# Patient Record
Sex: FEMALE | Race: WHITE | NOT HISPANIC OR LATINO | Employment: OTHER | URBAN - METROPOLITAN AREA
[De-identification: names, ages, dates, MRNs, and addresses within clinical notes are randomized per-mention and may not be internally consistent; named-entity substitution may affect disease eponyms.]

---

## 2017-01-11 PROBLEM — R42 DIZZINESS: Status: ACTIVE | Noted: 2017-01-11

## 2017-01-11 PROBLEM — R53.83 FATIGUE: Status: ACTIVE | Noted: 2017-01-11

## 2017-01-11 PROBLEM — R60.9 EDEMA: Status: ACTIVE | Noted: 2017-01-11

## 2017-01-18 RX ORDER — FUROSEMIDE 20 MG/1
TABLET ORAL
Qty: 24 TABLET | Refills: 3 | Status: SHIPPED | OUTPATIENT
Start: 2017-01-18 | End: 2018-01-31 | Stop reason: SDUPTHER

## 2017-02-08 RX ORDER — LEVOTHYROXINE SODIUM 0.05 MG/1
TABLET ORAL
Qty: 90 TABLET | Refills: 1 | Status: SHIPPED | OUTPATIENT
Start: 2017-02-08 | End: 2017-08-18 | Stop reason: SDUPTHER

## 2017-02-13 RX ORDER — POTASSIUM CHLORIDE 750 MG/1
TABLET, FILM COATED, EXTENDED RELEASE ORAL
Qty: 90 TABLET | Refills: 0 | Status: SHIPPED | OUTPATIENT
Start: 2017-02-13 | End: 2017-11-14

## 2017-03-06 RX ORDER — AMLODIPINE BESYLATE 5 MG/1
TABLET ORAL
Qty: 90 TABLET | Refills: 1 | Status: SHIPPED | OUTPATIENT
Start: 2017-03-06 | End: 2017-03-29 | Stop reason: DRUGHIGH

## 2017-03-16 RX ORDER — ROSUVASTATIN CALCIUM 10 MG/1
TABLET, COATED ORAL
Qty: 90 TABLET | Refills: 0 | Status: SHIPPED | OUTPATIENT
Start: 2017-03-16 | End: 2017-03-20 | Stop reason: SDUPTHER

## 2017-03-20 RX ORDER — METOPROLOL SUCCINATE 50 MG/1
TABLET, EXTENDED RELEASE ORAL
Qty: 90 TABLET | Refills: 1 | Status: SHIPPED | OUTPATIENT
Start: 2017-03-20 | End: 2017-06-26

## 2017-03-20 RX ORDER — ROSUVASTATIN CALCIUM 10 MG/1
TABLET, COATED ORAL
Qty: 90 TABLET | Refills: 0 | Status: SHIPPED | OUTPATIENT
Start: 2017-03-20 | End: 2017-11-14

## 2017-03-20 RX ORDER — METOPROLOL SUCCINATE 50 MG/1
TABLET, EXTENDED RELEASE ORAL
Qty: 90 TABLET | Refills: 1 | Status: SHIPPED | OUTPATIENT
Start: 2017-03-20 | End: 2018-04-04 | Stop reason: SDUPTHER

## 2017-03-29 ENCOUNTER — OFFICE VISIT (OUTPATIENT)
Dept: INTERNAL MEDICINE | Facility: CLINIC | Age: 82
End: 2017-03-29

## 2017-03-29 VITALS
HEART RATE: 60 BPM | WEIGHT: 135.5 LBS | SYSTOLIC BLOOD PRESSURE: 170 MMHG | BODY MASS INDEX: 28.32 KG/M2 | OXYGEN SATURATION: 98 % | DIASTOLIC BLOOD PRESSURE: 80 MMHG

## 2017-03-29 DIAGNOSIS — E78.5 DYSLIPIDEMIA: ICD-10-CM

## 2017-03-29 DIAGNOSIS — E78.5 HYPERLIPIDEMIA LDL GOAL <100: ICD-10-CM

## 2017-03-29 DIAGNOSIS — E03.8 OTHER SPECIFIED HYPOTHYROIDISM: ICD-10-CM

## 2017-03-29 DIAGNOSIS — I10 ESSENTIAL HYPERTENSION: ICD-10-CM

## 2017-03-29 DIAGNOSIS — M89.9 BONE DISORDER: Primary | ICD-10-CM

## 2017-03-29 DIAGNOSIS — K21.9 GASTROESOPHAGEAL REFLUX DISEASE, ESOPHAGITIS PRESENCE NOT SPECIFIED: ICD-10-CM

## 2017-03-29 LAB
ALBUMIN SERPL-MCNC: 4.2 G/DL (ref 3.2–4.8)
ALBUMIN/GLOB SERPL: 1.4 G/DL (ref 1.5–2.5)
ALP SERPL-CCNC: 136 U/L (ref 25–100)
ALT SERPL W P-5'-P-CCNC: 11 U/L (ref 7–40)
ANION GAP SERPL CALCULATED.3IONS-SCNC: 11 MMOL/L (ref 3–11)
ARTICHOKE IGE QN: 89 MG/DL (ref 0–130)
AST SERPL-CCNC: 19 U/L (ref 0–33)
BILIRUB SERPL-MCNC: 0.4 MG/DL (ref 0.3–1.2)
BUN BLD-MCNC: 13 MG/DL (ref 9–23)
BUN/CREAT SERPL: 14.4 (ref 7–25)
CALCIUM SPEC-SCNC: 9.7 MG/DL (ref 8.7–10.4)
CHLORIDE SERPL-SCNC: 102 MMOL/L (ref 99–109)
CHOLEST SERPL-MCNC: 165 MG/DL (ref 0–200)
CO2 SERPL-SCNC: 27 MMOL/L (ref 20–31)
CREAT BLD-MCNC: 0.9 MG/DL (ref 0.6–1.3)
GFR SERPL CREATININE-BSD FRML MDRD: 60 ML/MIN/1.73
GLOBULIN UR ELPH-MCNC: 3.1 GM/DL
GLUCOSE BLD-MCNC: 78 MG/DL (ref 70–100)
HDLC SERPL-MCNC: 64 MG/DL (ref 40–60)
POTASSIUM BLD-SCNC: 4.6 MMOL/L (ref 3.5–5.5)
PROT SERPL-MCNC: 7.3 G/DL (ref 5.7–8.2)
SODIUM BLD-SCNC: 140 MMOL/L (ref 132–146)
TRIGL SERPL-MCNC: 101 MG/DL (ref 0–150)
TSH SERPL DL<=0.05 MIU/L-ACNC: 5.1 MIU/ML (ref 0.35–5.35)

## 2017-03-29 PROCEDURE — 99214 OFFICE O/P EST MOD 30 MIN: CPT | Performed by: INTERNAL MEDICINE

## 2017-03-29 PROCEDURE — 84443 ASSAY THYROID STIM HORMONE: CPT | Performed by: INTERNAL MEDICINE

## 2017-03-29 PROCEDURE — 80061 LIPID PANEL: CPT | Performed by: INTERNAL MEDICINE

## 2017-03-29 PROCEDURE — 80053 COMPREHEN METABOLIC PANEL: CPT | Performed by: INTERNAL MEDICINE

## 2017-03-29 RX ORDER — AMLODIPINE BESYLATE 10 MG/1
10 TABLET ORAL DAILY
Qty: 30 TABLET | Refills: 1 | Status: SHIPPED | OUTPATIENT
Start: 2017-03-29 | End: 2017-05-24 | Stop reason: SDUPTHER

## 2017-03-29 NOTE — PROGRESS NOTES
Subjective   Dorota Membreno is a 85 y.o. female.   Chief Complaint   Patient presents with   • Heartburn   • Hyperlipidemia   • Hypertension   • Dyslipidemia       Heartburn   She reports no abdominal pain, no chest pain, no coughing, no nausea, no sore throat, no stridor, no water brash or no wheezing. This is a chronic problem. Pertinent negatives include no fatigue.   Hyperlipidemia   This is a chronic problem. The current episode started more than 1 year ago. The problem is controlled. Exacerbating diseases include hypothyroidism. Pertinent negatives include no chest pain, myalgias or shortness of breath.   Hypertension   This is a chronic problem. The current episode started more than 1 year ago. Pertinent negatives include no chest pain, headaches, neck pain, palpitations or shortness of breath. There are no compliance problems.    Hypothyroidism   This is a chronic problem. The current episode started more than 1 year ago. Pertinent negatives include no abdominal pain, arthralgias, chest pain, chills, congestion, coughing, diaphoresis, fatigue, fever, headaches, myalgias, nausea, neck pain, numbness, rash, sore throat or vomiting.        The following portions of the patient's history were reviewed and updated as appropriate: allergies, current medications, past family history, past medical history, past social history, past surgical history and problem list.    Review of Systems   Constitutional: Negative for activity change, appetite change, chills, diaphoresis, fatigue, fever and unexpected weight change.   HENT: Negative for congestion, ear discharge, ear pain, mouth sores, nosebleeds, sinus pressure, sneezing and sore throat.    Eyes: Negative for pain, discharge and itching.   Respiratory: Negative for cough, chest tightness, shortness of breath and wheezing.    Cardiovascular: Negative for chest pain, palpitations and leg swelling.   Gastrointestinal: Negative for abdominal pain, constipation,  diarrhea, nausea and vomiting.   Endocrine: Negative for cold intolerance, heat intolerance, polydipsia and polyphagia.   Genitourinary: Negative for dysuria, flank pain, frequency, hematuria and urgency.   Musculoskeletal: Negative for arthralgias, back pain, gait problem, myalgias, neck pain and neck stiffness.   Skin: Negative for color change, pallor and rash.   Neurological: Negative for seizures, speech difficulty, numbness and headaches.   Psychiatric/Behavioral: Negative for agitation, confusion, decreased concentration and sleep disturbance. The patient is not nervous/anxious.      /80  Pulse 60  Wt 135 lb 8 oz (61.5 kg)  SpO2 98%  BMI 28.32 kg/m2    Objective   Physical Exam   Constitutional: She is oriented to person, place, and time. She appears well-developed.   HENT:   Head: Normocephalic.   Right Ear: External ear normal.   Left Ear: External ear normal.   Nose: Nose normal.   Mouth/Throat: Oropharynx is clear and moist.   Eyes: Conjunctivae are normal. Pupils are equal, round, and reactive to light.   Neck: No JVD present. No thyromegaly present.   Cardiovascular: Normal rate, regular rhythm and normal heart sounds.  Exam reveals no friction rub.    No murmur heard.  Pulmonary/Chest: Effort normal and breath sounds normal. No respiratory distress. She has no wheezes. She has no rales.   Abdominal: Soft. Bowel sounds are normal. She exhibits no distension. There is no tenderness. There is no guarding.   Musculoskeletal: She exhibits no edema or tenderness.   Lymphadenopathy:     She has no cervical adenopathy.   Neurological: She is oriented to person, place, and time. She displays normal reflexes. No cranial nerve deficit.   Skin: No rash noted.   Psychiatric: Her behavior is normal.   Nursing note and vitals reviewed.      Assessment/Plan   Dorota was seen today for heartburn, hyperlipidemia, hypertension and dyslipidemia.    Diagnoses and all orders for this visit:    Bone disorder  -      DEXA Bone Density Axial; Future    Essential hypertension  -     amLODIPine (NORVASC) 10 MG tablet; Take 1 tablet by mouth Daily.  -     Lipid Panel  -     Comprehensive Metabolic Panel  -     TSH  BP is up today. She has already taken BP med.  Increase Norvasc to 10 mg and 4 week f/u.  Other specified hypothyroidism  -     Lipid Panel  -     Comprehensive Metabolic Panel  -     TSH  stable  Hyperlipidemia LDL goal <100  -     Lipid Panel  -     Comprehensive Metabolic Panel  -     TSH  stable  Gastroesophageal reflux disease, esophagitis presence not specified    Stable  50% of visit spent counseling

## 2017-03-30 ENCOUNTER — TELEPHONE (OUTPATIENT)
Dept: INTERNAL MEDICINE | Facility: CLINIC | Age: 82
End: 2017-03-30

## 2017-03-30 NOTE — TELEPHONE ENCOUNTER
Called and spoke with pts daughter. Informed her that we do not have Calcium listed on her medication but she could take it otc and to check her medication bottles at home to verify that. Pts daughter verbalized understanding and had no further questions.

## 2017-03-30 NOTE — TELEPHONE ENCOUNTER
"----- Message from Brendan Waldrop sent at 3/30/2017  9:09 AM EDT -----  [phone]    PT'S DAUGHTER IS CALLING, ASKING IF HER MOTHER SHANNA IS ON CALCIUM. THE INFO IS TO GET HER SCHEDULED FOR A BONE DENSITY. I HAD GIVEN HER WEIGHT AND HEIGHT OVER THE PHONE. \"NEELA\" HER DAUGHTER IS ON HER POWER OF  SCANNED INTO EPIC.   "

## 2017-05-01 RX ORDER — OMEPRAZOLE 20 MG/1
CAPSULE, DELAYED RELEASE ORAL
Qty: 30 CAPSULE | Refills: 5 | Status: SHIPPED | OUTPATIENT
Start: 2017-05-01 | End: 2017-11-06 | Stop reason: SDUPTHER

## 2017-05-02 ENCOUNTER — OFFICE VISIT (OUTPATIENT)
Dept: NEUROLOGY | Facility: CLINIC | Age: 82
End: 2017-05-02

## 2017-05-02 VITALS — SYSTOLIC BLOOD PRESSURE: 130 MMHG | WEIGHT: 135 LBS | BODY MASS INDEX: 28.22 KG/M2 | DIASTOLIC BLOOD PRESSURE: 70 MMHG

## 2017-05-02 DIAGNOSIS — G31.84 MILD COGNITIVE IMPAIRMENT: Primary | ICD-10-CM

## 2017-05-02 PROCEDURE — 99214 OFFICE O/P EST MOD 30 MIN: CPT | Performed by: PHYSICIAN ASSISTANT

## 2017-05-05 RX ORDER — MONTELUKAST SODIUM 10 MG/1
TABLET ORAL
Qty: 90 TABLET | Refills: 1 | Status: SHIPPED | OUTPATIENT
Start: 2017-05-05 | End: 2017-11-08 | Stop reason: SDUPTHER

## 2017-05-19 ENCOUNTER — OFFICE VISIT (OUTPATIENT)
Dept: INTERNAL MEDICINE | Facility: CLINIC | Age: 82
End: 2017-05-19

## 2017-05-19 VITALS
HEART RATE: 70 BPM | DIASTOLIC BLOOD PRESSURE: 68 MMHG | OXYGEN SATURATION: 99 % | BODY MASS INDEX: 28.57 KG/M2 | WEIGHT: 136.7 LBS | SYSTOLIC BLOOD PRESSURE: 132 MMHG

## 2017-05-19 DIAGNOSIS — K21.9 GASTROESOPHAGEAL REFLUX DISEASE, ESOPHAGITIS PRESENCE NOT SPECIFIED: ICD-10-CM

## 2017-05-19 DIAGNOSIS — I10 ESSENTIAL HYPERTENSION: ICD-10-CM

## 2017-05-19 DIAGNOSIS — E03.8 OTHER SPECIFIED HYPOTHYROIDISM: Primary | ICD-10-CM

## 2017-05-19 PROCEDURE — 99213 OFFICE O/P EST LOW 20 MIN: CPT | Performed by: INTERNAL MEDICINE

## 2017-05-22 ENCOUNTER — APPOINTMENT (OUTPATIENT)
Dept: MAMMOGRAPHY | Facility: HOSPITAL | Age: 82
End: 2017-05-22
Attending: INTERNAL MEDICINE

## 2017-05-24 DIAGNOSIS — I10 ESSENTIAL HYPERTENSION: ICD-10-CM

## 2017-05-24 RX ORDER — AMLODIPINE BESYLATE 10 MG/1
TABLET ORAL
Qty: 30 TABLET | Refills: 1 | Status: SHIPPED | OUTPATIENT
Start: 2017-05-24 | End: 2017-11-14

## 2017-05-30 RX ORDER — FLUTICASONE PROPIONATE 50 MCG
SPRAY, SUSPENSION (ML) NASAL
Qty: 16 ML | Refills: 3 | Status: SHIPPED | OUTPATIENT
Start: 2017-05-30 | End: 2018-06-08

## 2017-05-31 RX ORDER — LOSARTAN POTASSIUM 100 MG/1
TABLET ORAL
Qty: 90 TABLET | Refills: 1 | Status: SHIPPED | OUTPATIENT
Start: 2017-05-31 | End: 2017-11-14

## 2017-06-22 ENCOUNTER — APPOINTMENT (OUTPATIENT)
Dept: BONE DENSITY | Facility: HOSPITAL | Age: 82
End: 2017-06-22
Attending: INTERNAL MEDICINE

## 2017-06-26 ENCOUNTER — OFFICE VISIT (OUTPATIENT)
Dept: INTERNAL MEDICINE | Facility: CLINIC | Age: 82
End: 2017-06-26

## 2017-06-26 VITALS
BODY MASS INDEX: 28.05 KG/M2 | SYSTOLIC BLOOD PRESSURE: 140 MMHG | WEIGHT: 134.2 LBS | DIASTOLIC BLOOD PRESSURE: 60 MMHG | OXYGEN SATURATION: 98 % | HEART RATE: 71 BPM

## 2017-06-26 DIAGNOSIS — M79.89 LEFT LEG SWELLING: Primary | ICD-10-CM

## 2017-06-26 PROCEDURE — 99213 OFFICE O/P EST LOW 20 MIN: CPT | Performed by: INTERNAL MEDICINE

## 2017-06-26 RX ORDER — AMLODIPINE BESYLATE 2.5 MG/1
2.5 TABLET ORAL DAILY
Qty: 90 TABLET | Refills: 0 | Status: SHIPPED | OUTPATIENT
Start: 2017-06-26 | End: 2017-10-02 | Stop reason: SDUPTHER

## 2017-06-26 NOTE — PROGRESS NOTES
Subjective   Dorota Membreno is a 85 y.o. female.     Chief Complaint   Patient presents with   • Leg Swelling       History of Present Illness   2 week hx leg swelling. Now only in left leg and increased in size. 2  The following portions of the patient's history were reviewed and updated as appropriate: allergies, current medications, past family history, past medical history, past social history, past surgical history and problem list.    Review of Systems   Constitutional: Negative for activity change, appetite change, chills, diaphoresis, fatigue, fever and unexpected weight change.   HENT: Negative for congestion, ear discharge, ear pain, mouth sores, nosebleeds, sinus pressure, sneezing and sore throat.    Eyes: Negative for pain, discharge and itching.   Respiratory: Negative for cough, chest tightness, shortness of breath and wheezing.    Cardiovascular: Positive for leg swelling. Negative for chest pain and palpitations.   Gastrointestinal: Negative for abdominal pain, constipation, diarrhea, nausea and vomiting.   Endocrine: Negative for cold intolerance, heat intolerance, polydipsia and polyphagia.   Genitourinary: Negative for dysuria, flank pain, frequency, hematuria and urgency.   Musculoskeletal: Negative for arthralgias, back pain, gait problem, myalgias, neck pain and neck stiffness.   Skin: Negative for color change, pallor and rash.   Neurological: Negative for seizures, speech difficulty, numbness and headaches.   Psychiatric/Behavioral: Negative for agitation, confusion, decreased concentration and sleep disturbance. The patient is not nervous/anxious.    /60  Pulse 71  Wt 134 lb 3.2 oz (60.9 kg)  SpO2 98%  BMI 28.05 kg/m2      Objective   Physical Exam   Constitutional: She appears well-developed.   HENT:   Head: Normocephalic.   Right Ear: External ear normal.   Left Ear: External ear normal.   Nose: Nose normal.   Mouth/Throat: Oropharynx is clear and moist.   Eyes: Conjunctivae  are normal. Pupils are equal, round, and reactive to light.   Neck: No JVD present. No thyromegaly present.   Cardiovascular: Normal rate, regular rhythm and normal heart sounds.  Exam reveals no friction rub.    No murmur heard.  Pulmonary/Chest: Effort normal and breath sounds normal. No respiratory distress. She has no wheezes. She has no rales.   Abdominal: Soft. Bowel sounds are normal. She exhibits no distension. There is no tenderness. There is no guarding.   Musculoskeletal: She exhibits edema (left leg edema). She exhibits no tenderness.   Lymphadenopathy:     She has no cervical adenopathy.   Neurological: She displays normal reflexes. No cranial nerve deficit.   Skin: No rash noted.   Psychiatric: Her behavior is normal.   Nursing note and vitals reviewed.      Assessment/Plan   Dorota was seen today for leg swelling.    Diagnoses and all orders for this visit:    Left leg swelling  -     Duplex Venous Lower Extremity - Left CAR; Future    Further recommendations after results.    50% of visit spent counseling

## 2017-08-18 RX ORDER — LEVOTHYROXINE SODIUM 0.05 MG/1
TABLET ORAL
Qty: 90 TABLET | Refills: 1 | Status: SHIPPED | OUTPATIENT
Start: 2017-08-18 | End: 2018-02-15 | Stop reason: SDUPTHER

## 2017-08-24 ENCOUNTER — HOSPITAL ENCOUNTER (OUTPATIENT)
Dept: BONE DENSITY | Facility: HOSPITAL | Age: 82
Discharge: HOME | End: 2017-08-24
Attending: INTERNAL MEDICINE | Admitting: INTERNAL MEDICINE

## 2017-08-24 DIAGNOSIS — M89.9 BONE DISORDER: ICD-10-CM

## 2017-08-24 PROCEDURE — 77080 DXA BONE DENSITY AXIAL: CPT

## 2017-09-11 RX ORDER — DONEPEZIL HYDROCHLORIDE 5 MG/1
TABLET, FILM COATED ORAL
Qty: 90 TABLET | Refills: 2 | Status: SHIPPED | OUTPATIENT
Start: 2017-09-11 | End: 2020-01-06

## 2017-09-12 RX ORDER — ROSUVASTATIN CALCIUM 10 MG/1
TABLET, COATED ORAL
Qty: 90 TABLET | Refills: 0 | Status: SHIPPED | OUTPATIENT
Start: 2017-09-12 | End: 2018-02-13 | Stop reason: SDUPTHER

## 2017-10-02 RX ORDER — AMLODIPINE BESYLATE 2.5 MG/1
TABLET ORAL
Qty: 90 TABLET | Refills: 0 | Status: SHIPPED | OUTPATIENT
Start: 2017-10-02 | End: 2017-12-29 | Stop reason: SDUPTHER

## 2017-10-04 ENCOUNTER — OFFICE VISIT (OUTPATIENT)
Dept: INTERNAL MEDICINE | Facility: CLINIC | Age: 82
End: 2017-10-04

## 2017-10-04 VITALS
HEART RATE: 57 BPM | WEIGHT: 135 LBS | DIASTOLIC BLOOD PRESSURE: 60 MMHG | OXYGEN SATURATION: 99 % | SYSTOLIC BLOOD PRESSURE: 140 MMHG | BODY MASS INDEX: 28.22 KG/M2

## 2017-10-04 DIAGNOSIS — I10 ESSENTIAL HYPERTENSION: Primary | ICD-10-CM

## 2017-10-04 DIAGNOSIS — G31.84 MILD COGNITIVE IMPAIRMENT: ICD-10-CM

## 2017-10-04 DIAGNOSIS — E03.8 OTHER SPECIFIED HYPOTHYROIDISM: ICD-10-CM

## 2017-10-04 DIAGNOSIS — E78.5 DYSLIPIDEMIA: ICD-10-CM

## 2017-10-04 DIAGNOSIS — K21.9 GASTROESOPHAGEAL REFLUX DISEASE, ESOPHAGITIS PRESENCE NOT SPECIFIED: ICD-10-CM

## 2017-10-04 LAB
ALBUMIN SERPL-MCNC: 4.1 G/DL (ref 3.2–4.8)
ALBUMIN/GLOB SERPL: 1.4 G/DL (ref 1.5–2.5)
ALP SERPL-CCNC: 115 U/L (ref 25–100)
ALT SERPL W P-5'-P-CCNC: 11 U/L (ref 7–40)
ANION GAP SERPL CALCULATED.3IONS-SCNC: 7 MMOL/L (ref 3–11)
AST SERPL-CCNC: 18 U/L (ref 0–33)
BILIRUB SERPL-MCNC: 0.3 MG/DL (ref 0.3–1.2)
BUN BLD-MCNC: 16 MG/DL (ref 9–23)
BUN/CREAT SERPL: 20 (ref 7–25)
CALCIUM SPEC-SCNC: 9.1 MG/DL (ref 8.7–10.4)
CHLORIDE SERPL-SCNC: 101 MMOL/L (ref 99–109)
CO2 SERPL-SCNC: 28 MMOL/L (ref 20–31)
CREAT BLD-MCNC: 0.8 MG/DL (ref 0.6–1.3)
GFR SERPL CREATININE-BSD FRML MDRD: 68 ML/MIN/1.73
GLOBULIN UR ELPH-MCNC: 3 GM/DL
GLUCOSE BLD-MCNC: 90 MG/DL (ref 70–100)
POTASSIUM BLD-SCNC: 4 MMOL/L (ref 3.5–5.5)
PROT SERPL-MCNC: 7.1 G/DL (ref 5.7–8.2)
SODIUM BLD-SCNC: 136 MMOL/L (ref 132–146)
TSH SERPL DL<=0.05 MIU/L-ACNC: 4.75 MIU/ML (ref 0.35–5.35)

## 2017-10-04 PROCEDURE — G0008 ADMIN INFLUENZA VIRUS VAC: HCPCS | Performed by: INTERNAL MEDICINE

## 2017-10-04 PROCEDURE — 90662 IIV NO PRSV INCREASED AG IM: CPT | Performed by: INTERNAL MEDICINE

## 2017-10-04 PROCEDURE — 99214 OFFICE O/P EST MOD 30 MIN: CPT | Performed by: INTERNAL MEDICINE

## 2017-10-04 PROCEDURE — 84443 ASSAY THYROID STIM HORMONE: CPT | Performed by: INTERNAL MEDICINE

## 2017-10-04 PROCEDURE — 80053 COMPREHEN METABOLIC PANEL: CPT | Performed by: INTERNAL MEDICINE

## 2017-10-04 NOTE — PROGRESS NOTES
Subjective   Dorota Membreno is a 85 y.o. female.   Chief Complaint   Patient presents with   • Follow-up     hypertension, hypothyroidism       Hypertension   This is a chronic problem. The current episode started more than 1 year ago. Pertinent negatives include no chest pain, headaches, neck pain, palpitations, peripheral edema, shortness of breath or sweats.   Hypothyroidism   This is a chronic problem. The current episode started more than 1 year ago. Pertinent negatives include no abdominal pain, arthralgias, chest pain, chills, congestion, coughing, diaphoresis, fatigue, fever, headaches, myalgias, nausea, neck pain, numbness, rash, sore throat or vomiting. The treatment provided moderate relief.   Heartburn   She reports no abdominal pain, no chest pain, no coughing, no nausea, no sore throat, no stridor, no tooth decay, no water brash or no wheezing. This is a chronic problem. The current episode started more than 1 year ago. Pertinent negatives include no fatigue.      Mild cognitive memory.  The following portions of the patient's history were reviewed and updated as appropriate: allergies, current medications, past family history, past medical history, past social history, past surgical history and problem list.    Review of Systems   Constitutional: Negative for activity change, appetite change, chills, diaphoresis, fatigue, fever and unexpected weight change.   HENT: Negative for congestion, ear discharge, ear pain, mouth sores, nosebleeds, sinus pressure, sneezing and sore throat.    Eyes: Negative for pain, discharge and itching.   Respiratory: Negative for cough, chest tightness, shortness of breath and wheezing.    Cardiovascular: Negative for chest pain, palpitations and leg swelling.   Gastrointestinal: Negative for abdominal pain, constipation, diarrhea, nausea and vomiting.   Endocrine: Negative for cold intolerance, heat intolerance, polydipsia and polyphagia.   Genitourinary: Negative for  dysuria, flank pain, frequency, hematuria and urgency.   Musculoskeletal: Negative for arthralgias, back pain, gait problem, myalgias, neck pain and neck stiffness.   Skin: Negative for color change, pallor and rash.   Neurological: Negative for seizures, speech difficulty, numbness and headaches.   Psychiatric/Behavioral: Negative for agitation, confusion, decreased concentration and sleep disturbance. The patient is not nervous/anxious.      /60  Pulse 57  Wt 135 lb (61.2 kg)  SpO2 99%  BMI 28.22 kg/m2    Objective   Physical Exam   Constitutional: She appears well-developed.   HENT:   Head: Normocephalic.   Right Ear: External ear normal.   Left Ear: External ear normal.   Nose: Nose normal.   Mouth/Throat: Oropharynx is clear and moist.   Eyes: Conjunctivae are normal. Pupils are equal, round, and reactive to light.   Neck: No JVD present. No thyromegaly present.   Cardiovascular: Normal rate, regular rhythm and normal heart sounds.  Exam reveals no friction rub.    No murmur heard.  Pulmonary/Chest: Effort normal and breath sounds normal. No respiratory distress. She has no wheezes. She has no rales.   Abdominal: Soft. Bowel sounds are normal. She exhibits no distension. There is no tenderness. There is no guarding.   Musculoskeletal: She exhibits edema (left leg edema). She exhibits no tenderness.   Lymphadenopathy:     She has no cervical adenopathy.   Neurological: She displays normal reflexes. No cranial nerve deficit.   Skin: No rash noted.   Psychiatric: Her behavior is normal.   Nursing note and vitals reviewed.      Assessment/Plan   Dorota was seen today for follow-up.    Diagnoses and all orders for this visit:    Essential hypertension  -     Comprehensive Metabolic Panel    Other specified hypothyroidism  -     TSH    Gastroesophageal reflux disease, esophagitis presence not specified  stable  Dyslipidemia  stable  Mild cognitive impairment  stable  Other orders  -     Flu Vaccine High Dose  PF 65YR+

## 2017-11-06 RX ORDER — OMEPRAZOLE 20 MG/1
CAPSULE, DELAYED RELEASE ORAL
Qty: 30 CAPSULE | Refills: 5 | Status: SHIPPED | OUTPATIENT
Start: 2017-11-06 | End: 2018-03-18 | Stop reason: SDUPTHER

## 2017-11-08 RX ORDER — MONTELUKAST SODIUM 10 MG/1
TABLET ORAL
Qty: 90 TABLET | Refills: 1 | Status: SHIPPED | OUTPATIENT
Start: 2017-11-08

## 2017-11-09 ENCOUNTER — TELEPHONE (OUTPATIENT)
Dept: NEUROLOGY | Facility: CLINIC | Age: 82
End: 2017-11-09
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
## 2017-11-14 ENCOUNTER — OFFICE VISIT (OUTPATIENT)
Dept: NEUROLOGY | Facility: CLINIC | Age: 82
End: 2017-11-14

## 2017-11-14 VITALS — DIASTOLIC BLOOD PRESSURE: 74 MMHG | SYSTOLIC BLOOD PRESSURE: 130 MMHG | BODY MASS INDEX: 28.22 KG/M2 | WEIGHT: 135 LBS

## 2017-11-14 DIAGNOSIS — F02.80 LATE ONSET ALZHEIMER'S DISEASE WITHOUT BEHAVIORAL DISTURBANCE (HCC): Primary | ICD-10-CM

## 2017-11-14 DIAGNOSIS — G30.1 LATE ONSET ALZHEIMER'S DISEASE WITHOUT BEHAVIORAL DISTURBANCE (HCC): Primary | ICD-10-CM

## 2017-11-14 PROCEDURE — 99214 OFFICE O/P EST MOD 30 MIN: CPT | Performed by: PHYSICIAN ASSISTANT

## 2017-11-14 NOTE — PROGRESS NOTES
Subjective     Chief Complaint: memory loss     History of Present Illness   Dorota Membreno is a 86 y.o. female who returns to clinic today for evaluation of cognitive impairment. She has noted problems since early 2013. She initially noted trouble with naming and forgetfulness. This has worsened over time. There are additional impairments in orientation and executive function. Her family notes that she has had difficulty managing her finances.      At her initial evaluation in 12/13, her MMSE score was 27, but her MoCA=25 (0/5 recall). Therefore, a diagnosis of MCI was made and donepezil started. She was unable to tolerate Donepezil at 10mg due to upset stomach and diarrhea. She is currently doing well on this medication at 5mg daily. She was also unable to tolerate Namenda XR due to dizziness. Workup has included a normal head CT along with CBC/CMP/TFT's/B12/Folate.    Since her last visit in 5/17, she and her family have noted a cognitive decline.      I have reviewed and confirmed the past family, social and medical history as accurate on 11/14/17.     Review of Systems   Constitutional: Negative.    HENT: Negative.    Eyes: Negative.    Respiratory: Negative.    Cardiovascular: Negative.    Gastrointestinal: Negative.    Endocrine: Negative.    Genitourinary: Negative.    Musculoskeletal: Negative.    Skin: Negative.    Allergic/Immunologic: Negative.    Neurological:        As noted in HPI   Hematological: Negative.    Psychiatric/Behavioral: Negative.        Objective     /74  Wt 135 lb (61.2 kg)  BMI 28.22 kg/m2    General appearance today is normal.       Physical Exam   Constitutional: She is oriented to person, place, and time.   Neurological: She is oriented to person, place, and time. She has normal strength. She has a normal Finger-Nose-Finger Test.   Psychiatric: Her speech is normal.        Neurologic Exam     Mental Status   Oriented to person, place, and time.   Registration: recalls 3 of  3 objects. Recall of objects at 5 minutes: 0/3 recall. Follows 3 step commands.   Attention: 2/5 sequencing.   Speech: speech is normal   Level of consciousness: alert  Able to name object. Able to read. Able to repeat. Able to write. Normal comprehension.     Cranial Nerves   Cranial nerves II through XII intact.     Motor Exam   Muscle bulk: normal  Overall muscle tone: normal    Strength   Strength 5/5 throughout.     Sensory Exam   Light touch normal.     Gait, Coordination, and Reflexes     Coordination   Finger to nose coordination: normal    Tremor   Resting tremor: absent        Results  MMSE=23 (26 in 5/17)       Assessment/Plan   Dorota was seen today for memory loss.    Diagnoses and all orders for this visit:    Late onset Alzheimer's disease without behavioral disturbance  -     Ambulatory Referral to Occupational Therapy          Discussion/Summary   Dorota Membreno comes to clinic today for evaluation of cognitive impairment. Given her history and examination today, including cognitive bedside testing, I am concerned about underlying Alzheimer's Disease. This was discussed in detail with the patient and her family. I again reviewed her current status and treatment options. After discussing potential treatment options, it was elected to continue on  donepezil 5mg daily. I have also made a referral to [facility] for a driving evaluation. She will then follow up in 6 months, or sooner if needed.       I spent 20 minutes out of 30  minutes face to face with the patient and family and discussing diagnosis, prognosis, evaluation, current status, treatment options and management.    As part of this visit I obtained additional history from the family which is incorporated in the HPI.      Deborah Corley PA-C

## 2017-11-21 RX ORDER — POTASSIUM CHLORIDE 750 MG/1
TABLET, FILM COATED, EXTENDED RELEASE ORAL
Qty: 90 TABLET | Refills: 0 | OUTPATIENT
Start: 2017-11-21

## 2017-12-02 RX ORDER — POTASSIUM CHLORIDE 750 MG/1
TABLET, FILM COATED, EXTENDED RELEASE ORAL
Qty: 90 TABLET | Refills: 0 | Status: SHIPPED | OUTPATIENT
Start: 2017-12-02 | End: 2023-03-09 | Stop reason: HOSPADM

## 2017-12-28 ENCOUNTER — TELEPHONE (OUTPATIENT)
Dept: NEUROLOGY | Facility: CLINIC | Age: 82
End: 2017-12-28

## 2017-12-29 RX ORDER — AMLODIPINE BESYLATE 2.5 MG/1
TABLET ORAL
Qty: 90 TABLET | Refills: 0 | Status: SHIPPED | OUTPATIENT
Start: 2017-12-29 | End: 2018-03-31 | Stop reason: SDUPTHER

## 2018-01-31 RX ORDER — FUROSEMIDE 20 MG/1
TABLET ORAL
Qty: 24 TABLET | Refills: 0 | Status: SHIPPED | OUTPATIENT
Start: 2018-01-31 | End: 2018-03-29 | Stop reason: SDUPTHER

## 2018-02-08 ENCOUNTER — OFFICE VISIT (OUTPATIENT)
Dept: INTERNAL MEDICINE | Facility: CLINIC | Age: 83
End: 2018-02-08

## 2018-02-08 VITALS
HEART RATE: 73 BPM | DIASTOLIC BLOOD PRESSURE: 70 MMHG | SYSTOLIC BLOOD PRESSURE: 128 MMHG | BODY MASS INDEX: 27.59 KG/M2 | OXYGEN SATURATION: 98 % | WEIGHT: 132 LBS

## 2018-02-08 DIAGNOSIS — B02.9 HERPES ZOSTER WITHOUT COMPLICATION: Primary | ICD-10-CM

## 2018-02-08 PROCEDURE — 99213 OFFICE O/P EST LOW 20 MIN: CPT | Performed by: PHYSICIAN ASSISTANT

## 2018-02-08 RX ORDER — VALACYCLOVIR HYDROCHLORIDE 1 G/1
1000 TABLET, FILM COATED ORAL 3 TIMES DAILY
Qty: 21 TABLET | Refills: 0 | Status: SHIPPED | OUTPATIENT
Start: 2018-02-08 | End: 2018-02-12

## 2018-02-08 RX ORDER — VALACYCLOVIR HYDROCHLORIDE 1 G/1
1000 TABLET, FILM COATED ORAL 3 TIMES DAILY
Qty: 21 TABLET | Refills: 0 | Status: SHIPPED | OUTPATIENT
Start: 2018-02-08 | End: 2018-02-08 | Stop reason: SDUPTHER

## 2018-02-08 NOTE — PROGRESS NOTES
Chief Complaint   Patient presents with   • Rash under breasts and back x1 week       Subjective   Dorota Membreno is a 86 y.o. female.       History of Present Illness     Pt has had a mild rash under breasts for several months, worsened about a week ago under right breast and has moved around to her back, stops at midline. Rash has blistered and become painful and itchy. Has not tried anything on it or taken anything for pain.      Current Outpatient Prescriptions:   •  albuterol (PROVENTIL) (2.5 MG/3ML) 0.083% nebulizer solution, Albuterol Sulfate (2.5 MG/3ML) 0.083% Inhalation Nebulization Solution; Patient Sig: Albuterol Sulfate (2.5 MG/3ML) 0.083% Inhalation Nebulization Solution prn; 0; 01-May-2013; Active, Disp: , Rfl:   •  amLODIPine (NORVASC) 2.5 MG tablet, TAKE 1 TABLET BY MOUTH EVERY DAY, Disp: 90 tablet, Rfl: 0  •  donepezil (ARICEPT) 5 MG tablet, TAKE 1 TABLET BY MOUTH AT BEDTIME, Disp: 90 tablet, Rfl: 2  •  fluticasone (FLONASE) 50 MCG/ACT nasal spray, USE 1 SPRAY IN EACH NOSTRIL TWICE DAILY, Disp: 16 mL, Rfl: 3  •  furosemide (LASIX) 20 MG tablet, TAKE 1 TABLET BY MOUTH DAILY ON MONDAY AND THURSDAY, Disp: 24 tablet, Rfl: 0  •  KLOR-CON 10 MEQ CR tablet, TAKE 1 TABLET BY MOUTH: MON,WED,FRI, Disp: 90 tablet, Rfl: 0  •  levothyroxine (SYNTHROID, LEVOTHROID) 50 MCG tablet, TAKE 1 TABLET EVERY DAY, Disp: 90 tablet, Rfl: 1  •  metoprolol succinate XL (TOPROL-XL) 50 MG 24 hr tablet, TAKE 1 TABLET BY MOUTH EVERY DAY, Disp: 90 tablet, Rfl: 1  •  montelukast (SINGULAIR) 10 MG tablet, TAKE 1 TABLET BY MOUTH EVERY NIGHT., Disp: 90 tablet, Rfl: 1  •  omeprazole (priLOSEC) 20 MG capsule, TAKE 1 CAPSULE BY MOUTH DAILY., Disp: 30 capsule, Rfl: 5  •  rosuvastatin (CRESTOR) 10 MG tablet, TAKE 1 TABLET BY MOUTH MONDAY,WEDNESDAY,FRIDAY, Disp: 90 tablet, Rfl: 0  •  valACYclovir (VALTREX) 1000 MG tablet, Take 1 tablet by mouth 3 (Three) Times a Day., Disp: 21 tablet, Rfl: 0     PMFSH  The following portions of the  patient's history were reviewed and updated as appropriate: allergies, current medications, past family history, past medical history, past social history, past surgical history and problem list.    Review of Systems   Constitutional: Negative for activity change, appetite change, fatigue, fever and unexpected weight change.   HENT: Negative for facial swelling, mouth sores and trouble swallowing.    Eyes: Negative for pain, discharge, itching and visual disturbance.   Respiratory: Negative for chest tightness, shortness of breath and wheezing.    Cardiovascular: Negative for chest pain and palpitations.   Gastrointestinal: Negative for abdominal pain, diarrhea, nausea and vomiting.   Endocrine: Negative.    Genitourinary: Negative.    Musculoskeletal: Negative for joint swelling.   Skin: Positive for rash.   Allergic/Immunologic: Negative.    Neurological: Negative for seizures and syncope.   Hematological: Does not bruise/bleed easily.   Psychiatric/Behavioral: Negative.        Objective   /70  Pulse 73  Wt 59.9 kg (132 lb)  SpO2 98%  BMI 27.59 kg/m2    Physical Exam   Constitutional: She is oriented to person, place, and time. She appears well-developed and well-nourished. No distress.   HENT:   Head: Normocephalic and atraumatic.   Right Ear: External ear normal.   Left Ear: External ear normal.   Eyes: Conjunctivae and EOM are normal. Pupils are equal, round, and reactive to light. Right eye exhibits no discharge. Left eye exhibits no discharge. No scleral icterus.   Neck: Normal range of motion. Neck supple.   Pulmonary/Chest: Effort normal.   Musculoskeletal: Normal range of motion.   Neurological: She is alert and oriented to person, place, and time. She exhibits normal muscle tone.   Skin: Skin is warm and dry. Rash noted. Rash is vesicular. She is not diaphoretic.        Psychiatric: She has a normal mood and affect. Her behavior is normal. Judgment and thought content normal.   Nursing note and  vitals reviewed.      Results for orders placed or performed in visit on 10/04/17   Comprehensive Metabolic Panel   Result Value Ref Range    Glucose 90 70 - 100 mg/dL    BUN 16 9 - 23 mg/dL    Creatinine 0.80 0.60 - 1.30 mg/dL    Sodium 136 132 - 146 mmol/L    Potassium 4.0 3.5 - 5.5 mmol/L    Chloride 101 99 - 109 mmol/L    CO2 28.0 20.0 - 31.0 mmol/L    Calcium 9.1 8.7 - 10.4 mg/dL    Total Protein 7.1 5.7 - 8.2 g/dL    Albumin 4.10 3.20 - 4.80 g/dL    ALT (SGPT) 11 7 - 40 U/L    AST (SGOT) 18 0 - 33 U/L    Alkaline Phosphatase 115 (H) 25 - 100 U/L    Total Bilirubin 0.3 0.3 - 1.2 mg/dL    eGFR Non African Amer 68 >60 mL/min/1.73    Globulin 3.0 gm/dL    A/G Ratio 1.4 (L) 1.5 - 2.5 g/dL    BUN/Creatinine Ratio 20.0 7.0 - 25.0    Anion Gap 7.0 3.0 - 11.0 mmol/L   TSH   Result Value Ref Range    TSH 4.750 0.350 - 5.350 mIU/mL        ASSESSMENT/PLAN    Problem List Items Addressed This Visit     None      Visit Diagnoses     Herpes zoster without complication    -  Primary    Start valtrex as directed. Use otc tylenol or ibuprofen prn for discomfort, use otc hydrocortisone cream for itching. RTC if worsening or not resolving.    Relevant Medications    valACYclovir (VALTREX) 1000 MG tablet               Return if symptoms worsen or fail to improve.

## 2018-02-12 ENCOUNTER — OFFICE VISIT (OUTPATIENT)
Dept: INTERNAL MEDICINE | Facility: CLINIC | Age: 83
End: 2018-02-12

## 2018-02-12 VITALS
BODY MASS INDEX: 27.17 KG/M2 | OXYGEN SATURATION: 98 % | DIASTOLIC BLOOD PRESSURE: 60 MMHG | HEART RATE: 71 BPM | WEIGHT: 130 LBS | SYSTOLIC BLOOD PRESSURE: 138 MMHG | TEMPERATURE: 97.1 F

## 2018-02-12 DIAGNOSIS — B02.8 HERPES ZOSTER WITH OTHER COMPLICATION: Primary | ICD-10-CM

## 2018-02-12 PROBLEM — B02.9 SHINGLES: Status: ACTIVE | Noted: 2018-02-12

## 2018-02-12 PROCEDURE — 99213 OFFICE O/P EST LOW 20 MIN: CPT | Performed by: PHYSICIAN ASSISTANT

## 2018-02-12 RX ORDER — ACYCLOVIR 800 MG/1
800 TABLET ORAL
Qty: 35 TABLET | Refills: 0 | Status: SHIPPED | OUTPATIENT
Start: 2018-02-12 | End: 2018-02-19

## 2018-02-12 RX ORDER — DOXYCYCLINE 100 MG/1
100 CAPSULE ORAL EVERY 12 HOURS SCHEDULED
Qty: 20 CAPSULE | Refills: 0 | Status: SHIPPED | OUTPATIENT
Start: 2018-02-12 | End: 2018-06-08

## 2018-02-12 NOTE — PROGRESS NOTES
Chief Complaint   Patient presents with   • Follow-up     shingles       Subjective   Dorota Membreno is a 86 y.o. female.       History of Present Illness     Since last visit, pt took one of valtrex tablets and could not tolerate it- vomited after several hours of nausea. She has continued to have worsening rash, in same dermatomal distribution. Did not use hydrocortisone cream or ibuprofen for pain relief. Rash under her breast is uncomfortable and turning somewhat red. Pain is uncomfortable, not intolerable. Pt does not want to take more medication to help with pain.     Pt is here with her dtr-in-law who notes that pt called her son (DIL's ) several times over the weekend saying she was short of breath but when he came to the house, was feeling fine. Thinks she was feeling anxious due to frustration with shingles.     Current Outpatient Prescriptions:   •  albuterol (PROVENTIL) (2.5 MG/3ML) 0.083% nebulizer solution, Albuterol Sulfate (2.5 MG/3ML) 0.083% Inhalation Nebulization Solution; Patient Sig: Albuterol Sulfate (2.5 MG/3ML) 0.083% Inhalation Nebulization Solution prn; 0; 01-May-2013; Active, Disp: , Rfl:   •  amLODIPine (NORVASC) 2.5 MG tablet, TAKE 1 TABLET BY MOUTH EVERY DAY, Disp: 90 tablet, Rfl: 0  •  donepezil (ARICEPT) 5 MG tablet, TAKE 1 TABLET BY MOUTH AT BEDTIME, Disp: 90 tablet, Rfl: 2  •  fluticasone (FLONASE) 50 MCG/ACT nasal spray, USE 1 SPRAY IN EACH NOSTRIL TWICE DAILY, Disp: 16 mL, Rfl: 3  •  furosemide (LASIX) 20 MG tablet, TAKE 1 TABLET BY MOUTH DAILY ON MONDAY AND THURSDAY, Disp: 24 tablet, Rfl: 0  •  KLOR-CON 10 MEQ CR tablet, TAKE 1 TABLET BY MOUTH: MON,WED,FRI, Disp: 90 tablet, Rfl: 0  •  levothyroxine (SYNTHROID, LEVOTHROID) 50 MCG tablet, TAKE 1 TABLET EVERY DAY, Disp: 90 tablet, Rfl: 1  •  metoprolol succinate XL (TOPROL-XL) 50 MG 24 hr tablet, TAKE 1 TABLET BY MOUTH EVERY DAY, Disp: 90 tablet, Rfl: 1  •  montelukast (SINGULAIR) 10 MG tablet, TAKE 1 TABLET BY MOUTH EVERY  NIGHT., Disp: 90 tablet, Rfl: 1  •  omeprazole (priLOSEC) 20 MG capsule, TAKE 1 CAPSULE BY MOUTH DAILY., Disp: 30 capsule, Rfl: 5  •  rosuvastatin (CRESTOR) 10 MG tablet, TAKE 1 TABLET BY MOUTH MONDAY,WEDNESDAY,FRIDAY, Disp: 90 tablet, Rfl: 0  •  acyclovir (ZOVIRAX) 800 MG tablet, Take 1 tablet by mouth 5 (Five) Times a Day. Take no more than 5 doses a day., Disp: 35 tablet, Rfl: 0  •  doxycycline (MONODOX) 100 MG capsule, Take 1 capsule by mouth Every 12 (Twelve) Hours., Disp: 20 capsule, Rfl: 0     PMFSH  The following portions of the patient's history were reviewed and updated as appropriate: allergies, current medications, past family history, past medical history, past social history, past surgical history and problem list.    Review of Systems   Constitutional: Negative for activity change, appetite change, fatigue, fever and unexpected weight change.   HENT: Negative for facial swelling, mouth sores and trouble swallowing.    Eyes: Negative for pain, discharge, itching and visual disturbance.   Respiratory: Negative for chest tightness, shortness of breath and wheezing.    Cardiovascular: Negative for chest pain and palpitations.   Gastrointestinal: Negative for abdominal pain, diarrhea, nausea and vomiting.   Endocrine: Negative.    Genitourinary: Negative.    Musculoskeletal: Negative for joint swelling.   Skin: Positive for rash.   Allergic/Immunologic: Negative.    Neurological: Negative for seizures and syncope.   Hematological: Does not bruise/bleed easily.   Psychiatric/Behavioral: Negative.        Objective   /60  Pulse 71  Temp 97.1 °F (36.2 °C)  Wt 59 kg (130 lb)  SpO2 98%  BMI 27.17 kg/m2    Physical Exam   Constitutional: She is oriented to person, place, and time. She appears well-developed and well-nourished. No distress.   HENT:   Head: Normocephalic and atraumatic.   Right Ear: External ear normal.   Left Ear: External ear normal.   Eyes: Conjunctivae and EOM are normal. Pupils are  equal, round, and reactive to light. Right eye exhibits no discharge. Left eye exhibits no discharge. No scleral icterus.   Neck: Normal range of motion. Neck supple.   Pulmonary/Chest: Effort normal.   Musculoskeletal: Normal range of motion.   Neurological: She is alert and oriented to person, place, and time. She exhibits normal muscle tone.   Skin: Skin is warm and dry. Rash (some slightly ulcerated lesions with surrounding redness under right breast) noted. Rash is pustular and vesicular. She is not diaphoretic.        Psychiatric: She has a normal mood and affect. Her behavior is normal. Judgment and thought content normal.   Nursing note and vitals reviewed.      Results for orders placed or performed in visit on 10/04/17   Comprehensive Metabolic Panel   Result Value Ref Range    Glucose 90 70 - 100 mg/dL    BUN 16 9 - 23 mg/dL    Creatinine 0.80 0.60 - 1.30 mg/dL    Sodium 136 132 - 146 mmol/L    Potassium 4.0 3.5 - 5.5 mmol/L    Chloride 101 99 - 109 mmol/L    CO2 28.0 20.0 - 31.0 mmol/L    Calcium 9.1 8.7 - 10.4 mg/dL    Total Protein 7.1 5.7 - 8.2 g/dL    Albumin 4.10 3.20 - 4.80 g/dL    ALT (SGPT) 11 7 - 40 U/L    AST (SGOT) 18 0 - 33 U/L    Alkaline Phosphatase 115 (H) 25 - 100 U/L    Total Bilirubin 0.3 0.3 - 1.2 mg/dL    eGFR Non African Amer 68 >60 mL/min/1.73    Globulin 3.0 gm/dL    A/G Ratio 1.4 (L) 1.5 - 2.5 g/dL    BUN/Creatinine Ratio 20.0 7.0 - 25.0    Anion Gap 7.0 3.0 - 11.0 mmol/L   TSH   Result Value Ref Range    TSH 4.750 0.350 - 5.350 mIU/mL        ASSESSMENT/PLAN    Problem List Items Addressed This Visit        Other    Shingles - Primary     Worsening d/t pt not taking antiviral. Switch to acyclovir to see if better tolerated. Add doxycycline to treat newly forming surrounding cellulitis. Keep clean and dry. Ordered HH to see if they can help with monitoring lesions and potential dressing of the rash. Follow up with Dr Zapata on Friday. Call if any medication is not tolerated,  return with any worsening symptoms.         Relevant Medications    acyclovir (ZOVIRAX) 800 MG tablet    doxycycline (MONODOX) 100 MG capsule    Other Relevant Orders    Ambulatory Referral to Home Health               Return in about 4 days (around 2/16/2018) for Recheck with Dr Zapata.

## 2018-02-12 NOTE — TELEPHONE ENCOUNTER
NEELA WOULD LIKE TO SPEAK WITH SE ABOUT THE SHINGLES MEDICATION THAT SHE WAS PUT ON.  SHANNA HAS BEEN VOMITING AND VERY WEAK.  NEELA, HER DAUGHTER IN LAW WOULD LIKE TO SPEAK WITH SE BEFORE BRINGING Shanna IN BECAUSE SHE NEEDS TO TELL HER SOME THINGS NOT IN FRONT OF SHANNA BECAUSE OF THE ALZHEIMERS.    pLEASE CALL NEELA BACK [phone] ASAP.  THANKS.

## 2018-02-13 RX ORDER — ROSUVASTATIN CALCIUM 10 MG/1
10 TABLET, COATED ORAL NIGHTLY
Qty: 90 TABLET | Refills: 1 | Status: SHIPPED | OUTPATIENT
Start: 2018-02-13 | End: 2018-06-08

## 2018-02-13 NOTE — ASSESSMENT & PLAN NOTE
Worsening d/t pt not taking antiviral. Switch to acyclovir to see if better tolerated. Add doxycycline to treat newly forming surrounding cellulitis. Keep clean and dry. Ordered HH to see if they can help with monitoring lesions and potential dressing of the rash. Follow up with Dr Zapata on Friday. Call if any medication is not tolerated, return with any worsening symptoms.

## 2018-02-15 RX ORDER — LEVOTHYROXINE SODIUM 0.05 MG/1
TABLET ORAL
Qty: 90 TABLET | Refills: 1 | Status: SHIPPED | OUTPATIENT
Start: 2018-02-15 | End: 2018-02-19 | Stop reason: SDUPTHER

## 2018-02-15 NOTE — TELEPHONE ENCOUNTER
Sidra with [facility] went out to provide home health services, but pt's insurance requires her to be home bound and pt is not so she will not be able to continue care with her.

## 2018-02-19 ENCOUNTER — OFFICE VISIT (OUTPATIENT)
Dept: INTERNAL MEDICINE | Facility: CLINIC | Age: 83
End: 2018-02-19

## 2018-02-19 VITALS
WEIGHT: 129.9 LBS | HEART RATE: 66 BPM | SYSTOLIC BLOOD PRESSURE: 140 MMHG | OXYGEN SATURATION: 98 % | DIASTOLIC BLOOD PRESSURE: 90 MMHG | BODY MASS INDEX: 27.15 KG/M2

## 2018-02-19 DIAGNOSIS — Z86.19 HISTORY OF SHINGLES: Primary | ICD-10-CM

## 2018-02-19 PROCEDURE — 99213 OFFICE O/P EST LOW 20 MIN: CPT | Performed by: INTERNAL MEDICINE

## 2018-02-19 RX ORDER — LEVOTHYROXINE SODIUM 0.05 MG/1
50 TABLET ORAL DAILY
Qty: 90 TABLET | Refills: 1 | Status: SHIPPED | OUTPATIENT
Start: 2018-02-19 | End: 2019-12-11 | Stop reason: SDUPTHER

## 2018-02-19 RX ORDER — NYSTATIN AND TRIAMCINOLONE ACETONIDE 100000; 1 [USP'U]/G; MG/G
OINTMENT TOPICAL 2 TIMES DAILY
Qty: 30 G | Refills: 0 | Status: SHIPPED | OUTPATIENT
Start: 2018-02-19 | End: 2018-03-10 | Stop reason: SDUPTHER

## 2018-02-19 NOTE — PROGRESS NOTES
Subjective   Dorota Membreno is a 86 y.o. female.   Chief Complaint   Patient presents with   • Herpes Zoster       History of Present Illness   Seen by NP and was given Valtrex but she did not tolerate it so did not take after 1 dose.  Acyclovir she did not . Got a secondary infection and started on doxycycline.   The following portions of the patient's history were reviewed and updated as appropriate: allergies, current medications, past family history, past medical history, past social history, past surgical history and problem list.    Review of Systems   Constitutional: Negative for activity change, appetite change, chills, diaphoresis, fatigue, fever and unexpected weight change.   HENT: Negative for congestion, ear discharge, ear pain, mouth sores, nosebleeds, sinus pressure, sneezing and sore throat.    Eyes: Negative for pain, discharge and itching.   Respiratory: Negative for cough, chest tightness, shortness of breath and wheezing.    Cardiovascular: Negative for chest pain, palpitations and leg swelling.   Gastrointestinal: Negative for abdominal pain, constipation, diarrhea, nausea and vomiting.   Endocrine: Negative for cold intolerance, heat intolerance, polydipsia and polyphagia.   Genitourinary: Negative for dysuria, flank pain, frequency, hematuria and urgency.   Musculoskeletal: Negative for arthralgias, back pain, gait problem, myalgias, neck pain and neck stiffness.   Skin: Negative for color change, pallor and rash.   Neurological: Negative for seizures, speech difficulty, numbness and headaches.   Psychiatric/Behavioral: Negative for agitation, confusion, decreased concentration and sleep disturbance. The patient is not nervous/anxious.        Objective   Physical Exam   Constitutional: She appears well-developed.   HENT:   Head: Normocephalic.   Right Ear: External ear normal.   Left Ear: External ear normal.   Nose: Nose normal.   Mouth/Throat: Oropharynx is clear and moist.   Eyes:  Conjunctivae are normal. Pupils are equal, round, and reactive to light.   Neck: No JVD present. No thyromegaly present.   Cardiovascular: Normal rate, regular rhythm and normal heart sounds.  Exam reveals no friction rub.    No murmur heard.  Pulmonary/Chest: No respiratory distress. She has no wheezes. She has no rales.   Abdominal: She exhibits no distension. There is no tenderness. There is no guarding.   Musculoskeletal: She exhibits no edema or tenderness.   Lymphadenopathy:     She has no cervical adenopathy.   Neurological: She displays normal reflexes. No cranial nerve deficit.   Skin: No rash noted.        Psychiatric: Her behavior is normal.   Nursing note and vitals reviewed.      Assessment/Plan   Dorota was seen today for herpes zoster.    Diagnoses and all orders for this visit:    History of shingles  Healing, appears start of yeast under right breast. Addd mycolog ointment  Other orders  -     levothyroxine (SYNTHROID, LEVOTHROID) 50 MCG tablet; Take 1 tablet by mouth Daily.  -     nystatin-triamcinolone (MYCOLOG) 965699-4.1 UNIT/GM-% ointment; Apply  topically 2 (Two) Times a Day.

## 2018-03-08 ENCOUNTER — HOSPITAL ENCOUNTER (EMERGENCY)
Facility: HOSPITAL | Age: 83
Discharge: HOME | End: 2018-03-09
Attending: EMERGENCY MEDICINE | Admitting: EMERGENCY MEDICINE

## 2018-03-08 ENCOUNTER — APPOINTMENT (OUTPATIENT)
Dept: GENERAL RADIOLOGY | Facility: HOSPITAL | Age: 83
End: 2018-03-08

## 2018-03-08 DIAGNOSIS — I10 ESSENTIAL HYPERTENSION: ICD-10-CM

## 2018-03-08 DIAGNOSIS — F41.0 PANIC ATTACKS: ICD-10-CM

## 2018-03-08 DIAGNOSIS — F02.80 LATE ONSET ALZHEIMER'S DISEASE WITHOUT BEHAVIORAL DISTURBANCE (HCC): ICD-10-CM

## 2018-03-08 DIAGNOSIS — Z86.19 HISTORY OF SHINGLES: ICD-10-CM

## 2018-03-08 DIAGNOSIS — G30.1 LATE ONSET ALZHEIMER'S DISEASE WITHOUT BEHAVIORAL DISTURBANCE (HCC): ICD-10-CM

## 2018-03-08 DIAGNOSIS — F41.1 ANXIETY IN ACUTE STRESS REACTION: Primary | ICD-10-CM

## 2018-03-08 DIAGNOSIS — F43.0 ANXIETY IN ACUTE STRESS REACTION: Primary | ICD-10-CM

## 2018-03-08 LAB
ALBUMIN SERPL-MCNC: 4.1 G/DL (ref 3.2–4.8)
ALBUMIN/GLOB SERPL: 1.2 G/DL (ref 1.5–2.5)
ALP SERPL-CCNC: 104 U/L (ref 25–100)
ALT SERPL W P-5'-P-CCNC: 13 U/L (ref 7–40)
ANION GAP SERPL CALCULATED.3IONS-SCNC: 15 MMOL/L (ref 3–11)
AST SERPL-CCNC: 28 U/L (ref 0–33)
BASOPHILS # BLD AUTO: 0.05 10*3/MM3 (ref 0–0.2)
BASOPHILS NFR BLD AUTO: 0.6 % (ref 0–1)
BILIRUB SERPL-MCNC: 0.3 MG/DL (ref 0.3–1.2)
BNP SERPL-MCNC: 103 PG/ML (ref 0–100)
BUN BLD-MCNC: 10 MG/DL (ref 9–23)
BUN/CREAT SERPL: 11.1 (ref 7–25)
CALCIUM SPEC-SCNC: 8.9 MG/DL (ref 8.7–10.4)
CHLORIDE SERPL-SCNC: 94 MMOL/L (ref 99–109)
CO2 SERPL-SCNC: 27 MMOL/L (ref 20–31)
CREAT BLD-MCNC: 0.9 MG/DL (ref 0.6–1.3)
DEPRECATED RDW RBC AUTO: 45.1 FL (ref 37–54)
EOSINOPHIL # BLD AUTO: 0.18 10*3/MM3 (ref 0–0.3)
EOSINOPHIL NFR BLD AUTO: 2 % (ref 0–3)
ERYTHROCYTE [DISTWIDTH] IN BLOOD BY AUTOMATED COUNT: 14.4 % (ref 11.3–14.5)
GFR SERPL CREATININE-BSD FRML MDRD: 59 ML/MIN/1.73
GLOBULIN UR ELPH-MCNC: 3.3 GM/DL
GLUCOSE BLD-MCNC: 110 MG/DL (ref 70–100)
HCT VFR BLD AUTO: 39.3 % (ref 34.5–44)
HGB BLD-MCNC: 12.9 G/DL (ref 11.5–15.5)
HOLD SPECIMEN: NORMAL
HOLD SPECIMEN: NORMAL
IMM GRANULOCYTES # BLD: 0.03 10*3/MM3 (ref 0–0.03)
IMM GRANULOCYTES NFR BLD: 0.3 % (ref 0–0.6)
LIPASE SERPL-CCNC: 33 U/L (ref 6–51)
LYMPHOCYTES # BLD AUTO: 2.61 10*3/MM3 (ref 0.6–4.8)
LYMPHOCYTES NFR BLD AUTO: 28.9 % (ref 24–44)
MCH RBC QN AUTO: 28.4 PG (ref 27–31)
MCHC RBC AUTO-ENTMCNC: 32.8 G/DL (ref 32–36)
MCV RBC AUTO: 86.4 FL (ref 80–99)
MONOCYTES # BLD AUTO: 0.87 10*3/MM3 (ref 0–1)
MONOCYTES NFR BLD AUTO: 9.6 % (ref 0–12)
NEUTROPHILS # BLD AUTO: 5.28 10*3/MM3 (ref 1.5–8.3)
NEUTROPHILS NFR BLD AUTO: 58.6 % (ref 41–71)
PLATELET # BLD AUTO: 326 10*3/MM3 (ref 150–450)
PMV BLD AUTO: 10.1 FL (ref 6–12)
POTASSIUM BLD-SCNC: 3.9 MMOL/L (ref 3.5–5.5)
PROT SERPL-MCNC: 7.4 G/DL (ref 5.7–8.2)
RBC # BLD AUTO: 4.55 10*6/MM3 (ref 3.89–5.14)
SODIUM BLD-SCNC: 136 MMOL/L (ref 132–146)
TROPONIN I SERPL-MCNC: 0 NG/ML (ref 0–0.07)
WBC NRBC COR # BLD: 9.02 10*3/MM3 (ref 3.5–10.8)
WHOLE BLOOD HOLD SPECIMEN: NORMAL
WHOLE BLOOD HOLD SPECIMEN: NORMAL

## 2018-03-08 PROCEDURE — 80053 COMPREHEN METABOLIC PANEL: CPT | Performed by: EMERGENCY MEDICINE

## 2018-03-08 PROCEDURE — 93005 ELECTROCARDIOGRAM TRACING: CPT | Performed by: EMERGENCY MEDICINE

## 2018-03-08 PROCEDURE — 81003 URINALYSIS AUTO W/O SCOPE: CPT | Performed by: EMERGENCY MEDICINE

## 2018-03-08 PROCEDURE — 71045 X-RAY EXAM CHEST 1 VIEW: CPT

## 2018-03-08 PROCEDURE — 83690 ASSAY OF LIPASE: CPT | Performed by: EMERGENCY MEDICINE

## 2018-03-08 PROCEDURE — 83880 ASSAY OF NATRIURETIC PEPTIDE: CPT | Performed by: EMERGENCY MEDICINE

## 2018-03-08 PROCEDURE — 85025 COMPLETE CBC W/AUTO DIFF WBC: CPT | Performed by: EMERGENCY MEDICINE

## 2018-03-08 PROCEDURE — 84484 ASSAY OF TROPONIN QUANT: CPT

## 2018-03-08 PROCEDURE — 99284 EMERGENCY DEPT VISIT MOD MDM: CPT

## 2018-03-08 RX ORDER — DIAZEPAM 2 MG/1
2 TABLET ORAL ONCE
Status: COMPLETED | OUTPATIENT
Start: 2018-03-08 | End: 2018-03-08

## 2018-03-08 RX ORDER — ASPIRIN 81 MG/1
324 TABLET, CHEWABLE ORAL ONCE
Status: DISCONTINUED | OUTPATIENT
Start: 2018-03-08 | End: 2018-03-08

## 2018-03-08 RX ORDER — SODIUM CHLORIDE 0.9 % (FLUSH) 0.9 %
10 SYRINGE (ML) INJECTION AS NEEDED
Status: DISCONTINUED | OUTPATIENT
Start: 2018-03-08 | End: 2018-03-09 | Stop reason: HOSPADM

## 2018-03-08 RX ADMIN — DIAZEPAM 2 MG: 2 TABLET ORAL at 23:41

## 2018-03-09 ENCOUNTER — TELEPHONE (OUTPATIENT)
Dept: NEUROLOGY | Facility: CLINIC | Age: 83
End: 2018-03-09

## 2018-03-09 VITALS
TEMPERATURE: 97.7 F | HEART RATE: 60 BPM | HEIGHT: 59 IN | WEIGHT: 132 LBS | BODY MASS INDEX: 26.61 KG/M2 | RESPIRATION RATE: 18 BRPM | SYSTOLIC BLOOD PRESSURE: 146 MMHG | DIASTOLIC BLOOD PRESSURE: 72 MMHG | OXYGEN SATURATION: 96 %

## 2018-03-09 LAB
BILIRUB UR QL STRIP: NEGATIVE
CLARITY UR: CLEAR
COLOR UR: YELLOW
GLUCOSE UR STRIP-MCNC: NEGATIVE MG/DL
HGB UR QL STRIP.AUTO: NEGATIVE
KETONES UR QL STRIP: NEGATIVE
LEUKOCYTE ESTERASE UR QL STRIP.AUTO: NEGATIVE
NITRITE UR QL STRIP: NEGATIVE
PH UR STRIP.AUTO: 7 [PH] (ref 5–8)
PROT UR QL STRIP: NEGATIVE
SP GR UR STRIP: 1.01 (ref 1–1.03)
UROBILINOGEN UR QL STRIP: NORMAL

## 2018-03-09 RX ORDER — DIAZEPAM 2 MG/1
2 TABLET ORAL EVERY 8 HOURS PRN
Qty: 10 TABLET | Refills: 0 | Status: SHIPPED | OUTPATIENT
Start: 2018-03-09 | End: 2018-06-08

## 2018-03-09 RX ORDER — MIRTAZAPINE 15 MG/1
15 TABLET, FILM COATED ORAL NIGHTLY
Qty: 30 TABLET | Refills: 6 | Status: SHIPPED | OUTPATIENT
Start: 2018-03-09 | End: 2018-03-28

## 2018-03-09 NOTE — ED PROVIDER NOTES
Subjective   HPI Comments: Dorota Membreno is a 86 y.o.female who presents to the ED with c/o shortness of breath and chest pain that suddenly onset at 2000 after becoming emotionally upset. 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 She felt as if she could not control her body then became very nervous and agitated. She then experienced a panic attack then developed shortness of breath and chest pain. She called her friend's who then came over to her house to check on her. She states her symptoms resolved and that she returned to her baseline within the hour but her friend's were concerned so they brought her to [facility] ED for evaluation. She denies any current chest pain, shortness of breath, confusion or panic attack.   She reports that she has experienced an increased in frequency and urgency over the past couple of weeks but attributed it to her constant fluid intake. She states that she has had a rash on her back for several weeks now that is improved and reports a history of shingles.  Per her friend, she has complained of dizziness the past several days but denies any current dizziness, light-headiness or headaches. She states that she has experienced 3 panic attacks in the past 10 days and states she has had the occasional panic attack over the years but none severe enough to go to the hospital before today. Her last panic attack was in 2000. She states that she lives independently in her own home and feels safe at home. She has a history of HTN and a possible TIA but denies MI, stroke, lung disease or heart disease. She states her brothers both had strokes.       Patient is a 86 y.o. female presenting with shortness of breath.   History provided by:  Patient and friend  Shortness of Breath   Severity:  Moderate  Onset quality:  Sudden  Duration:  1 hour  Timing:  Constant  Progression:  Resolved  Chronicity:  New  Context: emotional upset     Relieved by:  Rest  Worsened by:  Nothing  Ineffective treatments:  None tried  Associated symptoms: chest pain and rash    Associated symptoms: no abdominal pain, no fever, no headaches and no vomiting    Risk factors: no recent alcohol use and no tobacco use        Review of Systems   Constitutional: Negative for activity change, appetite change, chills and fever.   Respiratory: Positive for shortness of breath.    Cardiovascular: Positive for chest pain.   Gastrointestinal: Negative for abdominal pain, blood in stool, diarrhea and vomiting.   Genitourinary: Positive for frequency and urgency. Negative for dysuria and hematuria.   Skin: Positive for rash.   Neurological: Negative for headaches.   Psychiatric/Behavioral: Positive for sleep disturbance. The patient is nervous/anxious.    All other systems reviewed and are negative.      Past Medical History:   Diagnosis Date   • Acid reflux 03/14/2016    stable   • Allergic rhinitis    • Alzheimer's disease 12/2017   • Arteritis     A. Giant cell arteritis, diagnosed around 2000 when she lost sight in her eye. B. Improved with prednisone therapy   • Asthma    • Cancer     colon. Status post resection at age 37   • Cognitive impairment, mild, so stated     A. Patient has been forgetting names continue med. Has f/u with neuro. She is having some times forgetting directions. She has moved into a new house and this has not helped with directions   • Dyslipidemia     lipids and cmp   • Hypertension     lipids, cmp, urine micro   • Hypothyroidism     A. On replacement therapy   • Mammogram abnormal     A. 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   • Panic attacks    • Pneumonia    • Postmenopausal osteoporosis    • Shingles 03/2018       Allergies   Allergen Reactions   • Codeine    • Hydrochlorothiazide W-Triamterene    • Levofloxacin    • Penicillins        Past Surgical History:   Procedure Laterality Date   • CATARACT EXTRACTION     •  COLECTOMY PARTIAL / TOTAL      Patient diagnosed with colon cancer at age 37   • HYSTERECTOMY         Family History   Problem Relation Age of Onset   • Other Mother      medical problems   • Allergies Son    • Alcohol abuse Other        Social History     Social History   • Marital status:      Social History Main Topics   • Smoking status: Never Smoker   • Smokeless tobacco: Never Used   • Alcohol use No   • Drug use: No   • Sexual activity: Defer         Objective   Physical Exam   Constitutional: She is oriented to person, place, and time. She appears well-developed and well-nourished. No distress.   HENT:   Head: Normocephalic and atraumatic.   Right Ear: External ear normal.   Left Ear: External ear normal.   Nose: Nose normal.   Eyes: Conjunctivae are normal. No scleral icterus.   Neck: Normal range of motion. Neck supple.   Cardiovascular: Normal rate, regular rhythm, normal heart sounds and intact distal pulses.    No murmur heard.  Pulmonary/Chest: Effort normal and breath sounds normal. No respiratory distress. She has no wheezes. She has no rales.   Abdominal: Soft. Bowel sounds are normal. She exhibits no distension. There is no tenderness.   Musculoskeletal: Normal range of motion. She exhibits no edema or tenderness.   Neurological: She is alert and oriented to person, place, and time.   Skin: Skin is warm and dry. Rash noted. Rash is macular. She is not diaphoretic.   Herpes zoster outbreak along T5 distrubution.    Psychiatric: She has a normal mood and affect. Her behavior is normal.   Nursing note and vitals reviewed.      Procedures         ED Course  Recent Results (from the past 24 hour(s))   Comprehensive Metabolic Panel    Collection Time: 03/08/18 10:14 PM   Result Value Ref Range    Glucose 110 (H) 70 - 100 mg/dL    BUN 10 9 - 23 mg/dL    Creatinine 0.90 0.60 - 1.30 mg/dL    Sodium 136 132 - 146 mmol/L    Potassium 3.9 3.5 - 5.5 mmol/L    Chloride 94 (L) 99 - 109 mmol/L    CO2 27.0  20.0 - 31.0 mmol/L    Calcium 8.9 8.7 - 10.4 mg/dL    Total Protein 7.4 5.7 - 8.2 g/dL    Albumin 4.10 3.20 - 4.80 g/dL    ALT (SGPT) 13 7 - 40 U/L    AST (SGOT) 28 0 - 33 U/L    Alkaline Phosphatase 104 (H) 25 - 100 U/L    Total Bilirubin 0.3 0.3 - 1.2 mg/dL    eGFR Non African Amer 59 (L) >60 mL/min/1.73    Globulin 3.3 gm/dL    A/G Ratio 1.2 (L) 1.5 - 2.5 g/dL    BUN/Creatinine Ratio 11.1 7.0 - 25.0    Anion Gap 15.0 (H) 3.0 - 11.0 mmol/L   Lipase    Collection Time: 03/08/18 10:14 PM   Result Value Ref Range    Lipase 33 6 - 51 U/L   BNP    Collection Time: 03/08/18 10:14 PM   Result Value Ref Range    .0 (H) 0.0 - 100.0 pg/mL   Light Blue Top    Collection Time: 03/08/18 10:14 PM   Result Value Ref Range    Extra Tube hold for add-on    Green Top (Gel)    Collection Time: 03/08/18 10:14 PM   Result Value Ref Range    Extra Tube Hold for add-ons.    Lavender Top    Collection Time: 03/08/18 10:14 PM   Result Value Ref Range    Extra Tube hold for add-on    Gold Top - SST    Collection Time: 03/08/18 10:14 PM   Result Value Ref Range    Extra Tube Hold for add-ons.    CBC Auto Differential    Collection Time: 03/08/18 10:14 PM   Result Value Ref Range    WBC 9.02 3.50 - 10.80 10*3/mm3    RBC 4.55 3.89 - 5.14 10*6/mm3    Hemoglobin 12.9 11.5 - 15.5 g/dL    Hematocrit 39.3 34.5 - 44.0 %    MCV 86.4 80.0 - 99.0 fL    MCH 28.4 27.0 - 31.0 pg    MCHC 32.8 32.0 - 36.0 g/dL    RDW 14.4 11.3 - 14.5 %    RDW-SD 45.1 37.0 - 54.0 fl    MPV 10.1 6.0 - 12.0 fL    Platelets 326 150 - 450 10*3/mm3    Neutrophil % 58.6 41.0 - 71.0 %    Lymphocyte % 28.9 24.0 - 44.0 %    Monocyte % 9.6 0.0 - 12.0 %    Eosinophil % 2.0 0.0 - 3.0 %    Basophil % 0.6 0.0 - 1.0 %    Immature Grans % 0.3 0.0 - 0.6 %    Neutrophils, Absolute 5.28 1.50 - 8.30 10*3/mm3    Lymphocytes, Absolute 2.61 0.60 - 4.80 10*3/mm3    Monocytes, Absolute 0.87 0.00 - 1.00 10*3/mm3    Eosinophils, Absolute 0.18 0.00 - 0.30 10*3/mm3    Basophils, Absolute 0.05  "0.00 - 0.20 10*3/mm3    Immature Grans, Absolute 0.03 0.00 - 0.03 10*3/mm3   POC Troponin, Rapid    Collection Time: 03/08/18 10:21 PM   Result Value Ref Range    Troponin I 0.00 0.00 - 0.07 ng/mL   Urinalysis With / Culture If Indicated - Urine, Clean Catch    Collection Time: 03/08/18 11:50 PM   Result Value Ref Range    Color, UA Yellow Yellow, Straw    Appearance, UA Clear Clear    pH, UA 7.0 5.0 - 8.0    Specific Gravity, UA 1.006 1.001 - 1.030    Glucose, UA Negative Negative    Ketones, UA Negative Negative    Bilirubin, UA Negative Negative    Blood, UA Negative Negative    Protein, UA Negative Negative    Leuk Esterase, UA Negative Negative    Nitrite, UA Negative Negative    Urobilinogen, UA 0.2 E.U./dL 0.2 - 1.0 E.U./dL     Note: In addition to lab results from this visit, the labs listed above may include labs taken at another facility or during a different encounter within the last 24 hours. Please correlate lab times with ED admission and discharge times for further clarification of the services performed during this visit.    XR Chest 1 View   Final Result     Chronic cardiopulmonary changes without evidence of an active lung    parenchymal lesion.       THIS DOCUMENT HAS BEEN ELECTRONICALLY SIGNED BY YENY CRAWFORD MD        Vitals:    03/08/18 2158 03/08/18 2249 03/08/18 2300   BP: 151/65 173/61 137/62   BP Location: Left arm Left arm Left arm   Patient Position: Sitting Sitting Sitting   Pulse: 68 63 61   Resp: 18     Temp: 97.7 °F (36.5 °C)     TempSrc: Oral     SpO2: 95% 98% 96%   Weight: 59.9 kg (132 lb)     Height: 149.9 cm (59\")       Medications   sodium chloride 0.9 % flush 10 mL (not administered)   diazePAM (VALIUM) tablet 2 mg (2 mg Oral Given 3/8/18 8111)     ECG/EMG Results (last 24 hours)     Procedure Component Value Units Date/Time    ECG 12 Lead [[id number]] Collected:  03/08/18 2204     Updated:  03/08/18 2205          ED Course   Value Comment By Time   Troponin I: 0.00 (Reviewed) " Marvin Prescott MD 03/08 2342   BNP: (!) 103.0 (Reviewed) Marvin Prescott MD 03/08 2342    Patient has findings consistent with anxiety associated with lites discharge with follow-up with doctor as soon as possible.  She voices understanding and is happy with the plan. Marvin Prescott MD 03/09 0034                     MDM  Number of Diagnoses or Management Options  Anxiety in acute stress reaction:   Essential hypertension:   History of shingles:   Late onset Alzheimer's disease without behavioral disturbance:   Panic attacks:   Diagnosis management comments: ECG/EMG Results (last 24 hours)     Procedure Component Value Units Date/Time    ECG 12 Lead ([id number]) Collected:  03/08/18 2204     Updated:  03/08/18 2205             Amount and/or Complexity of Data Reviewed  Clinical lab tests: reviewed  Tests in the radiology section of CPT®: reviewed  Independent visualization of images, tracings, or specimens: yes        Final diagnoses:   Anxiety in acute stress reaction   Panic attacks   Essential hypertension   History of shingles   Late onset Alzheimer's disease without behavioral disturbance       Documentation assistance provided by radha Westfall.  Information recorded by the scribe was done at my direction and has been verified and validated by me.     Magdiel Westfall  03/09/18 0006       Marvin Prescott MD  03/09/18 0040

## 2018-03-09 NOTE — TELEPHONE ENCOUNTER
----- Message from Alvaro Montano MD sent at 3/9/2018  2:41 PM EST -----  Contact: Jovana Membreno   Those are reasonable concerns. She should NOT drive if she is taking this medication. I would also offer mirtazapine 15 mg po qhs, which might help prevent these attacks. Thanks.    ----- Message -----     From: Patricia Danielle Snellen, CMA     Sent: 3/9/2018   2:14 PM       To: Alvaro Montano MD        ----- Message -----     From: Yvonne Wynn     Sent: 3/9/2018   1:25 PM       To: LAN Bravo, #    Dusty/Vero     Jovana called in regards to Dorota. She wanted me to send a message to both Dusty and Dorota letting them know Dorota had to go to the emergency room with a panic attack. It was the 3rd one within 10 days. The emergency room prescribed her Valium 2mg   (Take one tablet by mouth every 8 hours), but Jvoana is uneasy about Dorota taking this because she lives alone and drives. She wondered Dustys thoughts on this and whether something she could take every night to help with her anxiety would better help her?    Please call Jovana back  [phone]

## 2018-03-09 NOTE — TELEPHONE ENCOUNTER
Returned Jovana's call informed her of  instructions she verbalized understanding and wants to know if it is okay for pt to drive when taking mirtazapine?  However They would prefer her not to drive and she is scheduled to do driving eval. Is there any possibility you can write a letter stating she cannot drive? Thanks

## 2018-03-10 RX ORDER — NYSTATIN AND TRIAMCINOLONE ACETONIDE 100000; 1 [USP'U]/G; MG/G
OINTMENT TOPICAL
Qty: 30 G | Refills: 0 | Status: SHIPPED | OUTPATIENT
Start: 2018-03-10 | End: 2023-03-09 | Stop reason: HOSPADM

## 2018-03-12 NOTE — TELEPHONE ENCOUNTER
Returned Jovana's call informed her of  instruction and mailed letter to Jovana, she verbalized understanding and will call back if needed.

## 2018-03-18 RX ORDER — OMEPRAZOLE 20 MG/1
CAPSULE, DELAYED RELEASE ORAL
Qty: 30 CAPSULE | Refills: 0 | Status: SHIPPED | OUTPATIENT
Start: 2018-03-18 | End: 2018-06-08

## 2018-03-21 ENCOUNTER — TELEPHONE (OUTPATIENT)
Dept: NEUROLOGY | Facility: CLINIC | Age: 83
End: 2018-03-21

## 2018-03-21 DIAGNOSIS — F03.90 DEMENTIA WITHOUT BEHAVIORAL DISTURBANCE, UNSPECIFIED DEMENTIA TYPE: Primary | ICD-10-CM

## 2018-03-21 NOTE — TELEPHONE ENCOUNTER
"I spoke with daughter in law, Jovana, yesterday about worsening symptoms. Ms. Membreno has been on mirtazapine 15mg for 9 days and is noticing the following: Doesn't know if side effect of meds or needs a new med but they are concerned with safety. Symtpoms start around 3-4pm, very confused, delusional and paranoid (thinks family is poisoning her), won't let family help her, threats of \"Killing the doctors who told me to not drive\", wants to \"drive until she reaches the end\". She has hidden her car key and family cannot find them, they do not want to remove the car because doctors have not said to not drive and they are in process of driving evaluation process through [facility]. There are lots of fluctuations with this, fine one minute and symptoms above appear again. When she gets the mirtazapine pill at 6pm it's \"like she's drunk\" for an hour-loopy, giggling, childlike, then falls asleep at 10pm and sleeps until 10am. I've encouraged supervision from caregiving agency. No weapons. I sent message to Dr. Montano asking if he feels this can be handled at home or should we consider in patient psychiatry. I told daughter we would call her once discussed with Dr. Montano. I advised her to call PCP office about this as well to see what they recommend.   "

## 2018-03-27 ENCOUNTER — TELEPHONE (OUTPATIENT)
Dept: INTERNAL MEDICINE | Facility: CLINIC | Age: 83
End: 2018-03-27

## 2018-03-27 ENCOUNTER — TELEPHONE (OUTPATIENT)
Dept: NEUROLOGY | Facility: CLINIC | Age: 83
End: 2018-03-27

## 2018-03-27 NOTE — TELEPHONE ENCOUNTER
----- Message from Maria L Braxton MA sent at 3/27/2018 11:06 AM EDT -----  Jovana ( daughter in law)  [phone]  Wanted to talk to you.

## 2018-03-27 NOTE — TELEPHONE ENCOUNTER
LUIS M from [facility] that they evaluated Ms. Membreno and did not feel she qualifies for inpatient services. I called and left message for Deborah Cruz, intake director, to see what other options she could give me as we have concerns about Ms. Membreno's safety and presentation of symptoms. I called Jovana, daughter in law, and Left her a voicemail asking her to call and discuss other options such as a referral to [facility].

## 2018-03-28 ENCOUNTER — TELEPHONE (OUTPATIENT)
Dept: NEUROLOGY | Facility: CLINIC | Age: 83
End: 2018-03-28

## 2018-03-28 RX ORDER — MIRTAZAPINE 15 MG/1
30 TABLET, FILM COATED ORAL NIGHTLY
Qty: 60 TABLET | Refills: 6 | Status: SHIPPED | OUTPATIENT
Start: 2018-03-28 | End: 2018-06-08

## 2018-03-28 RX ORDER — MIRTAZAPINE 15 MG/1
30 TABLET, FILM COATED ORAL NIGHTLY
Qty: 60 TABLET | Refills: 6 | Status: SHIPPED | OUTPATIENT
Start: 2018-03-28 | End: 2018-03-28 | Stop reason: SDUPTHER

## 2018-03-29 RX ORDER — FUROSEMIDE 20 MG/1
TABLET ORAL
Qty: 24 TABLET | Refills: 2 | Status: SHIPPED | OUTPATIENT
Start: 2018-03-29 | End: 2018-06-08

## 2018-03-30 ENCOUNTER — TELEPHONE (OUTPATIENT)
Dept: NEUROLOGY | Facility: CLINIC | Age: 83
End: 2018-03-30
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
## 2018-03-31 RX ORDER — AMLODIPINE BESYLATE 2.5 MG/1
TABLET ORAL
Qty: 90 TABLET | Refills: 0 | Status: SHIPPED | OUTPATIENT
Start: 2018-03-31 | End: 2018-06-29 | Stop reason: SDUPTHER

## 2018-04-02 NOTE — TELEPHONE ENCOUNTER
She can take to [facility], they have psy available for eval. Needs to have car keys in safe spot.

## 2018-04-03 ENCOUNTER — TELEPHONE (OUTPATIENT)
Dept: NEUROLOGY | Facility: CLINIC | Age: 83
End: 2018-04-03

## 2018-04-03 RX ORDER — QUETIAPINE FUMARATE 25 MG/1
25 TABLET, FILM COATED ORAL NIGHTLY
Qty: 30 TABLET | Refills: 6 | Status: SHIPPED | OUTPATIENT
Start: 2018-04-03 | End: 2018-12-06 | Stop reason: SDUPTHER

## 2018-04-03 NOTE — TELEPHONE ENCOUNTER
"----- Message from Yvonne Wynn sent at 4/3/2018 10:30 AM EDT -----  Contact: Jovana (POA - daughter in law)  Dusty/[facility] Whaley called in regards to the mirtazapine that Dusty prescribed. She said it's not helping and she's requesting that Dusty prescribe instead a mild sedative for Dorota. Jovana explained that she's \"all over the place\" and that the sedative would be for \"her own safety\". She's having an In home evaluation on Thursday, but she would like the medication before then.   Jovana wanted this message to also be sent to Vero, as well.    She requested a call back from [facility] assistant letting her know what's decided  [phone]    (Use [facility] pharmacy on [address] on file)   "

## 2018-04-03 NOTE — TELEPHONE ENCOUNTER
Spoke with Jovana to inform her of the RX that was called in for Deepak Haseeb. She verbalized understanding and will call with any questions or concerns.

## 2018-04-04 RX ORDER — METOPROLOL SUCCINATE 50 MG/1
50 TABLET, EXTENDED RELEASE ORAL DAILY
Qty: 90 TABLET | Refills: 1 | Status: SHIPPED | OUTPATIENT
Start: 2018-04-04 | End: 2021-01-04 | Stop reason: SDUPTHER

## 2018-04-05 ENCOUNTER — TELEPHONE (OUTPATIENT)
Dept: INTERNAL MEDICINE | Facility: CLINIC | Age: 83
End: 2018-04-05

## 2018-04-05 NOTE — TELEPHONE ENCOUNTER
"NEELA WANTED TO CALL TO GIVE YOU AN UPDATE ON PATIENT.   SHE HAD A BRAIN SCAN AT [facility]. SHE DOES NOT HAVE A TUMOR.  PT IS NOW [facility].  PT WENT IN VERY WILLINGLY AND HAD A GOOD INTERVIEW.   DURING ADMISSIONS \"SUN DOWNER\" STARTED   SO HER CONDITION WAS ABLE TO BE OBSERVED  NEELA WILL BE BY THERE LATER THIS AFTERNOON.   NEELA DOES NOT NEED A CALL BACK. SHE WILL CALL YOU FOR FURTHER UPDATES.  "

## 2018-04-07 ENCOUNTER — HOSPITAL ENCOUNTER (EMERGENCY)
Facility: HOSPITAL | Age: 83
Discharge: ANOTHER HOSPITAL | End: 2018-04-07
Attending: EMERGENCY MEDICINE | Admitting: EMERGENCY MEDICINE

## 2018-04-07 ENCOUNTER — APPOINTMENT (OUTPATIENT)
Dept: CT IMAGING | Facility: HOSPITAL | Age: 83
End: 2018-04-07

## 2018-04-07 ENCOUNTER — APPOINTMENT (OUTPATIENT)
Dept: GENERAL RADIOLOGY | Facility: HOSPITAL | Age: 83
End: 2018-04-07

## 2018-04-07 ENCOUNTER — APPOINTMENT (OUTPATIENT)
Dept: MRI IMAGING | Facility: HOSPITAL | Age: 83
End: 2018-04-07

## 2018-04-07 VITALS
OXYGEN SATURATION: 90 % | HEART RATE: 65 BPM | BODY MASS INDEX: 26.66 KG/M2 | SYSTOLIC BLOOD PRESSURE: 110 MMHG | RESPIRATION RATE: 18 BRPM | DIASTOLIC BLOOD PRESSURE: 53 MMHG | TEMPERATURE: 99.9 F | WEIGHT: 127 LBS | HEIGHT: 58 IN

## 2018-04-07 DIAGNOSIS — R55 SYNCOPE, UNSPECIFIED SYNCOPE TYPE: ICD-10-CM

## 2018-04-07 DIAGNOSIS — F03.90 DEMENTIA WITHOUT BEHAVIORAL DISTURBANCE, UNSPECIFIED DEMENTIA TYPE: ICD-10-CM

## 2018-04-07 DIAGNOSIS — R56.9 SEIZURE (HCC): Primary | ICD-10-CM

## 2018-04-07 LAB
ALBUMIN SERPL-MCNC: 3.7 G/DL (ref 3.2–4.8)
ALBUMIN/GLOB SERPL: 1.4 G/DL (ref 1.5–2.5)
ALP SERPL-CCNC: 67 U/L (ref 25–100)
ALT SERPL W P-5'-P-CCNC: 10 U/L (ref 7–40)
ANION GAP SERPL CALCULATED.3IONS-SCNC: 9 MMOL/L (ref 3–11)
APTT PPP: 27.2 SECONDS (ref 24–31)
AST SERPL-CCNC: 23 U/L (ref 0–33)
BACTERIA UR QL AUTO: ABNORMAL /HPF
BASOPHILS # BLD AUTO: 0.02 10*3/MM3 (ref 0–0.2)
BASOPHILS NFR BLD AUTO: 0.3 % (ref 0–1)
BILIRUB SERPL-MCNC: 0.5 MG/DL (ref 0.3–1.2)
BILIRUB UR QL STRIP: NEGATIVE
BILIRUB UR QL STRIP: NEGATIVE
BNP SERPL-MCNC: 71 PG/ML (ref 0–100)
BUN BLD-MCNC: 20 MG/DL (ref 9–23)
BUN/CREAT SERPL: 22.2 (ref 7–25)
CALCIUM SPEC-SCNC: 8.1 MG/DL (ref 8.7–10.4)
CHLORIDE SERPL-SCNC: 96 MMOL/L (ref 99–109)
CLARITY UR: ABNORMAL
CLARITY UR: CLEAR
CO2 SERPL-SCNC: 27 MMOL/L (ref 20–31)
COLOR UR: YELLOW
COLOR UR: YELLOW
CREAT BLD-MCNC: 0.9 MG/DL (ref 0.6–1.3)
DEPRECATED RDW RBC AUTO: 47.7 FL (ref 37–54)
EOSINOPHIL # BLD AUTO: 0 10*3/MM3 (ref 0–0.3)
EOSINOPHIL NFR BLD AUTO: 0 % (ref 0–3)
ERYTHROCYTE [DISTWIDTH] IN BLOOD BY AUTOMATED COUNT: 15 % (ref 11.3–14.5)
GFR SERPL CREATININE-BSD FRML MDRD: 59 ML/MIN/1.73
GLOBULIN UR ELPH-MCNC: 2.7 GM/DL
GLUCOSE BLD-MCNC: 98 MG/DL (ref 70–100)
GLUCOSE UR STRIP-MCNC: NEGATIVE MG/DL
GLUCOSE UR STRIP-MCNC: NEGATIVE MG/DL
HCT VFR BLD AUTO: 39.6 % (ref 34.5–44)
HGB BLD-MCNC: 12.9 G/DL (ref 11.5–15.5)
HGB UR QL STRIP.AUTO: ABNORMAL
HGB UR QL STRIP.AUTO: NEGATIVE
HOLD SPECIMEN: NORMAL
HOLD SPECIMEN: NORMAL
HYALINE CASTS UR QL AUTO: ABNORMAL /LPF
IMM GRANULOCYTES # BLD: 0.02 10*3/MM3 (ref 0–0.03)
IMM GRANULOCYTES NFR BLD: 0.3 % (ref 0–0.6)
INR PPP: 1.06 (ref 0.91–1.09)
KETONES UR QL STRIP: ABNORMAL
KETONES UR QL STRIP: ABNORMAL
LEUKOCYTE ESTERASE UR QL STRIP.AUTO: ABNORMAL
LEUKOCYTE ESTERASE UR QL STRIP.AUTO: NEGATIVE
LIPASE SERPL-CCNC: 49 U/L (ref 6–51)
LYMPHOCYTES # BLD AUTO: 0.78 10*3/MM3 (ref 0.6–4.8)
LYMPHOCYTES NFR BLD AUTO: 13 % (ref 24–44)
MAGNESIUM SERPL-MCNC: 1.4 MG/DL (ref 1.3–2.7)
MCH RBC QN AUTO: 28.3 PG (ref 27–31)
MCHC RBC AUTO-ENTMCNC: 32.6 G/DL (ref 32–36)
MCV RBC AUTO: 86.8 FL (ref 80–99)
MONOCYTES # BLD AUTO: 0.77 10*3/MM3 (ref 0–1)
MONOCYTES NFR BLD AUTO: 12.8 % (ref 0–12)
MUCOUS THREADS URNS QL MICRO: ABNORMAL /HPF
NEUTROPHILS # BLD AUTO: 4.43 10*3/MM3 (ref 1.5–8.3)
NEUTROPHILS NFR BLD AUTO: 73.6 % (ref 41–71)
NITRITE UR QL STRIP: NEGATIVE
NITRITE UR QL STRIP: NEGATIVE
PH UR STRIP.AUTO: 5.5 [PH] (ref 5–8)
PH UR STRIP.AUTO: 6 [PH] (ref 5–8)
PLATELET # BLD AUTO: 156 10*3/MM3 (ref 150–450)
PMV BLD AUTO: 11.2 FL (ref 6–12)
POTASSIUM BLD-SCNC: 3.3 MMOL/L (ref 3.5–5.5)
PROT SERPL-MCNC: 6.4 G/DL (ref 5.7–8.2)
PROT UR QL STRIP: ABNORMAL
PROT UR QL STRIP: ABNORMAL
PROTHROMBIN TIME: 11.1 SECONDS (ref 9.6–11.5)
RBC # BLD AUTO: 4.56 10*6/MM3 (ref 3.89–5.14)
RBC # UR: ABNORMAL /HPF
REF LAB TEST METHOD: ABNORMAL
SODIUM BLD-SCNC: 132 MMOL/L (ref 132–146)
SP GR UR STRIP: 1.02 (ref 1–1.03)
SP GR UR STRIP: 1.02 (ref 1–1.03)
SQUAMOUS #/AREA URNS HPF: ABNORMAL /HPF
T4 FREE SERPL-MCNC: 1.34 NG/DL (ref 0.89–1.76)
TROPONIN I SERPL-MCNC: 0.03 NG/ML (ref 0–0.07)
TROPONIN I SERPL-MCNC: 0.06 NG/ML
TROPONIN I SERPL-MCNC: 0.06 NG/ML
TSH SERPL DL<=0.05 MIU/L-ACNC: 0.56 MIU/ML (ref 0.35–5.35)
UROBILINOGEN UR QL STRIP: ABNORMAL
UROBILINOGEN UR QL STRIP: ABNORMAL
WBC NRBC COR # BLD: 6.02 10*3/MM3 (ref 3.5–10.8)
WBC UR QL AUTO: ABNORMAL /HPF
WHOLE BLOOD HOLD SPECIMEN: NORMAL
WHOLE BLOOD HOLD SPECIMEN: NORMAL

## 2018-04-07 PROCEDURE — 70553 MRI BRAIN STEM W/O & W/DYE: CPT

## 2018-04-07 PROCEDURE — 87077 CULTURE AEROBIC IDENTIFY: CPT | Performed by: EMERGENCY MEDICINE

## 2018-04-07 PROCEDURE — 96361 HYDRATE IV INFUSION ADD-ON: CPT

## 2018-04-07 PROCEDURE — 81001 URINALYSIS AUTO W/SCOPE: CPT | Performed by: EMERGENCY MEDICINE

## 2018-04-07 PROCEDURE — 84439 ASSAY OF FREE THYROXINE: CPT | Performed by: EMERGENCY MEDICINE

## 2018-04-07 PROCEDURE — 0 GADOBENATE DIMEGLUMINE 529 MG/ML SOLUTION: Performed by: EMERGENCY MEDICINE

## 2018-04-07 PROCEDURE — 93005 ELECTROCARDIOGRAM TRACING: CPT | Performed by: EMERGENCY MEDICINE

## 2018-04-07 PROCEDURE — 83735 ASSAY OF MAGNESIUM: CPT | Performed by: EMERGENCY MEDICINE

## 2018-04-07 PROCEDURE — 83690 ASSAY OF LIPASE: CPT | Performed by: EMERGENCY MEDICINE

## 2018-04-07 PROCEDURE — A9577 INJ MULTIHANCE: HCPCS | Performed by: EMERGENCY MEDICINE

## 2018-04-07 PROCEDURE — 85025 COMPLETE CBC W/AUTO DIFF WBC: CPT | Performed by: EMERGENCY MEDICINE

## 2018-04-07 PROCEDURE — 71045 X-RAY EXAM CHEST 1 VIEW: CPT

## 2018-04-07 PROCEDURE — 85730 THROMBOPLASTIN TIME PARTIAL: CPT | Performed by: EMERGENCY MEDICINE

## 2018-04-07 PROCEDURE — 87186 SC STD MICRODIL/AGAR DIL: CPT | Performed by: EMERGENCY MEDICINE

## 2018-04-07 PROCEDURE — 25010000003 LEVETIRACETAM IN NACL 0.75% 1000 MG/100ML SOLUTION: Performed by: EMERGENCY MEDICINE

## 2018-04-07 PROCEDURE — 85610 PROTHROMBIN TIME: CPT | Performed by: EMERGENCY MEDICINE

## 2018-04-07 PROCEDURE — 84484 ASSAY OF TROPONIN QUANT: CPT | Performed by: EMERGENCY MEDICINE

## 2018-04-07 PROCEDURE — 84484 ASSAY OF TROPONIN QUANT: CPT

## 2018-04-07 PROCEDURE — 83880 ASSAY OF NATRIURETIC PEPTIDE: CPT | Performed by: EMERGENCY MEDICINE

## 2018-04-07 PROCEDURE — 96374 THER/PROPH/DIAG INJ IV PUSH: CPT

## 2018-04-07 PROCEDURE — 80053 COMPREHEN METABOLIC PANEL: CPT | Performed by: EMERGENCY MEDICINE

## 2018-04-07 PROCEDURE — 87086 URINE CULTURE/COLONY COUNT: CPT | Performed by: EMERGENCY MEDICINE

## 2018-04-07 PROCEDURE — 81003 URINALYSIS AUTO W/O SCOPE: CPT | Performed by: EMERGENCY MEDICINE

## 2018-04-07 PROCEDURE — 84443 ASSAY THYROID STIM HORMONE: CPT | Performed by: EMERGENCY MEDICINE

## 2018-04-07 PROCEDURE — 70450 CT HEAD/BRAIN W/O DYE: CPT

## 2018-04-07 PROCEDURE — 99284 EMERGENCY DEPT VISIT MOD MDM: CPT

## 2018-04-07 RX ORDER — LEVETIRACETAM 10 MG/ML
1000 INJECTION INTRAVASCULAR ONCE
Status: COMPLETED | OUTPATIENT
Start: 2018-04-07 | End: 2018-04-07

## 2018-04-07 RX ORDER — SODIUM CHLORIDE 0.9 % (FLUSH) 0.9 %
10 SYRINGE (ML) INJECTION AS NEEDED
Status: DISCONTINUED | OUTPATIENT
Start: 2018-04-07 | End: 2018-04-08 | Stop reason: HOSPADM

## 2018-04-07 RX ORDER — SODIUM CHLORIDE 9 MG/ML
125 INJECTION, SOLUTION INTRAVENOUS CONTINUOUS
Status: DISCONTINUED | OUTPATIENT
Start: 2018-04-07 | End: 2018-04-08 | Stop reason: HOSPADM

## 2018-04-07 RX ORDER — LEVETIRACETAM 500 MG/1
500 TABLET ORAL 2 TIMES DAILY
Qty: 30 TABLET | Refills: 0 | Status: SHIPPED | OUTPATIENT
Start: 2018-04-07 | End: 2019-10-21

## 2018-04-07 RX ADMIN — SODIUM CHLORIDE 1000 ML: 9 INJECTION, SOLUTION INTRAVENOUS at 17:09

## 2018-04-07 RX ADMIN — LEVETIRACETAM 1000 MG: 10 INJECTION INTRAVENOUS at 14:10

## 2018-04-07 RX ADMIN — SODIUM CHLORIDE 500 ML: 9 INJECTION, SOLUTION INTRAVENOUS at 12:24

## 2018-04-07 RX ADMIN — GADOBENATE DIMEGLUMINE 10 ML: 529 INJECTION, SOLUTION INTRAVENOUS at 15:12

## 2018-04-07 NOTE — ED PROVIDER NOTES
"Subjective   Ms. Dorota Membreno is a 86 y.o. female who presents to the ED post possible seizure episode approximately 1 hours ago. Pt is resident at the [facility] and nursing home staff states pt went to the bathroom and after a few minutes they heard a loud noise. Upon entering bathroom, nursing home staff found patient unconscious, slumped over on the toilet with head rested against wall, and with loss of bowel continence. The nursing staff placed the patient on the ground and states she had urinary incontinence at this time and she was \"contracted and rigid\" with eyes open for about 2-3 minutes. After about 3 minutes, pt started to become more conscious, however she was confused upon waking and it took her more than 5 minutes to return to baseline. Pt is alert and oriented x3  in ED now, however she is amnesic to seizure event. She denies head pain, chest pain, back pain, neck pain, N/V/D, HA, dysuria, cough, congestion, SoA, and any other acute sx at this time. Nursing home staff denies a recent change in medication. She has hx of panic attacks, PNA. Hypothyroidism, HTN, colon cancer, Alzheimer's, asthma, and dyslipidemia. She denies hx of seizure.           History provided by:  Patient and nursing home  Seizures   Seizure activity on arrival: no    Seizure type:  Unable to specify  Initial focality:  Unable to specify  Episode characteristics: incontinence, stiffening and unresponsiveness    Postictal symptoms: confusion and memory loss    Return to baseline: yes    Severity:  Moderate  Timing:  Once  Number of seizures this episode:  1  Progression:  Resolved  Context: not change in medication    Recent head injury:  During the event  History of seizures: no        Review of Systems   HENT: Negative for congestion.         Denies head pain   Respiratory: Negative for cough and shortness of breath.    Cardiovascular: Negative for chest pain.   Gastrointestinal: Negative for diarrhea, nausea and vomiting.        " Bowel incontinence during seizure episode   Genitourinary: Negative for dysuria.        Urinary incontinence during seizure episode.   Musculoskeletal: Negative for back pain and neck pain.   Neurological: Positive for seizures. Negative for headaches.   Psychiatric/Behavioral: Positive for confusion (Post seizure episode).   All other systems reviewed and are negative.      Past Medical History:   Diagnosis Date   • Acid reflux 03/14/2016    stable   • Allergic rhinitis    • Alzheimer's disease 12/2017   • Arteritis     A. Giant cell arteritis, diagnosed around 2000 when she lost sight in her eye. B. Improved with prednisone therapy   • Asthma    • Cancer     colon. Status post resection at age 37   • Cognitive impairment, mild, so stated     A. Patient has been forgetting names continue med. Has f/u with neuro. She is having some times forgetting directions. She has moved into a new house and this has not helped with directions   • Dyslipidemia     lipids and cmp   • Hypertension     lipids, cmp, urine micro   • Hypothyroidism     A. On replacement therapy   • Mammogram abnormal     A. 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   • Panic attacks    • Pneumonia    • Postmenopausal osteoporosis    • Shingles 03/2018       Allergies   Allergen Reactions   • Codeine    • Hydrochlorothiazide W-Triamterene    • Levofloxacin    • Penicillins        Past Surgical History:   Procedure Laterality Date   • CATARACT EXTRACTION     • COLECTOMY PARTIAL / TOTAL      Patient diagnosed with colon cancer at age 37   • HYSTERECTOMY         Family History   Problem Relation Age of Onset   • Other Mother      medical problems   • Allergies Son    • Alcohol abuse Other        Social History     Social History   • Marital status:      Social History Main Topics   • Smoking status: Never Smoker   • Smokeless tobacco: Never Used   • Alcohol use No   • Drug use: No   • Sexual activity: Defer     Other Topics  Concern   • Not on file         Objective   Physical Exam   Constitutional: She is oriented to person, place, and time. She appears well-developed and well-nourished.   HENT:   Head: Normocephalic and atraumatic.   Mouth/Throat: Oropharynx is clear and moist and mucous membranes are normal. No lacerations.   No signs of neck trauma or tenderness. No signs of cranial trauma or tenderness. No tongue bites.    Eyes: Conjunctivae and EOM are normal. Pupils are equal, round, and reactive to light.   Neck: Normal range of motion. Neck supple.   No shoulder tenderness.    Cardiovascular: Normal rate, regular rhythm and normal heart sounds.    Pulmonary/Chest: Effort normal and breath sounds normal.   Abdominal: Soft. Bowel sounds are normal. There is no tenderness.   Musculoskeletal: Normal range of motion. She exhibits no tenderness.   No signs of trauma or tenderness. Pelvis is stable and non-tender.    Neurological: She is alert and oriented to person, place, and time. She has normal strength. No cranial nerve deficit or sensory deficit.   Skin: Skin is warm and dry.   Psychiatric: She has a normal mood and affect. Her behavior is normal. Judgment and thought content normal.   Nursing note and vitals reviewed.      Procedures         ED Course  ED Course   Comment By Time   Patient remains asymptomatic.  Orthostatics are unremarkable and there is no objective signs of orthostasis.  Her second troponin however is 0.058.  EKG was nonacute.  Urinalysis on clean catch is somewhat equivocal and will repeat with a catheter specimen. Gabriel Live MD 04/07 4768   Dr. Live discussed with Dr. Felipe Sherman who recommends outpatient Echo and re-evaluation of pt's symptoms.  Discussed the syncope, EKG and troponin results in the emergency department.  We will initiate outpatient therapy. Gabriel Live MD 04/07 4124   Clive syncope rule is low risk     [facility]   Dr. Live discussed with   Dee who agrees pt is safe for discharge with an rx of Kepra 500 B.I.D. Piero Tejada Tringal 04/07 1810   Repeat urinalysis on a catheter specimen is negative.  Clean catch specimen appears contaminated. Gabriel Live MD 04/07 1811   I discussed findings at length with the patient and the caregiver from the [facility] who accompanies her.  I discussed seizure precautions, no driving for 3 months by ANGELIKA CANALES, Lionel ruth.  And follow-upI discussed outpatient echocardiogram as recommended by cardiology along with cardiovascular follow-up, though I do not believe this is a cardiovascular event in view of the eyewitness that accompanied her.  I discussed indications for immediate returnVery pleasant patient verbalizes understanding agreement with plan of care, need for follow-up, and indications for return as does her caregiver from the [facility] Gabriel Live MD 04/07 1816   The caregiver from the [facility] reports a Dr. Farmer has accepted her back to the [facility] Gabriel Live MD 04/07 1818   Discussed with patient and caregiver that I believe she had a seizure.  This is fairly convincing from the caregiver that was there but there is no history of seizure and no tonoclonic just tonic activity though she has a normal progression postevent for seizure and not cardiogenic etiology.  I discussed outpatient cardiology and neurology follow-up however.  All agree.  The patient was stable for discharge and has been asymptomatic throughout the entire ED course including 3 sets of cardiac enzymes Gabriel Live MD 04/07 1830     Recent Results (from the past 24 hour(s))   Comprehensive Metabolic Panel    Collection Time: 04/07/18 11:39 AM   Result Value Ref Range    Glucose 98 70 - 100 mg/dL    BUN 20 9 - 23 mg/dL    Creatinine 0.90 0.60 - 1.30 mg/dL    Sodium 132 132 - 146 mmol/L    Potassium 3.3 (L) 3.5 - 5.5 mmol/L    Chloride 96 (L) 99 - 109 mmol/L    CO2 27.0 20.0 - 31.0 mmol/L    Calcium 8.1 (L) 8.7 - 10.4 mg/dL    Total  Protein 6.4 5.7 - 8.2 g/dL    Albumin 3.70 3.20 - 4.80 g/dL    ALT (SGPT) 10 7 - 40 U/L    AST (SGOT) 23 0 - 33 U/L    Alkaline Phosphatase 67 25 - 100 U/L    Total Bilirubin 0.5 0.3 - 1.2 mg/dL    eGFR Non African Amer 59 (L) >60 mL/min/1.73    Globulin 2.7 gm/dL    A/G Ratio 1.4 (L) 1.5 - 2.5 g/dL    BUN/Creatinine Ratio 22.2 7.0 - 25.0    Anion Gap 9.0 3.0 - 11.0 mmol/L   Protime-INR    Collection Time: 04/07/18 11:39 AM   Result Value Ref Range    Protime 11.1 9.6 - 11.5 Seconds    INR 1.06 0.91 - 1.09   aPTT    Collection Time: 04/07/18 11:39 AM   Result Value Ref Range    PTT 27.2 24.0 - 31.0 seconds   Lipase    Collection Time: 04/07/18 11:39 AM   Result Value Ref Range    Lipase 49 6 - 51 U/L   TSH    Collection Time: 04/07/18 11:39 AM   Result Value Ref Range    TSH 0.563 0.350 - 5.350 mIU/mL   T4, Free    Collection Time: 04/07/18 11:39 AM   Result Value Ref Range    Free T4 1.34 0.89 - 1.76 ng/dL   Magnesium    Collection Time: 04/07/18 11:39 AM   Result Value Ref Range    Magnesium 1.4 1.3 - 2.7 mg/dL   BNP    Collection Time: 04/07/18 11:39 AM   Result Value Ref Range    BNP 71.0 0.0 - 100.0 pg/mL   CBC Auto Differential    Collection Time: 04/07/18 11:39 AM   Result Value Ref Range    WBC 6.02 3.50 - 10.80 10*3/mm3    RBC 4.56 3.89 - 5.14 10*6/mm3    Hemoglobin 12.9 11.5 - 15.5 g/dL    Hematocrit 39.6 34.5 - 44.0 %    MCV 86.8 80.0 - 99.0 fL    MCH 28.3 27.0 - 31.0 pg    MCHC 32.6 32.0 - 36.0 g/dL    RDW 15.0 (H) 11.3 - 14.5 %    RDW-SD 47.7 37.0 - 54.0 fl    MPV 11.2 6.0 - 12.0 fL    Platelets 156 150 - 450 10*3/mm3    Neutrophil % 73.6 (H) 41.0 - 71.0 %    Lymphocyte % 13.0 (L) 24.0 - 44.0 %    Monocyte % 12.8 (H) 0.0 - 12.0 %    Eosinophil % 0.0 0.0 - 3.0 %    Basophil % 0.3 0.0 - 1.0 %    Immature Grans % 0.3 0.0 - 0.6 %    Neutrophils, Absolute 4.43 1.50 - 8.30 10*3/mm3    Lymphocytes, Absolute 0.78 0.60 - 4.80 10*3/mm3    Monocytes, Absolute 0.77 0.00 - 1.00 10*3/mm3    Eosinophils, Absolute 0.00  0.00 - 0.30 10*3/mm3    Basophils, Absolute 0.02 0.00 - 0.20 10*3/mm3    Immature Grans, Absolute 0.02 0.00 - 0.03 10*3/mm3   Light Blue Top    Collection Time: 04/07/18 11:39 AM   Result Value Ref Range    Extra Tube hold for add-on    Green Top (Gel)    Collection Time: 04/07/18 11:39 AM   Result Value Ref Range    Extra Tube Hold for add-ons.    Lavender Top    Collection Time: 04/07/18 11:39 AM   Result Value Ref Range    Extra Tube hold for add-on    Gold Top - SST    Collection Time: 04/07/18 11:39 AM   Result Value Ref Range    Extra Tube Hold for add-ons.    POC Troponin, Rapid    Collection Time: 04/07/18 11:44 AM   Result Value Ref Range    Troponin I 0.03 0.00 - 0.07 ng/mL   Urinalysis With / Culture If Indicated - Urine, Clean Catch    Collection Time: 04/07/18 12:33 PM   Result Value Ref Range    Color, UA Yellow Yellow, Straw    Appearance, UA Cloudy (A) Clear    pH, UA 5.5 5.0 - 8.0    Specific Gravity, UA 1.018 1.001 - 1.030    Glucose, UA Negative Negative    Ketones, UA 15 mg/dL (1+) (A) Negative    Bilirubin, UA Negative Negative    Blood, UA Small (1+) (A) Negative    Protein, UA Trace (A) Negative    Leuk Esterase, UA Moderate (2+) (A) Negative    Nitrite, UA Negative Negative    Urobilinogen, UA 0.2 E.U./dL 0.2 - 1.0 E.U./dL   Urinalysis, Microscopic Only - Urine, Clean Catch    Collection Time: 04/07/18 12:33 PM   Result Value Ref Range    RBC, UA 3-6 (A) None Seen, 0-2 /HPF    WBC, UA 6-12 (A) None Seen, 0-2 /HPF    Bacteria, UA None Seen None Seen, Trace /HPF    Squamous Epithelial Cells, UA 3-6 (A) None Seen, 0-2 /HPF    Hyaline Casts, UA 0-6 0 - 6 /LPF    Mucus, UA Small/1+ (A) None Seen, Trace /HPF    Methodology Manual Light Microscopy    Troponin    Collection Time: 04/07/18  2:32 PM   Result Value Ref Range    Troponin I 0.058 (H) <=0.039 ng/mL   Urinalysis With / Culture If Indicated - Urine, Catheter    Collection Time: 04/07/18  5:08 PM   Result Value Ref Range    Color, UA Yellow  Yellow, Straw    Appearance, UA Clear Clear    pH, UA 6.0 5.0 - 8.0    Specific Gravity, UA 1.018 1.001 - 1.030    Glucose, UA Negative Negative    Ketones, UA 15 mg/dL (1+) (A) Negative    Bilirubin, UA Negative Negative    Blood, UA Negative Negative    Protein, UA Trace (A) Negative    Leuk Esterase, UA Negative Negative    Nitrite, UA Negative Negative    Urobilinogen, UA 0.2 E.U./dL 0.2 - 1.0 E.U./dL   Troponin    Collection Time: 04/07/18  5:09 PM   Result Value Ref Range    Troponin I 0.060 (H) <=0.039 ng/mL     Note: In addition to lab results from this visit, the labs listed above may include labs taken at another facility or during a different encounter within the last 24 hours. Please correlate lab times with ED admission and discharge times for further clarification of the services performed during this visit.    MRI Brain With & Without Contrast   Final Result   1. No acute intracranial abnormality, specifically no evidence for acute   ischemia or abnormal enhancing focus.   2. Extensive chronic small vessel ischemic changes and findings of   atrophy of the supratentorial white matter.        DICTATED:     04/07/2018   EDITED/ls :     04/07/2018        This report was finalized on 4/7/2018 4:55 PM by Dr. Benedicto Krause.          XR Chest 1 View   Final Result   Persistent asymmetric elevation of the right hemidiaphragm   as well as subtle opacifications left lung base, predominantly linear in   orientation and unchanged from 03/08/2018 comparison, likely atelectasis   without new parenchymal disease or focal consolidation noted.       DICTATED:     04/07/2018   EDITED/ls :     04/07/2018        This report was finalized on 4/7/2018 3:06 PM by Dr. Benedicto Krause.          CT Head Without Contrast   Final Result   No acute intracranial abnormality with background chronic   small-vessel ischemic changes and findings of cerebral atrophy.       DICTATED:     04/07/2018   EDITED/ls :     04/07/2018        This  report was finalized on 4/7/2018 2:41 PM by Dr. Benedicto Krause.            Vitals:    04/07/18 1330 04/07/18 1400 04/07/18 1430 04/07/18 1547   BP: 110/50 114/49 117/48 110/53   BP Location:       Patient Position:       Pulse: 67 66 65    Resp:       Temp:       TempSrc:       SpO2: (!) 89% (!) 89% 90%    Weight:       Height:         Medications   sodium chloride 0.9 % flush 10 mL (not administered)   sodium chloride 0.9 % infusion (0 mL/hr Intravenous Stopped 4/7/18 1429)   sodium chloride 0.9 % bolus 500 mL (0 mL Intravenous Stopped 4/7/18 1232)   levETIRAcetam in NaCl 0.75% (KEPPRA) IVPB 1,000 mg (0 mg Intravenous Stopped 4/7/18 1437)   gadobenate dimeglumine (MULTIHANCE) injection 10 mL (10 mL Intravenous Given 4/7/18 1512)   sodium chloride 0.9 % bolus 1,000 mL (1,000 mL Intravenous New Bag 4/7/18 1709)     ECG/EMG Results (last 24 hours)     ** No results found for the last 24 hours. **                        MDM    Final diagnoses:   Seizure   Syncope, unspecified syncope type   Dementia without behavioral disturbance, unspecified dementia type       Documentation assistance provided by radha Leonardo.  Information recorded by the scribe was done at my direction and has been verified and validated by me.     Piero Leonardo  04/07/18 1140       Piero Leonardo  04/07/18 1818       Gabriel Live MD  04/07/18 1832

## 2018-04-07 NOTE — DISCHARGE INSTRUCTIONS
Follow up with Dr. Zapata in office within 1 week. Call to make an appointment.  Have her coordinate outpatient neurologic and cardiovascular evaluation along with office follow-up    Go to outpatient follow up with cardiologist, as discussed, for an outpatient Echo.  A referral to cardiology has been generated today.  Please call this week to arrange an outpatient appointment.  Part of your evaluation for all that may be done by Dr. Zapata.  Please discuss additional cardiovascular evaluation with Dr. Zapata however    Follow up with Dr. Montano at next available for neurologic re-evaluation. Continue with neurologic care.     Start Kepra twice a day, as prescribed.     Push fluids for the next couple of days.     No driving for three months per [place] State Law.  Seizure precautions as discussed.  No driving, no bathing alone, no bathing in a tub or swimming in a pool or any elevated work.  No other activities that would cause injuries to your another person if he had a seizure while performing them.    Immediately return to the emergency department for any new or worsening symptoms, including recurrent syncope or seizure or any other acute neurologic event, or other acute, urgent, emergent, or progressive symptoms as discussed.

## 2018-04-09 ENCOUNTER — EPISODE CHANGES (OUTPATIENT)
Dept: CASE MANAGEMENT | Facility: OTHER | Age: 83
End: 2018-04-09

## 2018-04-10 LAB
BACTERIA SPEC AEROBE CULT: ABNORMAL
BACTERIA SPEC AEROBE CULT: ABNORMAL

## 2018-04-13 ENCOUNTER — TELEPHONE (OUTPATIENT)
Dept: INTERNAL MEDICINE | Facility: CLINIC | Age: 83
End: 2018-04-13

## 2018-04-13 ENCOUNTER — TELEPHONE (OUTPATIENT)
Dept: NEUROLOGY | Facility: CLINIC | Age: 83
End: 2018-04-13

## 2018-04-13 NOTE — TELEPHONE ENCOUNTER
Jovana calling to let you know that Dorota is being moved to [facility] unit on 04/18/18.    Jovana also expressed her thanks for your help in getting her mom in law taken care of.

## 2018-04-13 NOTE — TELEPHONE ENCOUNTER
----- Message from Maria L Braxton MA sent at 4/13/2018 10:21 AM EDT -----  JUST FYHERI.  DO NOT NEED TO RETURN CALL    Jovana christiansen to say Dorota is moving into [facility] wed 4/18 on the [facility] unit.

## 2018-04-23 ENCOUNTER — PATIENT OUTREACH (OUTPATIENT)
Dept: CASE MANAGEMENT | Facility: OTHER | Age: 83
End: 2018-04-23

## 2018-06-04 ENCOUNTER — TELEPHONE (OUTPATIENT)
Dept: NEUROLOGY | Facility: CLINIC | Age: 83
End: 2018-06-04
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
## 2018-06-08 ENCOUNTER — OFFICE VISIT (OUTPATIENT)
Dept: NEUROLOGY | Facility: CLINIC | Age: 83
End: 2018-06-08

## 2018-06-08 VITALS
BODY MASS INDEX: 26.24 KG/M2 | HEIGHT: 58 IN | DIASTOLIC BLOOD PRESSURE: 78 MMHG | SYSTOLIC BLOOD PRESSURE: 154 MMHG | WEIGHT: 125 LBS

## 2018-06-08 DIAGNOSIS — R55 SYNCOPE AND COLLAPSE: ICD-10-CM

## 2018-06-08 DIAGNOSIS — F02.818 LATE ONSET ALZHEIMER'S DISEASE WITH BEHAVIORAL DISTURBANCE (HCC): Primary | ICD-10-CM

## 2018-06-08 DIAGNOSIS — G30.1 LATE ONSET ALZHEIMER'S DISEASE WITH BEHAVIORAL DISTURBANCE (HCC): Primary | ICD-10-CM

## 2018-06-08 PROCEDURE — 99214 OFFICE O/P EST MOD 30 MIN: CPT | Performed by: PSYCHIATRY & NEUROLOGY

## 2018-06-08 RX ORDER — MEMANTINE HYDROCHLORIDE 5 MG/1
5 TABLET ORAL 2 TIMES DAILY
COMMUNITY
End: 2020-05-22

## 2018-06-08 RX ORDER — ATORVASTATIN CALCIUM 20 MG/1
20 TABLET, FILM COATED ORAL DAILY
COMMUNITY
End: 2021-02-03

## 2018-06-08 NOTE — PROGRESS NOTES
"Dorota Membreno    Subjective     CC: memory loss    History of Present Illness   Dorota Membreno returns to clinic today with a history of Alzheimer's Disease . She has had symptoms since approximately 2013 marked initially by forgetfulness and word-finding difficulties . This has worsened  over time. She is currently residing at [facility].     Prior evaluation has included screening blood work  and a head CT  which was unremarkable .     Since her last visit on 11/14/17, she has been admitted for 2 weeks at the [facility] due to behavioral symptoms. However, she has adjusted well to [facility] with now only occasional verbal aggressivity. She was seen in the ED on 4/7/18 and diagnosed with possible seizure. She was found in the bathroom with urinary incontinence, \"contracted and rigid\", returning to baseline in about 5 minutes. A head CT was normal. She was started on Keppra. She has not had recurrent episodes. Currently she feels well.    I have reviewed and confirmed the past family, social and medical history as accurate on 6/8/18.     Review of Systems   Constitutional: Negative.    Respiratory: Negative.    Cardiovascular: Negative.    Gastrointestinal: Negative.    Genitourinary: Negative.        Objective     /78   Ht 147.3 cm (58\")   Wt 56.7 kg (125 lb)   BMI 26.13 kg/m²      Neurologic Exam     Mental Status   Oriented to person.   Disoriented to place.   Disoriented to time.   Registration: recalls 3 of 3 objects. Recall of objects at 5 minutes: 0/3. Follows 3 step commands.   Attention: normal.   Speech: speech is normal   Level of consciousness: alert  Able to name object. Able to read. Able to repeat. Able to write. Normal comprehension.     Cranial Nerves   Cranial nerves II through XII intact.     Motor Exam   Muscle bulk: normal  Overall muscle tone: normal    Strength   Strength 5/5 throughout.     Sensory Exam   Light touch normal.     Gait, Coordination, and Reflexes     Coordination   Finger " to nose coordination: normal      MMSE=22      Assessment/Plan   Dorota was seen today for mild cognitive impairment.    Diagnoses and all orders for this visit:    Late onset Alzheimer's disease with behavioral disturbance    Syncope and collapse  -     EEG Awake or Asleep Routine          Dorota Membreno returns to clinic today with a history of Alzheimer's Disease . I again reviewed her current status and treatment options. After discussing potential treatment options, it was elected to continue on her current medications unchanged. However, I am unconvinced of seizure/epilepsy, and after discussion it was elected to check an EEG, and if no epileptiform activty is noted stop Keppra. She will then follow up in 6 months , or sooner if needed.       I spent 25 minutes face to face with the patient and caregiver. I   spent 15 minutes of this time counseling and discussing diagnosis, prognosis, current status, driving, treatment options and management.    As part of this visit I reviewed radiology results.    Alvaro Montano MD

## 2018-06-21 ENCOUNTER — APPOINTMENT (OUTPATIENT)
Dept: NEUROLOGY | Facility: HOSPITAL | Age: 83
End: 2018-06-21
Attending: PSYCHIATRY & NEUROLOGY

## 2018-06-27 ENCOUNTER — TELEPHONE (OUTPATIENT)
Dept: NEUROLOGY | Facility: CLINIC | Age: 83
End: 2018-06-27

## 2018-06-27 NOTE — TELEPHONE ENCOUNTER
Daughter in law, Jvoana, called me saying they scheduled the EEG but scheduling told them that a family member needed to accompany but she nor her  are not able to go with her as they cause the patient a lot of distress, plus their own medical issues. I called EEG dept and I was told that a paid caregiver could accompany instead of family. I relayed this information to Jovana who will have a staff member from [facility] accompany her. She also noted that Ms. Membreno is starting to have delusions again, this time that the [facility] is performing surgery on her . This is not yet distressing and I informed her to keep up posted should this become distressing.

## 2018-06-29 RX ORDER — AMLODIPINE BESYLATE 2.5 MG/1
TABLET ORAL
Qty: 90 TABLET | Refills: 0 | Status: SHIPPED | OUTPATIENT
Start: 2018-06-29 | End: 2021-02-09

## 2018-09-12 ENCOUNTER — TELEPHONE (OUTPATIENT)
Dept: NEUROLOGY | Facility: CLINIC | Age: 83
End: 2018-09-12

## 2018-09-12 NOTE — TELEPHONE ENCOUNTER
Elizabeth with [facility] called to schedule the EEG for Ms. Membreno as it has to be staff with nursing home that will be taking her to and from the appt. Can you please see that central Scheduling calls the nurse line at [facility] to schedule appt: [phone] (Jovana Johnson or Michelle). Thank you!

## 2018-09-26 ENCOUNTER — HOSPITAL ENCOUNTER (OUTPATIENT)
Dept: NEUROLOGY | Facility: HOSPITAL | Age: 83
Discharge: HOME | End: 2018-09-26
Attending: PSYCHIATRY & NEUROLOGY | Admitting: PSYCHIATRY & NEUROLOGY

## 2018-09-26 PROCEDURE — 95816 EEG AWAKE AND DROWSY: CPT

## 2018-09-27 ENCOUNTER — TELEPHONE (OUTPATIENT)
Dept: NEUROLOGY | Facility: CLINIC | Age: 83
End: 2018-09-27

## 2018-09-27 NOTE — TELEPHONE ENCOUNTER
Jovana Jose with [facility] memory care community called to clarify to discontinue Keppra-she verbalized understanding and said she could take a verbal, didn't need written order.

## 2018-09-27 NOTE — TELEPHONE ENCOUNTER
LM for Director of Nursing (no name on the machine) to give a verbal to discontinue Keppra and that we will send in written order when Dr. Montano gets back in office. Asked to call us back if had questions.

## 2018-09-27 NOTE — TELEPHONE ENCOUNTER
----- Message from Alvaro Montano MD sent at 9/26/2018  3:56 PM EDT -----  Regarding: RE: EEG results and fu recommendations  It is accurate. Do you think that you or someone could write out the order and stamp my name on it? Otherwise, I'll try to swing by tomorrow. Thanks.    ----- Message -----  From: Vero Maciel MSW  Sent: 9/26/2018   3:35 PM  To: Alvaro Montano MD  Subject: EEG results and fu recommendations               Daughter saw in [facility] the recommendation to stop keppra based on EEG results. She wants to know if this is accurate and if so, if you can write order for this so I can send to nursing home. Thanks    [facility] fax [phone]

## 2018-12-06 RX ORDER — QUETIAPINE FUMARATE 25 MG/1
TABLET, FILM COATED ORAL
Qty: 45 TABLET | Refills: 4 | Status: SHIPPED | OUTPATIENT
Start: 2018-12-06 | End: 2020-06-15

## 2019-02-25 ENCOUNTER — TELEPHONE (OUTPATIENT)
Dept: NEUROLOGY | Facility: CLINIC | Age: 84
End: 2019-02-25

## 2019-02-25 ENCOUNTER — OFFICE VISIT (OUTPATIENT)
Dept: NEUROLOGY | Facility: CLINIC | Age: 84
End: 2019-02-25

## 2019-02-25 VITALS — HEIGHT: 58 IN | BODY MASS INDEX: 26.24 KG/M2 | WEIGHT: 125 LBS | HEART RATE: 64 BPM

## 2019-02-25 DIAGNOSIS — G30.1 LATE ONSET ALZHEIMER'S DISEASE WITH BEHAVIORAL DISTURBANCE (HCC): Primary | ICD-10-CM

## 2019-02-25 DIAGNOSIS — F02.818 LATE ONSET ALZHEIMER'S DISEASE WITH BEHAVIORAL DISTURBANCE (HCC): Primary | ICD-10-CM

## 2019-02-25 PROCEDURE — 99214 OFFICE O/P EST MOD 30 MIN: CPT | Performed by: PHYSICIAN ASSISTANT

## 2019-02-25 NOTE — PROGRESS NOTES
"Subjective     Chief Complaint: memory loss      History of Present Illness   Droota Membreno is a 87 y.o. female who returns to clinic today with a history of Alzheimer's Disease . She has had symptoms since approximately 2013 marked initially by forgetfulness and word-finding difficulties . This has worsened  over time. She is currently residing at [facility].      Prior evaluation has included screening blood work  and a head CT  which was unremarkable. She is currently taking donepezil and Namenda.      She was seen in the ED on 4/7/18 and diagnosed with possible seizure. She was found in the bathroom with urinary incontinence, \"contracted and rigid\", returning to baseline in about 5 minutes. A head CT was normal. She was started on Keppra, though this was discontinued later on after an EEG was unremarkable. She has not had any recurrent episodes.     Today: Since her last visit in 6/18, she feels essentially unchanged. Her family has noted some cognitive decline. She has adjusted well to [facility] with now only occasional verbal aggressivity      I have reviewed and confirmed the past family, social and medical history as accurate on 2/25/19.     Review of Systems   Constitutional: Negative.    HENT: Negative.    Eyes: Negative.    Respiratory: Negative.    Cardiovascular: Negative.    Gastrointestinal: Negative.    Endocrine: Negative.    Genitourinary: Negative.    Musculoskeletal: Negative.    Skin: Negative.    Allergic/Immunologic: Negative.    Neurological:        Memory loss     Hematological: Negative.    Psychiatric/Behavioral: Negative.        Objective     Pulse 64   Ht 147.3 cm (58\")   Wt 56.7 kg (125 lb)   BMI 26.13 kg/m²     General appearance today is normal.     Physical Exam   Neurological: She has normal strength. She has a normal Finger-Nose-Finger Test. Gait normal.   Psychiatric: Her speech is normal.        Neurologic Exam     Mental Status   Oriented to person.   Oriented to place. "   Disoriented to time. Disoriented to year, date and season. Oriented to month and day.   Registration: recalls 3 of 3 objects. Recall at 5 minutes: recalls 1 of 3 objects. Follows 3 step commands.   Attention: decreased.   Speech: speech is normal   Level of consciousness: alert  Able to name object. Able to read. Able to repeat. Able to write. Normal comprehension.     Cranial Nerves   Cranial nerves II through XII intact.     Motor Exam   Muscle bulk: normal  Overall muscle tone: normal    Strength   Strength 5/5 throughout.     Sensory Exam   Light touch normal.     Gait, Coordination, and Reflexes     Gait  Gait: normal    Coordination   Finger to nose coordination: normal    Tremor   Resting tremor: absent        Results  MMSE=18 (22 in 6/18)      Assessment/Plan   Dorota was seen today for memory loss.    Diagnoses and all orders for this visit:    Late onset Alzheimer's disease with behavioral disturbance          Discussion/Summary   Dorota Membreno returns to clinic today for evaluation of Alzheimer's Disease . I again reviewed her current status and treatment options. After discussing potential treatment options, it was elected to continue on  donepezil and memantine unchanged as she is doing well overall. She will then follow up in 6 months, or sooner if needed.   I spent 25 minutes face to face with the patient with 15 minutes spent on discussing evaluation, current status, treatment options and management as discussed above.       As part of this visit I obtained additional history from the family which is incorporated in the HPI.      Deborah Corley PA-C

## 2019-02-25 NOTE — TELEPHONE ENCOUNTER
"Phone call with oJvana Membreno to get update on how she is doing before her visit today. She is still living in Memory Care building at [facility] and doing very well. She has \"extreme\" short term memory loss, can't recall events from one hour to next. She has some anger towards staff but nothing that staff can't redirect. She knows to keep in touch as needed.   "

## 2019-09-06 NOTE — TELEPHONE ENCOUNTER
"Spoke with Jovana, Dr. Montano recommends inpatient psychiatry evaluation and treatment of behavioral symptoms as he feels she is not safe to be at home while trying to find another medication that may be beneficial. Since our call yesterday, Jovana said Ms. Membreno made a statement expressing a wish for death. Last night Ms. Membreno said \" I've called to tell you I'm done with this, I'm old, and I want to go home, you know what I mean\". Son stayed the night with her and family will increase supervision but they are worried that these behaviors are distressing and family cannot provide 24/7 care and anger is towards son and daughter in law. I explained I would call [facility], per Dr. Montano's recommendations and send over a referral (fax [phone]).  "
97.7

## 2019-10-15 ENCOUNTER — TELEPHONE (OUTPATIENT)
Dept: NEUROLOGY | Facility: CLINIC | Age: 84
End: 2019-10-15

## 2019-10-15 NOTE — TELEPHONE ENCOUNTER
----- Message from Alvaro Montano MD sent at 10/15/2019  3:15 PM EDT -----  Regarding: RE: appt time  Contact: [phone]  I would not anticipate any problem with this, but she is welcome to call that afternoon to make sure that I'm not running behind. Thanks.    ----- Message -----  From: Vero Maciel MSW  Sent: 10/15/2019   1:25 PM  To: Alvaro Montano MD  Subject: appt time                                        Daughter wants to know if you can get her out of her appt by 245pm (2pm appt) or should she reschedule.

## 2019-10-15 NOTE — TELEPHONE ENCOUNTER
Told daughter we will do what we can to get them out by 245 (agency she uses for transportation has to leave by 245), told her to let front office know as a reminder the day of the visit. Also has 2 new med changes:  Doubled seroquel from 12.5 mg to 25mg bid and added citalopram. I will request updated med list from [facility] (called Tricia Sotelo).

## 2019-10-16 ENCOUNTER — TELEPHONE (OUTPATIENT)
Dept: NEUROLOGY | Facility: CLINIC | Age: 84
End: 2019-10-16

## 2019-10-16 NOTE — TELEPHONE ENCOUNTER
Called her senior living community to get updated med list faxed over, they advised they would fax to my attention today.

## 2019-10-21 ENCOUNTER — OFFICE VISIT (OUTPATIENT)
Dept: NEUROLOGY | Facility: CLINIC | Age: 84
End: 2019-10-21

## 2019-10-21 VITALS — OXYGEN SATURATION: 91 % | BODY MASS INDEX: 26.24 KG/M2 | HEIGHT: 58 IN | WEIGHT: 125 LBS | HEART RATE: 70 BPM

## 2019-10-21 DIAGNOSIS — F02.818 LATE ONSET ALZHEIMER'S DISEASE WITH BEHAVIORAL DISTURBANCE (HCC): Primary | ICD-10-CM

## 2019-10-21 DIAGNOSIS — G30.1 LATE ONSET ALZHEIMER'S DISEASE WITH BEHAVIORAL DISTURBANCE (HCC): Primary | ICD-10-CM

## 2019-10-21 PROCEDURE — 99213 OFFICE O/P EST LOW 20 MIN: CPT | Performed by: PSYCHIATRY & NEUROLOGY

## 2019-10-21 RX ORDER — CITALOPRAM 20 MG/1
TABLET ORAL
COMMUNITY
Start: 2019-09-21 | End: 2020-06-26

## 2019-10-21 RX ORDER — AMLODIPINE BESYLATE 5 MG/1
TABLET ORAL
COMMUNITY
Start: 2019-10-04 | End: 2021-02-09

## 2019-10-21 NOTE — PROGRESS NOTES
"Subjective     Chief Complaint: memory loss      History of Present Illness   Dorota Membreno is a 87 y.o. female who returns to clinic today with a history of Alzheimer's Disease . She has had symptoms since approximately 2013 marked initially by forgetfulness and word-finding difficulties . This has worsened  over time. She is currently residing at [facility].      Prior evaluation has included screening blood work  and a head CT  which was unremarkable. She is currently taking donepezil and Namenda.      She was seen in the ED on 4/7/18 and diagnosed with possible seizure. She was found in the bathroom with urinary incontinence, \"contracted and rigid\", returning to baseline in about 5 minutes. A head CT was normal. She was started on Keppra, though this was discontinued later on after an EEG was unremarkable. She has not had any recurrent episodes.     Since her last visit on 2/25/19 she feels well and unchanged. No new concerns were reported today.    I have reviewed and confirmed the past family, social and medical history as accurate on 10/21/19.     Review of Systems   Constitutional: Negative.        Objective     Pulse 70   Ht 147.3 cm (58\")   Wt 56.7 kg (125 lb)   SpO2 91%   BMI 26.13 kg/m²     General appearance today is normal.     Physical Exam   Neurological: She has normal strength.   Psychiatric: Her speech is normal.        Neurologic Exam     Mental Status   Oriented to person.   Disoriented to place.   Disoriented to time.   Registration: recalls 3 of 3 objects. Recall of objects at 5 minutes: 0/3. Follows 3 step commands.   Attention: normal.   Speech: speech is normal   Level of consciousness: alert  Able to name object. Able to read. Able to repeat. Able to write. Normal comprehension.     Cranial Nerves   Cranial nerves II through XII intact.     Motor Exam   Muscle bulk: normal  Overall muscle tone: normal    Strength   Strength 5/5 throughout.         Results  MMSE=18 " (unchanged)      Assessment/Plan   Dorota was seen today for follow-up.    Diagnoses and all orders for this visit:    Late onset Alzheimer's disease with behavioral disturbance (CMS/HCC)          Discussion/Summary   Dorota Membreno returns to clinic today for evaluation of Alzheimer's Disease . I again reviewed her current status and treatment options. After discussing potential treatment options, it was elected to continue on  donepezil and memantine as well as Seroquel and citalopram unchanged as she is doing well overall. She will then follow up in 6 months, or sooner if needed.     I spent 15 minutes face to face with the patient and caregiver with 8 minutes spent on discussing current status, treatment options and management as discussed above.       As part of this visit I obtained additional history from the caregiver which is incorporated in the HPI.      Alvaro Montano MD

## 2019-12-11 RX ORDER — LEVOTHYROXINE SODIUM 0.05 MG/1
TABLET ORAL
Qty: 30 TABLET | Refills: 4 | Status: SHIPPED | OUTPATIENT
Start: 2019-12-11 | End: 2020-05-22

## 2019-12-19 RX ORDER — ATORVASTATIN CALCIUM 20 MG/1
TABLET, FILM COATED ORAL
Qty: 30 TABLET | Refills: 11 | OUTPATIENT
Start: 2019-12-19

## 2020-01-06 RX ORDER — DONEPEZIL HYDROCHLORIDE 5 MG/1
TABLET, FILM COATED ORAL
Qty: 30 TABLET | Refills: 4 | Status: SHIPPED | OUTPATIENT
Start: 2020-01-06 | End: 2021-04-08

## 2020-05-22 RX ORDER — LEVOTHYROXINE SODIUM 0.05 MG/1
TABLET ORAL
Qty: 30 TABLET | Refills: 11 | Status: SHIPPED | OUTPATIENT
Start: 2020-05-22 | End: 2021-04-01

## 2020-05-22 RX ORDER — MEMANTINE HYDROCHLORIDE 5 MG/1
TABLET ORAL
Qty: 60 TABLET | Refills: 10 | Status: SHIPPED | OUTPATIENT
Start: 2020-05-22 | End: 2021-04-01

## 2020-06-15 RX ORDER — QUETIAPINE FUMARATE 25 MG/1
TABLET, FILM COATED ORAL
Qty: 60 TABLET | Refills: 3 | Status: SHIPPED | OUTPATIENT
Start: 2020-06-15 | End: 2020-06-17

## 2020-06-17 RX ORDER — QUETIAPINE FUMARATE 25 MG/1
TABLET, FILM COATED ORAL
Qty: 60 TABLET | Refills: 3 | Status: SHIPPED | OUTPATIENT
Start: 2020-06-17 | End: 2020-10-14

## 2020-06-26 RX ORDER — CITALOPRAM 20 MG/1
TABLET ORAL
Qty: 30 TABLET | Refills: 3 | Status: SHIPPED | OUTPATIENT
Start: 2020-06-26 | End: 2020-10-23

## 2020-08-25 ENCOUNTER — TELEPHONE (OUTPATIENT)
Dept: NEUROLOGY | Facility: CLINIC | Age: 85
End: 2020-08-25

## 2020-08-25 NOTE — TELEPHONE ENCOUNTER
Daughter in Law and POA, Jovana, called saying she hasn't been allowed to change appts, etc as she isn't on [facility]. I mailed her another VINCENT for she and her  as joint POA's to complete.

## 2020-10-14 RX ORDER — QUETIAPINE FUMARATE 25 MG/1
TABLET, FILM COATED ORAL
Qty: 60 TABLET | Refills: 11 | Status: SHIPPED | OUTPATIENT
Start: 2020-10-14 | End: 2021-08-30

## 2020-10-23 RX ORDER — CITALOPRAM 20 MG/1
TABLET ORAL
Qty: 30 TABLET | Refills: 11 | Status: SHIPPED | OUTPATIENT
Start: 2020-10-23 | End: 2021-04-01

## 2020-10-23 RX ORDER — CITALOPRAM 20 MG/1
TABLET ORAL
Qty: 30 TABLET | Refills: 11 | Status: SHIPPED | OUTPATIENT
Start: 2020-10-23 | End: 2020-10-23

## 2021-01-04 RX ORDER — METOPROLOL SUCCINATE 50 MG/1
50 TABLET, EXTENDED RELEASE ORAL DAILY
Qty: 90 TABLET | Refills: 3 | Status: SHIPPED | OUTPATIENT
Start: 2021-01-04 | End: 2021-04-08

## 2021-01-21 RX ORDER — AMLODIPINE BESYLATE 5 MG/1
TABLET ORAL
Qty: 30 TABLET | Refills: 10 | OUTPATIENT
Start: 2021-01-21

## 2021-01-21 RX ORDER — ATORVASTATIN CALCIUM 20 MG/1
TABLET, FILM COATED ORAL
Qty: 30 TABLET | Refills: 10 | OUTPATIENT
Start: 2021-01-21

## 2021-01-22 RX ORDER — ONDANSETRON 4 MG/1
TABLET, FILM COATED ORAL
Qty: 12 TABLET | Refills: 2 | Status: SHIPPED | OUTPATIENT
Start: 2021-01-22

## 2021-01-29 RX ORDER — ATORVASTATIN CALCIUM 20 MG/1
TABLET, FILM COATED ORAL
Qty: 30 TABLET | Refills: 11 | OUTPATIENT
Start: 2021-01-29

## 2021-02-03 RX ORDER — ATORVASTATIN CALCIUM 20 MG/1
TABLET, FILM COATED ORAL
Qty: 30 TABLET | Refills: 11 | Status: SHIPPED | OUTPATIENT
Start: 2021-02-03 | End: 2022-01-18

## 2021-02-09 RX ORDER — AMLODIPINE BESYLATE 5 MG/1
5 TABLET ORAL DAILY
Qty: 30 TABLET | Refills: 11 | Status: SHIPPED | OUTPATIENT
Start: 2021-02-09 | End: 2021-06-29

## 2021-02-25 RX ORDER — BISMUTH SUBSALICYLATE 262 MG/15ML
LIQUID ORAL
Qty: 236 ML | Refills: 11 | Status: SHIPPED | OUTPATIENT
Start: 2021-02-25 | End: 2023-03-09 | Stop reason: HOSPADM

## 2021-04-01 RX ORDER — LEVOTHYROXINE SODIUM 0.05 MG/1
TABLET ORAL
Qty: 30 TABLET | Refills: 11 | Status: SHIPPED | OUTPATIENT
Start: 2021-04-01 | End: 2022-03-22

## 2021-04-01 RX ORDER — MEMANTINE HYDROCHLORIDE 5 MG/1
TABLET ORAL
Qty: 60 TABLET | Refills: 11 | Status: SHIPPED | OUTPATIENT
Start: 2021-04-01 | End: 2022-03-22

## 2021-04-01 RX ORDER — CITALOPRAM 20 MG/1
TABLET ORAL
Qty: 30 TABLET | Refills: 11 | Status: SHIPPED | OUTPATIENT
Start: 2021-04-01 | End: 2022-03-22

## 2021-04-08 RX ORDER — METOPROLOL SUCCINATE 50 MG/1
50 TABLET, EXTENDED RELEASE ORAL DAILY
Qty: 30 TABLET | Refills: 11 | Status: SHIPPED | OUTPATIENT
Start: 2021-04-08 | End: 2021-08-30

## 2021-04-08 RX ORDER — DONEPEZIL HYDROCHLORIDE 5 MG/1
TABLET, FILM COATED ORAL
Qty: 30 TABLET | Refills: 11 | Status: SHIPPED | OUTPATIENT
Start: 2021-04-08 | End: 2021-08-30

## 2021-06-29 RX ORDER — AMLODIPINE BESYLATE 5 MG/1
TABLET ORAL
Qty: 30 TABLET | Refills: 11 | Status: SHIPPED | OUTPATIENT
Start: 2021-06-29 | End: 2022-06-14

## 2021-08-30 RX ORDER — DONEPEZIL HYDROCHLORIDE 5 MG/1
TABLET, FILM COATED ORAL
Qty: 30 TABLET | Refills: 11 | Status: SHIPPED | OUTPATIENT
Start: 2021-08-30 | End: 2022-08-09

## 2021-08-30 RX ORDER — QUETIAPINE FUMARATE 25 MG/1
TABLET, FILM COATED ORAL
Qty: 30 TABLET | Refills: 11 | Status: SHIPPED | OUTPATIENT
Start: 2021-08-30 | End: 2022-08-09

## 2021-08-30 RX ORDER — METOPROLOL SUCCINATE 50 MG/1
50 TABLET, EXTENDED RELEASE ORAL DAILY
Qty: 30 TABLET | Refills: 11 | Status: SHIPPED | OUTPATIENT
Start: 2021-08-30 | End: 2023-03-09 | Stop reason: HOSPADM

## 2022-01-18 RX ORDER — ATORVASTATIN CALCIUM 20 MG/1
TABLET, FILM COATED ORAL
Qty: 30 TABLET | Refills: 11 | Status: SHIPPED | OUTPATIENT
Start: 2022-01-18 | End: 2023-01-11

## 2022-03-22 RX ORDER — LEVOTHYROXINE SODIUM 0.05 MG/1
TABLET ORAL
Qty: 30 TABLET | Refills: 11 | Status: SHIPPED | OUTPATIENT
Start: 2022-03-22 | End: 2023-03-14

## 2022-03-22 RX ORDER — MEMANTINE HYDROCHLORIDE 5 MG/1
TABLET ORAL
Qty: 60 TABLET | Refills: 11 | Status: SHIPPED | OUTPATIENT
Start: 2022-03-22 | End: 2023-03-09 | Stop reason: HOSPADM

## 2022-03-22 RX ORDER — CITALOPRAM 20 MG/1
TABLET ORAL
Qty: 30 TABLET | Refills: 11 | Status: SHIPPED | OUTPATIENT
Start: 2022-03-22 | End: 2023-03-14

## 2022-06-14 RX ORDER — AMLODIPINE BESYLATE 5 MG/1
TABLET ORAL
Qty: 30 TABLET | Refills: 10 | Status: SHIPPED | OUTPATIENT
Start: 2022-06-14 | End: 2023-03-09 | Stop reason: HOSPADM

## 2022-08-09 RX ORDER — DONEPEZIL HYDROCHLORIDE 5 MG/1
TABLET, FILM COATED ORAL
Qty: 30 TABLET | Refills: 0 | Status: SHIPPED | OUTPATIENT
Start: 2022-08-09 | End: 2023-03-09 | Stop reason: HOSPADM

## 2022-08-09 RX ORDER — QUETIAPINE FUMARATE 25 MG/1
TABLET, FILM COATED ORAL
Qty: 30 TABLET | Refills: 0 | Status: SHIPPED | OUTPATIENT
Start: 2022-08-09 | End: 2023-03-09 | Stop reason: HOSPADM

## 2022-09-06 RX ORDER — QUETIAPINE FUMARATE 25 MG/1
TABLET, FILM COATED ORAL
Qty: 30 TABLET | Refills: 10 | OUTPATIENT
Start: 2022-09-06

## 2022-09-06 RX ORDER — DONEPEZIL HYDROCHLORIDE 5 MG/1
TABLET, FILM COATED ORAL
Qty: 30 TABLET | Refills: 10 | OUTPATIENT
Start: 2022-09-06

## 2022-09-12 RX ORDER — DONEPEZIL HYDROCHLORIDE 5 MG/1
TABLET, FILM COATED ORAL
Qty: 30 TABLET | Refills: 10 | OUTPATIENT
Start: 2022-09-12

## 2022-09-12 RX ORDER — QUETIAPINE FUMARATE 25 MG/1
TABLET, FILM COATED ORAL
Qty: 30 TABLET | Refills: 10 | OUTPATIENT
Start: 2022-09-12

## 2023-01-11 RX ORDER — ATORVASTATIN CALCIUM 20 MG/1
TABLET, FILM COATED ORAL
Qty: 30 TABLET | Refills: 11 | Status: SHIPPED | OUTPATIENT
Start: 2023-01-11 | End: 2023-03-09 | Stop reason: HOSPADM

## 2023-02-26 ENCOUNTER — HOSPITAL ENCOUNTER (INPATIENT)
Facility: HOSPITAL | Age: 88
LOS: 11 days | Discharge: NURSING, REHAB OR LONG-TERM CARE | DRG: 871 | End: 2023-03-09
Attending: STUDENT IN AN ORGANIZED HEALTH CARE EDUCATION/TRAINING PROGRAM | Admitting: STUDENT IN AN ORGANIZED HEALTH CARE EDUCATION/TRAINING PROGRAM
Payer: MEDICARE

## 2023-02-26 ENCOUNTER — APPOINTMENT (OUTPATIENT)
Dept: CARDIOLOGY | Facility: HOSPITAL | Age: 88
DRG: 871 | End: 2023-02-26
Payer: MEDICARE

## 2023-02-26 ENCOUNTER — APPOINTMENT (OUTPATIENT)
Dept: CT IMAGING | Facility: HOSPITAL | Age: 88
DRG: 871 | End: 2023-02-26
Payer: MEDICARE

## 2023-02-26 ENCOUNTER — APPOINTMENT (OUTPATIENT)
Dept: GENERAL RADIOLOGY | Facility: HOSPITAL | Age: 88
DRG: 871 | End: 2023-02-26
Payer: MEDICARE

## 2023-02-26 DIAGNOSIS — G30.1 LATE ONSET ALZHEIMER'S DISEASE WITH BEHAVIORAL DISTURBANCE: Primary | ICD-10-CM

## 2023-02-26 DIAGNOSIS — F02.818 LATE ONSET ALZHEIMER'S DISEASE WITH BEHAVIORAL DISTURBANCE: Primary | ICD-10-CM

## 2023-02-26 DIAGNOSIS — I95.9 HYPOTENSION, UNSPECIFIED HYPOTENSION TYPE: ICD-10-CM

## 2023-02-26 DIAGNOSIS — I48.91 ATRIAL FIBRILLATION WITH RVR: ICD-10-CM

## 2023-02-26 DIAGNOSIS — J96.01 ACUTE RESPIRATORY FAILURE WITH HYPOXIA: ICD-10-CM

## 2023-02-26 DIAGNOSIS — A41.9 SEPSIS, DUE TO UNSPECIFIED ORGANISM, UNSPECIFIED WHETHER ACUTE ORGAN DYSFUNCTION PRESENT: ICD-10-CM

## 2023-02-26 PROBLEM — J18.9 PNEUMONIA: Status: ACTIVE | Noted: 2023-02-26

## 2023-02-26 PROBLEM — J90 PLEURAL EFFUSION: Status: ACTIVE | Noted: 2023-02-26

## 2023-02-26 PROBLEM — E83.42 HYPOMAGNESEMIA: Status: ACTIVE | Noted: 2023-02-26

## 2023-02-26 PROBLEM — J96.90 RESPIRATORY FAILURE (HCC): Status: ACTIVE | Noted: 2023-02-26

## 2023-02-26 PROBLEM — R80.9 PROTEINURIA: Status: ACTIVE | Noted: 2023-02-26

## 2023-02-26 PROBLEM — R79.89 ELEVATED BRAIN NATRIURETIC PEPTIDE (BNP) LEVEL: Status: ACTIVE | Noted: 2023-02-26

## 2023-02-26 PROBLEM — D72.829 LEUKOCYTOSIS: Status: ACTIVE | Noted: 2023-02-26

## 2023-02-26 PROBLEM — R74.8 ELEVATED ALKALINE PHOSPHATASE LEVEL: Status: ACTIVE | Noted: 2023-02-26

## 2023-02-26 LAB
ALBUMIN SERPL-MCNC: 3.4 G/DL (ref 3.5–5.2)
ALBUMIN/GLOB SERPL: 0.9 G/DL
ALP SERPL-CCNC: 120 U/L (ref 39–117)
ALT SERPL W P-5'-P-CCNC: 13 U/L (ref 1–33)
ANION GAP SERPL CALCULATED.3IONS-SCNC: 10 MMOL/L (ref 5–15)
ARTERIAL PATENCY WRIST A: ABNORMAL
AST SERPL-CCNC: 19 U/L (ref 1–32)
ATMOSPHERIC PRESS: ABNORMAL MM[HG]
B PARAPERT DNA SPEC QL NAA+PROBE: NOT DETECTED
B PERT DNA SPEC QL NAA+PROBE: NOT DETECTED
BASE EXCESS BLDA CALC-SCNC: 4.9 MMOL/L (ref 0–2)
BASOPHILS # BLD AUTO: 0.05 10*3/MM3 (ref 0–0.2)
BASOPHILS NFR BLD AUTO: 0.3 % (ref 0–1.5)
BDY SITE: ABNORMAL
BH CV ECHO MEAS - AO MAX PG: 7.8 MMHG
BH CV ECHO MEAS - AO MEAN PG: 4 MMHG
BH CV ECHO MEAS - AO ROOT DIAM: 2.14 CM
BH CV ECHO MEAS - AO V2 MAX: 140 CM/SEC
BH CV ECHO MEAS - AO V2 VTI: 30.3 CM
BH CV ECHO MEAS - AVA(I,D): 2.05 CM2
BH CV ECHO MEAS - EDV(CUBED): 93.8 ML
BH CV ECHO MEAS - EDV(MOD-SP2): 41.3 ML
BH CV ECHO MEAS - EDV(MOD-SP4): 45.3 ML
BH CV ECHO MEAS - EF(MOD-BP): 64.2 %
BH CV ECHO MEAS - EF(MOD-SP2): 69.5 %
BH CV ECHO MEAS - EF(MOD-SP4): 58.9 %
BH CV ECHO MEAS - ESV(CUBED): 17.5 ML
BH CV ECHO MEAS - ESV(MOD-SP2): 12.6 ML
BH CV ECHO MEAS - ESV(MOD-SP4): 18.6 ML
BH CV ECHO MEAS - FS: 42.8 %
BH CV ECHO MEAS - IVS/LVPW: 1.06 CM
BH CV ECHO MEAS - IVSD: 0.84 CM
BH CV ECHO MEAS - LA DIMENSION: 3.4 CM
BH CV ECHO MEAS - LAT PEAK E' VEL: 6.7 CM/SEC
BH CV ECHO MEAS - LV MASS(C)D: 118.3 GRAMS
BH CV ECHO MEAS - LV MAX PG: 3.8 MMHG
BH CV ECHO MEAS - LV MEAN PG: 2 MMHG
BH CV ECHO MEAS - LV V1 MAX: 97.9 CM/SEC
BH CV ECHO MEAS - LV V1 VTI: 19.8 CM
BH CV ECHO MEAS - LVIDD: 4.5 CM
BH CV ECHO MEAS - LVIDS: 2.6 CM
BH CV ECHO MEAS - LVOT AREA: 3.1 CM2
BH CV ECHO MEAS - LVOT DIAM: 2 CM
BH CV ECHO MEAS - LVPWD: 0.79 CM
BH CV ECHO MEAS - MED PEAK E' VEL: 6.6 CM/SEC
BH CV ECHO MEAS - MV A MAX VEL: 109.6 CM/SEC
BH CV ECHO MEAS - MV DEC SLOPE: 537.1 CM/SEC2
BH CV ECHO MEAS - MV DEC TIME: 0.19 MSEC
BH CV ECHO MEAS - MV E MAX VEL: 110 CM/SEC
BH CV ECHO MEAS - MV E/A: 1
BH CV ECHO MEAS - MV MAX PG: 7.9 MMHG
BH CV ECHO MEAS - MV MEAN PG: 3.5 MMHG
BH CV ECHO MEAS - MV P1/2T: 75.4 MSEC
BH CV ECHO MEAS - MV V2 VTI: 41.3 CM
BH CV ECHO MEAS - MVA(P1/2T): 2.9 CM2
BH CV ECHO MEAS - MVA(VTI): 1.5 CM2
BH CV ECHO MEAS - PA ACC TIME: 0.13 SEC
BH CV ECHO MEAS - PA PR(ACCEL): 22 MMHG
BH CV ECHO MEAS - RAP SYSTOLE: 3 MMHG
BH CV ECHO MEAS - RVSP: 47 MMHG
BH CV ECHO MEAS - SV(LVOT): 62 ML
BH CV ECHO MEAS - SV(MOD-SP2): 28.7 ML
BH CV ECHO MEAS - SV(MOD-SP4): 26.7 ML
BH CV ECHO MEAS - TAPSE (>1.6): 1.92 CM
BH CV ECHO MEAS - TR MAX PG: 44 MMHG
BH CV ECHO MEAS - TR MAX VEL: 331 CM/SEC
BH CV ECHO MEASUREMENTS AVERAGE E/E' RATIO: 16.54
BH CV XLRA - RV BASE: 3.9 CM
BH CV XLRA - RV LENGTH: 6 CM
BH CV XLRA - RV MID: 3.4 CM
BH CV XLRA - TDI S': 11.4 CM/SEC
BILIRUB SERPL-MCNC: 0.7 MG/DL (ref 0–1.2)
BILIRUB UR QL STRIP: NEGATIVE
BODY TEMPERATURE: 37 C
BUN SERPL-MCNC: 28 MG/DL (ref 8–23)
BUN/CREAT SERPL: 32.6 (ref 7–25)
C PNEUM DNA NPH QL NAA+NON-PROBE: NOT DETECTED
CALCIUM SPEC-SCNC: 8.7 MG/DL (ref 8.2–9.6)
CHLORIDE SERPL-SCNC: 99 MMOL/L (ref 98–107)
CLARITY UR: CLEAR
CO2 BLDA-SCNC: 31.2 MMOL/L (ref 22–33)
CO2 SERPL-SCNC: 30 MMOL/L (ref 22–29)
COHGB MFR BLD: 1.3 % (ref 0–2)
COLOR UR: YELLOW
CREAT SERPL-MCNC: 0.86 MG/DL (ref 0.57–1)
D DIMER PPP FEU-MCNC: 1.45 MCGFEU/ML (ref 0–0.91)
D-LACTATE SERPL-SCNC: 1.5 MMOL/L (ref 0.5–2)
DEPRECATED RDW RBC AUTO: 46.7 FL (ref 37–54)
EGFRCR SERPLBLD CKD-EPI 2021: 63.9 ML/MIN/1.73
EOSINOPHIL # BLD AUTO: 0.11 10*3/MM3 (ref 0–0.4)
EOSINOPHIL NFR BLD AUTO: 0.7 % (ref 0.3–6.2)
EPAP: 0
ERYTHROCYTE [DISTWIDTH] IN BLOOD BY AUTOMATED COUNT: 13.6 % (ref 12.3–15.4)
FLUAV SUBTYP SPEC NAA+PROBE: NOT DETECTED
FLUBV RNA ISLT QL NAA+PROBE: NOT DETECTED
GEN 5 2HR TROPONIN T REFLEX: 20 NG/L
GLOBULIN UR ELPH-MCNC: 3.7 GM/DL
GLUCOSE SERPL-MCNC: 139 MG/DL (ref 65–99)
GLUCOSE UR STRIP-MCNC: NEGATIVE MG/DL
HADV DNA SPEC NAA+PROBE: NOT DETECTED
HCO3 BLDA-SCNC: 29.8 MMOL/L (ref 20–26)
HCOV 229E RNA SPEC QL NAA+PROBE: NOT DETECTED
HCOV HKU1 RNA SPEC QL NAA+PROBE: NOT DETECTED
HCOV NL63 RNA SPEC QL NAA+PROBE: NOT DETECTED
HCOV OC43 RNA SPEC QL NAA+PROBE: NOT DETECTED
HCT VFR BLD AUTO: 36.1 % (ref 34–46.6)
HCT VFR BLD CALC: 34.8 % (ref 38–51)
HGB BLD-MCNC: 11.5 G/DL (ref 12–15.9)
HGB BLDA-MCNC: 11.4 G/DL (ref 14–18)
HGB UR QL STRIP.AUTO: NEGATIVE
HMPV RNA NPH QL NAA+NON-PROBE: NOT DETECTED
HOLD SPECIMEN: NORMAL
HPIV1 RNA ISLT QL NAA+PROBE: NOT DETECTED
HPIV2 RNA SPEC QL NAA+PROBE: NOT DETECTED
HPIV3 RNA NPH QL NAA+PROBE: NOT DETECTED
HPIV4 P GENE NPH QL NAA+PROBE: NOT DETECTED
IMM GRANULOCYTES # BLD AUTO: 0.08 10*3/MM3 (ref 0–0.05)
IMM GRANULOCYTES NFR BLD AUTO: 0.5 % (ref 0–0.5)
INHALED O2 CONCENTRATION: 80 %
IPAP: 0
KETONES UR QL STRIP: NEGATIVE
L PNEUMO1 AG UR QL IA: NEGATIVE
LEFT ATRIUM VOLUME INDEX: 48.6 ML/M2
LEUKOCYTE ESTERASE UR QL STRIP.AUTO: NEGATIVE
LYMPHOCYTES # BLD AUTO: 0.82 10*3/MM3 (ref 0.7–3.1)
LYMPHOCYTES NFR BLD AUTO: 5 % (ref 19.6–45.3)
M PNEUMO IGG SER IA-ACNC: NOT DETECTED
MAGNESIUM SERPL-MCNC: 1.6 MG/DL (ref 1.7–2.3)
MAXIMAL PREDICTED HEART RATE: 129 BPM
MCH RBC QN AUTO: 29.8 PG (ref 26.6–33)
MCHC RBC AUTO-ENTMCNC: 31.9 G/DL (ref 31.5–35.7)
MCV RBC AUTO: 93.5 FL (ref 79–97)
METHGB BLD QL: 0.3 % (ref 0–1.5)
MODALITY: ABNORMAL
MONOCYTES # BLD AUTO: 1.66 10*3/MM3 (ref 0.1–0.9)
MONOCYTES NFR BLD AUTO: 10.2 % (ref 5–12)
MRSA DNA SPEC QL NAA+PROBE: NEGATIVE
NEUTROPHILS NFR BLD AUTO: 13.54 10*3/MM3 (ref 1.7–7)
NEUTROPHILS NFR BLD AUTO: 83.3 % (ref 42.7–76)
NITRITE UR QL STRIP: NEGATIVE
NOTE: ABNORMAL
NRBC BLD AUTO-RTO: 0 /100 WBC (ref 0–0.2)
NT-PROBNP SERPL-MCNC: 1095 PG/ML (ref 0–1800)
OXYHGB MFR BLDV: 98.2 % (ref 94–99)
PAW @ PEAK INSP FLOW SETTING VENT: 0 CMH2O
PCO2 BLDA: 44.5 MM HG (ref 35–45)
PCO2 TEMP ADJ BLD: 44.5 MM HG (ref 35–45)
PH BLDA: 7.43 PH UNITS (ref 7.35–7.45)
PH UR STRIP.AUTO: 5.5 [PH] (ref 5–8)
PH, TEMP CORRECTED: 7.43 PH UNITS
PHOSPHATE SERPL-MCNC: 2.6 MG/DL (ref 2.5–4.5)
PLATELET # BLD AUTO: 264 10*3/MM3 (ref 140–450)
PMV BLD AUTO: 9.8 FL (ref 6–12)
PO2 BLDA: 144 MM HG (ref 83–108)
PO2 TEMP ADJ BLD: 144 MM HG (ref 83–108)
POTASSIUM SERPL-SCNC: 3.8 MMOL/L (ref 3.5–5.2)
PROCALCITONIN SERPL-MCNC: 0.13 NG/ML (ref 0–0.25)
PROT SERPL-MCNC: 7.1 G/DL (ref 6–8.5)
PROT UR QL STRIP: ABNORMAL
RBC # BLD AUTO: 3.86 10*6/MM3 (ref 3.77–5.28)
RHINOVIRUS RNA SPEC NAA+PROBE: NOT DETECTED
RSV RNA NPH QL NAA+NON-PROBE: NOT DETECTED
S PNEUM AG SPEC QL LA: NEGATIVE
SARS-COV-2 RNA NPH QL NAA+NON-PROBE: NOT DETECTED
SODIUM SERPL-SCNC: 139 MMOL/L (ref 136–145)
SP GR UR STRIP: 1.02 (ref 1–1.03)
STRESS TARGET HR: 110 BPM
T4 FREE SERPL-MCNC: 1.03 NG/DL (ref 0.93–1.7)
TOTAL RATE: 0 BREATHS/MINUTE
TROPONIN T DELTA: 1 NG/L
TROPONIN T SERPL HS-MCNC: 19 NG/L
TROPONIN T SERPL HS-MCNC: 19 NG/L
TSH SERPL DL<=0.05 MIU/L-ACNC: 6 UIU/ML (ref 0.27–4.2)
UROBILINOGEN UR QL STRIP: ABNORMAL
WBC NRBC COR # BLD: 16.26 10*3/MM3 (ref 3.4–10.8)
WHOLE BLOOD HOLD COAG: NORMAL
WHOLE BLOOD HOLD SPECIMEN: NORMAL

## 2023-02-26 PROCEDURE — 25010000002 VANCOMYCIN 10 G RECONSTITUTED SOLUTION: Performed by: STUDENT IN AN ORGANIZED HEALTH CARE EDUCATION/TRAINING PROGRAM

## 2023-02-26 PROCEDURE — 94664 DEMO&/EVAL PT USE INHALER: CPT

## 2023-02-26 PROCEDURE — 84484 ASSAY OF TROPONIN QUANT: CPT | Performed by: STUDENT IN AN ORGANIZED HEALTH CARE EDUCATION/TRAINING PROGRAM

## 2023-02-26 PROCEDURE — 84145 PROCALCITONIN (PCT): CPT | Performed by: STUDENT IN AN ORGANIZED HEALTH CARE EDUCATION/TRAINING PROGRAM

## 2023-02-26 PROCEDURE — P9612 CATHETERIZE FOR URINE SPEC: HCPCS

## 2023-02-26 PROCEDURE — 84100 ASSAY OF PHOSPHORUS: CPT | Performed by: STUDENT IN AN ORGANIZED HEALTH CARE EDUCATION/TRAINING PROGRAM

## 2023-02-26 PROCEDURE — 94799 UNLISTED PULMONARY SVC/PX: CPT

## 2023-02-26 PROCEDURE — 25010000002 HEPARIN (PORCINE) PER 1000 UNITS: Performed by: INTERNAL MEDICINE

## 2023-02-26 PROCEDURE — 99222 1ST HOSP IP/OBS MODERATE 55: CPT | Performed by: INTERNAL MEDICINE

## 2023-02-26 PROCEDURE — 85025 COMPLETE CBC W/AUTO DIFF WBC: CPT

## 2023-02-26 PROCEDURE — 80053 COMPREHEN METABOLIC PANEL: CPT

## 2023-02-26 PROCEDURE — 92610 EVALUATE SWALLOWING FUNCTION: CPT

## 2023-02-26 PROCEDURE — 93005 ELECTROCARDIOGRAM TRACING: CPT

## 2023-02-26 PROCEDURE — 87641 MR-STAPH DNA AMP PROBE: CPT | Performed by: NURSE PRACTITIONER

## 2023-02-26 PROCEDURE — 82805 BLOOD GASES W/O2 SATURATION: CPT

## 2023-02-26 PROCEDURE — 25010000002 MAGNESIUM SULFATE IN D5W 1G/100ML (PREMIX) 1-5 GM/100ML-% SOLUTION: Performed by: STUDENT IN AN ORGANIZED HEALTH CARE EDUCATION/TRAINING PROGRAM

## 2023-02-26 PROCEDURE — 87040 BLOOD CULTURE FOR BACTERIA: CPT | Performed by: STUDENT IN AN ORGANIZED HEALTH CARE EDUCATION/TRAINING PROGRAM

## 2023-02-26 PROCEDURE — 82375 ASSAY CARBOXYHB QUANT: CPT

## 2023-02-26 PROCEDURE — 25010000002 CEFEPIME PER 500 MG: Performed by: STUDENT IN AN ORGANIZED HEALTH CARE EDUCATION/TRAINING PROGRAM

## 2023-02-26 PROCEDURE — 85379 FIBRIN DEGRADATION QUANT: CPT | Performed by: STUDENT IN AN ORGANIZED HEALTH CARE EDUCATION/TRAINING PROGRAM

## 2023-02-26 PROCEDURE — 94640 AIRWAY INHALATION TREATMENT: CPT

## 2023-02-26 PROCEDURE — 83880 ASSAY OF NATRIURETIC PEPTIDE: CPT

## 2023-02-26 PROCEDURE — 93306 TTE W/DOPPLER COMPLETE: CPT | Performed by: INTERNAL MEDICINE

## 2023-02-26 PROCEDURE — 81003 URINALYSIS AUTO W/O SCOPE: CPT | Performed by: STUDENT IN AN ORGANIZED HEALTH CARE EDUCATION/TRAINING PROGRAM

## 2023-02-26 PROCEDURE — 93005 ELECTROCARDIOGRAM TRACING: CPT | Performed by: STUDENT IN AN ORGANIZED HEALTH CARE EDUCATION/TRAINING PROGRAM

## 2023-02-26 PROCEDURE — 71045 X-RAY EXAM CHEST 1 VIEW: CPT

## 2023-02-26 PROCEDURE — 83735 ASSAY OF MAGNESIUM: CPT | Performed by: STUDENT IN AN ORGANIZED HEALTH CARE EDUCATION/TRAINING PROGRAM

## 2023-02-26 PROCEDURE — 87449 NOS EACH ORGANISM AG IA: CPT | Performed by: INTERNAL MEDICINE

## 2023-02-26 PROCEDURE — 36600 WITHDRAWAL OF ARTERIAL BLOOD: CPT

## 2023-02-26 PROCEDURE — 99285 EMERGENCY DEPT VISIT HI MDM: CPT

## 2023-02-26 PROCEDURE — 84484 ASSAY OF TROPONIN QUANT: CPT

## 2023-02-26 PROCEDURE — 84439 ASSAY OF FREE THYROXINE: CPT | Performed by: STUDENT IN AN ORGANIZED HEALTH CARE EDUCATION/TRAINING PROGRAM

## 2023-02-26 PROCEDURE — 93306 TTE W/DOPPLER COMPLETE: CPT

## 2023-02-26 PROCEDURE — 25510000001 IOPAMIDOL PER 1 ML: Performed by: STUDENT IN AN ORGANIZED HEALTH CARE EDUCATION/TRAINING PROGRAM

## 2023-02-26 PROCEDURE — 36415 COLL VENOUS BLD VENIPUNCTURE: CPT

## 2023-02-26 PROCEDURE — 84443 ASSAY THYROID STIM HORMONE: CPT | Performed by: STUDENT IN AN ORGANIZED HEALTH CARE EDUCATION/TRAINING PROGRAM

## 2023-02-26 PROCEDURE — 25010000002 DEXAMETHASONE SODIUM PHOSPHATE 100 MG/10ML SOLUTION: Performed by: STUDENT IN AN ORGANIZED HEALTH CARE EDUCATION/TRAINING PROGRAM

## 2023-02-26 PROCEDURE — 0202U NFCT DS 22 TRGT SARS-COV-2: CPT | Performed by: STUDENT IN AN ORGANIZED HEALTH CARE EDUCATION/TRAINING PROGRAM

## 2023-02-26 PROCEDURE — 71275 CT ANGIOGRAPHY CHEST: CPT

## 2023-02-26 PROCEDURE — 83050 HGB METHEMOGLOBIN QUAN: CPT

## 2023-02-26 PROCEDURE — 25010000002 CEFEPIME PER 500 MG: Performed by: INTERNAL MEDICINE

## 2023-02-26 PROCEDURE — 83605 ASSAY OF LACTIC ACID: CPT | Performed by: STUDENT IN AN ORGANIZED HEALTH CARE EDUCATION/TRAINING PROGRAM

## 2023-02-26 RX ORDER — MAGNESIUM SULFATE 1 G/100ML
1 INJECTION INTRAVENOUS ONCE
Status: COMPLETED | OUTPATIENT
Start: 2023-02-26 | End: 2023-02-26

## 2023-02-26 RX ORDER — DONEPEZIL HYDROCHLORIDE 10 MG/1
10 TABLET, FILM COATED ORAL NIGHTLY
Status: DISCONTINUED | OUTPATIENT
Start: 2023-02-26 | End: 2023-03-09 | Stop reason: HOSPADM

## 2023-02-26 RX ORDER — QUETIAPINE FUMARATE 25 MG/1
25 TABLET, FILM COATED ORAL DAILY
Status: DISCONTINUED | OUTPATIENT
Start: 2023-02-26 | End: 2023-02-26 | Stop reason: ALTCHOICE

## 2023-02-26 RX ORDER — SODIUM CHLORIDE 9 MG/ML
40 INJECTION, SOLUTION INTRAVENOUS AS NEEDED
Status: DISCONTINUED | OUTPATIENT
Start: 2023-02-26 | End: 2023-03-09 | Stop reason: HOSPADM

## 2023-02-26 RX ORDER — IPRATROPIUM BROMIDE AND ALBUTEROL SULFATE 2.5; .5 MG/3ML; MG/3ML
3 SOLUTION RESPIRATORY (INHALATION) ONCE
Status: COMPLETED | OUTPATIENT
Start: 2023-02-26 | End: 2023-02-26

## 2023-02-26 RX ORDER — MONTELUKAST SODIUM 10 MG/1
10 TABLET ORAL NIGHTLY
Status: DISCONTINUED | OUTPATIENT
Start: 2023-02-26 | End: 2023-03-09 | Stop reason: HOSPADM

## 2023-02-26 RX ORDER — PANTOPRAZOLE SODIUM 40 MG/10ML
40 INJECTION, POWDER, LYOPHILIZED, FOR SOLUTION INTRAVENOUS
Status: DISCONTINUED | OUTPATIENT
Start: 2023-02-26 | End: 2023-02-27 | Stop reason: ALTCHOICE

## 2023-02-26 RX ORDER — SODIUM CHLORIDE, SODIUM LACTATE, POTASSIUM CHLORIDE, CALCIUM CHLORIDE 600; 310; 30; 20 MG/100ML; MG/100ML; MG/100ML; MG/100ML
125 INJECTION, SOLUTION INTRAVENOUS CONTINUOUS
Status: DISCONTINUED | OUTPATIENT
Start: 2023-02-26 | End: 2023-02-26

## 2023-02-26 RX ORDER — SODIUM CHLORIDE 0.9 % (FLUSH) 0.9 %
10 SYRINGE (ML) INJECTION EVERY 12 HOURS SCHEDULED
Status: DISCONTINUED | OUTPATIENT
Start: 2023-02-26 | End: 2023-03-09 | Stop reason: HOSPADM

## 2023-02-26 RX ORDER — CITALOPRAM 20 MG/1
20 TABLET ORAL DAILY
Status: DISCONTINUED | OUTPATIENT
Start: 2023-02-26 | End: 2023-03-09 | Stop reason: HOSPADM

## 2023-02-26 RX ORDER — SODIUM CHLORIDE 0.9 % (FLUSH) 0.9 %
10 SYRINGE (ML) INJECTION AS NEEDED
Status: DISCONTINUED | OUTPATIENT
Start: 2023-02-26 | End: 2023-03-09 | Stop reason: HOSPADM

## 2023-02-26 RX ORDER — QUETIAPINE FUMARATE 25 MG/1
25 TABLET, FILM COATED ORAL DAILY
Status: DISCONTINUED | OUTPATIENT
Start: 2023-02-27 | End: 2023-03-07

## 2023-02-26 RX ORDER — HEPARIN SODIUM 5000 [USP'U]/ML
5000 INJECTION, SOLUTION INTRAVENOUS; SUBCUTANEOUS EVERY 8 HOURS SCHEDULED
Status: DISCONTINUED | OUTPATIENT
Start: 2023-02-26 | End: 2023-03-07

## 2023-02-26 RX ORDER — LEVOTHYROXINE SODIUM 0.05 MG/1
50 TABLET ORAL
Status: DISCONTINUED | OUTPATIENT
Start: 2023-02-26 | End: 2023-03-09 | Stop reason: HOSPADM

## 2023-02-26 RX ORDER — MEMANTINE HYDROCHLORIDE 10 MG/1
5 TABLET ORAL DAILY
Status: DISCONTINUED | OUTPATIENT
Start: 2023-02-26 | End: 2023-03-09 | Stop reason: HOSPADM

## 2023-02-26 RX ORDER — IPRATROPIUM BROMIDE AND ALBUTEROL SULFATE 2.5; .5 MG/3ML; MG/3ML
3 SOLUTION RESPIRATORY (INHALATION)
Status: DISCONTINUED | OUTPATIENT
Start: 2023-02-26 | End: 2023-03-07

## 2023-02-26 RX ORDER — ESMOLOL HYDROCHLORIDE 10 MG/ML
50-300 INJECTION, SOLUTION INTRAVENOUS
Status: DISCONTINUED | OUTPATIENT
Start: 2023-02-26 | End: 2023-02-28 | Stop reason: ALTCHOICE

## 2023-02-26 RX ORDER — QUETIAPINE FUMARATE 25 MG/1
25 TABLET, FILM COATED ORAL DAILY
Status: COMPLETED | OUTPATIENT
Start: 2023-02-26 | End: 2023-02-26

## 2023-02-26 RX ORDER — ATORVASTATIN CALCIUM 20 MG/1
20 TABLET, FILM COATED ORAL NIGHTLY
Status: DISCONTINUED | OUTPATIENT
Start: 2023-02-26 | End: 2023-03-09 | Stop reason: HOSPADM

## 2023-02-26 RX ORDER — IPRATROPIUM BROMIDE AND ALBUTEROL SULFATE 2.5; .5 MG/3ML; MG/3ML
3 SOLUTION RESPIRATORY (INHALATION) ONCE
Status: DISCONTINUED | OUTPATIENT
Start: 2023-02-26 | End: 2023-03-04

## 2023-02-26 RX ORDER — ONDANSETRON 2 MG/ML
4 INJECTION INTRAMUSCULAR; INTRAVENOUS EVERY 6 HOURS PRN
Status: DISCONTINUED | OUTPATIENT
Start: 2023-02-26 | End: 2023-03-09 | Stop reason: HOSPADM

## 2023-02-26 RX ADMIN — IOPAMIDOL 65 ML: 755 INJECTION, SOLUTION INTRAVENOUS at 07:57

## 2023-02-26 RX ADMIN — QUETIAPINE FUMARATE 25 MG: 25 TABLET ORAL at 11:17

## 2023-02-26 RX ADMIN — HEPARIN SODIUM 5000 UNITS: 5000 INJECTION INTRAVENOUS; SUBCUTANEOUS at 20:30

## 2023-02-26 RX ADMIN — Medication 10 ML: at 20:17

## 2023-02-26 RX ADMIN — IPRATROPIUM BROMIDE AND ALBUTEROL SULFATE 3 ML: 2.5; .5 SOLUTION RESPIRATORY (INHALATION) at 19:49

## 2023-02-26 RX ADMIN — MAGNESIUM SULFATE HEPTAHYDRATE 1 G: 1 INJECTION, SOLUTION INTRAVENOUS at 05:42

## 2023-02-26 RX ADMIN — ATORVASTATIN CALCIUM 20 MG: 20 TABLET, FILM COATED ORAL at 20:17

## 2023-02-26 RX ADMIN — ESMOLOL HYDROCHLORIDE 50 MCG/KG/MIN: 10 INJECTION INTRAVENOUS at 07:06

## 2023-02-26 RX ADMIN — HEPARIN SODIUM 5000 UNITS: 5000 INJECTION INTRAVENOUS; SUBCUTANEOUS at 17:21

## 2023-02-26 RX ADMIN — SODIUM CHLORIDE 1000 ML: 9 INJECTION, SOLUTION INTRAVENOUS at 06:40

## 2023-02-26 RX ADMIN — CEFEPIME 2 G: 2 INJECTION, POWDER, FOR SOLUTION INTRAVENOUS at 10:00

## 2023-02-26 RX ADMIN — IPRATROPIUM BROMIDE AND ALBUTEROL SULFATE 3 ML: 2.5; .5 SOLUTION RESPIRATORY (INHALATION) at 08:49

## 2023-02-26 RX ADMIN — IPRATROPIUM BROMIDE AND ALBUTEROL SULFATE 3 ML: 2.5; .5 SOLUTION RESPIRATORY (INHALATION) at 11:23

## 2023-02-26 RX ADMIN — MONTELUKAST 10 MG: 10 TABLET, FILM COATED ORAL at 20:17

## 2023-02-26 RX ADMIN — VANCOMYCIN HYDROCHLORIDE 1750 MG: 10 INJECTION, POWDER, LYOPHILIZED, FOR SOLUTION INTRAVENOUS at 10:55

## 2023-02-26 RX ADMIN — SODIUM CHLORIDE 500 ML: 9 INJECTION, SOLUTION INTRAVENOUS at 07:45

## 2023-02-26 RX ADMIN — CEFEPIME 2 G: 2 INJECTION, POWDER, FOR SOLUTION INTRAVENOUS at 20:17

## 2023-02-26 RX ADMIN — SODIUM CHLORIDE 500 ML: 9 INJECTION, SOLUTION INTRAVENOUS at 05:42

## 2023-02-26 RX ADMIN — DEXAMETHASONE SODIUM PHOSPHATE 10 MG: 10 INJECTION, SOLUTION INTRAMUSCULAR; INTRAVENOUS at 07:04

## 2023-02-26 RX ADMIN — DONEPEZIL HYDROCHLORIDE 10 MG: 10 TABLET, FILM COATED ORAL at 20:17

## 2023-02-27 LAB
ANION GAP SERPL CALCULATED.3IONS-SCNC: 17 MMOL/L (ref 5–15)
BUN SERPL-MCNC: 32 MG/DL (ref 8–23)
BUN/CREAT SERPL: 52.5 (ref 7–25)
CA-I SERPL ISE-MCNC: 1.21 MMOL/L (ref 1.12–1.32)
CALCIUM SPEC-SCNC: 8.2 MG/DL (ref 8.2–9.6)
CHLORIDE SERPL-SCNC: 106 MMOL/L (ref 98–107)
CO2 SERPL-SCNC: 17 MMOL/L (ref 22–29)
CREAT SERPL-MCNC: 0.61 MG/DL (ref 0.57–1)
DEPRECATED RDW RBC AUTO: 52.3 FL (ref 37–54)
EGFRCR SERPLBLD CKD-EPI 2021: 84.5 ML/MIN/1.73
ERYTHROCYTE [DISTWIDTH] IN BLOOD BY AUTOMATED COUNT: 14.1 % (ref 12.3–15.4)
GLUCOSE SERPL-MCNC: 129 MG/DL (ref 65–99)
HCT VFR BLD AUTO: 37 % (ref 34–46.6)
HGB BLD-MCNC: 11.3 G/DL (ref 12–15.9)
MAGNESIUM SERPL-MCNC: 1.8 MG/DL (ref 1.7–2.3)
MCH RBC QN AUTO: 31.1 PG (ref 26.6–33)
MCHC RBC AUTO-ENTMCNC: 30.5 G/DL (ref 31.5–35.7)
MCV RBC AUTO: 101.9 FL (ref 79–97)
PHOSPHATE SERPL-MCNC: 1.7 MG/DL (ref 2.5–4.5)
PHOSPHATE SERPL-MCNC: 2.3 MG/DL (ref 2.5–4.5)
PLATELET # BLD AUTO: 245 10*3/MM3 (ref 140–450)
PMV BLD AUTO: 10.3 FL (ref 6–12)
POTASSIUM SERPL-SCNC: 3.9 MMOL/L (ref 3.5–5.2)
RBC # BLD AUTO: 3.63 10*6/MM3 (ref 3.77–5.28)
SODIUM SERPL-SCNC: 140 MMOL/L (ref 136–145)
WBC NRBC COR # BLD: 16.92 10*3/MM3 (ref 3.4–10.8)

## 2023-02-27 PROCEDURE — 94799 UNLISTED PULMONARY SVC/PX: CPT

## 2023-02-27 PROCEDURE — 25010000002 ZIPRASIDONE MESYLATE PER 10 MG: Performed by: INTERNAL MEDICINE

## 2023-02-27 PROCEDURE — 82330 ASSAY OF CALCIUM: CPT | Performed by: NURSE PRACTITIONER

## 2023-02-27 PROCEDURE — 25010000002 CEFEPIME PER 500 MG: Performed by: INTERNAL MEDICINE

## 2023-02-27 PROCEDURE — 84100 ASSAY OF PHOSPHORUS: CPT

## 2023-02-27 PROCEDURE — 25010000002 HEPARIN (PORCINE) PER 1000 UNITS: Performed by: INTERNAL MEDICINE

## 2023-02-27 PROCEDURE — 25010000002 DIPHENHYDRAMINE PER 50 MG: Performed by: INTERNAL MEDICINE

## 2023-02-27 PROCEDURE — 25010000002 HALOPERIDOL LACTATE PER 5 MG: Performed by: INTERNAL MEDICINE

## 2023-02-27 PROCEDURE — 80048 BASIC METABOLIC PNL TOTAL CA: CPT | Performed by: NURSE PRACTITIONER

## 2023-02-27 PROCEDURE — 83735 ASSAY OF MAGNESIUM: CPT | Performed by: NURSE PRACTITIONER

## 2023-02-27 PROCEDURE — 99233 SBSQ HOSP IP/OBS HIGH 50: CPT | Performed by: INTERNAL MEDICINE

## 2023-02-27 PROCEDURE — 85027 COMPLETE CBC AUTOMATED: CPT | Performed by: NURSE PRACTITIONER

## 2023-02-27 PROCEDURE — 84100 ASSAY OF PHOSPHORUS: CPT | Performed by: NURSE PRACTITIONER

## 2023-02-27 PROCEDURE — 25010000002 METHYLPREDNISOLONE PER 40 MG: Performed by: INTERNAL MEDICINE

## 2023-02-27 PROCEDURE — 94664 DEMO&/EVAL PT USE INHALER: CPT

## 2023-02-27 PROCEDURE — 25010000002 CEFTRIAXONE PER 250 MG: Performed by: INTERNAL MEDICINE

## 2023-02-27 RX ORDER — BISOPROLOL FUMARATE AND HYDROCHLOROTHIAZIDE 2.5; 6.25 MG/1; MG/1
1 TABLET ORAL DAILY
COMMUNITY
End: 2023-03-09 | Stop reason: HOSPADM

## 2023-02-27 RX ORDER — HALOPERIDOL 5 MG/ML
1 INJECTION INTRAMUSCULAR ONCE
Status: COMPLETED | OUTPATIENT
Start: 2023-02-27 | End: 2023-02-27

## 2023-02-27 RX ORDER — PANTOPRAZOLE SODIUM 40 MG/1
40 TABLET, DELAYED RELEASE ORAL
Status: DISCONTINUED | OUTPATIENT
Start: 2023-02-28 | End: 2023-03-09 | Stop reason: HOSPADM

## 2023-02-27 RX ORDER — TAMSULOSIN HYDROCHLORIDE 0.4 MG/1
0.4 CAPSULE ORAL DAILY
Status: DISCONTINUED | OUTPATIENT
Start: 2023-02-27 | End: 2023-03-09 | Stop reason: HOSPADM

## 2023-02-27 RX ORDER — QUETIAPINE FUMARATE 25 MG/1
25 TABLET, FILM COATED ORAL NIGHTLY
Status: DISCONTINUED | OUTPATIENT
Start: 2023-02-27 | End: 2023-02-28

## 2023-02-27 RX ORDER — ACETAMINOPHEN 325 MG/1
650 TABLET ORAL EVERY 6 HOURS PRN
Status: DISCONTINUED | OUTPATIENT
Start: 2023-02-27 | End: 2023-03-09 | Stop reason: HOSPADM

## 2023-02-27 RX ORDER — HALOPERIDOL 5 MG/ML
2.5 INJECTION INTRAMUSCULAR ONCE
Status: COMPLETED | OUTPATIENT
Start: 2023-02-27 | End: 2023-02-27

## 2023-02-27 RX ORDER — DOXYCYCLINE 100 MG/1
100 CAPSULE ORAL EVERY 12 HOURS SCHEDULED
Status: DISPENSED | OUTPATIENT
Start: 2023-02-27 | End: 2023-03-04

## 2023-02-27 RX ORDER — ZIPRASIDONE MESYLATE 20 MG/ML
5 INJECTION, POWDER, LYOPHILIZED, FOR SOLUTION INTRAMUSCULAR EVERY 4 HOURS PRN
Status: DISCONTINUED | OUTPATIENT
Start: 2023-02-27 | End: 2023-03-07

## 2023-02-27 RX ORDER — DIPHENHYDRAMINE HYDROCHLORIDE 50 MG/ML
25 INJECTION INTRAMUSCULAR; INTRAVENOUS EVERY 4 HOURS PRN
Status: DISCONTINUED | OUTPATIENT
Start: 2023-02-27 | End: 2023-03-09 | Stop reason: HOSPADM

## 2023-02-27 RX ORDER — HALOPERIDOL 5 MG/ML
1 INJECTION INTRAMUSCULAR EVERY 6 HOURS PRN
Status: DISCONTINUED | OUTPATIENT
Start: 2023-02-27 | End: 2023-02-28 | Stop reason: ALTCHOICE

## 2023-02-27 RX ORDER — METHYLPREDNISOLONE SODIUM SUCCINATE 40 MG/ML
40 INJECTION, POWDER, LYOPHILIZED, FOR SOLUTION INTRAMUSCULAR; INTRAVENOUS EVERY 12 HOURS
Status: DISCONTINUED | OUTPATIENT
Start: 2023-02-27 | End: 2023-03-05

## 2023-02-27 RX ADMIN — POTASSIUM & SODIUM PHOSPHATES POWDER PACK 280-160-250 MG 2 PACKET: 280-160-250 PACK at 17:17

## 2023-02-27 RX ADMIN — TAMSULOSIN HYDROCHLORIDE 0.4 MG: 0.4 CAPSULE ORAL at 12:11

## 2023-02-27 RX ADMIN — IPRATROPIUM BROMIDE AND ALBUTEROL SULFATE 3 ML: 2.5; .5 SOLUTION RESPIRATORY (INHALATION) at 10:53

## 2023-02-27 RX ADMIN — HEPARIN SODIUM 5000 UNITS: 5000 INJECTION INTRAVENOUS; SUBCUTANEOUS at 22:16

## 2023-02-27 RX ADMIN — ACETAMINOPHEN 325MG 650 MG: 325 TABLET ORAL at 04:48

## 2023-02-27 RX ADMIN — CEFEPIME 2 G: 2 INJECTION, POWDER, FOR SOLUTION INTRAVENOUS at 05:01

## 2023-02-27 RX ADMIN — HALOPERIDOL LACTATE 2.5 MG: 5 INJECTION, SOLUTION INTRAMUSCULAR at 14:13

## 2023-02-27 RX ADMIN — CITALOPRAM HYDROBROMIDE 20 MG: 20 TABLET ORAL at 08:18

## 2023-02-27 RX ADMIN — PANTOPRAZOLE SODIUM 40 MG: 40 INJECTION, POWDER, FOR SOLUTION INTRAVENOUS at 05:01

## 2023-02-27 RX ADMIN — DOXYCYCLINE 100 MG: 100 CAPSULE ORAL at 22:22

## 2023-02-27 RX ADMIN — Medication 10 ML: at 22:17

## 2023-02-27 RX ADMIN — LEVOTHYROXINE SODIUM 50 MCG: 50 TABLET ORAL at 05:01

## 2023-02-27 RX ADMIN — QUETIAPINE FUMARATE 25 MG: 25 TABLET ORAL at 22:16

## 2023-02-27 RX ADMIN — HEPARIN SODIUM 5000 UNITS: 5000 INJECTION INTRAVENOUS; SUBCUTANEOUS at 13:50

## 2023-02-27 RX ADMIN — SODIUM CHLORIDE 1 G: 900 INJECTION INTRAVENOUS at 13:59

## 2023-02-27 RX ADMIN — QUETIAPINE FUMARATE 25 MG: 25 TABLET ORAL at 08:18

## 2023-02-27 RX ADMIN — ZIPRASIDONE MESYLATE 5 MG: 20 INJECTION, POWDER, LYOPHILIZED, FOR SOLUTION INTRAMUSCULAR at 17:17

## 2023-02-27 RX ADMIN — HALOPERIDOL LACTATE 1 MG: 5 INJECTION, SOLUTION INTRAMUSCULAR at 09:11

## 2023-02-27 RX ADMIN — IPRATROPIUM BROMIDE AND ALBUTEROL SULFATE 3 ML: 2.5; .5 SOLUTION RESPIRATORY (INHALATION) at 20:23

## 2023-02-27 RX ADMIN — HALOPERIDOL LACTATE 1 MG: 5 INJECTION, SOLUTION INTRAMUSCULAR at 12:07

## 2023-02-27 RX ADMIN — HEPARIN SODIUM 5000 UNITS: 5000 INJECTION INTRAVENOUS; SUBCUTANEOUS at 05:01

## 2023-02-27 RX ADMIN — DOXYCYCLINE 100 MG: 100 CAPSULE ORAL at 14:05

## 2023-02-27 RX ADMIN — METOPROLOL TARTRATE 25 MG: 25 TABLET, FILM COATED ORAL at 12:11

## 2023-02-27 RX ADMIN — MONTELUKAST 10 MG: 10 TABLET, FILM COATED ORAL at 22:16

## 2023-02-27 RX ADMIN — DIPHENHYDRAMINE HYDROCHLORIDE 25 MG: 50 INJECTION INTRAMUSCULAR; INTRAVENOUS at 17:21

## 2023-02-27 RX ADMIN — METHYLPREDNISOLONE SODIUM SUCCINATE 40 MG: 40 INJECTION, POWDER, FOR SOLUTION INTRAMUSCULAR; INTRAVENOUS at 14:00

## 2023-02-27 RX ADMIN — DONEPEZIL HYDROCHLORIDE 10 MG: 10 TABLET, FILM COATED ORAL at 22:16

## 2023-02-27 RX ADMIN — IPRATROPIUM BROMIDE AND ALBUTEROL SULFATE 3 ML: 2.5; .5 SOLUTION RESPIRATORY (INHALATION) at 06:58

## 2023-02-27 RX ADMIN — MEMANTINE 5 MG: 10 TABLET ORAL at 08:18

## 2023-02-27 RX ADMIN — IPRATROPIUM BROMIDE AND ALBUTEROL SULFATE 3 ML: 2.5; .5 SOLUTION RESPIRATORY (INHALATION) at 16:59

## 2023-02-27 RX ADMIN — ATORVASTATIN CALCIUM 20 MG: 20 TABLET, FILM COATED ORAL at 22:16

## 2023-02-27 RX ADMIN — DOXYCYCLINE 100 MG: 100 INJECTION, POWDER, LYOPHILIZED, FOR SOLUTION INTRAVENOUS at 12:22

## 2023-02-27 RX ADMIN — HALOPERIDOL LACTATE 1 MG: 5 INJECTION, SOLUTION INTRAMUSCULAR at 10:15

## 2023-02-28 LAB
ANION GAP SERPL CALCULATED.3IONS-SCNC: 16 MMOL/L (ref 5–15)
BUN SERPL-MCNC: 31 MG/DL (ref 8–23)
BUN/CREAT SERPL: 49.2 (ref 7–25)
CALCIUM SPEC-SCNC: 8.7 MG/DL (ref 8.2–9.6)
CHLORIDE SERPL-SCNC: 106 MMOL/L (ref 98–107)
CO2 SERPL-SCNC: 21 MMOL/L (ref 22–29)
CREAT SERPL-MCNC: 0.63 MG/DL (ref 0.57–1)
DEPRECATED RDW RBC AUTO: 52.1 FL (ref 37–54)
EGFRCR SERPLBLD CKD-EPI 2021: 83.9 ML/MIN/1.73
ERYTHROCYTE [DISTWIDTH] IN BLOOD BY AUTOMATED COUNT: 14.4 % (ref 12.3–15.4)
GLUCOSE SERPL-MCNC: 129 MG/DL (ref 65–99)
HCT VFR BLD AUTO: 37.1 % (ref 34–46.6)
HGB BLD-MCNC: 11.6 G/DL (ref 12–15.9)
MAGNESIUM SERPL-MCNC: 1.8 MG/DL (ref 1.7–2.3)
MCH RBC QN AUTO: 32 PG (ref 26.6–33)
MCHC RBC AUTO-ENTMCNC: 31.3 G/DL (ref 31.5–35.7)
MCV RBC AUTO: 102.5 FL (ref 79–97)
PHOSPHATE SERPL-MCNC: 2.8 MG/DL (ref 2.5–4.5)
PLATELET # BLD AUTO: 290 10*3/MM3 (ref 140–450)
PMV BLD AUTO: 9.8 FL (ref 6–12)
POTASSIUM SERPL-SCNC: 4.2 MMOL/L (ref 3.5–5.2)
QT INTERVAL: 284 MS
QT INTERVAL: 366 MS
QTC INTERVAL: 422 MS
QTC INTERVAL: 433 MS
RBC # BLD AUTO: 3.62 10*6/MM3 (ref 3.77–5.28)
SODIUM SERPL-SCNC: 143 MMOL/L (ref 136–145)
WBC NRBC COR # BLD: 15.97 10*3/MM3 (ref 3.4–10.8)

## 2023-02-28 PROCEDURE — 94799 UNLISTED PULMONARY SVC/PX: CPT

## 2023-02-28 PROCEDURE — 80048 BASIC METABOLIC PNL TOTAL CA: CPT

## 2023-02-28 PROCEDURE — 25010000002 HALOPERIDOL LACTATE PER 5 MG: Performed by: INTERNAL MEDICINE

## 2023-02-28 PROCEDURE — 84100 ASSAY OF PHOSPHORUS: CPT

## 2023-02-28 PROCEDURE — 25010000002 CEFTRIAXONE PER 250 MG: Performed by: INTERNAL MEDICINE

## 2023-02-28 PROCEDURE — 25010000002 METHYLPREDNISOLONE PER 40 MG: Performed by: INTERNAL MEDICINE

## 2023-02-28 PROCEDURE — 94761 N-INVAS EAR/PLS OXIMETRY MLT: CPT

## 2023-02-28 PROCEDURE — 25010000002 DIPHENHYDRAMINE PER 50 MG: Performed by: INTERNAL MEDICINE

## 2023-02-28 PROCEDURE — 25010000002 HEPARIN (PORCINE) PER 1000 UNITS: Performed by: INTERNAL MEDICINE

## 2023-02-28 PROCEDURE — 0 MAGNESIUM SULFATE 4 GM/100ML SOLUTION

## 2023-02-28 PROCEDURE — 25010000002 ZIPRASIDONE MESYLATE PER 10 MG: Performed by: INTERNAL MEDICINE

## 2023-02-28 PROCEDURE — 85027 COMPLETE CBC AUTOMATED: CPT | Performed by: INTERNAL MEDICINE

## 2023-02-28 PROCEDURE — 83735 ASSAY OF MAGNESIUM: CPT | Performed by: INTERNAL MEDICINE

## 2023-02-28 PROCEDURE — 99233 SBSQ HOSP IP/OBS HIGH 50: CPT | Performed by: INTERNAL MEDICINE

## 2023-02-28 RX ORDER — METOPROLOL TARTRATE 50 MG/1
50 TABLET, FILM COATED ORAL EVERY 12 HOURS SCHEDULED
Status: DISCONTINUED | OUTPATIENT
Start: 2023-02-28 | End: 2023-03-06

## 2023-02-28 RX ORDER — LABETALOL HYDROCHLORIDE 5 MG/ML
5 INJECTION, SOLUTION INTRAVENOUS EVERY 4 HOURS PRN
Status: DISCONTINUED | OUTPATIENT
Start: 2023-02-28 | End: 2023-03-09 | Stop reason: HOSPADM

## 2023-02-28 RX ORDER — MAGNESIUM SULFATE HEPTAHYDRATE 40 MG/ML
4 INJECTION, SOLUTION INTRAVENOUS ONCE
Status: DISCONTINUED | OUTPATIENT
Start: 2023-02-28 | End: 2023-03-04

## 2023-02-28 RX ORDER — QUETIAPINE FUMARATE 25 MG/1
50 TABLET, FILM COATED ORAL NIGHTLY
Status: DISCONTINUED | OUTPATIENT
Start: 2023-02-28 | End: 2023-03-01

## 2023-02-28 RX ORDER — SODIUM CHLORIDE 9 MG/ML
50 INJECTION, SOLUTION INTRAVENOUS CONTINUOUS
Status: DISCONTINUED | OUTPATIENT
Start: 2023-02-28 | End: 2023-03-01

## 2023-02-28 RX ADMIN — CITALOPRAM HYDROBROMIDE 20 MG: 20 TABLET ORAL at 09:29

## 2023-02-28 RX ADMIN — DIPHENHYDRAMINE HYDROCHLORIDE 25 MG: 50 INJECTION INTRAMUSCULAR; INTRAVENOUS at 23:34

## 2023-02-28 RX ADMIN — ZIPRASIDONE MESYLATE 5 MG: 20 INJECTION, POWDER, LYOPHILIZED, FOR SOLUTION INTRAMUSCULAR at 03:25

## 2023-02-28 RX ADMIN — ZIPRASIDONE MESYLATE 5 MG: 20 INJECTION, POWDER, LYOPHILIZED, FOR SOLUTION INTRAMUSCULAR at 16:33

## 2023-02-28 RX ADMIN — LABETALOL HYDROCHLORIDE 5 MG: 5 INJECTION, SOLUTION INTRAVENOUS at 14:52

## 2023-02-28 RX ADMIN — IPRATROPIUM BROMIDE AND ALBUTEROL SULFATE 3 ML: 2.5; .5 SOLUTION RESPIRATORY (INHALATION) at 15:44

## 2023-02-28 RX ADMIN — IPRATROPIUM BROMIDE AND ALBUTEROL SULFATE 3 ML: 2.5; .5 SOLUTION RESPIRATORY (INHALATION) at 11:21

## 2023-02-28 RX ADMIN — DIPHENHYDRAMINE HYDROCHLORIDE 25 MG: 50 INJECTION INTRAMUSCULAR; INTRAVENOUS at 03:26

## 2023-02-28 RX ADMIN — DONEPEZIL HYDROCHLORIDE 10 MG: 10 TABLET, FILM COATED ORAL at 20:43

## 2023-02-28 RX ADMIN — ATORVASTATIN CALCIUM 20 MG: 20 TABLET, FILM COATED ORAL at 20:43

## 2023-02-28 RX ADMIN — HEPARIN SODIUM 5000 UNITS: 5000 INJECTION INTRAVENOUS; SUBCUTANEOUS at 05:22

## 2023-02-28 RX ADMIN — METHYLPREDNISOLONE SODIUM SUCCINATE 40 MG: 40 INJECTION, POWDER, FOR SOLUTION INTRAMUSCULAR; INTRAVENOUS at 15:07

## 2023-02-28 RX ADMIN — Medication 10 ML: at 21:03

## 2023-02-28 RX ADMIN — Medication 10 ML: at 09:35

## 2023-02-28 RX ADMIN — LEVOTHYROXINE SODIUM 50 MCG: 50 TABLET ORAL at 05:22

## 2023-02-28 RX ADMIN — TAMSULOSIN HYDROCHLORIDE 0.4 MG: 0.4 CAPSULE ORAL at 09:32

## 2023-02-28 RX ADMIN — QUETIAPINE FUMARATE 50 MG: 25 TABLET ORAL at 20:42

## 2023-02-28 RX ADMIN — PANTOPRAZOLE SODIUM 40 MG: 40 TABLET, DELAYED RELEASE ORAL at 05:22

## 2023-02-28 RX ADMIN — QUETIAPINE FUMARATE 25 MG: 25 TABLET ORAL at 09:29

## 2023-02-28 RX ADMIN — METHYLPREDNISOLONE SODIUM SUCCINATE 40 MG: 40 INJECTION, POWDER, FOR SOLUTION INTRAMUSCULAR; INTRAVENOUS at 03:26

## 2023-02-28 RX ADMIN — HALOPERIDOL LACTATE 1 MG: 5 INJECTION, SOLUTION INTRAMUSCULAR at 06:35

## 2023-02-28 RX ADMIN — MEMANTINE 5 MG: 10 TABLET ORAL at 09:29

## 2023-02-28 RX ADMIN — METOPROLOL TARTRATE 25 MG: 25 TABLET, FILM COATED ORAL at 12:03

## 2023-02-28 RX ADMIN — IPRATROPIUM BROMIDE AND ALBUTEROL SULFATE 3 ML: 2.5; .5 SOLUTION RESPIRATORY (INHALATION) at 21:16

## 2023-02-28 RX ADMIN — SODIUM CHLORIDE 1 G: 900 INJECTION INTRAVENOUS at 12:02

## 2023-02-28 RX ADMIN — SODIUM CHLORIDE 50 ML/HR: 9 INJECTION, SOLUTION INTRAVENOUS at 14:57

## 2023-02-28 RX ADMIN — METOPROLOL TARTRATE 50 MG: 50 TABLET, FILM COATED ORAL at 20:43

## 2023-02-28 RX ADMIN — HEPARIN SODIUM 5000 UNITS: 5000 INJECTION INTRAVENOUS; SUBCUTANEOUS at 15:07

## 2023-02-28 RX ADMIN — ZIPRASIDONE MESYLATE 5 MG: 20 INJECTION, POWDER, LYOPHILIZED, FOR SOLUTION INTRAMUSCULAR at 23:38

## 2023-02-28 RX ADMIN — SODIUM CHLORIDE 50 ML/HR: 9 INJECTION, SOLUTION INTRAVENOUS at 09:50

## 2023-02-28 RX ADMIN — MONTELUKAST 10 MG: 10 TABLET, FILM COATED ORAL at 20:43

## 2023-02-28 RX ADMIN — IPRATROPIUM BROMIDE AND ALBUTEROL SULFATE 3 ML: 2.5; .5 SOLUTION RESPIRATORY (INHALATION) at 08:12

## 2023-02-28 RX ADMIN — DOXYCYCLINE 100 MG: 100 CAPSULE ORAL at 09:29

## 2023-02-28 RX ADMIN — DOXYCYCLINE 100 MG: 100 CAPSULE ORAL at 20:43

## 2023-02-28 RX ADMIN — HEPARIN SODIUM 5000 UNITS: 5000 INJECTION INTRAVENOUS; SUBCUTANEOUS at 20:43

## 2023-02-28 RX ADMIN — ZIPRASIDONE MESYLATE 5 MG: 20 INJECTION, POWDER, LYOPHILIZED, FOR SOLUTION INTRAMUSCULAR at 12:50

## 2023-03-01 LAB
ANION GAP SERPL CALCULATED.3IONS-SCNC: 15 MMOL/L (ref 5–15)
BUN SERPL-MCNC: 23 MG/DL (ref 8–23)
BUN/CREAT SERPL: 34.8 (ref 7–25)
CALCIUM SPEC-SCNC: 8.6 MG/DL (ref 8.2–9.6)
CHLORIDE SERPL-SCNC: 102 MMOL/L (ref 98–107)
CO2 SERPL-SCNC: 26 MMOL/L (ref 22–29)
CREAT SERPL-MCNC: 0.66 MG/DL (ref 0.57–1)
DEPRECATED RDW RBC AUTO: 45 FL (ref 37–54)
EGFRCR SERPLBLD CKD-EPI 2021: 82.9 ML/MIN/1.73
ERYTHROCYTE [DISTWIDTH] IN BLOOD BY AUTOMATED COUNT: 13.8 % (ref 12.3–15.4)
GLUCOSE SERPL-MCNC: 103 MG/DL (ref 65–99)
HCT VFR BLD AUTO: 36.3 % (ref 34–46.6)
HGB BLD-MCNC: 12.5 G/DL (ref 12–15.9)
MAGNESIUM SERPL-MCNC: 2.6 MG/DL (ref 1.7–2.3)
MCH RBC QN AUTO: 31.4 PG (ref 26.6–33)
MCHC RBC AUTO-ENTMCNC: 34.4 G/DL (ref 31.5–35.7)
MCV RBC AUTO: 91.2 FL (ref 79–97)
PLATELET # BLD AUTO: 342 10*3/MM3 (ref 140–450)
PMV BLD AUTO: 10.3 FL (ref 6–12)
POTASSIUM SERPL-SCNC: 3.9 MMOL/L (ref 3.5–5.2)
QT INTERVAL: 372 MS
QT INTERVAL: 378 MS
QTC INTERVAL: 446 MS
QTC INTERVAL: 452 MS
RBC # BLD AUTO: 3.98 10*6/MM3 (ref 3.77–5.28)
SODIUM SERPL-SCNC: 143 MMOL/L (ref 136–145)
WBC NRBC COR # BLD: 19.46 10*3/MM3 (ref 3.4–10.8)

## 2023-03-01 PROCEDURE — 97535 SELF CARE MNGMENT TRAINING: CPT

## 2023-03-01 PROCEDURE — 97530 THERAPEUTIC ACTIVITIES: CPT

## 2023-03-01 PROCEDURE — 25010000002 ZIPRASIDONE MESYLATE PER 10 MG: Performed by: INTERNAL MEDICINE

## 2023-03-01 PROCEDURE — 93010 ELECTROCARDIOGRAM REPORT: CPT | Performed by: STUDENT IN AN ORGANIZED HEALTH CARE EDUCATION/TRAINING PROGRAM

## 2023-03-01 PROCEDURE — 93005 ELECTROCARDIOGRAM TRACING: CPT | Performed by: INTERNAL MEDICINE

## 2023-03-01 PROCEDURE — 85027 COMPLETE CBC AUTOMATED: CPT | Performed by: INTERNAL MEDICINE

## 2023-03-01 PROCEDURE — 25010000002 DIPHENHYDRAMINE PER 50 MG: Performed by: INTERNAL MEDICINE

## 2023-03-01 PROCEDURE — 94664 DEMO&/EVAL PT USE INHALER: CPT

## 2023-03-01 PROCEDURE — 25010000002 HEPARIN (PORCINE) PER 1000 UNITS: Performed by: INTERNAL MEDICINE

## 2023-03-01 PROCEDURE — 94799 UNLISTED PULMONARY SVC/PX: CPT

## 2023-03-01 PROCEDURE — 83735 ASSAY OF MAGNESIUM: CPT | Performed by: INTERNAL MEDICINE

## 2023-03-01 PROCEDURE — 93005 ELECTROCARDIOGRAM TRACING: CPT | Performed by: PHYSICIAN ASSISTANT

## 2023-03-01 PROCEDURE — 97166 OT EVAL MOD COMPLEX 45 MIN: CPT

## 2023-03-01 PROCEDURE — 97162 PT EVAL MOD COMPLEX 30 MIN: CPT

## 2023-03-01 PROCEDURE — 25010000002 CEFTRIAXONE PER 250 MG: Performed by: INTERNAL MEDICINE

## 2023-03-01 PROCEDURE — 25010000002 METHYLPREDNISOLONE PER 40 MG: Performed by: INTERNAL MEDICINE

## 2023-03-01 PROCEDURE — 80048 BASIC METABOLIC PNL TOTAL CA: CPT

## 2023-03-01 PROCEDURE — 99233 SBSQ HOSP IP/OBS HIGH 50: CPT | Performed by: INTERNAL MEDICINE

## 2023-03-01 RX ORDER — HYDROXYZINE HYDROCHLORIDE 25 MG/1
25 TABLET, FILM COATED ORAL 3 TIMES DAILY PRN
COMMUNITY

## 2023-03-01 RX ORDER — AMOXICILLIN 250 MG
2 CAPSULE ORAL 2 TIMES DAILY
Status: DISCONTINUED | OUTPATIENT
Start: 2023-03-01 | End: 2023-03-09 | Stop reason: HOSPADM

## 2023-03-01 RX ORDER — GUAIFENESIN AND DEXTROMETHORPHAN HYDROBROMIDE 100; 10 MG/5ML; MG/5ML
5 SOLUTION ORAL 3 TIMES DAILY
COMMUNITY
Start: 2023-02-25 | End: 2023-03-09 | Stop reason: HOSPADM

## 2023-03-01 RX ORDER — QUETIAPINE FUMARATE 25 MG/1
75 TABLET, FILM COATED ORAL NIGHTLY
Status: DISCONTINUED | OUTPATIENT
Start: 2023-03-01 | End: 2023-03-09 | Stop reason: HOSPADM

## 2023-03-01 RX ADMIN — IPRATROPIUM BROMIDE AND ALBUTEROL SULFATE 3 ML: 2.5; .5 SOLUTION RESPIRATORY (INHALATION) at 19:21

## 2023-03-01 RX ADMIN — METHYLPREDNISOLONE SODIUM SUCCINATE 40 MG: 40 INJECTION, POWDER, FOR SOLUTION INTRAMUSCULAR; INTRAVENOUS at 14:44

## 2023-03-01 RX ADMIN — HEPARIN SODIUM 5000 UNITS: 5000 INJECTION INTRAVENOUS; SUBCUTANEOUS at 05:00

## 2023-03-01 RX ADMIN — METOPROLOL TARTRATE 50 MG: 50 TABLET, FILM COATED ORAL at 21:03

## 2023-03-01 RX ADMIN — QUETIAPINE FUMARATE 75 MG: 25 TABLET ORAL at 21:03

## 2023-03-01 RX ADMIN — DIPHENHYDRAMINE HYDROCHLORIDE 25 MG: 50 INJECTION INTRAMUSCULAR; INTRAVENOUS at 12:59

## 2023-03-01 RX ADMIN — LEVOTHYROXINE SODIUM 50 MCG: 50 TABLET ORAL at 05:00

## 2023-03-01 RX ADMIN — Medication 10 ML: at 09:39

## 2023-03-01 RX ADMIN — HEPARIN SODIUM 5000 UNITS: 5000 INJECTION INTRAVENOUS; SUBCUTANEOUS at 14:44

## 2023-03-01 RX ADMIN — IPRATROPIUM BROMIDE AND ALBUTEROL SULFATE 3 ML: 2.5; .5 SOLUTION RESPIRATORY (INHALATION) at 09:37

## 2023-03-01 RX ADMIN — Medication 10 ML: at 22:00

## 2023-03-01 RX ADMIN — HEPARIN SODIUM 5000 UNITS: 5000 INJECTION INTRAVENOUS; SUBCUTANEOUS at 21:59

## 2023-03-01 RX ADMIN — SODIUM CHLORIDE 1 G: 900 INJECTION INTRAVENOUS at 13:17

## 2023-03-01 RX ADMIN — ZIPRASIDONE MESYLATE 5 MG: 20 INJECTION, POWDER, LYOPHILIZED, FOR SOLUTION INTRAMUSCULAR at 12:59

## 2023-03-01 RX ADMIN — METHYLPREDNISOLONE SODIUM SUCCINATE 40 MG: 40 INJECTION, POWDER, FOR SOLUTION INTRAMUSCULAR; INTRAVENOUS at 04:56

## 2023-03-01 RX ADMIN — IPRATROPIUM BROMIDE AND ALBUTEROL SULFATE 3 ML: 2.5; .5 SOLUTION RESPIRATORY (INHALATION) at 16:10

## 2023-03-01 RX ADMIN — LABETALOL HYDROCHLORIDE 5 MG: 5 INJECTION, SOLUTION INTRAVENOUS at 13:57

## 2023-03-01 RX ADMIN — PANTOPRAZOLE SODIUM 40 MG: 40 TABLET, DELAYED RELEASE ORAL at 05:00

## 2023-03-01 RX ADMIN — IPRATROPIUM BROMIDE AND ALBUTEROL SULFATE 3 ML: 2.5; .5 SOLUTION RESPIRATORY (INHALATION) at 12:39

## 2023-03-01 NOTE — PLAN OF CARE
Goal Outcome Evaluation:     Progress: improving  Outcome Evaluation: Pt seen by Dr. Chang, reported more calm today, worked a little bit with PT, but O2 supplementation requirement up overnight, up to as high as 70% HF, then down to 60% early this afternoon. Dr. Chang unable to reach family by phone; per documentation, Dr. Guerin did speak with GUSTAVO Wadery, aware of guarded prognosis, continue current POC with limited supportive interventions and no CPR. Per chart review, pt has not had BM documented since admission on 2/26/23; Dr. Chang to initiate bowel regimen. Palliative following for continued support to pt and family in GOC/POC discussion.    1300 Palliative IDT meeting: JOCELYN Peters, RN, CHPN; REKHA Chang, ; MARIA ELENA Luz, APRN; PUNEET Baca, [facility], [facility]; ADALGISA Eaton, RN, CHPN; MARISSA Ferguson, RN, CHPN; PUNEET Fox, [facility]; JEANETTE Verma, RN    After hours, weekends and holidays, contact Palliative Provider by calling [phone].

## 2023-03-01 NOTE — PROGRESS NOTES
[facility] Medicine Services  PROGRESS NOTE    Patient Name: Dorota Membreno  : 10/31/1931  MRN: [id number]    Date of Admission: 2023  Primary Care Physician: Kylie Zapata DO    Subjective   Subjective     CC:  resp failure, pneumonia, afib, dementia    HPI:  Calm today, denies pain or dyspnea or nausea. Dementia is obvious and does limit accuracy of interview. Says she is comfortable    ROS:  uto reliably due to dementia, but denies pain, dyspnea or nausea    Objective   Objective     Vital Signs:   Temp:  [97.7 °F (36.5 °C)-98 °F (36.7 °C)] 97.8 °F (36.6 °C)  Heart Rate:  [62-95] 85  Resp:  [18-20] 20  BP: (141-174)/() 164/89  Flow (L/min):  [45] 45    Physical Exam:  Constitutional: alert , calm currently, no resp distress, interactive, hi flow canula in place, normal work of breathing. Answers simple questions appropriately but confused to details  Psych:agitated  HEENT:NCAT, oropharynx clear  Neck: neck supple, full range of motion  Neuro: Face symmetric, speech clear, equal , moves all extremities  Cardiac: RRR; No pretibial pitting edema  Resp: rhonchi bilaterally, normal work of breathing  GI: abd soft, nontender, obese  Skin: No extremity rash  Musculoskeletal/extremities: no cyanosis of extremities; no significant ankle edema      Results Reviewed:  LAB RESULTS:      Lab 23  0700 23  0659 23  1145 23  0525   WBC 19.46* 15.97* 16.92* 16.26*   HEMOGLOBIN 12.5 11.6* 11.3* 11.5*   HEMATOCRIT 36.3 37.1 37.0 36.1   PLATELETS 342 290 245 264   NEUTROS ABS  --   --   --  13.54*   IMMATURE GRANS (ABS)  --   --   --  0.08*   LYMPHS ABS  --   --   --  0.82   MONOS ABS  --   --   --  1.66*   EOS ABS  --   --   --  0.11   MCV 91.2 102.5* 101.9* 93.5   PROCALCITONIN  --   --   --  0.13   LACTATE  --   --   --  1.5   D DIMER QUANT  --   --   --  1.45*         Lab 23  0700 23  0659 23  2211 23  1145 23  0525    SODIUM 143 143  --  140 139   POTASSIUM 3.9 4.2  --  3.9 3.8   CHLORIDE 102 106  --  106 99   CO2 26.0 21.0*  --  17.0* 30.0*   ANION GAP 15.0 16.0*  --  17.0* 10.0   BUN 23 31*  --  32* 28*   CREATININE 0.66 0.63  --  0.61 0.86   EGFR 82.9 83.9  --  84.5 63.9   GLUCOSE 103* 129*  --  129* 139*   CALCIUM 8.6 8.7  --  8.2 8.7   IONIZED CALCIUM  --   --   --  1.21  --    MAGNESIUM 2.6* 1.8  --  1.8 1.6*   PHOSPHORUS  --  2.8 2.3* 1.7* 2.6   TSH  --   --   --   --  6.000*         Lab 02/26/23  0525   TOTAL PROTEIN 7.1   ALBUMIN 3.4*   GLOBULIN 3.7   ALT (SGPT) 13   AST (SGOT) 19   BILIRUBIN 0.7   ALK PHOS 120*         Lab 02/26/23  0834 02/26/23  0525   PROBNP  --  1,095.0   HSTROP T 20* 19*  19*                 Lab 02/26/23  0544   PH, ARTERIAL 7.434   PCO2, ARTERIAL 44.5   PO2 .0*   FIO2 80   HCO3 ART 29.8*   BASE EXCESS ART 4.9*   CARBOXYHEMOGLOBIN 1.3     Brief Urine Lab Results  (Last result in the past 365 days)      Color   Clarity   Blood   Leuk Est   Nitrite   Protein   CREAT   Urine HCG        02/26/23 0600 Yellow   Clear   Negative   Negative   Negative   Trace                 Microbiology Results Abnormal     Procedure Component Value - Date/Time    Blood Culture - Blood, Arm, Right [[id number]]  (Normal) Collected: 02/26/23 0525    Lab Status: Preliminary result Specimen: Blood from Arm, Right Updated: 03/01/23 0730     Blood Culture No growth at 3 days    Blood Culture - Blood, Arm, Right [[id number]]  (Normal) Collected: 02/26/23 0525    Lab Status: Preliminary result Specimen: Blood from Arm, Right Updated: 03/01/23 0730     Blood Culture No growth at 3 days    S. Pneumo Ag Urine or CSF - Urine, Urine, Clean Catch [[id number]]  (Normal) Collected: 02/26/23 1514    Lab Status: Final result Specimen: Urine, Clean Catch Updated: 02/26/23 2125     Strep Pneumo Ag Negative    Legionella Antigen, Urine - Urine, Urine, Clean Catch [[id number]]  (Normal) Collected: 02/26/23 1514    Lab Status: Final  result Specimen: Urine, Clean Catch Updated: 02/26/23 2124     LEGIONELLA ANTIGEN, URINE Negative    MRSA Screen, PCR (Inpatient) - Swab, Nares [[id number]]  (Normal) Collected: 02/26/23 1113    Lab Status: Final result Specimen: Swab from Nares Updated: 02/26/23 1228     MRSA PCR Negative    Narrative:      The negative predictive value of this diagnostic test is high and should only be used to consider de-escalating anti-MRSA therapy. A positive result may indicate colonization with MRSA and must be correlated clinically.  MRSA Negative    COVID PRE-OP / PRE-PROCEDURE SCREENING ORDER (NO ISOLATION) - Swab, Nasopharynx [[id number]]  (Normal) Collected: 02/26/23 0525    Lab Status: Final result Specimen: Swab from Nasopharynx Updated: 02/26/23 0637    Narrative:      The following orders were created for panel order COVID PRE-OP / PRE-PROCEDURE SCREENING ORDER (NO ISOLATION) - Swab, Nasopharynx.  Procedure                               Abnormality         Status                     ---------                               -----------         ------                     Respiratory Panel PCR w/...[[id number]]  Normal              Final result                 Please view results for these tests on the individual orders.    Respiratory Panel PCR w/COVID-19(SARS-CoV-2) KRYSTIAN/ANISHA/LEIDA/PAD/COR/MAD/JAILENE In-House, NP Swab in UTM/VTM, 3-4 HR TAT - Swab, Nasopharynx [[id number]]  (Normal) Collected: 02/26/23 0525    Lab Status: Final result Specimen: Swab from Nasopharynx Updated: 02/26/23 0637     ADENOVIRUS, PCR Not Detected     Coronavirus 229E Not Detected     Coronavirus HKU1 Not Detected     Coronavirus NL63 Not Detected     Coronavirus OC43 Not Detected     COVID19 Not Detected     Human Metapneumovirus Not Detected     Human Rhinovirus/Enterovirus Not Detected     Influenza A PCR Not Detected     Influenza B PCR Not Detected     Parainfluenza Virus 1 Not Detected     Parainfluenza Virus 2 Not Detected     Parainfluenza Virus 3  Not Detected     Parainfluenza Virus 4 Not Detected     RSV, PCR Not Detected     Bordetella pertussis pcr Not Detected     Bordetella parapertussis PCR Not Detected     Chlamydophila pneumoniae PCR Not Detected     Mycoplasma pneumo by PCR Not Detected    Narrative:      In the setting of a positive respiratory panel with a viral infection PLUS a negative procalcitonin without other underlying concern for bacterial infection, consider observing off antibiotics or discontinuation of antibiotics and continue supportive care. If the respiratory panel is positive for atypical bacterial infection (Bordetella pertussis, Chlamydophila pneumoniae, or Mycoplasma pneumoniae), consider antibiotic de-escalation to target atypical bacterial infection.          Adult Transthoracic Echo Complete w/ Color, Spectral and Contrast if Necessary Per Protocol    Result Date: 2/26/2023  •  Left ventricular ejection fraction appears to be 61 - 65%. •  Left ventricular diastolic function was normal. •  Left atrial volume is moderately increased. •  Moderate tricuspid valve regurgitation is present. •  Estimated right ventricular systolic pressure from tricuspid regurgitation is moderately elevated (45-55 mmHg). Calculated right ventricular systolic pressure from tricuspid regurgitation is 47 mmHg.     XR Chest 1 View    Result Date: 2/26/2023  EXAMINATION: XR CHEST 1 VW  DATE: 2/26/2023 6:00 AM INDICATIONS: SOA triage protocol. COMPARISON: Image from chest radiograph dated 4/7/2018. Report not available at time of dictation. FINDINGS: Evaluation somewhat limited by patient body habitus. Stable elevation of the right hemidiaphragm. Nonspecific mild diffuse interstitial prominence. Mild bibasilar opacities suggest atelectasis, but infiltrate not excluded. No sizable effusion or pneumothorax. Atherosclerotic calcifications of the aortic arch. Cardiac and mediastinal silhouette otherwise unremarkable. No acute osseous abnormality identified on  single frontal view.     Impression: 1.  Nonspecific interstitial prominence could reflect chronic changes, but mild edema or atypical infectious/inflammatory process can also appear this way in the correct clinical setting. 2.  Mild bibasilar opacities suggest atelectasis, but infiltrate or edema not excluded.  Electronically signed by:  Nikita Keller D.O.  2/26/2023 4:47 AM Mountain Time    CT Angiogram Chest    Addendum Date: 2/26/2023    ADDENDUM #1 Additional impression point: Narrowing of the lower trachea and proximal mainstem bronchi may represent tracheomalacia. Electronically Signed: Son Bella  2/26/2023 9:04 AM EST  Workstation ID: [id number] REPORT  CT ANGIOGRAM CHEST Date of Exam: 2/26/2023 7:03 AM EST Indication: Sepsis, hypoxia, tachycardia, dimer. Comparison: CTA chest 6/18/2013 Technique: CTA of the chest was performed after the uneventful intravenous administration of 65 mL Isovue-370. Reconstructed coronal and sagittal images were also obtained. In addition, a 3-D volume rendered image was created for interpretation. Automated exposure control and iterative reconstruction methods were used. Findings: Motion degraded exam. Pulmonary arteries: No evidence of pulmonary embolus. Normal main pulmonary artery diameter. Heart and pericardium:No flattening of the interventricular septum. Coronary artery calcification is seen. No substantial pericardial effusion. Vessels:Normal caliber aorta. Atherosclerotic calcification of the aorta. No evidence of acute aortic injury on this nondedicated exam. Venous structures including the superior vena cava and inferior vena cava appear grossly patent. Mediastinum:Unremarkable appearance of the esophagus. Few enlarged mediastinal lymph nodes including a right paratracheal node measuring 13 mm in short axis (image 29). No anterior mediastinal masses. Lower neck:No suspicious or enlarged supraclavicular or axillary lymphadenopathy. Diminutive appearance  of the thyroid. Pulmonary parenchyma:No evidence of pulmonary infarct. Muscular spine nodules. Few foci of consolidative opacities seen in the right upper lobe. Scattered calcified granulomas. Bilateral dependent atelectasis. Pleura: Trace bilateral pleural effusions. No pneumothorax. Airways: Narrowing of the lower trachea and proximal mainstem bronchi may represent tracheomalacia. Diffuse bronchial wall thickening. Chest wall and bones:No acute osseous abnormality. Degenerative changes of thoracic spine. Unremarkable appearance of soft tissues. Upper abdomen:No lesion seen within the visualized liver. Upper abdomen is unremarkable. Small hiatal hernia. Impression: Mildly motion degraded exam. No evidence of pulmonary embolus. Few foci of consolidative opacity in the right upper lobe may represent infection. Few mildly enlarged mediastinal lymph nodes may be reactive. Trace bilateral pleural effusions. Electronically Signed: Son Bella  2/26/2023 8:17 AM EST  Workstation ID: [id number]    Result Date: 2/26/2023  CT ANGIOGRAM CHEST Date of Exam: 2/26/2023 7:03 AM EST Indication: Sepsis, hypoxia, tachycardia, dimer. Comparison: CTA chest 6/18/2013 Technique: CTA of the chest was performed after the uneventful intravenous administration of 65 mL Isovue-370. Reconstructed coronal and sagittal images were also obtained. In addition, a 3-D volume rendered image was created for interpretation. Automated exposure control and iterative reconstruction methods were used. Findings: Motion degraded exam. Pulmonary arteries: No evidence of pulmonary embolus. Normal main pulmonary artery diameter. Heart and pericardium:No flattening of the interventricular septum. Coronary artery calcification is seen. No substantial pericardial effusion. Vessels:Normal caliber aorta. Atherosclerotic calcification of the aorta. No evidence of acute aortic injury on this nondedicated exam. Venous structures including the superior vena cava and  inferior vena cava appear grossly patent. Mediastinum:Unremarkable appearance of the esophagus. Few enlarged mediastinal lymph nodes including a right paratracheal node measuring 13 mm in short axis (image 29). No anterior mediastinal masses. Lower neck:No suspicious or enlarged supraclavicular or axillary lymphadenopathy. Diminutive appearance of the thyroid. Pulmonary parenchyma:No evidence of pulmonary infarct. Muscular spine nodules. Few foci of consolidative opacities seen in the right upper lobe. Scattered calcified granulomas. Bilateral dependent atelectasis. Pleura: Trace bilateral pleural effusions. No pneumothorax. Airways: Narrowing of the lower trachea and proximal mainstem bronchi may represent tracheomalacia. Diffuse bronchial wall thickening. Chest wall and bones:No acute osseous abnormality. Degenerative changes of thoracic spine. Unremarkable appearance of soft tissues. Upper abdomen:No lesion seen within the visualized liver. Upper abdomen is unremarkable. Small hiatal hernia.     Impression: Impression: Mildly motion degraded exam. No evidence of pulmonary embolus. Few foci of consolidative opacity in the right upper lobe may represent infection. Few mildly enlarged mediastinal lymph nodes may be reactive. Trace bilateral pleural effusions. Electronically Signed: Son Bella  2/26/2023 8:17 AM EST  Workstation ID: [id number]      Results for orders placed during the hospital encounter of 02/26/23    Adult Transthoracic Echo Complete w/ Color, Spectral and Contrast if Necessary Per Protocol    Interpretation Summary  •  Left ventricular ejection fraction appears to be 61 - 65%.  •  Left ventricular diastolic function was normal.  •  Left atrial volume is moderately increased.  •  Moderate tricuspid valve regurgitation is present.  •  Estimated right ventricular systolic pressure from tricuspid regurgitation is moderately elevated (45-55 mmHg). Calculated right ventricular systolic pressure from  tricuspid regurgitation is 47 mmHg.      Current medications:  Scheduled Meds:atorvastatin, 20 mg, Oral, Nightly  cefTRIAXone, 1 g, Intravenous, Q24H  citalopram, 20 mg, Oral, Daily  donepezil, 10 mg, Oral, Nightly  doxycycline, 100 mg, Oral, Q12H  heparin (porcine), 5,000 Units, Subcutaneous, Q8H  ipratropium-albuterol, 3 mL, Nebulization, Once  ipratropium-albuterol, 3 mL, Nebulization, 4x Daily - RT  levothyroxine, 50 mcg, Oral, Q AM  magnesium sulfate, 4 g, Intravenous, Once  memantine, 5 mg, Oral, Daily  methylPREDNISolone sodium succinate, 40 mg, Intravenous, Q12H  metoprolol tartrate, 50 mg, Oral, Q12H  montelukast, 10 mg, Oral, Nightly  pantoprazole, 40 mg, Oral, Q AM  QUEtiapine, 25 mg, Oral, Daily  QUEtiapine, 75 mg, Oral, Nightly  sodium chloride, 10 mL, Intravenous, Q12H  tamsulosin, 0.4 mg, Oral, Daily      Continuous Infusions:   PRN Meds:.•  acetaminophen  •  Calcium Replacement - Initiate Nurse / BPA Driven Protocol  •  diphenhydrAMINE  •  labetalol  •  Magnesium Standard Dose Replacement - Initiate Nurse / BPA Driven Protocol  •  ondansetron  •  Phosphorus Replacement - Initiate Nurse / BPA Driven Protocol  •  Potassium Replacement - Initiate Nurse / BPA Driven Protocol  •  sodium chloride  •  sodium chloride  •  sodium chloride  •  ziprasidone    Assessment & Plan   Assessment & Plan     Active Hospital Problems    Diagnosis  POA   • **Sepsis ([facility]) [A41.9]  Yes   • Atrial fibrillation ([facility]) [I48.91]  Yes   • Leukocytosis [D72.829]  Yes   • Hypomagnesemia [E83.42]  Yes   • Elevated brain natriuretic peptide (BNP) level [R79.89]  Yes   • Proteinuria [R80.9]  Yes   • Bilateral Pleural effusions [J90]  Yes   • Pneumonia [J18.9]  Yes   • Respiratory failure ([facility]) [J96.90]  Yes   • Elevated alkaline phosphatase level [R74.8]  Yes   • Sepsis, due to unspecified organism, unspecified whether acute organ dysfunction present ([facility]) [A41.9]  Yes   • Late onset Alzheimer's disease with behavioral disturbance  ([facility]) [G30.1, 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        Brief Hospital Course to date:  Dorota Membreno is a 89 y.o. female w/ hx alzheimers dementia, giant cell arteritis (2000), asthma, htn, hl, hypothyroidism, anxiety, remote colon cancer (s/p resection at age 37) who presented to [facility] ED via ems on 2/26/23 after being found down at nursing facility and was found to be hypoxic (sats 80s) w/ tachycardia. In ED temp 99.9, hr 138, sbp 89/79, sats 83%. Was placed on nonrebreather mask and initiated on esmolol dripl and iv fluids. Bp improved w/ fluids and was weaned to nasal canula. Troponins were mildly elevated and flat. Respiratory pcr was negative. U/a not compelling for uti. D-dimer mildly elevated at 1.45, probnp 1095, wbc 16,260. Cta chest revealed mildly motion degraded exam with no evidence of pulmonary embolish and few foci of consolidative opacity right upper lobe, few mildly enlarged mediastinal lymph notes (possibly reactive) and trace bilateral pleural effusions, some narrowing of lower trachea and proximal mainstem bronchi possibly representing tracheomalacia. Initiated on cefepime and vanc empirically by intensivist service and admitted to telemetry floor as patient had clinically improved. 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 Continue empiric rocephin & doxy empiric rx (day #4 abx); continue duonebs; continue solumedrol 40mg iv bid and wean as able. Wean oxygen as tolerates (currently on ~60% fio2 hi flow). CXR in a.m.    New onset afib w/ rvr (converted back to nsr)  -echo: ef 61-65%, diastolic dysfunction, mod tr w/ ervsp moderately elevated 45-55mmhg  -tsh ok  -weaned off esmolol  -increased metoprolol to 50mg bid for rate control      Urinary retention  -u/a benign  -flomax; bladder scans and in/out cath prn    Alzheimers dementia, severe  Agitation  -continue home memantine and aricept  -increase night seroquel to 75mg nightly. Continue seroquel 25mg po qam  -continue prn geodon/benadryl  -continue celexa  -soft wrist restraints currently, attempt to d/c as able    Debility  -pt/ot evals pending    Hypothyroidism  -tsh 6.0  -continue synthroid    Goals/limits  -long discussion via phone w/ daughter in law Whaley (home ; cell ) to update on 2/27/23. She confirmed dnr/dni/no feeding tube status/no icu transfer/no invasives. Then per later discussion 2/27/23, after clinical deterioration, was open to palliative consultation given guarded prognosis  -palliative following, assisting w/ goals/symptoms, etc.   -I updated daughter in law Whaley on 3/1/23, she was appreciative of the call. Aware of the guarded prognosis    Am labs: cbc,bmp,mag (6am CXR)    Expected Discharge Location and Transportation: facility  Expected Discharge ? 3/4 depending on clinical course if improves  Expected Discharge Date and Time     Expected Discharge Date Expected Discharge Time    Mar 3, 2023          DVT prophylaxis:  Medical and mechanical DVT prophylaxis orders are present. sq heparin    AM-PAC 6 Clicks Score (PT): 14 (02/28/23 2000)    CODE STATUS:   Code Status and Medical Interventions:   Ordered at: 02/27/23 1522     Medical Intervention Limits:    NO intubation (DNI)    NO artificial nutrition     NO dialysis    NO vasopressors    Other     Code Status (Patient has no pulse and is not breathing):    No CPR (Do Not Attempt to Resuscitate)     Medical Interventions (Patient has pulse or is breathing):    Limited Support     Additional Medical Interventions Limits:    no invasive procedures, no icu transfer     Release to patient:    Routine Release       Russell Guerin MD  03/01/23

## 2023-03-01 NOTE — PLAN OF CARE
Goal Outcome Evaluation:  Plan of Care Reviewed With: patient        Progress: improving  Outcome Evaluation: Pt demonstrates deficits in sequencing, strength, activity tolerance with increased O2 demands, and balance compared to baseline completion of ADLs, able to participate well in therapeutic activities and ADL retraining through self-feeding EOB/grooming and steps to BSC then chair, recom IPOT DC SNF rehab

## 2023-03-01 NOTE — PLAN OF CARE
Goal Outcome Evaluation:  Plan of Care Reviewed With: patient           Outcome Evaluation: PT initial Eval completed.  Pt presents with generalized weakness, balance deficits, cognitive impairments, decreased functional endurance, increased O2 requirements, and decreased indep/safety with functional mobility compared to baseline.  STS with minAx2 and BUE support.  Ambulated 25ft with modAx2 and FWW with O2 transitioned from HFNC to 15L on NRB.  Exhibited 3 LOB during ambulation.  Pt warrants skilled IP PT to improve indep with mobility and return to PLOF.  Rec SNF upon d/c.

## 2023-03-01 NOTE — PROGRESS NOTES
Palliative Care Progress Note    Date of Admission: 2/26/2023    Subjective:  sitter states that the patient has been much calmer today as well as able to walk some with physical therapy.  Overnight events do show that the patient's oxygen supplementation did have to be increased however  Current Code Status     Date Active Code Status Order ID Comments User Context       2/27/2023 1522 No CPR (Do Not Attempt to Resuscitate) [id number]  Russell Guerin MD Inpatient      Question Answer    Code Status (Patient has no pulse and is not breathing) No CPR (Do Not Attempt to Resuscitate)    Medical Interventions (Patient has pulse or is breathing) Limited Support    Medical Intervention Limits: NO intubation (DNI)     NO artificial nutrition     NO dialysis     NO vasopressors     Other    Additional Medical Interventions Limits: no invasive procedures, no icu transfer    Release to patient Routine Release              No current facility-administered medications on file prior to encounter.     Current Outpatient Medications on File Prior to Encounter   Medication Sig Dispense Refill   • amLODIPine (NORVASC) 5 MG tablet GIVE 1 TABLET BY MOUTH DAILY AT BEDTIME 30 tablet 10   • atorvastatin (LIPITOR) 20 MG tablet GIVE 1 TABLET BY MOUTH DAILY 30 tablet 11   • bisoprolol-hydrochlorothiazide (ZIAC) 2.5-6.25 MG per tablet Take 1 tablet by mouth Daily.     • Cholecalciferol (vitamin D3) 125 MCG (5000 UT) tablet tablet GIVE 1 TABLET BY MOUTH DAILY 30 tablet 11   • citalopram (CeleXA) 20 MG tablet TAKE 1 TABLET BY MOUTH EVERY MORNING FOR DEPRESSION 30 tablet 11   • donepezil (ARICEPT) 5 MG tablet GIVE 1 TABLET BY MOUTH DAILY AT BEDTIME 30 tablet 0   • QUEtiapine (SEROquel) 25 MG tablet GIVE 1 TABLET BY MOUTH EVERY MORNING 30 tablet 0   • Bismatrol 262 MG/15ML suspension GIVE 15 ML BY MOUTH EVERY 8 HOURS AS NEEDED FOR DIARRHEA 236 mL 11   • KLOR-CON 10 MEQ CR tablet TAKE 1 TABLET BY MOUTH: MON,WED,FRI 90 tablet 0   •  "levothyroxine (SYNTHROID, LEVOTHROID) 50 MCG tablet GIVE 1 TABLET BY MOUTH DAILY 30 tablet 11   • memantine (NAMENDA) 5 MG tablet GIVE 1 TABLET BY MOUTH TWICE A DAY 60 tablet 11   • metoprolol succinate XL (TOPROL-XL) 50 MG 24 hr tablet TAKE 1 TABLET BY MOUTH DAILY. 30 tablet 11   • montelukast (SINGULAIR) 10 MG tablet TAKE 1 TABLET BY MOUTH EVERY NIGHT. 90 tablet 1   • nystatin-triamcinolone (MYCOLOG) 340085-5.1 UNIT/GM-% ointment APPLY TO SKIN TWICE DAILY 30 g 0   • ondansetron (ZOFRAN) 4 MG tablet GIVE 1 TABLET BY MOUTH EVERY 12 HOURS AS NEEDED FOR NAUSEA/VOMITING 12 tablet 2        •  acetaminophen  •  Calcium Replacement - Initiate Nurse / BPA Driven Protocol  •  diphenhydrAMINE  •  labetalol  •  Magnesium Standard Dose Replacement - Initiate Nurse / BPA Driven Protocol  •  ondansetron  •  Phosphorus Replacement - Initiate Nurse / BPA Driven Protocol  •  Potassium Replacement - Initiate Nurse / BPA Driven Protocol  •  sodium chloride  •  sodium chloride  •  sodium chloride  •  ziprasidone    Objective: /87 (BP Location: Right arm, Patient Position: Lying)   Pulse 96   Temp 98.2 °F (36.8 °C) (Oral)   Resp 20   Ht 147.3 cm (58\")   Wt 73.3 kg (161 lb 9.6 oz)   SpO2 90%   BMI 33.77 kg/m²      Intake/Output Summary (Last 24 hours) at 3/1/2023 1249  Last data filed at 3/1/2023 0635  Gross per 24 hour   Intake 1353.11 ml   Output 1375 ml   Net -21.89 ml     Physical Exam:      General Appearance:   Pleasantly confused   Head:    Normocephalic, without obvious abnormality, atraumatic   Eyes:            Lids and lashes normal, conjunctivae and sclerae normal, no   icterus, no pallor, corneas clear, PERRLA   Ears:    Ears appear intact with no abnormalities noted   Throat:   No oral lesions, no thrush, oral mucosa moist   Neck:   No adenopathy, supple, trachea midline, no thyromegaly, no     carotid bruit, no JVD   Back:     No kyphosis present, no scoliosis present, no skin lesions,       erythema or scars, " no tenderness to percussion or                   palpation,   range of motion normal   Lungs:     Clear to auscultation,respirations regular, even and                   unlabored    Heart:    Regular rhythm and normal rate, normal S1 and S2, no            murmur, no gallop, no rub, no click   Breast Exam:    Deferred   Abdomen:     Normal bowel sounds, no masses, no organomegaly, soft        non-tender, non-distended, no guarding, no rebound                 tenderness   Genitalia:    Deferred   Extremities:   Moves all extremities well, no edema, no cyanosis, no              redness   Pulses:   Pulses palpable and equal bilaterally   Skin:   No bleeding, bruising or rash   Lymph nodes:   No palpable adenopathy   Neurologic:   Cranial nerves 2 - 12 grossly intact, sensation intact, DTR        present and equal bilaterally     Results from last 7 days   Lab Units 03/01/23  0700   WBC 10*3/mm3 19.46*   HEMOGLOBIN g/dL 12.5   HEMATOCRIT % 36.3   PLATELETS 10*3/mm3 342     Results from last 7 days   Lab Units 03/01/23  0700 02/27/23  1145 02/26/23  0525   SODIUM mmol/L 143   < > 139   POTASSIUM mmol/L 3.9   < > 3.8   CHLORIDE mmol/L 102   < > 99   CO2 mmol/L 26.0   < > 30.0*   BUN mg/dL 23   < > 28*   CREATININE mg/dL 0.66   < > 0.86   CALCIUM mg/dL 8.6   < > 8.7   BILIRUBIN mg/dL  --   --  0.7   ALK PHOS U/L  --   --  120*   ALT (SGPT) U/L  --   --  13   AST (SGOT) U/L  --   --  19   GLUCOSE mg/dL 103*   < > 139*    < > = values in this interval not displayed.       Impression: Sepsis  Dementia  Dyspnea  Restlessness  Change in status  Goals of care  Plan: Patient does show some improvement in respect to her mentation, however her respiratory status does seem to be slightly worse.  Did try calling the patient's daughter to update her but no answer.  We will continue following and have further discussions as we proceed forward based on how the patient is doing.            Adonis Chang DO  03/01/23  12:49 EST

## 2023-03-01 NOTE — THERAPY EVALUATION
Patient Name: Dorota Membreno  : 10/31/1931    MRN: [id number]                              Today's Date: 3/1/2023       Admit Date: 2023    Visit Dx:     ICD-10-CM ICD-9-CM   1. Late onset Alzheimer's disease with behavioral disturbance (HCC)  G30.1 331.0    F02.818 294.11   2. Sepsis, due to unspecified organism, unspecified whether acute organ dysfunction present ([facility])  A41.9 038.9     995.91   3. Acute respiratory failure with hypoxia ([facility])  J96.01 518.81   4. Atrial fibrillation with RVR ([facility])  I48.91 427.31   5. Hypotension, unspecified hypotension type  I95.9 458.9     Patient Active Problem List   Diagnosis   • Gastroesophageal reflux disease   • Atopic rhinitis   • Asthma   • Late onset Alzheimer's disease with behavioral disturbance (HCC)   • Eczema   • Dyslipidemia   • Hypothyroidism   • Essential hypertension   • Post-menopausal osteoporosis   • Arteritis (HCC)   • Dizziness   • Edema   • Fatigue   • Shingles   • Syncope and collapse   • Atrial fibrillation (HCC)   • Leukocytosis   • Hypomagnesemia   • Elevated brain natriuretic peptide (BNP) level   • Proteinuria   • Bilateral Pleural effusions   • Pneumonia   • Respiratory failure (HCC)   • Elevated alkaline phosphatase level   • Sepsis (HCC)   • Sepsis, due to unspecified organism, unspecified whether acute organ dysfunction present (HCC)     Past Medical History:   Diagnosis Date   • Acid reflux 2016    stable   • Allergic rhinitis    • Alzheimer's disease ([facility]) 2017   • Arteritis ([facility])     A. 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 B. Improved with prednisone therapy   • Asthma    • Cancer (HCC)     colon. Status post resection at age 37   • Cognitive impairment, mild, so stated     A. Patient has been forgetting names continue med. Has f/u with neuro. She is having some times forgetting directions. She has moved into a new house and this has not helped with directions   • Dyslipidemia     lipids  and cmp   • Hypertension     lipids, cmp, urine micro   • Hypothyroidism     A. On replacement therapy   • Mammogram abnormal     A. 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   • Panic attacks    • Pneumonia    • Postmenopausal osteoporosis    • Shingles 03/2018     Past Surgical History:   Procedure Laterality Date   • CATARACT EXTRACTION     • COLECTOMY PARTIAL / TOTAL      Patient diagnosed with colon cancer at age 37   • HYSTERECTOMY        General Information     Row Name 03/01/23 0929          OT Time and Intention    Document Type evaluation  -KF     Mode of Treatment occupational therapy  -KF     Row Name 03/01/23 0929          General Information    Patient Profile Reviewed yes  -KF     Prior Level of Function independent:;transfer;bed mobility;all household mobility;min assist:;ADL's  based on chart review, Pt limited historian  -KF     Existing Precautions/Restrictions fall;oxygen therapy device and L/min  HFNC  -KF     Barriers to Rehab cognitive status;medically complex  -KF     Row Name 03/01/23 0929          Occupational Profile    Environmental Supports and Barriers (Occupational Profile) no DME used at baseline per chart  -KF     Row Name 03/01/23 0929          Living Environment    People in Home facility resident  -KF     Row Name 03/01/23 0929          Cognition    Orientation Status (Cognition) oriented to;person;disoriented to;place;situation;time;verbal cues/prompts needed for orientation  -KF     Row Name 03/01/23 0929          Safety Issues, Functional Mobility    Safety Issues Affecting Function (Mobility) insight into deficits/self-awareness;awareness of need for assistance;safety precaution awareness;sequencing abilities;impulsivity  -KF     Impairments Affecting Function (Mobility) balance;cognition;endurance/activity tolerance;strength  -KF     Cognitive Impairments, Mobility Safety/Performance awareness, need for assistance;attention;impulsivity;safety  precaution awareness  -KF           User Key  (r) = Recorded By, (t) = Taken By, (c) = Cosigned By    Initials Name Provider Type    KF Giovanna Condon, OT Occupational Therapist                 Mobility/ADL's     Row Name 03/01/23 0931          Bed Mobility    Bed Mobility scooting/bridging;supine-sit  -KF     Scooting/Bridging [place] (Bed Mobility) minimum assist (75% patient effort);2 person assist;nonverbal cues (demo/gesture);verbal cues  -KF     Supine-Sit [place] (Bed Mobility) 2 person assist;moderate assist (50% patient effort);verbal cues;nonverbal cues (demo/gesture)  -KF     Bed Mobility, Safety Issues cognitive deficits limit understanding;decreased use of arms for pushing/pulling;decreased use of legs for bridging/pushing  -KF     Assistive Device (Bed Mobility) bed rails;draw sheet;head of bed elevated  -KF     Comment, (Bed Mobility) cues for sequencing and tactile cues for initiation throughout  -KF     Row Name 03/01/23 0931          Transfers    Transfers sit-stand transfer;stand-sit transfer;toilet transfer;bed-chair transfer  -KF     Comment, (Transfers) cues for sequencing, trial of use of BSC post self-feeding treatment seated EOB with cues for task completion only and SBA for balance throughout EOB  -KF     Row Name 03/01/23 0931          Bed-Chair Transfer    Bed-Chair [place] (Transfers) minimum assist (75% patient effort);2 person assist  -     Assistive Device (Bed-Chair Transfers) --  BUE support  -KF     Row Name 03/01/23 0931          Sit-Stand Transfer    Sit-Stand [place] (Transfers) minimum assist (75% patient effort);2 person assist  -KF     Row Name 03/01/23 0931          Stand-Sit Transfer    Stand-Sit [place] (Transfers) minimum assist (75% patient effort);2 person assist  -KF     Row Name 03/01/23 0931          Toilet Transfer    Type (Toilet Transfer) stand pivot/stand step  -KF     [place] Level (Toilet Transfer) minimum assist (75% patient  effort);2 person assist  -     Assistive Device (Toilet Transfer) commode, bedside without drop arms  -     Row Name 03/01/23 0931          Functional Mobility    Functional Mobility- Safety Issues weight-shifting ability decreased;loses balance backward;step length decreased  -     Functional Mobility- Comment steps to BSC then to chair, deferred specifics to PT  -     Row Name 03/01/23 0931          Activities of Daily Living    BADL Assessment/Intervention upper body dressing;lower body dressing;grooming;feeding  -     Row Name 03/01/23 0931          Upper Body Dressing Assessment/Training    [place] Level (Upper Body Dressing) front opening garment;maximum assist (25% patient effort)  -KF     Position (Upper Body Dressing) supported standing  -     Comment, (Upper Body Dressing) cues throughout for line management and sequencing  -[facility] Name 03/01/23 0931          Lower Body Dressing Assessment/Training    [place] Level (Lower Body Dressing) don;socks;dependent (less than 25% patient effort)  -     Position (Lower Body Dressing) sitting up in bed  -     Row Name 03/01/23 0931          Grooming Assessment/Training    [place] Level (Grooming) oral care regimen;wash face, hands;set up;hair care, combing/brushing  -     Position (Grooming) edge of bed sitting;unsupported sitting  -     Row Name 03/01/23 0931          Self-Feeding Assessment/Training    [place] Level (Feeding) prepare tray/open items;knife use;maximum assist (25% patient effort);scoop food and bring to mouth;supervision;verbal cues  -     Position (Self-Feeding) edge of bed sitting  -     Comment, (Feeding) assist for tray setup and cues for sequencing, good management of utensils, required assist for cutting foods  -           User Key  (r) = Recorded By, (t) = Taken By, (c) = Cosigned By    Initials Name Provider Type    KF Giovanna Condon, OT Occupational Therapist                Obj/Interventions     Row Name 03/01/23 0936          Sensory Assessment (Somatosensory)    Sensory Assessment (Somatosensory) UE sensation intact;unable/difficult to assess  -KF     [facility] Name 03/01/23 0936          Vision Assessment/Intervention    Visual Impairment/Limitations WFL;unable/difficult to assess  reports wears glasses cont to assess  -KF     [facility] Name 03/01/23 0936          Range of Motion Comprehensive    General Range of Motion bilateral upper extremity ROM WFL  -[facility] Name 03/01/23 0936          Strength Comprehensive (MMT)    General Manual Muscle Testing (MMT) Assessment upper extremity strength deficits identified  -KF     Comment, General Manual Muscle Testing (MMT) Assessment generalized weakness demonstrated throughout compared to reported baseline, BUE grossly 4-/5  -KF     [facility] Name 03/01/23 0936          Motor Skills    Motor Skills coordination  -     Coordination WFL;bimanual skills;bilateral;upper extremity  -KF     [facility] Name 03/01/23 0936          Balance    Balance Assessment sitting static balance;sitting dynamic balance;standing static balance;standing dynamic balance  -KF     Static Sitting Balance standby assist  -KF     Dynamic Sitting Balance contact guard  -KF     Position, Sitting Balance unsupported;sitting edge of bed  -KF     Static Standing Balance minimal assist;2-person assist  -KF     Dynamic Standing Balance moderate assist;2-person assist  -KF     Position/Device Used, Standing Balance supported  -KF     Balance Interventions standing;weight shifting activity;sit to stand;supported;occupation based/functional task;sitting;UE activity with balance activity  -     Comment, Balance TA seated with incorporated use of preferred music, completed self-feeding and oral hygiene seated EOB SBA throughout, overall very unsteady in standing with required BUE support for balance throughout with episodes of LOB requiring assist  -KF           User Key  (r) = Recorded By, (t) =  Taken By, (c) = Cosigned By    Initials Name Provider Type    Giovanna Abbasi OT Occupational Therapist               Goals/Plan     Row Name 03/01/23 0942          Transfer Goal 1 (OT)    Activity/Assistive Device (Transfer Goal 1, OT) sit-to-stand/stand-to-sit;bed-to-chair/chair-to-bed;toilet;walker, rolling  -KF     [place] Level/Cues Needed (Transfer Goal 1, OT) minimum assist (75% or more patient effort)  -KF     Time Frame (Transfer Goal 1, OT) long term goal (LTG);10 days  -KF     Progress/Outcome (Transfer Goal 1, OT) new goal;goal ongoing  -     Row Name 03/01/23 0942          Toileting Goal 1 (OT)    Activity/Device (Toileting Goal 1, OT) adjust/manage clothing;perform perineal hygiene;commode;grab bar/safety frame  -KF     [place] Level/Cues Needed (Toileting Goal 1, OT) moderate assist (50-74% patient effort);verbal cues required  -KF     Time Frame (Toileting Goal 1, OT) long term goal (LTG);10 days  -KF     Progress/Outcome (Toileting Goal 1, OT) new goal;goal ongoing  -     Row Name 03/01/23 0942          Grooming Goal 1 (OT)    Activity/Device (Grooming Goal 1, OT) hair care;oral care;wash face, hands  tolerated standing sink side  -KF     [place] (Grooming Goal 1, OT) contact guard required  -KF     Time Frame (Grooming Goal 1, OT) long term goal (LTG);10 days  -KF     Progress/Outcome (Grooming Goal 1, OT) new goal;goal ongoing  -KF     Row Name 03/01/23 0942          Therapy Assessment/Plan (OT)    Planned Therapy Interventions (OT) activity tolerance training;adaptive equipment training;BADL retraining;cognitive/visual perception retraining;occupation/activity based interventions;strengthening exercise;transfer/mobility retraining;functional balance retraining;ROM/therapeutic exercise;patient/caregiver education/training  -KF           User Key  (r) = Recorded By, (t) = Taken By, (c) = Cosigned By    Initials Name Provider Type    Giovanna Abbasi OT Occupational  Therapist               Clinical Impression     Row Name 03/01/23 0939          Pain Assessment    Pretreatment Pain Rating 0/10 - no pain  -KF     Posttreatment Pain Rating 0/10 - no pain  -KF     Pre/Posttreatment Pain Comment tolerated  -KF     Pain Intervention(s) Repositioned;Ambulation/increased activity  -     Row Name 03/01/23 0939          Plan of Care Review    Plan of Care Reviewed With patient  -KF     Progress improving  -     Outcome Evaluation Pt demonstrates deficits in sequencing, strength, activity tolerance with increased O2 demands, and balance compared to baseline completion of ADLs, able to participate well in therapeutic activities and ADL retraining through self-feeding EOB/grooming and steps to BSC then chair, recom IPOT DC SNF rehab  -     Row Name 03/01/23 0939          Therapy Assessment/Plan (OT)    Rehab Potential (OT) good, to achieve stated therapy goals  -     Criteria for Skilled Therapeutic Interventions Met (OT) yes;meets criteria;skilled treatment is necessary  -KF     Therapy Frequency (OT) daily  -     Row Name 03/01/23 0939          Therapy Plan Review/Discharge Plan (OT)    Equipment Needs Upon Discharge (OT) --  TBD  -KF     Anticipated Discharge Disposition (OT) skilled nursing facility  -     Row Name 03/01/23 0939          Vital Signs    Pre Systolic BP Rehab 168  -KF     Pre Treatment Diastolic BP 89  -KF     Post Systolic BP Rehab 153  -KF     Post Treatment Diastolic BP 79  -KF     Pre SpO2 (%) 95  -KF     O2 Delivery Pre Treatment hi-flow  -KF     Intra SpO2 (%) 92  -KF     O2 Delivery Intra Treatment non-rebreather  with functional transfers and tolerated cleared by RN  -KF     Post SpO2 (%) 96  -KF     O2 Delivery Post Treatment hi-flow  -KF     Pre Patient Position Supine  -KF     Intra Patient Position Standing  -KF     Post Patient Position Sitting  -KF     Rest Breaks  2  -KF     Row Name 03/01/23 0939          Positioning and Restraints     Pre-Treatment Position in bed  -KF     Post Treatment Position chair  -KF     In Chair notified nsg;reclined;sitting;call light within reach;encouraged to call for assist;exit alarm on;waffle cushion;heels elevated;legs elevated  -KF           User Key  (r) = Recorded By, (t) = Taken By, (c) = Cosigned By    Initials Name Provider Type    Giovanna Abbasi OT Occupational Therapist               Outcome Measures     Row Name 03/01/23 0944          How much help from another is currently needed...    Putting on and taking off regular lower body clothing? 2  -KF     Bathing (including washing, rinsing, and drying) 2  -KF     Toileting (which includes using toilet bed pan or urinal) 2  -KF     Putting on and taking off regular upper body clothing 3  -KF     Taking care of personal grooming (such as brushing teeth) 3  -KF     Eating meals 3  -KF     AM-PAC 6 Clicks Score (OT) 15  -KF     Row Name 03/01/23 0944          Functional Assessment    Outcome Measure Options AM-PAC 6 Clicks Daily Activity (OT)  -KF           User Key  (r) = Recorded By, (t) = Taken By, (c) = Cosigned By    Initials Name Provider Type    Giovanna Abbasi OT Occupational Therapist                Occupational Therapy Education     Title: PT OT SLP Therapies (In Progress)     Topic: Occupational Therapy (In Progress)     Point: ADL training (In Progress)     Description:   Instruct learner(s) on proper safety adaptation and remediation techniques during self care or transfers.   Instruct in proper use of assistive devices.              Learning Progress Summary           Patient Acceptance, E,TB,D, NR by  at 3/1/2023 0945                   Point: Home exercise program (In Progress)     Description:   Instruct learner(s) on appropriate technique for monitoring, assisting and/or progressing therapeutic exercises/activities.              Learning Progress Summary           Patient Acceptance, E,TB,D, NR by  at 3/1/2023 0945                    Point: Precautions (In Progress)     Description:   Instruct learner(s) on prescribed precautions during self-care and functional transfers.              Learning Progress Summary           Patient Acceptance, E,TB,D, NR by  at 3/1/2023 0945                   Point: Body mechanics (In Progress)     Description:   Instruct learner(s) on proper positioning and spine alignment during self-care, functional mobility activities and/or exercises.              Learning Progress Summary           Patient Acceptance, E,TB,D, NR by  at 3/1/2023 0945                               User Key     Initials Effective Dates Name Provider Type Discipline     06/16/21 -  Giovanna Condon OT Occupational Therapist OT              OT Recommendation and Plan  Planned Therapy Interventions (OT): activity tolerance training, adaptive equipment training, BADL retraining, cognitive/visual perception retraining, occupation/activity based interventions, strengthening exercise, transfer/mobility retraining, functional balance retraining, ROM/therapeutic exercise, patient/caregiver education/training  Therapy Frequency (OT): daily  Plan of Care Review  Plan of Care Reviewed With: patient  Progress: improving  Outcome Evaluation: Pt demonstrates deficits in sequencing, strength, activity tolerance with increased O2 demands, and balance compared to baseline completion of ADLs, able to participate well in therapeutic activities and ADL retraining through self-feeding EOB/grooming and steps to BSC then chair, recom IPOT DC SNF rehab     Time Calculation:    Time Calculation- OT     Row Name 03/01/23 0807             Time Calculation- OT    OT Start Time 0807  -KF      OT Received On 03/01/23  -KF      OT Goal Re-Cert Due Date 03/11/23  -KF         Timed Charges    04405 - OT Self Care/Mgmt Minutes 15  -KF         Untimed Charges    OT Eval/Re-eval Minutes 48  -KF         Total Minutes    Timed Charges Total Minutes 15  -KF      Untimed  Charges Total Minutes 48  -KF       Total Minutes 63  -KF            User Key  (r) = Recorded By, (t) = Taken By, (c) = Cosigned By    Initials Name Provider Type    Giovanna Abbasi, OT Occupational Therapist              Therapy Charges for Today     Code Description Service Date Service Provider Modifiers Qty    [id number]  OT SELF CARE/MGMT/TRAIN EA 15 MIN 3/1/2023 Giovanna Condon OT GO 1    [id number]  OT THER SUPP EA 15 MIN 3/1/2023 Giovanna Condon OT GO 1    [id number]  OT EVAL MOD COMPLEXITY 4 3/1/2023 Giovanna Condon OT GO 1               Giovanna Condon OT  3/1/2023

## 2023-03-01 NOTE — ED PROVIDER NOTES
EMERGENCY DEPARTMENT ENCOUNTER    Pt Name: Dorota Membreno  MRN: [id number]  Pt :   10/31/1931  Room Number:  S454/1  Date of encounter:  2023  PCP: Kylie Zapata DO  ED Provider: Russell Daniels MD    Historian: EMS, patient      HPI:  Chief Complaint: Shortness of breath, hypoxia        Context: Dorota Membreno is a 89-year-old woman who presents the emergency department from her nursing home for evaluation of shortness of breath.  She has history of dementia, asthma, pneumonia, hypothyroidism.  EMS report they were called for shortness of breath when they arrived the patient was sitting on the floor satting 84% on room air.  They had to place her on a nonrebreather to get her sats up into the 90s.  Upon arrival here she is pleasantly demented she says she does not know why she is here says she does feel somewhat short of breath.  Denies any other symptoms.  We are contacting the nursing home to hopefully get a better history.     PAST MEDICAL HISTORY  Past Medical History:   Diagnosis Date   • Acid reflux 2016    stable   • Allergic rhinitis    • Alzheimer's disease (HCC) 2017   • Arteritis (HCC)     A. 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 B. Improved with prednisone therapy   • Asthma    • Cancer (HCC)     colon. Status post resection at age 37   • Cognitive impairment, mild, so stated     A. Patient has been forgetting names continue med. Has f/u with neuro. She is having some times forgetting directions. She has moved into a new house and this has not helped with directions   • Dyslipidemia     lipids and cmp   • Hypertension     lipids, cmp, urine micro   • Hypothyroidism     A. On replacement therapy   • Mammogram abnormal     A. 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   • Panic attacks    • Pneumonia    • Postmenopausal osteoporosis    • Shingles 2018         PAST SURGICAL HISTORY  Past Surgical History:    Procedure Laterality Date   • CATARACT EXTRACTION     • COLECTOMY PARTIAL / TOTAL      Patient diagnosed with colon cancer at age 37   • HYSTERECTOMY           FAMILY HISTORY  Family History   Problem Relation Age of Onset   • Other Mother         medical problems   • Allergies Son    • Alcohol abuse Other          SOCIAL HISTORY  Social History     Socioeconomic History   • Marital status:    Tobacco Use   • Smoking status: Never   • Smokeless tobacco: Never   Vaping Use   • Vaping Use: Never used   Substance and Sexual Activity   • Alcohol use: No   • Drug use: No   • Sexual activity: Defer         ALLERGIES  Codeine, Hydrochlorothiazide w-triamterene, Levofloxacin, and Penicillins        REVIEW OF SYSTEMS  Review of Systems       All systems reviewed and negative except for those discussed in HPI.       PHYSICAL EXAM    I have reviewed the triage vital signs and nursing notes.    ED Triage Vitals   Temp Heart Rate Resp BP SpO2   02/26/23 0509 02/26/23 0506 02/26/23 0509 02/26/23 0509 02/26/23 0506   99.9 °F (37.7 °C) (!) 138 26 (!) 89/79 (!) 83 %      Temp src Heart Rate Source Patient Position BP Location FiO2 (%)   02/26/23 0509 02/26/23 0509 02/26/23 1000 02/26/23 0935 --   Oral Monitor Lying Right arm        Physical Exam  GENERAL:   Appears ill  HENT: Nares patent.  Dry mucous membranes  EYES: No scleral icterus.  Pupils equal and reactive  CV: Irregular tachycardia without appreciated murmurs rubs or gallops  RESPIRATORY: Coarse rhonchi in all lung fields without appreciated focal consolidation or rails   ABDOMEN: Soft, nontender  MUSCULOSKELETAL: No deformities.   NEURO: Demented and confused but speaking in full sentences, awake, alert t, moves all extremities, follows commands.  SKIN: Warm, dry, no rash visualized.      LAB RESULTS  Recent Results (from the past 24 hour(s))   Basic Metabolic Panel    Collection Time: 02/28/23  6:59 AM    Specimen: Blood   Result Value Ref Range    Glucose 129  (H) 65 - 99 mg/dL    BUN 31 (H) 8 - 23 mg/dL    Creatinine 0.63 0.57 - 1.00 mg/dL    Sodium 143 136 - 145 mmol/L    Potassium 4.2 3.5 - 5.2 mmol/L    Chloride 106 98 - 107 mmol/L    CO2 21.0 (L) 22.0 - 29.0 mmol/L    Calcium 8.7 8.2 - 9.6 mg/dL    BUN/Creatinine Ratio 49.2 (H) 7.0 - 25.0    Anion Gap 16.0 (H) 5.0 - 15.0 mmol/L    eGFR 83.9 >60.0 mL/min/1.73   CBC (No Diff)    Collection Time: 02/28/23  6:59 AM    Specimen: Blood   Result Value Ref Range    WBC 15.97 (H) 3.40 - 10.80 10*3/mm3    RBC 3.62 (L) 3.77 - 5.28 10*6/mm3    Hemoglobin 11.6 (L) 12.0 - 15.9 g/dL    Hematocrit 37.1 34.0 - 46.6 %    .5 (H) 79.0 - 97.0 fL    MCH 32.0 26.6 - 33.0 pg    MCHC 31.3 (L) 31.5 - 35.7 g/dL    RDW 14.4 12.3 - 15.4 %    RDW-SD 52.1 37.0 - 54.0 fl    MPV 9.8 6.0 - 12.0 fL    Platelets 290 140 - 450 10*3/mm3   Magnesium    Collection Time: 02/28/23  6:59 AM    Specimen: Blood   Result Value Ref Range    Magnesium 1.8 1.7 - 2.3 mg/dL   Phosphorus    Collection Time: 02/28/23  6:59 AM    Specimen: Blood   Result Value Ref Range    Phosphorus 2.8 2.5 - 4.5 mg/dL       If labs were ordered, I independently reviewed the results and considered them in treating the patient.        RADIOLOGY  No Radiology Exams Resulted Within Past 24 Hours    I ordered and independently reviewed the above noted radiographic studies.      I viewed images of chest x-ray which shows scattered opacities consistent with pneumonia.  CTA of the chest does not show pulmonary embolism or aortic injury but does show right upper lobe pneumonia, small bilateral pleural effusions, no evidence of pericardial effusion.  See radiologist's dictation for official interpretation.        PROCEDURES    Procedures    ECG 12 Lead Dyspnea   Final Result   Test Reason : Dyspnea   Blood Pressure :   */*   mmHG   Vent. Rate :  80 BPM     Atrial Rate :  80 BPM      P-R Int : 162 ms          QRS Dur :  72 ms       QT Int : 366 ms       P-R-T Axes :  64   8  46 degrees       QTc Int : 422 ms      Normal sinus rhythm   Nonspecific ST abnormality   Abnormal ECG   When compared with ECG of 26-FEB-2023 05:15, (Unconfirmed)   Sinus rhythm has replaced Atrial fibrillation   Vent. rate has decreased BY  60 BPM   Nonspecific T wave abnormality no longer evident in Inferior leads   Confirmed by SALLY MUNGUIA (Gissell) on 2/28/2023 2:11:43 PM      Referred By: MARCUS           Confirmed By: SALLY MUNGUIA      ECG 12 Lead ED Triage Standing Order; SOA   Final Result   Test Reason : ED Triage Standing Order~   Blood Pressure :   */*   mmHG   Vent. Rate : 140 BPM     Atrial Rate :  85 BPM      P-R Int :   * ms          QRS Dur :  72 ms       QT Int : 284 ms       P-R-T Axes :   *   9  19 degrees      QTc Int : 433 ms      Atrial fibrillation with rapid ventricular response   Nonspecific ST and T wave abnormality   Abnormal ECG   When compared with ECG of 07-APR-2018 17:57,   Atrial fibrillation has replaced Sinus rhythm   Vent. rate has increased BY  70 BPM   T wave inversion less evident in Anterolateral leads   Confirmed by SALLY MUNGUIA (Gissell) on 2/28/2023 2:10:55 PM      Referred By: MARCUS           Confirmed By: SALLY MUNGUIA      SCANNED - TELEMETRY     Final Result      ECG 12 Lead Dyspnea    (Results Pending)       MEDICATIONS GIVEN IN ER    Medications   sodium chloride 0.9 % flush 10 mL (has no administration in time range)   ipratropium-albuterol (DUO-NEB) nebulizer solution 3 mL (3 mL Nebulization Not Given 2/26/23 0558)   Potassium Replacement - Initiate Nurse / BPA Driven Protocol (has no administration in time range)   Magnesium Standard Dose Replacement - Initiate Nurse / BPA Driven Protocol (has no administration in time range)   Phosphorus Replacement - Initiate Nurse / BPA Driven Protocol (has no administration in time range)   Calcium Replacement - Initiate Nurse / BPA Driven Protocol (has no administration in time range)   sodium chloride 0.9 % flush 10 mL (10 mL  Intravenous Given 2/28/23 2103)   sodium chloride 0.9 % flush 10 mL (has no administration in time range)   sodium chloride 0.9 % infusion 40 mL (has no administration in time range)   ondansetron (ZOFRAN) injection 4 mg (has no administration in time range)   ipratropium-albuterol (DUO-NEB) nebulizer solution 3 mL (3 mL Nebulization Given 2/28/23 2116)   atorvastatin (LIPITOR) tablet 20 mg (20 mg Oral Given 2/28/23 2043)   citalopram (CeleXA) tablet 20 mg (20 mg Oral Given 2/28/23 0929)   donepezil (ARICEPT) tablet 10 mg (10 mg Oral Given 2/28/23 2043)   levothyroxine (SYNTHROID, LEVOTHROID) tablet 50 mcg (50 mcg Oral Given 2/28/23 0522)   memantine (NAMENDA) tablet 5 mg (5 mg Oral Given 2/28/23 0929)   montelukast (SINGULAIR) tablet 10 mg (10 mg Oral Given 2/28/23 2043)   heparin (porcine) 5000 UNIT/ML injection 5,000 Units ( Subcutaneous Canceled Entry 2/28/23 2200)   QUEtiapine (SEROquel) tablet 25 mg (25 mg Oral Given 2/28/23 0929)   acetaminophen (TYLENOL) tablet 650 mg (650 mg Oral Given 2/27/23 0448)   cefTRIAXone (ROCEPHIN) 1 g/100 mL 0.9% NS (MBP) (1 g Intravenous New Bag 2/28/23 1202)   tamsulosin (FLOMAX) 24 hr capsule 0.4 mg (0.4 mg Oral Given 2/28/23 0932)   doxycycline (MONODOX) capsule 100 mg (100 mg Oral Given 2/28/23 2043)   pantoprazole (PROTONIX) EC tablet 40 mg (40 mg Oral Given 2/28/23 0522)   methylPREDNISolone sodium succinate (SOLU-Medrol) injection 40 mg (40 mg Intravenous Given 2/28/23 1507)   ziprasidone (GEODON) injection 5 mg (5 mg Intramuscular Given 2/28/23 1633)   diphenhydrAMINE (BENADRYL) injection 25 mg (25 mg Intravenous Given 2/28/23 0326)   QUEtiapine (SEROquel) tablet 50 mg (50 mg Oral Given 2/28/23 2042)   sodium chloride 0.9 % infusion (50 mL/hr Intravenous New Bag 2/28/23 1457)   metoprolol tartrate (LOPRESSOR) tablet 50 mg (50 mg Oral Given 2/28/23 2043)   magnesium sulfate 4g/100mL (PREMIX) (0 g Intravenous Hold 2/28/23 1250)   labetalol (NORMODYNE,TRANDATE) injection 5  mg (5 mg Intravenous Given 2/28/23 1452)   magnesium sulfate in D5W 1g/100mL (PREMIX) (0 g Intravenous Stopped 2/26/23 0715)   sodium chloride 0.9 % bolus 500 mL (0 mL Intravenous Stopped 2/26/23 0642)   sodium chloride 0.9 % bolus 1,000 mL (0 mL Intravenous Stopped 2/26/23 0730)   dexamethasone (DECADRON) IVPB 10 mg (0 mg Intravenous Stopped 2/26/23 0730)   iopamidol (ISOVUE-370) 76 % injection 100 mL (65 mL Intravenous Given 2/26/23 0757)   sodium chloride 0.9 % bolus 500 mL (500 mL Intravenous New Bag 2/26/23 0745)   vancomycin 1750 mg/500 mL 0.9% NS IVPB ([facility]) (1,750 mg Intravenous New Bag 2/26/23 1055)   cefepime (MAXIPIME) 2 g/100 mL 0.9% NS (mbp) (0 g Intravenous Stopped 2/26/23 1040)   ipratropium-albuterol (DUO-NEB) nebulizer solution 3 mL (3 mL Nebulization Given 2/26/23 0849)   QUEtiapine (SEROquel) tablet 25 mg (25 mg Oral Given 2/26/23 1117)   haloperidol lactate (HALDOL) injection 1 mg (1 mg Intravenous Given 2/27/23 0911)   haloperidol lactate (HALDOL) injection 1 mg (1 mg Intravenous Given 2/27/23 1015)   haloperidol lactate (HALDOL) injection 2.5 mg (2.5 mg Intravenous Given 2/27/23 1413)   potassium & sodium phosphates (PHOS-NAK) oral packet (2 packets Oral Given 2/27/23 1717)   metoprolol tartrate (LOPRESSOR) tablet 25 mg (25 mg Oral Given 2/28/23 1203)         MEDICAL DECISION MAKING, PROGRESS, and CONSULTS    All labs have been independently reviewed by me.  All radiology studies have been reviewed by me and the radiologist dictating the report.  All EKG's have been independently viewed and interpreted by me.      Discussion below represents my analysis of pertinent findings related to patient's condition, differential diagnosis, treatment plan and final disposition.      Differential diagnosis:    Acute heart failure, pulmonary embolism, pneumonia, pneumothorax, sepsis, anemia, electrolyte abnormality, arrhythmia, COVID, flu      Additional sources:    - Discussed/ obtained information from  independent historians: EMS and nursing home    - External (non-ED) record review: PCP and neurology notes for dementia care    - Chronic or social conditions impacting care: Dementia    - Shared decision making:        Orders placed during this visit:  Orders Placed This Encounter   Procedures   • COVID PRE-OP / PRE-PROCEDURE SCREENING ORDER (NO ISOLATION) - Swab, Nasopharynx   • Blood Culture - Blood,   • Blood Culture - Blood,   • Respiratory Panel PCR w/COVID-19(SARS-CoV-2) KRYSTIAN/ANISHA/LEIDA/PAD/[facility]/MAD/JAILENE In-House, NP Swab in UTM/VTM, 3-4 HR TAT - Swab, Nasopharynx   • MRSA Screen, PCR (Inpatient) - Swab, Nares   • Legionella Antigen, Urine - Urine, Urine, Clean Catch   • S. Pneumo Ag Urine or CSF - Urine, Urine, Clean Catch   • XR Chest 1 View   • CT Angiogram Chest   • Toutle Draw   • Comprehensive Metabolic Panel   • BNP   • Single High Sensitivity Troponin T   • CBC Auto Differential   • Urinalysis With Microscopic If Indicated (No Culture) - Urine, Catheter   • High Sensitivity Troponin T   • Blood Gas, Arterial -With Co-Ox Panel: Yes   • D-dimer, Quantitative   • Lactic Acid, Plasma   • Procalcitonin   • Magnesium   • Phosphorus   • T4, Free   • TSH   • Blood Gas, Arterial With Co-Ox   • High Sensitivity Troponin T 2Hr   • Basic Metabolic Panel   • Calcium, Ionized   • CBC (No Diff)   • Magnesium   • Phosphorus   • Basic Metabolic Panel   • CBC (No Diff)   • Magnesium   • Phosphorus   • CBC (No Diff)   • Magnesium   • Phosphorus   • Diet: Regular/House Diet; Texture: Regular Texture (IDDSI 7); Fluid Consistency: Thin (IDDSI 0)   • Undress & Gown   • Vital Signs   • Vital Signs Every Hour and Per Hospital Policy Based on Patient Condition   • Cardiac Monitoring   • Continuous Pulse Oximetry   • Height & Weight   • Daily Weights   • Intake & Output   • Oral Care - Patient Not on NPPV & Not Intubated   • Use Mobility Guidelines for Advancement of Activity   • Saline Lock & Maintain IV Access   • Place Sequential  Compression Device   • Maintain Sequential Compression Device   • Please make sure labs drawn by noon  Nursing Communication   • Bladder Scan   • Bladder scan tid and prn -in/out cath prn pvr >400  Nursing Communication   • Strict Intake & Output   • Code Status and Medical Interventions:   • Inpatient Palliative Care MD Consult   • Spiritual Care Consult   • OT Consult: Eval & Treat   • PT Consult: Eval & Treat Functional Mobility Below Baseline   • Oxygen Therapy- Nasal Cannula; 2 LPM; Titrate for SPO2: equal to or greater than, 92%   • SLP Consult: Eval & Treat Swallow Disorder; Cardiac   • ECG 12 Lead ED Triage Standing Order; SOA   • ECG 12 Lead Dyspnea   • ECG 12 Lead Dyspnea   • SCANNED - TELEMETRY     • ECG 12 Lead QT Measurement   • Adult Transthoracic Echo Complete w/ Color, Spectral and Contrast if Necessary Per Protocol   • Insert Peripheral IV   • Insert Peripheral IV   • Inpatient Admission   • Transfer Patient   • Restraints violent or self-destructive adult (age 18 and older)   • Restraints Non-Violent or Non-Self Destructive   • Restraints Non-Violent or Non-Self Destructive   • Restraints Non-Violent or Non-Self Destructive   • Sitter At Bedside   • CBC & Differential   • Green Top (Gel)   • Lavender Top   • Gold Top - SST   • Gray Top   • Light Blue Top         Additional orders considered but not ordered:      ED Course:    Consultants:      ED Course as of 02/28/23 2305   Sun Feb 26, 2023   0822 In summary this a 89-year-old woman who presents the emergency department from her nursing home for evaluation of shortness of breath.  She has history of dementia, asthma, pneumonia, hypothyroidism.  EMS report they were called for shortness of breath when they arrived the patient was sitting on the floor satting 84% on room air.  They had to place her on a nonrebreather to get her sats up into the 90s.  Upon arrival here she is pleasantly demented she says she does not know why she is here says she  does feel somewhat short of breath.  Denies any other symptoms.  We are contacting the nursing home to hopefully get a better history. [CC]      ED Course User Index  [CC] Russell Daniels MD     Patient arrived awake and alert but ill-appearing requiring supplemental oxygen.  She is tachycardic in atrial fibrillation with RVR.  Nursing home reports she has no history of this and is not on any anticoagulation.  They also report that she is full code and is at her neurologic baseline.  We are set up for cardioversion if necessary but we will try medications like to avoid cardioversion if at all possible as I suspect she has been in atrial fibrillation for some time now.  Started on IV fluids and then esmolol drip.  Blood pressure has been tenuous having episodes of hypotension but ultimately after conversion to normal sinus rhythm blood pressure seems relatively stable but still low.  CTA of the chest shows right upper lobe pneumonia, already treating with broad-spectrum antibiotics.  She also has small bilateral pleural effusions but no pericardial effusion.  CBC shows significant leukocytosis at 16.  She has hyperglycemia and hypomagnesemia which I am repleting IV.  History of hypothyroidism but free T4 is within normal limits.  Initial high-sensitivity troponin is elevated but repeat troponin is stable without delta.  Full viral panel is negative.  No elevation in BNP continuing aggressive fluid resuscitation.  ABG is generally reassuring and not actionable.  Discussed with Dr. Funes with the hospitalist service but because of tenuous blood pressure she is not comfortable with this patient being on the floor discussed with Dr. Bowers in the ICU.  She was admitted to the intensive care unit.    100 minutes of critical care provided. This time excludes other billable procedures. Time does include preparation of documents, medical consultations, review of old records, and direct bedside care. Patient is at high  risk for life-threatening deterioration due to acute respiratory failure with hypoxia due to pneumonia complicated by atrial fibrillation with RVR and hypotension..              AS OF 23:05 EST VITALS:    BP - 153/71  HR - 62  TEMP - 98 °F (36.7 °C) (Oral)  O2 SATS - (!) 88%                  DIAGNOSIS  Final diagnoses:   Sepsis, due to unspecified organism, unspecified whether acute organ dysfunction present (HCC)   Acute respiratory failure with hypoxia (HCC)   Atrial fibrillation with RVR (HCC)   Late onset Alzheimer's disease with behavioral disturbance (HCC)   Hypotension, unspecified hypotension type         DISPOSITION  Admit*      Please note that portions of this document were completed with voice recognition software.        Russell Daniels MD  02/28/23 3259

## 2023-03-01 NOTE — THERAPY EVALUATION
Patient Name: Dorota Membreno  : 10/31/1931    MRN: [id number]                              Today's Date: 3/1/2023       Admit Date: 2023    Visit Dx:     ICD-10-CM ICD-9-CM   1. Late onset Alzheimer's disease with behavioral disturbance (HCC)  G30.1 331.0    F02.818 294.11   2. Sepsis, due to unspecified organism, unspecified whether acute organ dysfunction present (Formerly [facility])  A41.9 038.9     995.91   3. Acute respiratory failure with hypoxia (Formerly [facility])  J96.01 518.81   4. Atrial fibrillation with RVR (Formerly [facility])  I48.91 427.31   5. Hypotension, unspecified hypotension type  I95.9 458.9     Patient Active Problem List   Diagnosis   • Gastroesophageal reflux disease   • Atopic rhinitis   • Asthma   • Late onset Alzheimer's disease with behavioral disturbance (HCC)   • Eczema   • Dyslipidemia   • Hypothyroidism   • Essential hypertension   • Post-menopausal osteoporosis   • Arteritis (HCC)   • Dizziness   • Edema   • Fatigue   • Shingles   • Syncope and collapse   • Atrial fibrillation (HCC)   • Leukocytosis   • Hypomagnesemia   • Elevated brain natriuretic peptide (BNP) level   • Proteinuria   • Bilateral Pleural effusions   • Pneumonia   • Respiratory failure (HCC)   • Elevated alkaline phosphatase level   • Sepsis (HCC)   • Sepsis, due to unspecified organism, unspecified whether acute organ dysfunction present (HCC)     Past Medical History:   Diagnosis Date   • Acid reflux 2016    stable   • Allergic rhinitis    • Alzheimer's disease (Formerly [facility]) 2017   • Arteritis (Formerly [facility])     A. 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 B. Improved with prednisone therapy   • Asthma    • Cancer (HCC)     colon. Status post resection at age 37   • Cognitive impairment, mild, so stated     A. Patient has been forgetting names continue med. Has f/u with neuro. She is having some times forgetting directions. She has moved into a new house and this has not helped with directions   • Dyslipidemia     lipids  and cmp   • Hypertension     lipids, cmp, urine micro   • Hypothyroidism     A. On replacement therapy   • Mammogram abnormal     A. 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   • Panic attacks    • Pneumonia    • Postmenopausal osteoporosis    • Shingles 03/2018     Past Surgical History:   Procedure Laterality Date   • CATARACT EXTRACTION     • COLECTOMY PARTIAL / TOTAL      Patient diagnosed with colon cancer at age 37   • HYSTERECTOMY        General Information     Row Name 03/01/23 0939          Physical Therapy Time and Intention    Document Type evaluation  -BA     Mode of Treatment physical therapy  -     Row Name 03/01/23 0939          General Information    Patient Profile Reviewed yes  -BA     Prior Level of Function independent:;all household mobility;gait;transfer;bed mobility;min assist:;ADL's  Pt limited historian.  Per medical chart, pt ambulates I'ly with no AD.  -     Existing Precautions/Restrictions fall;oxygen therapy device and L/min;other (see comments)  HFNC > NRB for ambulation  -     Barriers to Rehab medically complex;cognitive status  -     Row Name 03/01/23 0939          Living Environment    People in Home facility resident  lives at [facility]  -     Row Name 03/01/23 0939          Home Main Entrance    Number of Stairs, Main Entrance none  -     Row Name 03/01/23 0939          Stairs Within Home, Primary    Number of Stairs, Within Home, Primary none  -     Row Name 03/01/23 0939          Cognition    Orientation Status (Cognition) oriented to;person;disoriented to;place;time;situation;verbal cues/prompts needed for orientation;other (see comments)  Oriented to first name only; not last name.  -     Row Name 03/01/23 0939          Safety Issues, Functional Mobility    Safety Issues Affecting Function (Mobility) awareness of need for assistance;insight into deficits/self-awareness;safety precaution awareness;safety precautions  follow-through/compliance;impulsivity;judgment;problem-solving;sequencing abilities  -     Impairments Affecting Function (Mobility) balance;cognition;coordination;endurance/activity tolerance;postural/trunk control;shortness of breath;strength  -     Cognitive Impairments, Mobility Safety/Performance awareness, need for assistance;insight into deficits/self-awareness;safety precaution awareness;safety precaution follow-through;impulsivity  -           User Key  (r) = Recorded By, (t) = Taken By, (c) = Cosigned By    Initials Name Provider Type     Mayela Last, PT Physical Therapist               Mobility     Row Name 03/01/23 0945          Bed Mobility    Bed Mobility supine-sit;scooting/bridging  -     Scooting/Bridging [place] (Bed Mobility) minimum assist (75% patient effort);2 person assist;verbal cues;nonverbal cues (demo/gesture)  -     Supine-Sit [place] (Bed Mobility) moderate assist (50% patient effort);2 person assist;verbal cues;nonverbal cues (demo/gesture)  -     Assistive Device (Bed Mobility) bed rails;draw sheet;head of bed elevated  -     Comment, (Bed Mobility) Increased time and effort.  VCs/TCs for task initiation, sequencing, and hand placement.  Reported no dizziness upon sitting EOB.  -     Row Name 03/01/23 0945          Bed-Chair Transfer    Bed-Chair [place] (Transfers) minimum assist (75% patient effort);2 person assist;verbal cues  -     Assistive Device (Bed-Chair Transfers) other (see comments)  BUE support  -     Comment, (Bed-Chair Transfer) Took a few lateral steps to R side to transfer from bed to BSC and then a few forward steps from BSC to chair.  O2 sats decreased to 89% on HFNC; quickly recovered to >90% in ~10-15 sec with seated rest and focus on PLB.  VCs/TCs for sequencing of steps, hand placement, and PLB.  Exhibited 1 LOB requiring increased assistance to correct.  -     Row Name 03/01/23 0945          Sit-Stand Transfer     Sit-Stand [place] (Transfers) minimum assist (75% patient effort);2 person assist;verbal cues  -     Assistive Device (Sit-Stand Transfers) other (see comments);walker, front-wheeled  BUE support  -     Comment, (Sit-Stand Transfer) STS x 3 reps from EOB, BSC, and chair with BUE support progressing to FWW.  VCs for sequencing and hand placement.  -     Row Name 03/01/23 0945          Gait/Stairs (Locomotion)    [place] Level (Gait) moderate assist (50% patient effort);2 person assist;verbal cues  -     Assistive Device (Gait) walker, front-wheeled  -     Distance in Feet (Gait) 25  -     Deviations/Abnormal Patterns (Gait) bilateral deviations;annette decreased;gait speed decreased;stride length decreased;weight shifting decreased  -     Bilateral Gait Deviations forward flexed posture;heel strike decreased  -     Comment, (Gait/Stairs) Demonstrated step through gait pattern with unsteadiness and B variable step length and foot placement.  Exhibited 3 LOB.  VCs/TCs for improved stride length, upright posture, to keep FWW placed on ground at all times, and PLB.  O2 was transitioned from HFNC to 15L on NRB during ambulation.  O2 sats remained b/w 92-98%.  Transitioned back to HFNC following ambulation.  Gait distance limited by fatigue.  -           User Key  (r) = Recorded By, (t) = Taken By, (c) = Cosigned By    Initials Name Provider Type     Mayela Last, PT Physical Therapist               Obj/Interventions     Row Name 03/01/23 0935          Range of Motion Comprehensive    General Range of Motion bilateral lower extremity ROM WFL  -     Row Name 03/01/23 0936          Strength Comprehensive (MMT)    General Manual Muscle Testing (MMT) Assessment lower extremity strength deficits identified  -     Comment, General Manual Muscle Testing (MMT) Assessment BLE grossly 4-/5.  -     Row Name 03/01/23 0923          Motor Skills    Motor Skills coordination;functional endurance   -     Coordination gross motor deficit;bilateral;lower extremity;minimal impairment  -     Functional Endurance Decreased functional endurance.  Fatigues with activity.  -     Row Name 03/01/23 0959          Balance    Balance Assessment sitting static balance;sitting dynamic balance;sit to stand dynamic balance;standing static balance;standing dynamic balance  -     Static Sitting Balance standby assist  -     Dynamic Sitting Balance contact guard  -     Position, Sitting Balance unsupported;sitting edge of bed;sitting in chair  -     Sit to Stand Dynamic Balance minimal assist;2-person assist;verbal cues  -BA     Static Standing Balance minimal assist;2-person assist;verbal cues  -BA     Dynamic Standing Balance moderate assist;2-person assist;verbal cues  -BA     Position/Device Used, Standing Balance supported;other (see comments);walker, front-wheeled  BUE support  -     Comment, Balance Mild/mod instability with standing and ambulation activity with BUE support vs. FWW.  Exhibited 4 LOB throughout session.  -     Row Name 03/01/23 0959          Sensory Assessment (Somatosensory)    Sensory Assessment (Somatosensory) LE sensation intact;unable/difficult to assess  -           User Key  (r) = Recorded By, (t) = Taken By, (c) = Cosigned By    Initials Name Provider Type     Mayela Last, PT Physical Therapist               Goals/Plan     Row Name 03/01/23 1443          Bed Mobility Goal 1 (PT)    Activity/Assistive Device (Bed Mobility Goal 1, PT) sit to supine/supine to sit  -     [place] Level/Cues Needed (Bed Mobility Goal 1, PT) contact guard required  -     Time Frame (Bed Mobility Goal 1, PT) long term goal (LTG);2 weeks  -     Row Name 03/01/23 1442          Transfer Goal 1 (PT)    Activity/Assistive Device (Transfer Goal 1, PT) sit-to-stand/stand-to-sit;bed-to-chair/chair-to-bed  -     [place] Level/Cues Needed (Transfer Goal 1, PT) contact guard required  -      Time Frame (Transfer Goal 1, PT) long term goal (LTG);2 weeks  -BA     Row Name 03/01/23 1443          Gait Training Goal 1 (PT)    Activity/Assistive Device (Gait Training Goal 1, PT) gait (walking locomotion)  -BA     [place] Level (Gait Training Goal 1, PT) minimum assist (75% or more patient effort)  -BA     Distance (Gait Training Goal 1, PT) 100  -BA     Time Frame (Gait Training Goal 1, PT) long term goal (LTG);2 weeks  -BA     Row Name 03/01/23 1443          Patient Education Goal (PT)    Activity (Patient Education Goal, PT) HEP  -BA     [place]/Cues/Accuracy (Memory Goal 2, PT) demonstrates adequately  -BA     Time Frame (Patient Education Goal, PT) long term goal (LTG);2 weeks  -BA     Row Name 03/01/23 1443          Therapy Assessment/Plan (PT)    Planned Therapy Interventions (PT) balance training;bed mobility training;gait training;home exercise program;motor coordination training;neuromuscular re-education;patient/family education;postural re-education;strengthening;transfer training  -           User Key  (r) = Recorded By, (t) = Taken By, (c) = Cosigned By    Initials Name Provider Type    Mayela Pat, PT Physical Therapist               Clinical Impression     Row Name 03/01/23 1003          Pain    Pretreatment Pain Rating 0/10 - no pain  -     Posttreatment Pain Rating 0/10 - no pain  -     Row Name 03/01/23 1003          Plan of Care Review    Plan of Care Reviewed With patient  -BA     Outcome Evaluation PT initial Eval completed.  Pt presents with generalized weakness, balance deficits, cognitive impairments, decreased functional endurance, increased O2 requirements, and decreased indep/safety with functional mobility compared to baseline.  STS with minAx2 and BUE support.  Ambulated 25ft with modAx2 and FWW with O2 transitioned from HFNC to 15L on NRB.  Exhibited 3 LOB during ambulation.  Pt warrants skilled IP PT to improve indep with mobility and return to  PLOF.  Rec SNF upon d/c.  -     Row Name 03/01/23 1003          Therapy Assessment/Plan (PT)    Rehab Potential (PT) good, to achieve stated therapy goals  -     Criteria for Skilled Interventions Met (PT) yes;meets criteria;skilled treatment is necessary  -     Therapy Frequency (PT) daily  -     Row Name 03/01/23 1003          Vital Signs    Pre Systolic BP Rehab 168  -BA     Pre Treatment Diastolic BP 89  -BA     Post Systolic BP Rehab 153  -BA     Post Treatment Diastolic BP 79  -BA     Pretreatment Heart Rate (beats/min) 82  -BA     Intratreatment Heart Rate (beats/min) 114  -BA     Posttreatment Heart Rate (beats/min) 94  -BA     Pre SpO2 (%) 96  -BA     O2 Delivery Pre Treatment hi-flow  -BA     Intra SpO2 (%) 92  92% on NRB during ambulation; 89% on [place] [facility]     O2 Delivery Intra Treatment non-rebreather  15L  -BA     Post SpO2 (%) 98  -BA     O2 Delivery Post Treatment hi-flow  -BA     Pre Patient Position Supine  -BA     Intra Patient Position Standing  -BA     Post Patient Position Sitting  -     Row Name 03/01/23 1003          Positioning and Restraints    Pre-Treatment Position in bed  -BA     Post Treatment Position chair  -BA     In Chair notified nsg;reclined;sitting;call light within reach;encouraged to call for assist;exit alarm on;waffle cushion;legs elevated;heels elevated  -     Restraints released:;soft limb;notified nsg:;other (comment)  RN gave clearance to keep B wrist restraints off of pt while pt is sitting up in recliner chair at end of session.  -           User Key  (r) = Recorded By, (t) = Taken By, (c) = Cosigned By    Initials Name Provider Type     Mayela Last, PT Physical Therapist               Outcome Measures     Row Name 03/01/23 1445 03/01/23 0800       How much help from another person do you currently need...    Turning from your back to your side while in flat bed without using bedrails? 3  -BA 3  -LOLY    Moving from  lying on back to sitting on the side of a flat bed without bedrails? 2  -BA 3  -LOLY    Moving to and from a bed to a chair (including a wheelchair)? 3  -BA 2  -LOLY    Standing up from a chair using your arms (e.g., wheelchair, bedside chair)? 3  -BA 2  -LOLY    Climbing 3-5 steps with a railing? 2  -BA 2  -LOLY    To walk in hospital room? 2  -BA 2  -LOLY    AM-PAC 6 Clicks Score (PT) 15  -BA 14  -LOLY    Highest level of mobility 4 --> Transferred to chair/commode  -BA 4 --> Transferred to chair/commode  -LOLY    Row Name 03/01/23 1445 03/01/23 0944       Functional Assessment    Outcome Measure Options AM-PAC 6 Clicks Basic Mobility (PT)  - AM-PAC 6 Clicks Daily Activity (OT)  -          User Key  (r) = Recorded By, (t) = Taken By, (c) = Cosigned By    Initials Name Provider Type    KF Giovanna Condon, OT Occupational Therapist     Mayela Last, PT Physical Therapist    Shantal Awan RN Registered Nurse                             Physical Therapy Education     Title: PT OT SLP Therapies (In Progress)     Topic: Physical Therapy (In Progress)     Point: Mobility training (In Progress)     Learning Progress Summary           Patient Acceptance, E, NR by  at 3/1/2023 1445                   Point: Home exercise program (Not Started)     Learner Progress:  Not documented in this visit.          Point: Body mechanics (In Progress)     Learning Progress Summary           Patient Acceptance, E, NR by  at 3/1/2023 1445                   Point: Precautions (In Progress)     Learning Progress Summary           Patient Acceptance, E, NR by  at 3/1/2023 1445                               User Key     Initials Effective Dates Name Provider Type Discipline     09/21/21 -  Mayela Last, PT Physical Therapist PT              PT Recommendation and Plan  Planned Therapy Interventions (PT): balance training, bed mobility training, gait training, home exercise program, motor coordination training, neuromuscular  re-education, patient/family education, postural re-education, strengthening, transfer training  Plan of Care Reviewed With: patient  Outcome Evaluation: PT initial Eval completed.  Pt presents with generalized weakness, balance deficits, cognitive impairments, decreased functional endurance, increased O2 requirements, and decreased indep/safety with functional mobility compared to baseline.  STS with minAx2 and BUE support.  Ambulated 25ft with modAx2 and FWW with O2 transitioned from HFNC to 15L on NRB.  Exhibited 3 LOB during ambulation.  Pt warrants skilled IP PT to improve indep with mobility and return to PLOF.  Rec SNF upon d/c.     Time Calculation:    PT Charges     Row Name 03/01/23 1446             Time Calculation    Start Time 0817  -BA      PT Received On 03/01/23  -BA      PT Goal Re-Cert Due Date 03/11/23  -BA         Time Calculation- PT    Total Timed Code Minutes- PT 16 minute(s)  -BA         Timed Charges    97337 - PT Therapeutic Activity Minutes 16  -BA         Untimed Charges    PT Eval/Re-eval Minutes 68  -BA         Total Minutes    Timed Charges Total Minutes 16  -BA      Untimed Charges Total Minutes 68  -BA       Total Minutes 84  -BA            User Key  (r) = Recorded By, (t) = Taken By, (c) = Cosigned By    Initials Name Provider Type    Mayela Pat, PT Physical Therapist              Therapy Charges for Today     Code Description Service Date Service Provider Modifiers Qty    08275074747 HC PT THERAPEUTIC ACT EA 15 MIN 3/1/2023 Mayela Last, PT GP 1    40577353959 HC PT EVAL MOD COMPLEXITY 4 3/1/2023 Mayela Last, PT GP 1    84854497994 HC PT THER SUPP EA 15 MIN 3/1/2023 Mayela Last, PT GP 1          PT G-Codes  Outcome Measure Options: AM-PAC 6 Clicks Basic Mobility (PT)  AM-PAC 6 Clicks Score (PT): 15  AM-PAC 6 Clicks Score (OT): 15  PT Discharge Summary  Anticipated Discharge Disposition (PT): skilled nursing facility    Mayela Last  PT  3/1/2023

## 2023-03-01 NOTE — PLAN OF CARE
Goal Outcome Evaluation:      Bilateral wrist restraints. NSR. [place] NC. Mag replaced today. Fluids infusing. Poor PO intake today, refusal to eat much of meals. PRN Geodon given x2 today.

## 2023-03-02 ENCOUNTER — APPOINTMENT (OUTPATIENT)
Dept: GENERAL RADIOLOGY | Facility: HOSPITAL | Age: 88
DRG: 871 | End: 2023-03-02
Payer: MEDICARE

## 2023-03-02 LAB
ALBUMIN SERPL-MCNC: 3.5 G/DL (ref 3.5–5.2)
ALBUMIN/GLOB SERPL: 1.2 G/DL
ALP SERPL-CCNC: 143 U/L (ref 39–117)
ALT SERPL W P-5'-P-CCNC: 36 U/L (ref 1–33)
ANION GAP SERPL CALCULATED.3IONS-SCNC: 14 MMOL/L (ref 5–15)
AST SERPL-CCNC: 26 U/L (ref 1–32)
BILIRUB SERPL-MCNC: 0.4 MG/DL (ref 0–1.2)
BUN SERPL-MCNC: 21 MG/DL (ref 8–23)
BUN/CREAT SERPL: 33.9 (ref 7–25)
CALCIUM SPEC-SCNC: 8.8 MG/DL (ref 8.2–9.6)
CHLORIDE SERPL-SCNC: 99 MMOL/L (ref 98–107)
CO2 SERPL-SCNC: 27 MMOL/L (ref 22–29)
CREAT SERPL-MCNC: 0.62 MG/DL (ref 0.57–1)
DEPRECATED RDW RBC AUTO: 47 FL (ref 37–54)
EGFRCR SERPLBLD CKD-EPI 2021: 84.2 ML/MIN/1.73
ERYTHROCYTE [DISTWIDTH] IN BLOOD BY AUTOMATED COUNT: 14.1 % (ref 12.3–15.4)
GLOBULIN UR ELPH-MCNC: 3 GM/DL
GLUCOSE SERPL-MCNC: 117 MG/DL (ref 65–99)
HCT VFR BLD AUTO: 39 % (ref 34–46.6)
HGB BLD-MCNC: 13.2 G/DL (ref 12–15.9)
MAGNESIUM SERPL-MCNC: 1.8 MG/DL (ref 1.7–2.3)
MCH RBC QN AUTO: 31.4 PG (ref 26.6–33)
MCHC RBC AUTO-ENTMCNC: 33.8 G/DL (ref 31.5–35.7)
MCV RBC AUTO: 92.9 FL (ref 79–97)
PLATELET # BLD AUTO: 318 10*3/MM3 (ref 140–450)
PMV BLD AUTO: 9.9 FL (ref 6–12)
POTASSIUM SERPL-SCNC: 3.8 MMOL/L (ref 3.5–5.2)
PROT SERPL-MCNC: 6.5 G/DL (ref 6–8.5)
RBC # BLD AUTO: 4.2 10*6/MM3 (ref 3.77–5.28)
SODIUM SERPL-SCNC: 140 MMOL/L (ref 136–145)
WBC NRBC COR # BLD: 16.96 10*3/MM3 (ref 3.4–10.8)

## 2023-03-02 PROCEDURE — 71045 X-RAY EXAM CHEST 1 VIEW: CPT

## 2023-03-02 PROCEDURE — 25010000002 DIPHENHYDRAMINE PER 50 MG: Performed by: INTERNAL MEDICINE

## 2023-03-02 PROCEDURE — 99231 SBSQ HOSP IP/OBS SF/LOW 25: CPT | Performed by: FAMILY MEDICINE

## 2023-03-02 PROCEDURE — 25010000002 HEPARIN (PORCINE) PER 1000 UNITS: Performed by: INTERNAL MEDICINE

## 2023-03-02 PROCEDURE — 94664 DEMO&/EVAL PT USE INHALER: CPT

## 2023-03-02 PROCEDURE — 25010000002 ZIPRASIDONE MESYLATE PER 10 MG: Performed by: INTERNAL MEDICINE

## 2023-03-02 PROCEDURE — 80053 COMPREHEN METABOLIC PANEL: CPT | Performed by: INTERNAL MEDICINE

## 2023-03-02 PROCEDURE — 25010000002 METHYLPREDNISOLONE PER 40 MG: Performed by: INTERNAL MEDICINE

## 2023-03-02 PROCEDURE — 94799 UNLISTED PULMONARY SVC/PX: CPT

## 2023-03-02 PROCEDURE — 25010000002 CEFTRIAXONE PER 250 MG: Performed by: INTERNAL MEDICINE

## 2023-03-02 PROCEDURE — 85027 COMPLETE CBC AUTOMATED: CPT | Performed by: INTERNAL MEDICINE

## 2023-03-02 PROCEDURE — 83735 ASSAY OF MAGNESIUM: CPT | Performed by: INTERNAL MEDICINE

## 2023-03-02 RX ADMIN — METHYLPREDNISOLONE SODIUM SUCCINATE 40 MG: 40 INJECTION, POWDER, FOR SOLUTION INTRAMUSCULAR; INTRAVENOUS at 02:01

## 2023-03-02 RX ADMIN — IPRATROPIUM BROMIDE AND ALBUTEROL SULFATE 3 ML: 2.5; .5 SOLUTION RESPIRATORY (INHALATION) at 19:44

## 2023-03-02 RX ADMIN — Medication 10 ML: at 08:11

## 2023-03-02 RX ADMIN — DOXYCYCLINE 100 MG: 100 CAPSULE ORAL at 08:06

## 2023-03-02 RX ADMIN — HEPARIN SODIUM 5000 UNITS: 5000 INJECTION INTRAVENOUS; SUBCUTANEOUS at 06:21

## 2023-03-02 RX ADMIN — MEMANTINE 5 MG: 10 TABLET ORAL at 08:06

## 2023-03-02 RX ADMIN — IPRATROPIUM BROMIDE AND ALBUTEROL SULFATE 3 ML: 2.5; .5 SOLUTION RESPIRATORY (INHALATION) at 07:23

## 2023-03-02 RX ADMIN — SENNOSIDES AND DOCUSATE SODIUM 2 TABLET: 8.6; 5 TABLET ORAL at 08:06

## 2023-03-02 RX ADMIN — DOXYCYCLINE 100 MG: 100 CAPSULE ORAL at 22:33

## 2023-03-02 RX ADMIN — DIPHENHYDRAMINE HYDROCHLORIDE 25 MG: 50 INJECTION INTRAMUSCULAR; INTRAVENOUS at 17:27

## 2023-03-02 RX ADMIN — SENNOSIDES AND DOCUSATE SODIUM 2 TABLET: 8.6; 5 TABLET ORAL at 22:33

## 2023-03-02 RX ADMIN — DIPHENHYDRAMINE HYDROCHLORIDE 25 MG: 50 INJECTION INTRAMUSCULAR; INTRAVENOUS at 01:58

## 2023-03-02 RX ADMIN — QUETIAPINE FUMARATE 25 MG: 25 TABLET ORAL at 08:06

## 2023-03-02 RX ADMIN — SODIUM CHLORIDE 1 G: 900 INJECTION INTRAVENOUS at 14:02

## 2023-03-02 RX ADMIN — TAMSULOSIN HYDROCHLORIDE 0.4 MG: 0.4 CAPSULE ORAL at 08:06

## 2023-03-02 RX ADMIN — METOPROLOL TARTRATE 50 MG: 50 TABLET, FILM COATED ORAL at 08:06

## 2023-03-02 RX ADMIN — HEPARIN SODIUM 5000 UNITS: 5000 INJECTION INTRAVENOUS; SUBCUTANEOUS at 14:02

## 2023-03-02 RX ADMIN — ZIPRASIDONE MESYLATE 5 MG: 20 INJECTION, POWDER, LYOPHILIZED, FOR SOLUTION INTRAMUSCULAR at 17:26

## 2023-03-02 RX ADMIN — DONEPEZIL HYDROCHLORIDE 10 MG: 10 TABLET, FILM COATED ORAL at 22:33

## 2023-03-02 RX ADMIN — IPRATROPIUM BROMIDE AND ALBUTEROL SULFATE 3 ML: 2.5; .5 SOLUTION RESPIRATORY (INHALATION) at 11:30

## 2023-03-02 RX ADMIN — ZIPRASIDONE MESYLATE 5 MG: 20 INJECTION, POWDER, LYOPHILIZED, FOR SOLUTION INTRAMUSCULAR at 01:58

## 2023-03-02 RX ADMIN — CITALOPRAM HYDROBROMIDE 20 MG: 20 TABLET ORAL at 08:06

## 2023-03-02 RX ADMIN — QUETIAPINE FUMARATE 75 MG: 25 TABLET ORAL at 22:32

## 2023-03-02 RX ADMIN — METHYLPREDNISOLONE SODIUM SUCCINATE 40 MG: 40 INJECTION, POWDER, FOR SOLUTION INTRAMUSCULAR; INTRAVENOUS at 14:02

## 2023-03-02 RX ADMIN — LABETALOL HYDROCHLORIDE 5 MG: 5 INJECTION, SOLUTION INTRAVENOUS at 04:40

## 2023-03-02 RX ADMIN — METOPROLOL TARTRATE 50 MG: 50 TABLET, FILM COATED ORAL at 22:33

## 2023-03-02 RX ADMIN — ATORVASTATIN CALCIUM 20 MG: 20 TABLET, FILM COATED ORAL at 22:33

## 2023-03-02 RX ADMIN — HEPARIN SODIUM 5000 UNITS: 5000 INJECTION INTRAVENOUS; SUBCUTANEOUS at 22:33

## 2023-03-02 RX ADMIN — Medication 10 ML: at 22:36

## 2023-03-02 RX ADMIN — MONTELUKAST 10 MG: 10 TABLET, FILM COATED ORAL at 22:32

## 2023-03-02 RX ADMIN — IPRATROPIUM BROMIDE AND ALBUTEROL SULFATE 3 ML: 2.5; .5 SOLUTION RESPIRATORY (INHALATION) at 15:18

## 2023-03-02 NOTE — PROGRESS NOTES
[facility] Medicine Services  PROGRESS NOTE    Patient Name: Dorota Membreno  : 10/31/1931  MRN: [id number]    Date of Admission: 2023  Primary Care Physician: Kylie Zapata DO    Subjective   Subjective     CC:  resp failure, pneumonia, afib, dementia    HPI:  Pt pleasantly confused in soft restraints     ROS:      Objective   Objective     Vital Signs:   Temp:  [97.7 °F (36.5 °C)-99.5 °F (37.5 °C)] 99.5 °F (37.5 °C)  Heart Rate:  [] 69  Resp:  [18-20] 20  BP: (136-182)/(66-87) 151/77  Flow (L/min):  [45-60] 45    Physical Exam:        Results Reviewed:  LAB RESULTS:      Lab 23  0617 23  0700 23  0659 23  1145 23  0525   WBC 16.96* 19.46* 15.97* 16.92* 16.26*   HEMOGLOBIN 13.2 12.5 11.6* 11.3* 11.5*   HEMATOCRIT 39.0 36.3 37.1 37.0 36.1   PLATELETS 318 342 290 245 264   NEUTROS ABS  --   --   --   --  13.54*   IMMATURE GRANS (ABS)  --   --   --   --  0.08*   LYMPHS ABS  --   --   --   --  0.82   MONOS ABS  --   --   --   --  1.66*   EOS ABS  --   --   --   --  0.11   MCV 92.9 91.2 102.5* 101.9* 93.5   PROCALCITONIN  --   --   --   --  0.13   LACTATE  --   --   --   --  1.5   D DIMER QUANT  --   --   --   --  1.45*         Lab 23  0617 23  0700 23  0659 23  22123  1145 23  0525   SODIUM 140 143 143  --  140 139   POTASSIUM 3.8 3.9 4.2  --  3.9 3.8   CHLORIDE 99 102 106  --  106 99   CO2 27.0 26.0 21.0*  --  17.0* 30.0*   ANION GAP 14.0 15.0 16.0*  --  17.0* 10.0   BUN 21 23 31*  --  32* 28*   CREATININE 0.62 0.66 0.63  --  0.61 0.86   EGFR 84.2 82.9 83.9  --  84.5 63.9   GLUCOSE 117* 103* 129*  --  129* 139*   CALCIUM 8.8 8.6 8.7  --  8.2 8.7   IONIZED CALCIUM  --   --   --   --  1.21  --    MAGNESIUM 1.8 2.6* 1.8  --  1.8 1.6*   PHOSPHORUS  --   --  2.8 2.3* 1.7* 2.6   TSH  --   --   --   --   --  6.000*         Lab 23  0617 23  0525   TOTAL PROTEIN 6.5 7.1   ALBUMIN 3.5 3.4*   GLOBULIN  3.0 3.7   ALT (SGPT) 36* 13   AST (SGOT) 26 19   BILIRUBIN 0.4 0.7   ALK PHOS 143* 120*         Lab 02/26/23  0834 02/26/23  0525   PROBNP  --  1,095.0   HSTROP T 20* 19*  19*                 Lab 02/26/23  0544   PH, ARTERIAL 7.434   PCO2, ARTERIAL 44.5   PO2 .0*   FIO2 80   HCO3 ART 29.8*   BASE EXCESS ART 4.9*   CARBOXYHEMOGLOBIN 1.3     Brief Urine Lab Results  (Last result in the past 365 days)      Color   Clarity   Blood   Leuk Est   Nitrite   Protein   CREAT   Urine HCG        02/26/23 0600 Yellow   Clear   Negative   Negative   Negative   Trace                 Microbiology Results Abnormal     Procedure Component Value - Date/Time    Blood Culture - Blood, Arm, Right [[id number]]  (Normal) Collected: 02/26/23 0525    Lab Status: Preliminary result Specimen: Blood from Arm, Right Updated: 03/02/23 0730     Blood Culture No growth at 4 days    Blood Culture - Blood, Arm, Right [[id number]]  (Normal) Collected: 02/26/23 0525    Lab Status: Preliminary result Specimen: Blood from Arm, Right Updated: 03/02/23 0730     Blood Culture No growth at 4 days    S. Pneumo Ag Urine or CSF - Urine, Urine, Clean Catch [[id number]]  (Normal) Collected: 02/26/23 1514    Lab Status: Final result Specimen: Urine, Clean Catch Updated: 02/26/23 2125     Strep Pneumo Ag Negative    Legionella Antigen, Urine - Urine, Urine, Clean Catch [[id number]]  (Normal) Collected: 02/26/23 1514    Lab Status: Final result Specimen: Urine, Clean Catch Updated: 02/26/23 2124     LEGIONELLA ANTIGEN, URINE Negative    MRSA Screen, PCR (Inpatient) - Swab, Nares [[id number]]  (Normal) Collected: 02/26/23 1113    Lab Status: Final result Specimen: Swab from Nares Updated: 02/26/23 1228     MRSA PCR Negative    Narrative:      The negative predictive value of this diagnostic test is high and should only be used to consider de-escalating anti-MRSA therapy. A positive result may indicate colonization with MRSA and must be correlated clinically.  MRSA  Negative    COVID PRE-OP / PRE-PROCEDURE SCREENING ORDER (NO ISOLATION) - Swab, Nasopharynx [[id number]]  (Normal) Collected: 02/26/23 0525    Lab Status: Final result Specimen: Swab from Nasopharynx Updated: 02/26/23 0637    Narrative:      The following orders were created for panel order COVID PRE-OP / PRE-PROCEDURE SCREENING ORDER (NO ISOLATION) - Swab, Nasopharynx.  Procedure                               Abnormality         Status                     ---------                               -----------         ------                     Respiratory Panel PCR w/...[[id number]]  Normal              Final result                 Please view results for these tests on the individual orders.    Respiratory Panel PCR w/COVID-19(SARS-CoV-2) KRYSTIAN/ANISHA/LEIDA/PAD/COR/MAD/JAILENE In-House, NP Swab in UTM/VTM, 3-4 HR TAT - Swab, Nasopharynx [[id number]]  (Normal) Collected: 02/26/23 0525    Lab Status: Final result Specimen: Swab from Nasopharynx Updated: 02/26/23 0637     ADENOVIRUS, PCR Not Detected     Coronavirus 229E Not Detected     Coronavirus HKU1 Not Detected     Coronavirus NL63 Not Detected     Coronavirus OC43 Not Detected     COVID19 Not Detected     Human Metapneumovirus Not Detected     Human Rhinovirus/Enterovirus Not Detected     Influenza A PCR Not Detected     Influenza B PCR Not Detected     Parainfluenza Virus 1 Not Detected     Parainfluenza Virus 2 Not Detected     Parainfluenza Virus 3 Not Detected     Parainfluenza Virus 4 Not Detected     RSV, PCR Not Detected     Bordetella pertussis pcr Not Detected     Bordetella parapertussis PCR Not Detected     Chlamydophila pneumoniae PCR Not Detected     Mycoplasma pneumo by PCR Not Detected    Narrative:      In the setting of a positive respiratory panel with a viral infection PLUS a negative procalcitonin without other underlying concern for bacterial infection, consider observing off antibiotics or discontinuation of antibiotics and continue supportive care. If  the respiratory panel is positive for atypical bacterial infection (Bordetella pertussis, Chlamydophila pneumoniae, or Mycoplasma pneumoniae), consider antibiotic de-escalation to target atypical bacterial infection.          Adult Transthoracic Echo Complete w/ Color, Spectral and Contrast if Necessary Per Protocol    Result Date: 2/26/2023  •  Left ventricular ejection fraction appears to be 61 - 65%. •  Left ventricular diastolic function was normal. •  Left atrial volume is moderately increased. •  Moderate tricuspid valve regurgitation is present. •  Estimated right ventricular systolic pressure from tricuspid regurgitation is moderately elevated (45-55 mmHg). Calculated right ventricular systolic pressure from tricuspid regurgitation is 47 mmHg.     XR Chest 1 View    Result Date: 2/26/2023  EXAMINATION: XR CHEST 1 VW  DATE: 2/26/2023 6:00 AM INDICATIONS: SOA triage protocol. COMPARISON: Image from chest radiograph dated 4/7/2018. Report not available at time of dictation. FINDINGS: Evaluation somewhat limited by patient body habitus. Stable elevation of the right hemidiaphragm. Nonspecific mild diffuse interstitial prominence. Mild bibasilar opacities suggest atelectasis, but infiltrate not excluded. No sizable effusion or pneumothorax. Atherosclerotic calcifications of the aortic arch. Cardiac and mediastinal silhouette otherwise unremarkable. No acute osseous abnormality identified on single frontal view.     Impression: 1.  Nonspecific interstitial prominence could reflect chronic changes, but mild edema or atypical infectious/inflammatory process can also appear this way in the correct clinical setting. 2.  Mild bibasilar opacities suggest atelectasis, but infiltrate or edema not excluded.  Electronically signed by:  Nikita Keller D.O.  2/26/2023 4:47 AM Mountain Time    CT Angiogram Chest    Addendum Date: 2/26/2023    ADDENDUM #1 Additional impression point: Narrowing of the lower trachea and proximal  mainstem bronchi may represent tracheomalacia. Electronically Signed: Son Bella  2/26/2023 9:04 AM EST  Workstation ID: [id number] REPORT  CT ANGIOGRAM CHEST Date of Exam: 2/26/2023 7:03 AM EST Indication: Sepsis, hypoxia, tachycardia, dimer. Comparison: CTA chest 6/18/2013 Technique: CTA of the chest was performed after the uneventful intravenous administration of 65 mL Isovue-370. Reconstructed coronal and sagittal images were also obtained. In addition, a 3-D volume rendered image was created for interpretation. Automated exposure control and iterative reconstruction methods were used. Findings: Motion degraded exam. Pulmonary arteries: No evidence of pulmonary embolus. Normal main pulmonary artery diameter. Heart and pericardium:No flattening of the interventricular septum. Coronary artery calcification is seen. No substantial pericardial effusion. Vessels:Normal caliber aorta. Atherosclerotic calcification of the aorta. No evidence of acute aortic injury on this nondedicated exam. Venous structures including the superior vena cava and inferior vena cava appear grossly patent. Mediastinum:Unremarkable appearance of the esophagus. Few enlarged mediastinal lymph nodes including a right paratracheal node measuring 13 mm in short axis (image 29). No anterior mediastinal masses. Lower neck:No suspicious or enlarged supraclavicular or axillary lymphadenopathy. Diminutive appearance of the thyroid. Pulmonary parenchyma:No evidence of pulmonary infarct. Muscular spine nodules. Few foci of consolidative opacities seen in the right upper lobe. Scattered calcified granulomas. Bilateral dependent atelectasis. Pleura: Trace bilateral pleural effusions. No pneumothorax. Airways: Narrowing of the lower trachea and proximal mainstem bronchi may represent tracheomalacia. Diffuse bronchial wall thickening. Chest wall and bones:No acute osseous abnormality. Degenerative changes of thoracic spine. Unremarkable  appearance of soft tissues. Upper abdomen:No lesion seen within the visualized liver. Upper abdomen is unremarkable. Small hiatal hernia. Impression: Mildly motion degraded exam. No evidence of pulmonary embolus. Few foci of consolidative opacity in the right upper lobe may represent infection. Few mildly enlarged mediastinal lymph nodes may be reactive. Trace bilateral pleural effusions. Electronically Signed: Son Thompsony  2/26/2023 8:17 AM EST  Workstation ID: [id number]    Result Date: 2/26/2023  CT ANGIOGRAM CHEST Date of Exam: 2/26/2023 7:03 AM EST Indication: Sepsis, hypoxia, tachycardia, dimer. Comparison: CTA chest 6/18/2013 Technique: CTA of the chest was performed after the uneventful intravenous administration of 65 mL Isovue-370. Reconstructed coronal and sagittal images were also obtained. In addition, a 3-D volume rendered image was created for interpretation. Automated exposure control and iterative reconstruction methods were used. Findings: Motion degraded exam. Pulmonary arteries: No evidence of pulmonary embolus. Normal main pulmonary artery diameter. Heart and pericardium:No flattening of the interventricular septum. Coronary artery calcification is seen. No substantial pericardial effusion. Vessels:Normal caliber aorta. Atherosclerotic calcification of the aorta. No evidence of acute aortic injury on this nondedicated exam. Venous structures including the superior vena cava and inferior vena cava appear grossly patent. Mediastinum:Unremarkable appearance of the esophagus. Few enlarged mediastinal lymph nodes including a right paratracheal node measuring 13 mm in short axis (image 29). No anterior mediastinal masses. Lower neck:No suspicious or enlarged supraclavicular or axillary lymphadenopathy. Diminutive appearance of the thyroid. Pulmonary parenchyma:No evidence of pulmonary infarct. Muscular spine nodules. Few foci of consolidative opacities seen in the right upper lobe. Scattered  calcified granulomas. Bilateral dependent atelectasis. Pleura: Trace bilateral pleural effusions. No pneumothorax. Airways: Narrowing of the lower trachea and proximal mainstem bronchi may represent tracheomalacia. Diffuse bronchial wall thickening. Chest wall and bones:No acute osseous abnormality. Degenerative changes of thoracic spine. Unremarkable appearance of soft tissues. Upper abdomen:No lesion seen within the visualized liver. Upper abdomen is unremarkable. Small hiatal hernia.     Impression: Impression: Mildly motion degraded exam. No evidence of pulmonary embolus. Few foci of consolidative opacity in the right upper lobe may represent infection. Few mildly enlarged mediastinal lymph nodes may be reactive. Trace bilateral pleural effusions. Electronically Signed: Son Bella  2/26/2023 8:17 AM EST  Workstation ID: [id number]      Results for orders placed during the hospital encounter of 02/26/23    Adult Transthoracic Echo Complete w/ Color, Spectral and Contrast if Necessary Per Protocol    Interpretation Summary  •  Left ventricular ejection fraction appears to be 61 - 65%.  •  Left ventricular diastolic function was normal.  •  Left atrial volume is moderately increased.  •  Moderate tricuspid valve regurgitation is present.  •  Estimated right ventricular systolic pressure from tricuspid regurgitation is moderately elevated (45-55 mmHg). Calculated right ventricular systolic pressure from tricuspid regurgitation is 47 mmHg.      Current medications:  Scheduled Meds:atorvastatin, 20 mg, Oral, Nightly  cefTRIAXone, 1 g, Intravenous, Q24H  citalopram, 20 mg, Oral, Daily  donepezil, 10 mg, Oral, Nightly  doxycycline, 100 mg, Oral, Q12H  heparin (porcine), 5,000 Units, Subcutaneous, Q8H  ipratropium-albuterol, 3 mL, Nebulization, Once  ipratropium-albuterol, 3 mL, Nebulization, 4x Daily - RT  levothyroxine, 50 mcg, Oral, Q AM  magnesium sulfate, 4 g, Intravenous, Once  memantine, 5 mg, Oral,  Daily  methylPREDNISolone sodium succinate, 40 mg, Intravenous, Q12H  metoprolol tartrate, 50 mg, Oral, Q12H  montelukast, 10 mg, Oral, Nightly  pantoprazole, 40 mg, Oral, Q AM  QUEtiapine, 25 mg, Oral, Daily  QUEtiapine, 75 mg, Oral, Nightly  senna-docusate sodium, 2 tablet, Oral, BID  sodium chloride, 10 mL, Intravenous, Q12H  tamsulosin, 0.4 mg, Oral, Daily      Continuous Infusions:   PRN Meds:.•  acetaminophen  •  Calcium Replacement - Initiate Nurse / BPA Driven Protocol  •  diphenhydrAMINE  •  labetalol  •  Magnesium Standard Dose Replacement - Initiate Nurse / BPA Driven Protocol  •  ondansetron  •  Phosphorus Replacement - Initiate Nurse / BPA Driven Protocol  •  Potassium Replacement - Initiate Nurse / BPA Driven Protocol  •  sodium chloride  •  sodium chloride  •  sodium chloride  •  ziprasidone    Assessment & Plan   Assessment & Plan     Active Hospital Problems    Diagnosis  POA   • **Sepsis (HCC) [A41.9]  Yes   • Atrial fibrillation (HCC) [I48.91]  Yes   • Leukocytosis [D72.829]  Yes   • Hypomagnesemia [E83.42]  Yes   • Elevated brain natriuretic peptide (BNP) level [R79.89]  Yes   • Proteinuria [R80.9]  Yes   • Bilateral Pleural effusions [J90]  Yes   • Pneumonia [J18.9]  Yes   • Respiratory failure (HCC) [J96.90]  Yes   • Elevated alkaline phosphatase level [R74.8]  Yes   • Sepsis, due to unspecified organism, unspecified whether acute organ dysfunction present (HCC) [A41.9]  Yes   • Late onset Alzheimer's disease with behavioral disturbance (HCC) [G30.1, 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        Brief Hospital Course to date:  Dorota Membreno is a 89 y.o. female w/ hx alzheimers dementia, giant cell arteritis (2000), asthma, htn, hl, hypothyroidism, anxiety, remote colon cancer (s/p resection at age 37) who presented to [facility] ED via ems on 2/26/23 after being found down at nursing facility and was found to be hypoxic (sats 80s) w/ tachycardia. In ED temp  99.9, hr 138, sbp 89/79, sats 83%. Was placed on nonrebreather mask and initiated on esmolol dripl and iv fluids. Bp improved w/ fluids and was weaned to nasal canula. Troponins were mildly elevated and flat. Respiratory pcr was negative. U/a not compelling for uti. D-dimer mildly elevated at 1.45, probnp 1095, wbc 16,260. Cta chest revealed mildly motion degraded exam with no evidence of pulmonary embolish and few foci of consolidative opacity right upper lobe, few mildly enlarged mediastinal lymph notes (possibly reactive) and trace bilateral pleural effusions, some narrowing of lower trachea and proximal mainstem bronchi possibly representing tracheomalacia. Initiated on cefepime and vanc empirically by intensivist service and admitted to telemetry floor as patient had clinically improved. hospitalist service assumed care on 2/27/23    This patient's problems and plans were partially entered by my partner and updated as appropriate by me 03/02/23.        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  - currently on high flow     New onset afib w/ rvr (converted back to nsr)  -echo: ef 61-65%, diastolic dysfunction, mod tr w/ ervsp moderately elevated 45-55mmhg  -tsh ok  -weaned off esmolol  -increased metoprolol to 50mg bid for rate control      Urinary retention  -u/a  benign  -flomax; bladder scans and in/out cath prn    Alzheimers dementia, severe  Agitation  -continue home memantine and aricept  -increase night seroquel to 75mg nightly. Continue seroquel 25mg po qam  -continue prn geodon/benadryl  -continue celexa  -soft wrist restraints currently, attempt to d/c as able    Debility  -pt/ot evals pending    Hypothyroidism  -tsh 6.0  -continue synthroid    Goals/limits  Palliative consulted.         Expected Discharge Location and Transportation: facility  Expected Discharge   Expected Discharge Date and Time     Expected Discharge Date Expected Discharge Time    Mar 6, 2023          DVT prophylaxis:  Medical and mechanical DVT prophylaxis orders are present. sq heparin    AM-PAC 6 Clicks Score (PT): 15 (03/02/23 0800)    CODE STATUS:   Code Status and Medical Interventions:   Ordered at: 02/27/23 1522     Medical Intervention Limits:    NO intubation (DNI)    NO artificial nutrition    NO dialysis    NO vasopressors    Other     Code Status (Patient has no pulse and is not breathing):    No CPR (Do Not Attempt to Resuscitate)     Medical Interventions (Patient has pulse or is breathing):    Limited Support     Additional Medical Interventions Limits:    no invasive procedures, no icu transfer     Release to patient:    Routine Release       Rina Vidales, DO  03/02/23

## 2023-03-02 NOTE — PROGRESS NOTES
Palliative Care Progress Note    Date of Admission: 2/26/2023    Subjective: Patient appears to be much weaker today, nursing staff states that she remains in restraints.  Current Code Status     Date Active Code Status Order ID Comments User Context       2/27/2023 1522 No CPR (Do Not Attempt to Resuscitate) [id number]  Russell Guerin MD Inpatient      Question Answer    Code Status (Patient has no pulse and is not breathing) No CPR (Do Not Attempt to Resuscitate)    Medical Interventions (Patient has pulse or is breathing) Limited Support    Medical Intervention Limits: NO intubation (DNI)     NO artificial nutrition     NO dialysis     NO vasopressors     Other    Additional Medical Interventions Limits: no invasive procedures, no icu transfer    Release to patient Routine Release              No current facility-administered medications on file prior to encounter.     Current Outpatient Medications on File Prior to Encounter   Medication Sig Dispense Refill   • amLODIPine (NORVASC) 5 MG tablet GIVE 1 TABLET BY MOUTH DAILY AT BEDTIME (Patient taking differently: Take 1 tablet by mouth Daily.) 30 tablet 10   • atorvastatin (LIPITOR) 20 MG tablet GIVE 1 TABLET BY MOUTH DAILY (Patient taking differently: Take 1 tablet by mouth Every Night.) 30 tablet 11   • bisoprolol-hydrochlorothiazide (ZIAC) 2.5-6.25 MG per tablet Take 1 tablet by mouth Daily.     • Cholecalciferol (vitamin D3) 125 MCG (5000 UT) tablet tablet GIVE 1 TABLET BY MOUTH DAILY (Patient taking differently: 1 tablet Daily.) 30 tablet 11   • citalopram (CeleXA) 20 MG tablet TAKE 1 TABLET BY MOUTH EVERY MORNING FOR DEPRESSION (Patient taking differently: Take 1 tablet by mouth Daily.) 30 tablet 11   • donepezil (ARICEPT) 5 MG tablet GIVE 1 TABLET BY MOUTH DAILY AT BEDTIME (Patient taking differently: Take 1 tablet by mouth Every Night.) 30 tablet 0   • QUEtiapine (SEROquel) 25 MG tablet GIVE 1 TABLET BY MOUTH EVERY MORNING (Patient taking differently:  Take 1 tablet by mouth Every Morning.) 30 tablet 0   • Bismatrol 262 MG/15ML suspension GIVE 15 ML BY MOUTH EVERY 8 HOURS AS NEEDED FOR DIARRHEA (Patient taking differently: Take 15 mL by mouth Every 8 (Eight) Hours As Needed for Diarrhea.) 236 mL 11   • [] dextromethorphan-guaifenesin (ROBITUSSIN-DM)  MG/5ML syrup Take 5 mL by mouth 3 (Three) Times a Day.     • hydrOXYzine (ATARAX) 25 MG tablet Take 1 tablet by mouth 3 (Three) Times a Day As Needed for Itching or Anxiety.     • KLOR-CON 10 MEQ CR tablet TAKE 1 TABLET BY MOUTH: MON,WED,FRI 90 tablet 0   • levothyroxine (SYNTHROID, LEVOTHROID) 50 MCG tablet GIVE 1 TABLET BY MOUTH DAILY (Patient taking differently: Take 1 tablet by mouth Every Morning.) 30 tablet 11   • memantine (NAMENDA) 5 MG tablet GIVE 1 TABLET BY MOUTH TWICE A DAY (Patient taking differently: Take 1 tablet by mouth 2 (Two) Times a Day.) 60 tablet 11   • metoprolol succinate XL (TOPROL-XL) 50 MG 24 hr tablet TAKE 1 TABLET BY MOUTH DAILY. 30 tablet 11   • montelukast (SINGULAIR) 10 MG tablet TAKE 1 TABLET BY MOUTH EVERY NIGHT. 90 tablet 1   • nystatin-triamcinolone (MYCOLOG) 410764-8.1 UNIT/GM-% ointment APPLY TO SKIN TWICE DAILY 30 g 0   • ondansetron (ZOFRAN) 4 MG tablet GIVE 1 TABLET BY MOUTH EVERY 12 HOURS AS NEEDED FOR NAUSEA/VOMITING (Patient taking differently: Take 1 tablet by mouth Every 12 (Twelve) Hours As Needed for Nausea or Vomiting.) 12 tablet 2        •  acetaminophen  •  Calcium Replacement - Initiate Nurse / BPA Driven Protocol  •  diphenhydrAMINE  •  labetalol  •  Magnesium Standard Dose Replacement - Initiate Nurse / BPA Driven Protocol  •  ondansetron  •  Phosphorus Replacement - Initiate Nurse / BPA Driven Protocol  •  Potassium Replacement - Initiate Nurse / BPA Driven Protocol  •  sodium chloride  •  sodium chloride  •  sodium chloride  •  ziprasidone    Objective: /77 (BP Location: Right arm, Patient Position: Lying)   Pulse 75   Temp 99.5 °F (37.5 °C)  "(Axillary)   Resp 18   Ht 147.3 cm (58\")   Wt 71 kg (156 lb 8 oz)   SpO2 95%   BMI 32.71 kg/m²      Intake/Output Summary (Last 24 hours) at 3/2/2023 1246  Last data filed at 3/2/2023 0800  Gross per 24 hour   Intake 60 ml   Output 1350 ml   Net -1290 ml     Physical Exam:      General Appearance:   Frail appearing   Head:    Normocephalic, without obvious abnormality, atraumatic   Eyes:            Lids and lashes normal, conjunctivae and sclerae normal, no   icterus, no pallor, corneas clear, PERRLA   Ears:    Ears appear intact with no abnormalities noted   Throat:   No oral lesions, no thrush, oral mucosa moist   Neck:   No adenopathy, supple, trachea midline, no thyromegaly, no     carotid bruit, no JVD   Back:     No kyphosis present, no scoliosis present, no skin lesions,       erythema or scars, no tenderness to percussion or                   palpation,   range of motion normal   Lungs:     Clear to auscultation,respirations regular, even and                   unlabored    Heart:    Regular rhythm and normal rate, normal S1 and S2, no            murmur, no gallop, no rub, no click   Breast Exam:    Deferred   Abdomen:     Normal bowel sounds, no masses, no organomegaly, soft        non-tender, non-distended, no guarding, no rebound                 tenderness   Genitalia:    Deferred   Extremities:   Moves all extremities well, no edema, no cyanosis, no              redness   Pulses:   Pulses palpable and equal bilaterally   Skin:   No bleeding, bruising or rash   Lymph nodes:   No palpable adenopathy   Neurologic:   Cranial nerves 2 - 12 grossly intact, sensation intact, DTR        present and equal bilaterally     Results from last 7 days   Lab Units 03/02/23  0617   WBC 10*3/mm3 16.96*   HEMOGLOBIN g/dL 13.2   HEMATOCRIT % 39.0   PLATELETS 10*3/mm3 318     Results from last 7 days   Lab Units 03/02/23  0617   SODIUM mmol/L 140   POTASSIUM mmol/L 3.8   CHLORIDE mmol/L 99   CO2 mmol/L 27.0   BUN mg/dL " 21   CREATININE mg/dL 0.62   CALCIUM mg/dL 8.8   BILIRUBIN mg/dL 0.4   ALK PHOS U/L 143*   ALT (SGPT) U/L 36*   AST (SGOT) U/L 26   GLUCOSE mg/dL 117*       Impression: Sepsis  Dementia  Dyspnea  Restlessness  Change in status  Goals of care  Plan: Had a conversation with the patient's daughter over the phone about patient's overall condition.  Unfortunately discussed that the patient may not survive this hospitalization as she appears to be declining from yesterday, continues to have more confusion, as well as continues to require supplemental oxygen.            Adonis Chang DO  03/02/23  12:46 EST

## 2023-03-02 NOTE — PLAN OF CARE
Goal Outcome Evaluation:  Plan of Care Reviewed With: daughter        Progress: declining  Outcome Evaluation: Pt seen by Dr. Chang; spoke with daughter by phone, advised of declining clinically, may not survive hospitalization. Palliative following for continued support in symptom management and GOC/POC.    1200 Palliative IDT meeting: JOCELYN Peters RN, CHPN; REKHA Chang, ; MARIA ELENA Luz, APRN; PUNEET Baca, [facility], [facility]; ADALGISA Eaton RN, CHPN    After hours, weekends and holidays, contact Palliative Provider by calling [phone].

## 2023-03-02 NOTE — CASE MANAGEMENT/SOCIAL WORK
Continued Stay Note  [facility]     Patient Name: Dorota Membreno  MRN: [id number]  Today's Date: 3/2/2023    Admit Date: 2/26/2023    Plan: ONGOING   Discharge Plan     Row Name 03/02/23 1051       Plan    Plan ONGOING    Plan Comments Still requiring 65-70% hi-flow O2.  Has required Geodon, increased Seroquel, and restraints d/t behaviors/agitation.  Palliative following and cont GOC discussions with family.  CM will follow from the periphery for now.    Final Discharge Disposition Code 30 - still a patient               Discharge Codes    No documentation.               Expected Discharge Date and Time     Expected Discharge Date Expected Discharge Time    Mar 3, 2023             Vero Russell RN

## 2023-03-03 LAB
BACTERIA SPEC AEROBE CULT: NORMAL
BACTERIA SPEC AEROBE CULT: NORMAL

## 2023-03-03 PROCEDURE — 94664 DEMO&/EVAL PT USE INHALER: CPT

## 2023-03-03 PROCEDURE — 25010000002 HEPARIN (PORCINE) PER 1000 UNITS: Performed by: INTERNAL MEDICINE

## 2023-03-03 PROCEDURE — 25010000002 CEFTRIAXONE PER 250 MG: Performed by: INTERNAL MEDICINE

## 2023-03-03 PROCEDURE — 94799 UNLISTED PULMONARY SVC/PX: CPT

## 2023-03-03 PROCEDURE — 25010000002 DIPHENHYDRAMINE PER 50 MG: Performed by: INTERNAL MEDICINE

## 2023-03-03 PROCEDURE — 25010000002 METHYLPREDNISOLONE PER 40 MG: Performed by: INTERNAL MEDICINE

## 2023-03-03 PROCEDURE — 25010000002 ZIPRASIDONE MESYLATE PER 10 MG: Performed by: INTERNAL MEDICINE

## 2023-03-03 RX ORDER — POLYETHYLENE GLYCOL 3350 17 G/17G
17 POWDER, FOR SOLUTION ORAL DAILY
Status: DISCONTINUED | OUTPATIENT
Start: 2023-03-03 | End: 2023-03-09 | Stop reason: HOSPADM

## 2023-03-03 RX ADMIN — POLYETHYLENE GLYCOL 3350 17 G: 17 POWDER, FOR SOLUTION ORAL at 10:23

## 2023-03-03 RX ADMIN — SODIUM CHLORIDE 1 G: 900 INJECTION INTRAVENOUS at 13:34

## 2023-03-03 RX ADMIN — DOXYCYCLINE 100 MG: 100 CAPSULE ORAL at 21:45

## 2023-03-03 RX ADMIN — MONTELUKAST 10 MG: 10 TABLET, FILM COATED ORAL at 21:45

## 2023-03-03 RX ADMIN — DIPHENHYDRAMINE HYDROCHLORIDE 25 MG: 50 INJECTION INTRAMUSCULAR; INTRAVENOUS at 15:10

## 2023-03-03 RX ADMIN — IPRATROPIUM BROMIDE AND ALBUTEROL SULFATE 3 ML: 2.5; .5 SOLUTION RESPIRATORY (INHALATION) at 15:42

## 2023-03-03 RX ADMIN — METOPROLOL TARTRATE 50 MG: 50 TABLET, FILM COATED ORAL at 08:54

## 2023-03-03 RX ADMIN — IPRATROPIUM BROMIDE AND ALBUTEROL SULFATE 3 ML: 2.5; .5 SOLUTION RESPIRATORY (INHALATION) at 07:50

## 2023-03-03 RX ADMIN — PANTOPRAZOLE SODIUM 40 MG: 40 TABLET, DELAYED RELEASE ORAL at 05:50

## 2023-03-03 RX ADMIN — TAMSULOSIN HYDROCHLORIDE 0.4 MG: 0.4 CAPSULE ORAL at 08:54

## 2023-03-03 RX ADMIN — MEMANTINE 5 MG: 10 TABLET ORAL at 08:54

## 2023-03-03 RX ADMIN — METOPROLOL TARTRATE 50 MG: 50 TABLET, FILM COATED ORAL at 21:45

## 2023-03-03 RX ADMIN — CITALOPRAM HYDROBROMIDE 20 MG: 20 TABLET ORAL at 08:53

## 2023-03-03 RX ADMIN — SENNOSIDES AND DOCUSATE SODIUM 2 TABLET: 8.6; 5 TABLET ORAL at 08:53

## 2023-03-03 RX ADMIN — Medication 10 ML: at 21:46

## 2023-03-03 RX ADMIN — LEVOTHYROXINE SODIUM 50 MCG: 50 TABLET ORAL at 05:50

## 2023-03-03 RX ADMIN — DONEPEZIL HYDROCHLORIDE 10 MG: 10 TABLET, FILM COATED ORAL at 21:45

## 2023-03-03 RX ADMIN — IPRATROPIUM BROMIDE AND ALBUTEROL SULFATE 3 ML: 2.5; .5 SOLUTION RESPIRATORY (INHALATION) at 19:45

## 2023-03-03 RX ADMIN — SENNOSIDES AND DOCUSATE SODIUM 2 TABLET: 8.6; 5 TABLET ORAL at 21:45

## 2023-03-03 RX ADMIN — QUETIAPINE FUMARATE 25 MG: 25 TABLET ORAL at 08:53

## 2023-03-03 RX ADMIN — HEPARIN SODIUM 5000 UNITS: 5000 INJECTION INTRAVENOUS; SUBCUTANEOUS at 05:50

## 2023-03-03 RX ADMIN — METHYLPREDNISOLONE SODIUM SUCCINATE 40 MG: 40 INJECTION, POWDER, FOR SOLUTION INTRAMUSCULAR; INTRAVENOUS at 03:37

## 2023-03-03 RX ADMIN — ZIPRASIDONE MESYLATE 5 MG: 20 INJECTION, POWDER, LYOPHILIZED, FOR SOLUTION INTRAMUSCULAR at 15:10

## 2023-03-03 RX ADMIN — DOXYCYCLINE 100 MG: 100 CAPSULE ORAL at 08:54

## 2023-03-03 RX ADMIN — HEPARIN SODIUM 5000 UNITS: 5000 INJECTION INTRAVENOUS; SUBCUTANEOUS at 21:45

## 2023-03-03 RX ADMIN — ATORVASTATIN CALCIUM 20 MG: 20 TABLET, FILM COATED ORAL at 21:44

## 2023-03-03 RX ADMIN — METHYLPREDNISOLONE SODIUM SUCCINATE 40 MG: 40 INJECTION, POWDER, FOR SOLUTION INTRAMUSCULAR; INTRAVENOUS at 14:06

## 2023-03-03 RX ADMIN — HEPARIN SODIUM 5000 UNITS: 5000 INJECTION INTRAVENOUS; SUBCUTANEOUS at 13:34

## 2023-03-03 RX ADMIN — QUETIAPINE FUMARATE 75 MG: 25 TABLET ORAL at 21:44

## 2023-03-03 RX ADMIN — Medication 10 ML: at 09:02

## 2023-03-03 NOTE — PROGRESS NOTES
[facility] Medicine Services  PROGRESS NOTE    Patient Name: Dorota Membreno  : 10/31/1931  MRN: [id number]    Date of Admission: 2023  Primary Care Physician: Kylie Zapata DO    Subjective   Subjective     CC:  resp failure, pneumonia, afib, dementia    HPI:      ROS:      Objective   Objective     Vital Signs:   Temp:  [97.3 °F (36.3 °C)-98.7 °F (37.1 °C)] 98.5 °F (36.9 °C)  Heart Rate:  [65-81] 69  Resp:  [17-18] 18  BP: (153-175)/(60-97) 162/90  Flow (L/min):  [45] 45    Physical Exam:        Results Reviewed:  LAB RESULTS:      Lab 23  0617 23  0700 23  0659 23  1145 23  0525   WBC 16.96* 19.46* 15.97* 16.92* 16.26*   HEMOGLOBIN 13.2 12.5 11.6* 11.3* 11.5*   HEMATOCRIT 39.0 36.3 37.1 37.0 36.1   PLATELETS 318 342 290 245 264   NEUTROS ABS  --   --   --   --  13.54*   IMMATURE GRANS (ABS)  --   --   --   --  0.08*   LYMPHS ABS  --   --   --   --  0.82   MONOS ABS  --   --   --   --  1.66*   EOS ABS  --   --   --   --  0.11   MCV 92.9 91.2 102.5* 101.9* 93.5   PROCALCITONIN  --   --   --   --  0.13   LACTATE  --   --   --   --  1.5   D DIMER QUANT  --   --   --   --  1.45*         Lab 23  0617 23  0700 23  0659 23  22123  1145 23  0525   SODIUM 140 143 143  --  140 139   POTASSIUM 3.8 3.9 4.2  --  3.9 3.8   CHLORIDE 99 102 106  --  106 99   CO2 27.0 26.0 21.0*  --  17.0* 30.0*   ANION GAP 14.0 15.0 16.0*  --  17.0* 10.0   BUN 21 23 31*  --  32* 28*   CREATININE 0.62 0.66 0.63  --  0.61 0.86   EGFR 84.2 82.9 83.9  --  84.5 63.9   GLUCOSE 117* 103* 129*  --  129* 139*   CALCIUM 8.8 8.6 8.7  --  8.2 8.7   IONIZED CALCIUM  --   --   --   --  1.21  --    MAGNESIUM 1.8 2.6* 1.8  --  1.8 1.6*   PHOSPHORUS  --   --  2.8 2.3* 1.7* 2.6   TSH  --   --   --   --   --  6.000*         Lab 23  0617 23  0525   TOTAL PROTEIN 6.5 7.1   ALBUMIN 3.5 3.4*   GLOBULIN 3.0 3.7   ALT (SGPT) 36* 13   AST (SGOT) 26 19    BILIRUBIN 0.4 0.7   ALK PHOS 143* 120*         Lab 02/26/23  0834 02/26/23  0525   PROBNP  --  1,095.0   HSTROP T 20* 19*  19*                 Lab 02/26/23  0544   PH, ARTERIAL 7.434   PCO2, ARTERIAL 44.5   PO2 .0*   FIO2 80   HCO3 ART 29.8*   BASE EXCESS ART 4.9*   CARBOXYHEMOGLOBIN 1.3     Brief Urine Lab Results  (Last result in the past 365 days)      Color   Clarity   Blood   Leuk Est   Nitrite   Protein   CREAT   Urine HCG        02/26/23 0600 Yellow   Clear   Negative   Negative   Negative   Trace                 Microbiology Results Abnormal     Procedure Component Value - Date/Time    Blood Culture - Blood, Arm, Right [[id number]]  (Normal) Collected: 02/26/23 0525    Lab Status: Final result Specimen: Blood from Arm, Right Updated: 03/03/23 0730     Blood Culture No growth at 5 days    Blood Culture - Blood, Arm, Right [[id number]]  (Normal) Collected: 02/26/23 0525    Lab Status: Final result Specimen: Blood from Arm, Right Updated: 03/03/23 0730     Blood Culture No growth at 5 days    S. Pneumo Ag Urine or CSF - Urine, Urine, Clean Catch [[id number]]  (Normal) Collected: 02/26/23 1514    Lab Status: Final result Specimen: Urine, Clean Catch Updated: 02/26/23 2125     Strep Pneumo Ag Negative    Legionella Antigen, Urine - Urine, Urine, Clean Catch [[id number]]  (Normal) Collected: 02/26/23 1514    Lab Status: Final result Specimen: Urine, Clean Catch Updated: 02/26/23 2124     LEGIONELLA ANTIGEN, URINE Negative    MRSA Screen, PCR (Inpatient) - Swab, Nares [[id number]]  (Normal) Collected: 02/26/23 1113    Lab Status: Final result Specimen: Swab from Nares Updated: 02/26/23 1228     MRSA PCR Negative    Narrative:      The negative predictive value of this diagnostic test is high and should only be used to consider de-escalating anti-MRSA therapy. A positive result may indicate colonization with MRSA and must be correlated clinically.  MRSA Negative    COVID PRE-OP / PRE-PROCEDURE SCREENING ORDER  (NO ISOLATION) - Swab, Nasopharynx [[id number]]  (Normal) Collected: 02/26/23 0525    Lab Status: Final result Specimen: Swab from Nasopharynx Updated: 02/26/23 0637    Narrative:      The following orders were created for panel order COVID PRE-OP / PRE-PROCEDURE SCREENING ORDER (NO ISOLATION) - Swab, Nasopharynx.  Procedure                               Abnormality         Status                     ---------                               -----------         ------                     Respiratory Panel PCR w/...[[id number]]  Normal              Final result                 Please view results for these tests on the individual orders.    Respiratory Panel PCR w/COVID-19(SARS-CoV-2) KRYSTIAN/ANISHA/LEIDA/PAD/COR/MAD/JAILENE In-House, NP Swab in UTM/VTM, 3-4 HR TAT - Swab, Nasopharynx [[id number]]  (Normal) Collected: 02/26/23 0525    Lab Status: Final result Specimen: Swab from Nasopharynx Updated: 02/26/23 0637     ADENOVIRUS, PCR Not Detected     Coronavirus 229E Not Detected     Coronavirus HKU1 Not Detected     Coronavirus NL63 Not Detected     Coronavirus OC43 Not Detected     COVID19 Not Detected     Human Metapneumovirus Not Detected     Human Rhinovirus/Enterovirus Not Detected     Influenza A PCR Not Detected     Influenza B PCR Not Detected     Parainfluenza Virus 1 Not Detected     Parainfluenza Virus 2 Not Detected     Parainfluenza Virus 3 Not Detected     Parainfluenza Virus 4 Not Detected     RSV, PCR Not Detected     Bordetella pertussis pcr Not Detected     Bordetella parapertussis PCR Not Detected     Chlamydophila pneumoniae PCR Not Detected     Mycoplasma pneumo by PCR Not Detected    Narrative:      In the setting of a positive respiratory panel with a viral infection PLUS a negative procalcitonin without other underlying concern for bacterial infection, consider observing off antibiotics or discontinuation of antibiotics and continue supportive care. If the respiratory panel is positive for atypical bacterial  infection (Bordetella pertussis, Chlamydophila pneumoniae, or Mycoplasma pneumoniae), consider antibiotic de-escalation to target atypical bacterial infection.          Adult Transthoracic Echo Complete w/ Color, Spectral and Contrast if Necessary Per Protocol    Result Date: 2/26/2023  •  Left ventricular ejection fraction appears to be 61 - 65%. •  Left ventricular diastolic function was normal. •  Left atrial volume is moderately increased. •  Moderate tricuspid valve regurgitation is present. •  Estimated right ventricular systolic pressure from tricuspid regurgitation is moderately elevated (45-55 mmHg). Calculated right ventricular systolic pressure from tricuspid regurgitation is 47 mmHg.     XR Chest 1 View    Result Date: 2/26/2023  EXAMINATION: XR CHEST 1 VW  DATE: 2/26/2023 6:00 AM INDICATIONS: SOA triage protocol. COMPARISON: Image from chest radiograph dated 4/7/2018. Report not available at time of dictation. FINDINGS: Evaluation somewhat limited by patient body habitus. Stable elevation of the right hemidiaphragm. Nonspecific mild diffuse interstitial prominence. Mild bibasilar opacities suggest atelectasis, but infiltrate not excluded. No sizable effusion or pneumothorax. Atherosclerotic calcifications of the aortic arch. Cardiac and mediastinal silhouette otherwise unremarkable. No acute osseous abnormality identified on single frontal view.     Impression: 1.  Nonspecific interstitial prominence could reflect chronic changes, but mild edema or atypical infectious/inflammatory process can also appear this way in the correct clinical setting. 2.  Mild bibasilar opacities suggest atelectasis, but infiltrate or edema not excluded.  Electronically signed by:  Nikita Keller D.O.  2/26/2023 4:47 AM Mountain Time    CT Angiogram Chest    Addendum Date: 2/26/2023    ADDENDUM #1 Additional impression point: Narrowing of the lower trachea and proximal mainstem bronchi may represent tracheomalacia.  Electronically Signed: Son Bella  2/26/2023 9:04 AM EST  Workstation ID: [id number] REPORT  CT ANGIOGRAM CHEST Date of Exam: 2/26/2023 7:03 AM EST Indication: Sepsis, hypoxia, tachycardia, dimer. Comparison: CTA chest 6/18/2013 Technique: CTA of the chest was performed after the uneventful intravenous administration of 65 mL Isovue-370. Reconstructed coronal and sagittal images were also obtained. In addition, a 3-D volume rendered image was created for interpretation. Automated exposure control and iterative reconstruction methods were used. Findings: Motion degraded exam. Pulmonary arteries: No evidence of pulmonary embolus. Normal main pulmonary artery diameter. Heart and pericardium:No flattening of the interventricular septum. Coronary artery calcification is seen. No substantial pericardial effusion. Vessels:Normal caliber aorta. Atherosclerotic calcification of the aorta. No evidence of acute aortic injury on this nondedicated exam. Venous structures including the superior vena cava and inferior vena cava appear grossly patent. Mediastinum:Unremarkable appearance of the esophagus. Few enlarged mediastinal lymph nodes including a right paratracheal node measuring 13 mm in short axis (image 29). No anterior mediastinal masses. Lower neck:No suspicious or enlarged supraclavicular or axillary lymphadenopathy. Diminutive appearance of the thyroid. Pulmonary parenchyma:No evidence of pulmonary infarct. Muscular spine nodules. Few foci of consolidative opacities seen in the right upper lobe. Scattered calcified granulomas. Bilateral dependent atelectasis. Pleura: Trace bilateral pleural effusions. No pneumothorax. Airways: Narrowing of the lower trachea and proximal mainstem bronchi may represent tracheomalacia. Diffuse bronchial wall thickening. Chest wall and bones:No acute osseous abnormality. Degenerative changes of thoracic spine. Unremarkable appearance of soft tissues. Upper abdomen:No lesion  seen within the visualized liver. Upper abdomen is unremarkable. Small hiatal hernia. Impression: Mildly motion degraded exam. No evidence of pulmonary embolus. Few foci of consolidative opacity in the right upper lobe may represent infection. Few mildly enlarged mediastinal lymph nodes may be reactive. Trace bilateral pleural effusions. Electronically Signed: Son Bella  2/26/2023 8:17 AM EST  Workstation ID: [id number]    Result Date: 2/26/2023  CT ANGIOGRAM CHEST Date of Exam: 2/26/2023 7:03 AM EST Indication: Sepsis, hypoxia, tachycardia, dimer. Comparison: CTA chest 6/18/2013 Technique: CTA of the chest was performed after the uneventful intravenous administration of 65 mL Isovue-370. Reconstructed coronal and sagittal images were also obtained. In addition, a 3-D volume rendered image was created for interpretation. Automated exposure control and iterative reconstruction methods were used. Findings: Motion degraded exam. Pulmonary arteries: No evidence of pulmonary embolus. Normal main pulmonary artery diameter. Heart and pericardium:No flattening of the interventricular septum. Coronary artery calcification is seen. No substantial pericardial effusion. Vessels:Normal caliber aorta. Atherosclerotic calcification of the aorta. No evidence of acute aortic injury on this nondedicated exam. Venous structures including the superior vena cava and inferior vena cava appear grossly patent. Mediastinum:Unremarkable appearance of the esophagus. Few enlarged mediastinal lymph nodes including a right paratracheal node measuring 13 mm in short axis (image 29). No anterior mediastinal masses. Lower neck:No suspicious or enlarged supraclavicular or axillary lymphadenopathy. Diminutive appearance of the thyroid. Pulmonary parenchyma:No evidence of pulmonary infarct. Muscular spine nodules. Few foci of consolidative opacities seen in the right upper lobe. Scattered calcified granulomas. Bilateral dependent atelectasis.  Pleura: Trace bilateral pleural effusions. No pneumothorax. Airways: Narrowing of the lower trachea and proximal mainstem bronchi may represent tracheomalacia. Diffuse bronchial wall thickening. Chest wall and bones:No acute osseous abnormality. Degenerative changes of thoracic spine. Unremarkable appearance of soft tissues. Upper abdomen:No lesion seen within the visualized liver. Upper abdomen is unremarkable. Small hiatal hernia.     Impression: Impression: Mildly motion degraded exam. No evidence of pulmonary embolus. Few foci of consolidative opacity in the right upper lobe may represent infection. Few mildly enlarged mediastinal lymph nodes may be reactive. Trace bilateral pleural effusions. Electronically Signed: Son Bella  2/26/2023 8:17 AM EST  Workstation ID: [id number]      Results for orders placed during the hospital encounter of 02/26/23    Adult Transthoracic Echo Complete w/ Color, Spectral and Contrast if Necessary Per Protocol    Interpretation Summary  •  Left ventricular ejection fraction appears to be 61 - 65%.  •  Left ventricular diastolic function was normal.  •  Left atrial volume is moderately increased.  •  Moderate tricuspid valve regurgitation is present.  •  Estimated right ventricular systolic pressure from tricuspid regurgitation is moderately elevated (45-55 mmHg). Calculated right ventricular systolic pressure from tricuspid regurgitation is 47 mmHg.      Current medications:  Scheduled Meds:atorvastatin, 20 mg, Oral, Nightly  cefTRIAXone, 1 g, Intravenous, Q24H  citalopram, 20 mg, Oral, Daily  donepezil, 10 mg, Oral, Nightly  doxycycline, 100 mg, Oral, Q12H  heparin (porcine), 5,000 Units, Subcutaneous, Q8H  ipratropium-albuterol, 3 mL, Nebulization, Once  ipratropium-albuterol, 3 mL, Nebulization, 4x Daily - RT  levothyroxine, 50 mcg, Oral, Q AM  magnesium sulfate, 4 g, Intravenous, Once  memantine, 5 mg, Oral, Daily  methylPREDNISolone sodium succinate, 40 mg, Intravenous,  Q12H  metoprolol tartrate, 50 mg, Oral, Q12H  montelukast, 10 mg, Oral, Nightly  pantoprazole, 40 mg, Oral, Q AM  polyethylene glycol, 17 g, Oral, Daily  QUEtiapine, 25 mg, Oral, Daily  QUEtiapine, 75 mg, Oral, Nightly  senna-docusate sodium, 2 tablet, Oral, BID  sodium chloride, 10 mL, Intravenous, Q12H  tamsulosin, 0.4 mg, Oral, Daily      Continuous Infusions:   PRN Meds:.•  acetaminophen  •  Calcium Replacement - Initiate Nurse / BPA Driven Protocol  •  diphenhydrAMINE  •  labetalol  •  Magnesium Standard Dose Replacement - Initiate Nurse / BPA Driven Protocol  •  ondansetron  •  Phosphorus Replacement - Initiate Nurse / BPA Driven Protocol  •  Potassium Replacement - Initiate Nurse / BPA Driven Protocol  •  sodium chloride  •  sodium chloride  •  sodium chloride  •  ziprasidone    Assessment & Plan   Assessment & Plan     Active Hospital Problems    Diagnosis  POA   • **Sepsis (HCC) [A41.9]  Yes   • Atrial fibrillation (HCC) [I48.91]  Yes   • Leukocytosis [D72.829]  Yes   • Hypomagnesemia [E83.42]  Yes   • Elevated brain natriuretic peptide (BNP) level [R79.89]  Yes   • Proteinuria [R80.9]  Yes   • Bilateral Pleural effusions [J90]  Yes   • Pneumonia [J18.9]  Yes   • Respiratory failure (HCC) [J96.90]  Yes   • Elevated alkaline phosphatase level [R74.8]  Yes   • Sepsis, due to unspecified organism, unspecified whether acute organ dysfunction present (HCC) [A41.9]  Yes   • Late onset Alzheimer's disease with behavioral disturbance (HCC) [G30.1, 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        Brief Hospital Course to date:  Dorota Membreno is a 89 y.o. female w/ hx alzheimers dementia, giant cell arteritis (2000), asthma, htn, hl, hypothyroidism, anxiety, remote colon cancer (s/p resection at age 37) who presented to [facility] ED via ems on 2/26/23 after being found down at nursing facility and was found to be hypoxic (sats 80s) w/ tachycardia. In ED temp 99.9, hr 138, sbp 89/79,  sats 83%. Was placed on nonrebreather mask and initiated on esmolol dripl and iv fluids. Bp improved w/ fluids and was weaned to nasal canula. Troponins were mildly elevated and flat. Respiratory pcr was negative. U/a not compelling for uti. D-dimer mildly elevated at 1.45, probnp 1095, wbc 16,260. Cta chest revealed mildly motion degraded exam with no evidence of pulmonary embolish and few foci of consolidative opacity right upper lobe, few mildly enlarged mediastinal lymph notes (possibly reactive) and trace bilateral pleural effusions, some narrowing of lower trachea and proximal mainstem bronchi possibly representing tracheomalacia. Initiated on cefepime and vanc empirically by intensivist service and admitted to telemetry floor as patient had clinically improved. hospitalist service assumed care on 2/27/23    This patient's problems and plans were partially entered by my partner and updated as appropriate by me 03/03/23.        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  - currently on high flow     New onset afib w/ rvr (converted back to nsr)  -echo: ef 61-65%, diastolic dysfunction, mod tr w/ ervsp moderately elevated 45-55mmhg  -tsh ok  -weaned off esmolol  -increased metoprolol to 50mg bid for rate control      Urinary retention  -u/a benign  -flomax; bladder scans and  in/out cath prn    Alzheimers dementia, severe  Agitation  -continue home memantine and aricept  -increase night seroquel to 75mg nightly. Continue seroquel 25mg po qam  -continue prn geodon/benadryl  -continue celexa  -soft wrist restraints currently, attempt to d/c as able    Debility  -pt/ot evals pending    Hypothyroidism  -tsh 6.0  -continue synthroid    Goals/limits  Palliative consulted.         Expected Discharge Location and Transportation: facility  Expected Discharge   Expected Discharge Date and Time     Expected Discharge Date Expected Discharge Time    Mar 6, 2023          DVT prophylaxis:  Medical and mechanical DVT prophylaxis orders are present. sq heparin    AM-PAC 6 Clicks Score (PT): 15 (03/03/23 0800)    CODE STATUS:   Code Status and Medical Interventions:   Ordered at: 02/27/23 1522     Medical Intervention Limits:    NO intubation (DNI)    NO artificial nutrition    NO dialysis    NO vasopressors    Other     Code Status (Patient has no pulse and is not breathing):    No CPR (Do Not Attempt to Resuscitate)     Medical Interventions (Patient has pulse or is breathing):    Limited Support     Additional Medical Interventions Limits:    no invasive procedures, no icu transfer     Release to patient:    Routine Release       Rina Vidales, DO  03/03/23

## 2023-03-03 NOTE — PLAN OF CARE
Goal Outcome Evaluation:  Plan of Care Reviewed With: patient        Progress: no change  Outcome Evaluation: Pt. changed by staff during time of palliative nursing visit.  Pt. denied pain and nausea.  Stated she is being well taken care of by staff.  Pt. remains in BUE restraints.  HFNC continued at 60%/  PRN geodon required yesterday per nursing report.  Pt. consumed half of breakfast tray per report this am.  Palliative care to continue to follow for support, POC and ongoing GOC.

## 2023-03-03 NOTE — PLAN OF CARE
Goal Outcome Evaluation:  Plan of Care Reviewed With: patient        Progress: no change    Pt is alert to self only. Confused and hallucinating at time. Frequently reoriented and appropriate behavior reinforced. VSS. High flow nasal canula throughout the night. She remains in restrain. Buck care provided.

## 2023-03-04 PROCEDURE — 99231 SBSQ HOSP IP/OBS SF/LOW 25: CPT | Performed by: FAMILY MEDICINE

## 2023-03-04 PROCEDURE — 25010000002 METHYLPREDNISOLONE PER 40 MG: Performed by: INTERNAL MEDICINE

## 2023-03-04 PROCEDURE — 94799 UNLISTED PULMONARY SVC/PX: CPT

## 2023-03-04 PROCEDURE — 94664 DEMO&/EVAL PT USE INHALER: CPT

## 2023-03-04 PROCEDURE — 94761 N-INVAS EAR/PLS OXIMETRY MLT: CPT

## 2023-03-04 PROCEDURE — 25010000002 HEPARIN (PORCINE) PER 1000 UNITS: Performed by: INTERNAL MEDICINE

## 2023-03-04 RX ADMIN — IPRATROPIUM BROMIDE AND ALBUTEROL SULFATE 3 ML: 2.5; .5 SOLUTION RESPIRATORY (INHALATION) at 19:21

## 2023-03-04 RX ADMIN — METOPROLOL TARTRATE 50 MG: 50 TABLET, FILM COATED ORAL at 08:22

## 2023-03-04 RX ADMIN — HEPARIN SODIUM 5000 UNITS: 5000 INJECTION INTRAVENOUS; SUBCUTANEOUS at 20:48

## 2023-03-04 RX ADMIN — HEPARIN SODIUM 5000 UNITS: 5000 INJECTION INTRAVENOUS; SUBCUTANEOUS at 14:44

## 2023-03-04 RX ADMIN — METHYLPREDNISOLONE SODIUM SUCCINATE 40 MG: 40 INJECTION, POWDER, FOR SOLUTION INTRAMUSCULAR; INTRAVENOUS at 04:07

## 2023-03-04 RX ADMIN — METOPROLOL TARTRATE 50 MG: 50 TABLET, FILM COATED ORAL at 20:48

## 2023-03-04 RX ADMIN — QUETIAPINE FUMARATE 75 MG: 25 TABLET ORAL at 20:47

## 2023-03-04 RX ADMIN — QUETIAPINE FUMARATE 25 MG: 25 TABLET ORAL at 08:23

## 2023-03-04 RX ADMIN — LEVOTHYROXINE SODIUM 50 MCG: 50 TABLET ORAL at 06:21

## 2023-03-04 RX ADMIN — CITALOPRAM HYDROBROMIDE 20 MG: 20 TABLET ORAL at 08:22

## 2023-03-04 RX ADMIN — Medication 10 ML: at 20:48

## 2023-03-04 RX ADMIN — LABETALOL HYDROCHLORIDE 5 MG: 5 INJECTION, SOLUTION INTRAVENOUS at 16:52

## 2023-03-04 RX ADMIN — IPRATROPIUM BROMIDE AND ALBUTEROL SULFATE 3 ML: 2.5; .5 SOLUTION RESPIRATORY (INHALATION) at 16:30

## 2023-03-04 RX ADMIN — TAMSULOSIN HYDROCHLORIDE 0.4 MG: 0.4 CAPSULE ORAL at 08:22

## 2023-03-04 RX ADMIN — DONEPEZIL HYDROCHLORIDE 10 MG: 10 TABLET, FILM COATED ORAL at 20:47

## 2023-03-04 RX ADMIN — Medication 10 ML: at 08:24

## 2023-03-04 RX ADMIN — PANTOPRAZOLE SODIUM 40 MG: 40 TABLET, DELAYED RELEASE ORAL at 06:21

## 2023-03-04 RX ADMIN — METHYLPREDNISOLONE SODIUM SUCCINATE 40 MG: 40 INJECTION, POWDER, FOR SOLUTION INTRAMUSCULAR; INTRAVENOUS at 14:44

## 2023-03-04 RX ADMIN — MEMANTINE 5 MG: 10 TABLET ORAL at 08:22

## 2023-03-04 RX ADMIN — HEPARIN SODIUM 5000 UNITS: 5000 INJECTION INTRAVENOUS; SUBCUTANEOUS at 06:21

## 2023-03-04 RX ADMIN — MONTELUKAST 10 MG: 10 TABLET, FILM COATED ORAL at 20:47

## 2023-03-04 RX ADMIN — IPRATROPIUM BROMIDE AND ALBUTEROL SULFATE 3 ML: 2.5; .5 SOLUTION RESPIRATORY (INHALATION) at 08:13

## 2023-03-04 RX ADMIN — ATORVASTATIN CALCIUM 20 MG: 20 TABLET, FILM COATED ORAL at 20:47

## 2023-03-04 NOTE — PROGRESS NOTES
[facility] Medicine Services  PROGRESS NOTE    Patient Name: Dorota Membreno  : 10/31/1931  MRN: [id number]    Date of Admission: 2023  Primary Care Physician: Kylie Zapata DO    Subjective   Subjective     CC:  resp failure, pneumonia, afib, dementia    HPI:  No acute events overnight.   Still in soft restraints     ROS:  Unable to assess     Objective   Objective     Vital Signs:   Temp:  [98.3 °F (36.8 °C)-99.1 °F (37.3 °C)] 98.9 °F (37.2 °C)  Heart Rate:  [58-99] 67  Resp:  [18-20] 20  BP: (145-183)/(75-94) 177/83  Flow (L/min):  [5-45] 5    Physical Exam:  Constitutional: No acute distress, awake, alert  HENT: NCAT, mucous membranes moist  Respiratory: Clear to auscultation bilaterally, respiratory effort normal 5L NC  Cardiovascular: RRR, no murmurs, rubs, or gallops  Gastrointestinal:, nondistended  Musculoskeletal: No bilateral ankle edema  Psychiatric: Appropriate affect, cooperative  Neurologic: oriented to self   Skin: No rashes        Results Reviewed:  LAB RESULTS:      Lab 23  0617 23  0700 23  0659 23  1145 23  0525   WBC 16.96* 19.46* 15.97* 16.92* 16.26*   HEMOGLOBIN 13.2 12.5 11.6* 11.3* 11.5*   HEMATOCRIT 39.0 36.3 37.1 37.0 36.1   PLATELETS 318 342 290 245 264   NEUTROS ABS  --   --   --   --  13.54*   IMMATURE GRANS (ABS)  --   --   --   --  0.08*   LYMPHS ABS  --   --   --   --  0.82   MONOS ABS  --   --   --   --  1.66*   EOS ABS  --   --   --   --  0.11   MCV 92.9 91.2 102.5* 101.9* 93.5   PROCALCITONIN  --   --   --   --  0.13   LACTATE  --   --   --   --  1.5   D DIMER QUANT  --   --   --   --  1.45*         Lab 23  0617 23  0700 23  0659 23  2211 23  1145 23  0525   SODIUM 140 143 143  --  140 139   POTASSIUM 3.8 3.9 4.2  --  3.9 3.8   CHLORIDE 99 102 106  --  106 99   CO2 27.0 26.0 21.0*  --  17.0* 30.0*   ANION GAP 14.0 15.0 16.0*  --  17.0* 10.0   BUN 21 23 31*  --  32* 28*    CREATININE 0.62 0.66 0.63  --  0.61 0.86   EGFR 84.2 82.9 83.9  --  84.5 63.9   GLUCOSE 117* 103* 129*  --  129* 139*   CALCIUM 8.8 8.6 8.7  --  8.2 8.7   IONIZED CALCIUM  --   --   --   --  1.21  --    MAGNESIUM 1.8 2.6* 1.8  --  1.8 1.6*   PHOSPHORUS  --   --  2.8 2.3* 1.7* 2.6   TSH  --   --   --   --   --  6.000*         Lab 03/02/23  0617 02/26/23  0525   TOTAL PROTEIN 6.5 7.1   ALBUMIN 3.5 3.4*   GLOBULIN 3.0 3.7   ALT (SGPT) 36* 13   AST (SGOT) 26 19   BILIRUBIN 0.4 0.7   ALK PHOS 143* 120*         Lab 02/26/23  0834 02/26/23  0525   PROBNP  --  1,095.0   HSTROP T 20* 19*  19*                 Lab 02/26/23  0544   PH, ARTERIAL 7.434   PCO2, ARTERIAL 44.5   PO2 .0*   FIO2 80   HCO3 ART 29.8*   BASE EXCESS ART 4.9*   CARBOXYHEMOGLOBIN 1.3     Brief Urine Lab Results  (Last result in the past 365 days)      Color   Clarity   Blood   Leuk Est   Nitrite   Protein   CREAT   Urine HCG        02/26/23 0600 Yellow   Clear   Negative   Negative   Negative   Trace                 Microbiology Results Abnormal     Procedure Component Value - Date/Time    Blood Culture - Blood, Arm, Right [[id number]]  (Normal) Collected: 02/26/23 0525    Lab Status: Final result Specimen: Blood from Arm, Right Updated: 03/03/23 0730     Blood Culture No growth at 5 days    Blood Culture - Blood, Arm, Right [[id number]]  (Normal) Collected: 02/26/23 0525    Lab Status: Final result Specimen: Blood from Arm, Right Updated: 03/03/23 0730     Blood Culture No growth at 5 days    S. Pneumo Ag Urine or CSF - Urine, Urine, Clean Catch [[id number]]  (Normal) Collected: 02/26/23 1514    Lab Status: Final result Specimen: Urine, Clean Catch Updated: 02/26/23 2125     Strep Pneumo Ag Negative    Legionella Antigen, Urine - Urine, Urine, Clean Catch [[id number]]  (Normal) Collected: 02/26/23 1514    Lab Status: Final result Specimen: Urine, Clean Catch Updated: 02/26/23 2124     LEGIONELLA ANTIGEN, URINE Negative    MRSA Screen, PCR (Inpatient)  - Swab, Nares [[id number]]  (Normal) Collected: 02/26/23 1113    Lab Status: Final result Specimen: Swab from Nares Updated: 02/26/23 1228     MRSA PCR Negative    Narrative:      The negative predictive value of this diagnostic test is high and should only be used to consider de-escalating anti-MRSA therapy. A positive result may indicate colonization with MRSA and must be correlated clinically.  MRSA Negative    COVID PRE-OP / PRE-PROCEDURE SCREENING ORDER (NO ISOLATION) - Swab, Nasopharynx [[id number]]  (Normal) Collected: 02/26/23 0525    Lab Status: Final result Specimen: Swab from Nasopharynx Updated: 02/26/23 0637    Narrative:      The following orders were created for panel order COVID PRE-OP / PRE-PROCEDURE SCREENING ORDER (NO ISOLATION) - Swab, Nasopharynx.  Procedure                               Abnormality         Status                     ---------                               -----------         ------                     Respiratory Panel PCR w/...[[id number]]  Normal              Final result                 Please view results for these tests on the individual orders.    Respiratory Panel PCR w/COVID-19(SARS-CoV-2) KRYSTIAN/ANISHA/LEIDA/PAD/COR/MAD/JAILENE In-House, NP Swab in UTM/VTM, 3-4 HR TAT - Swab, Nasopharynx [[id number]]  (Normal) Collected: 02/26/23 0525    Lab Status: Final result Specimen: Swab from Nasopharynx Updated: 02/26/23 0637     ADENOVIRUS, PCR Not Detected     Coronavirus 229E Not Detected     Coronavirus HKU1 Not Detected     Coronavirus NL63 Not Detected     Coronavirus OC43 Not Detected     COVID19 Not Detected     Human Metapneumovirus Not Detected     Human Rhinovirus/Enterovirus Not Detected     Influenza A PCR Not Detected     Influenza B PCR Not Detected     Parainfluenza Virus 1 Not Detected     Parainfluenza Virus 2 Not Detected     Parainfluenza Virus 3 Not Detected     Parainfluenza Virus 4 Not Detected     RSV, PCR Not Detected     Bordetella pertussis pcr Not Detected      Bordetella parapertussis PCR Not Detected     Chlamydophila pneumoniae PCR Not Detected     Mycoplasma pneumo by PCR Not Detected    Narrative:      In the setting of a positive respiratory panel with a viral infection PLUS a negative procalcitonin without other underlying concern for bacterial infection, consider observing off antibiotics or discontinuation of antibiotics and continue supportive care. If the respiratory panel is positive for atypical bacterial infection (Bordetella pertussis, Chlamydophila pneumoniae, or Mycoplasma pneumoniae), consider antibiotic de-escalation to target atypical bacterial infection.          Adult Transthoracic Echo Complete w/ Color, Spectral and Contrast if Necessary Per Protocol    Result Date: 2/26/2023  •  Left ventricular ejection fraction appears to be 61 - 65%. •  Left ventricular diastolic function was normal. •  Left atrial volume is moderately increased. •  Moderate tricuspid valve regurgitation is present. •  Estimated right ventricular systolic pressure from tricuspid regurgitation is moderately elevated (45-55 mmHg). Calculated right ventricular systolic pressure from tricuspid regurgitation is 47 mmHg.     XR Chest 1 View    Result Date: 2/26/2023  EXAMINATION: XR CHEST 1 VW  DATE: 2/26/2023 6:00 AM INDICATIONS: SOA triage protocol. COMPARISON: Image from chest radiograph dated 4/7/2018. Report not available at time of dictation. FINDINGS: Evaluation somewhat limited by patient body habitus. Stable elevation of the right hemidiaphragm. Nonspecific mild diffuse interstitial prominence. Mild bibasilar opacities suggest atelectasis, but infiltrate not excluded. No sizable effusion or pneumothorax. Atherosclerotic calcifications of the aortic arch. Cardiac and mediastinal silhouette otherwise unremarkable. No acute osseous abnormality identified on single frontal view.     Impression: 1.  Nonspecific interstitial prominence could reflect chronic changes, but mild edema  or atypical infectious/inflammatory process can also appear this way in the correct clinical setting. 2.  Mild bibasilar opacities suggest atelectasis, but infiltrate or edema not excluded.  Electronically signed by:  Nikita Keller D.O.  2/26/2023 4:47 AM Mountain Time    CT Angiogram Chest    Addendum Date: 2/26/2023    ADDENDUM #1 Additional impression point: Narrowing of the lower trachea and proximal mainstem bronchi may represent tracheomalacia. Electronically Signed: Son Bella  2/26/2023 9:04 AM EST  Workstation ID: [id number] REPORT  CT ANGIOGRAM CHEST Date of Exam: 2/26/2023 7:03 AM EST Indication: Sepsis, hypoxia, tachycardia, dimer. Comparison: CTA chest 6/18/2013 Technique: CTA of the chest was performed after the uneventful intravenous administration of 65 mL Isovue-370. Reconstructed coronal and sagittal images were also obtained. In addition, a 3-D volume rendered image was created for interpretation. Automated exposure control and iterative reconstruction methods were used. Findings: Motion degraded exam. Pulmonary arteries: No evidence of pulmonary embolus. Normal main pulmonary artery diameter. Heart and pericardium:No flattening of the interventricular septum. Coronary artery calcification is seen. No substantial pericardial effusion. Vessels:Normal caliber aorta. Atherosclerotic calcification of the aorta. No evidence of acute aortic injury on this nondedicated exam. Venous structures including the superior vena cava and inferior vena cava appear grossly patent. Mediastinum:Unremarkable appearance of the esophagus. Few enlarged mediastinal lymph nodes including a right paratracheal node measuring 13 mm in short axis (image 29). No anterior mediastinal masses. Lower neck:No suspicious or enlarged supraclavicular or axillary lymphadenopathy. Diminutive appearance of the thyroid. Pulmonary parenchyma:No evidence of pulmonary infarct. Muscular spine nodules. Few foci of consolidative  opacities seen in the right upper lobe. Scattered calcified granulomas. Bilateral dependent atelectasis. Pleura: Trace bilateral pleural effusions. No pneumothorax. Airways: Narrowing of the lower trachea and proximal mainstem bronchi may represent tracheomalacia. Diffuse bronchial wall thickening. Chest wall and bones:No acute osseous abnormality. Degenerative changes of thoracic spine. Unremarkable appearance of soft tissues. Upper abdomen:No lesion seen within the visualized liver. Upper abdomen is unremarkable. Small hiatal hernia. Impression: Mildly motion degraded exam. No evidence of pulmonary embolus. Few foci of consolidative opacity in the right upper lobe may represent infection. Few mildly enlarged mediastinal lymph nodes may be reactive. Trace bilateral pleural effusions. Electronically Signed: Son Bella  2/26/2023 8:17 AM EST  Workstation ID: [id number]    Result Date: 2/26/2023  CT ANGIOGRAM CHEST Date of Exam: 2/26/2023 7:03 AM EST Indication: Sepsis, hypoxia, tachycardia, dimer. Comparison: CTA chest 6/18/2013 Technique: CTA of the chest was performed after the uneventful intravenous administration of 65 mL Isovue-370. Reconstructed coronal and sagittal images were also obtained. In addition, a 3-D volume rendered image was created for interpretation. Automated exposure control and iterative reconstruction methods were used. Findings: Motion degraded exam. Pulmonary arteries: No evidence of pulmonary embolus. Normal main pulmonary artery diameter. Heart and pericardium:No flattening of the interventricular septum. Coronary artery calcification is seen. No substantial pericardial effusion. Vessels:Normal caliber aorta. Atherosclerotic calcification of the aorta. No evidence of acute aortic injury on this nondedicated exam. Venous structures including the superior vena cava and inferior vena cava appear grossly patent. Mediastinum:Unremarkable appearance of the esophagus. Few enlarged mediastinal  lymph nodes including a right paratracheal node measuring 13 mm in short axis (image 29). No anterior mediastinal masses. Lower neck:No suspicious or enlarged supraclavicular or axillary lymphadenopathy. Diminutive appearance of the thyroid. Pulmonary parenchyma:No evidence of pulmonary infarct. Muscular spine nodules. Few foci of consolidative opacities seen in the right upper lobe. Scattered calcified granulomas. Bilateral dependent atelectasis. Pleura: Trace bilateral pleural effusions. No pneumothorax. Airways: Narrowing of the lower trachea and proximal mainstem bronchi may represent tracheomalacia. Diffuse bronchial wall thickening. Chest wall and bones:No acute osseous abnormality. Degenerative changes of thoracic spine. Unremarkable appearance of soft tissues. Upper abdomen:No lesion seen within the visualized liver. Upper abdomen is unremarkable. Small hiatal hernia.     Impression: Impression: Mildly motion degraded exam. No evidence of pulmonary embolus. Few foci of consolidative opacity in the right upper lobe may represent infection. Few mildly enlarged mediastinal lymph nodes may be reactive. Trace bilateral pleural effusions. Electronically Signed: Son Bella  2/26/2023 8:17 AM EST  Workstation ID: [id number]      Results for orders placed during the hospital encounter of 02/26/23    Adult Transthoracic Echo Complete w/ Color, Spectral and Contrast if Necessary Per Protocol    Interpretation Summary  •  Left ventricular ejection fraction appears to be 61 - 65%.  •  Left ventricular diastolic function was normal.  •  Left atrial volume is moderately increased.  •  Moderate tricuspid valve regurgitation is present.  •  Estimated right ventricular systolic pressure from tricuspid regurgitation is moderately elevated (45-55 mmHg). Calculated right ventricular systolic pressure from tricuspid regurgitation is 47 mmHg.      Current medications:  Scheduled Meds:atorvastatin, 20 mg, Oral,  Nightly  citalopram, 20 mg, Oral, Daily  donepezil, 10 mg, Oral, Nightly  heparin (porcine), 5,000 Units, Subcutaneous, Q8H  ipratropium-albuterol, 3 mL, Nebulization, Once  ipratropium-albuterol, 3 mL, Nebulization, 4x Daily - RT  levothyroxine, 50 mcg, Oral, Q AM  magnesium sulfate, 4 g, Intravenous, Once  memantine, 5 mg, Oral, Daily  methylPREDNISolone sodium succinate, 40 mg, Intravenous, Q12H  metoprolol tartrate, 50 mg, Oral, Q12H  montelukast, 10 mg, Oral, Nightly  pantoprazole, 40 mg, Oral, Q AM  polyethylene glycol, 17 g, Oral, Daily  QUEtiapine, 25 mg, Oral, Daily  QUEtiapine, 75 mg, Oral, Nightly  senna-docusate sodium, 2 tablet, Oral, BID  sodium chloride, 10 mL, Intravenous, Q12H  tamsulosin, 0.4 mg, Oral, Daily      Continuous Infusions:   PRN Meds:.•  acetaminophen  •  Calcium Replacement - Initiate Nurse / BPA Driven Protocol  •  diphenhydrAMINE  •  labetalol  •  Magnesium Standard Dose Replacement - Initiate Nurse / BPA Driven Protocol  •  ondansetron  •  Phosphorus Replacement - Initiate Nurse / BPA Driven Protocol  •  Potassium Replacement - Initiate Nurse / BPA Driven Protocol  •  sodium chloride  •  sodium chloride  •  sodium chloride  •  ziprasidone    Assessment & Plan   Assessment & Plan     Active Hospital Problems    Diagnosis  POA   • **Sepsis ([facility]) [A41.9]  Yes   • Atrial fibrillation ([facility]) [I48.91]  Yes   • Leukocytosis [D72.829]  Yes   • Hypomagnesemia [E83.42]  Yes   • Elevated brain natriuretic peptide (BNP) level [R79.89]  Yes   • Proteinuria [R80.9]  Yes   • Bilateral Pleural effusions [J90]  Yes   • Pneumonia [J18.9]  Yes   • Respiratory failure ([facility]) [J96.90]  Yes   • Elevated alkaline phosphatase level [R74.8]  Yes   • Sepsis, due to unspecified organism, unspecified whether acute organ dysfunction present ([facility]) [A41.9]  Yes   • Late onset Alzheimer's disease with behavioral disturbance (HCC) [G30.1, F02.818]  Yes      Resolved Hospital Problems   No resolved problems to  display.        Brief Hospital Course to date:  Dorota Membreno is a 89 y.o. female w/ hx alzheimers dementia, giant cell arteritis (2000), asthma, htn, hl, hypothyroidism, anxiety, remote colon cancer (s/p resection at age 37) who presented to [facility] ED via ems on 2/26/23 after being found down at nursing facility and was found to be hypoxic (sats 80s) w/ tachycardia. In ED temp 99.9, hr 138, sbp 89/79, sats 83%. Was placed on nonrebreather mask and initiated on esmolol dripl and iv fluids. Bp improved w/ fluids and was weaned to nasal canula. Troponins were mildly elevated and flat. Respiratory pcr was negative. U/a not compelling for uti. D-dimer mildly elevated at 1.45, probnp 1095, wbc 16,260. Cta chest revealed mildly motion degraded exam with no evidence of pulmonary embolish and few foci of consolidative opacity right upper lobe, few mildly enlarged mediastinal lymph notes (possibly reactive) and trace bilateral pleural effusions, some narrowing of lower trachea and proximal mainstem bronchi possibly representing tracheomalacia. Initiated on cefepime and vanc empirically by intensivist service and admitted to telemetry floor as patient had clinically improved. hospitalist service assumed care on 2/27/23    This patient's problems and plans were partially entered by my partner and updated as appropriate by me 03/04/23.        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  - currently on 5L NC    New onset afib w/ rvr (converted back to nsr)  -echo: ef 61-65%, diastolic dysfunction, mod tr w/ ervsp moderately elevated 45-55mmhg  -tsh ok  -weaned off esmolol  -increased metoprolol to 50mg bid for rate control      Urinary retention  -u/a benign  -flomax; bladder scans and in/out cath prn    Alzheimers dementia, severe  Agitation  -continue home memantine and aricept  -increase night seroquel to 75mg nightly. Continue seroquel 25mg po qam  -continue prn geodon/benadryl  -continue celexa  -soft wrist restraints currently,    Debility  -pt/ot evals pending    Hypothyroidism  -tsh 6.0  -continue synthroid    Goals/limits  Palliative consulted.     Attempted to call family but no answer     Expected Discharge Location and Transportation: facility  Expected Discharge   Expected Discharge Date and Time     Expected Discharge Date Expected Discharge Time    Mar 6, 2023          DVT prophylaxis:  Medical and mechanical DVT prophylaxis orders are present. sq heparin    AM-PAC 6 Clicks Score (PT): 15 (03/03/23 0800)    CODE STATUS:   Code Status and Medical Interventions:   Ordered at: 02/27/23 1522     Medical Intervention Limits:    NO intubation (DNI)    NO artificial nutrition    NO dialysis    NO vasopressors    Other     Code Status (Patient has no pulse and is not breathing):    No CPR (Do Not Attempt to Resuscitate)     Medical Interventions (Patient has pulse or is breathing):    Limited Support     Additional Medical Interventions Limits:    no invasive procedures, no icu transfer     Release to patient:    Routine Release       Rina Vidales, DO  03/04/23

## 2023-03-04 NOTE — PLAN OF CARE
Goal Outcome Evaluation:  Plan of Care Reviewed With: patient        Progress: improving  Outcome Evaluation: pleasantly confused overnight, very cooperative; VSS, no changes to HFNC; Upper limb restraints remain in place due to patient frequently pulling on viera cather; turned q2h; CHG completed, viera cath care completed twice; large BM, skin care completed; no changes to report from overnight; will continue POC      Problem: Adult Inpatient Plan of Care  Goal: Absence of Hospital-Acquired Illness or Injury  Intervention: Prevent Skin Injury  Recent Flowsheet Documentation  Taken 3/4/2023 0407 by Binh Mott RN  Body Position:   turned   supine   neutral head position   neutral body alignment  Skin Protection:   adhesive use limited   incontinence pads utilized   tubing/devices free from skin contact   transparent dressing maintained   skin-to-device areas padded  Taken 3/4/2023 0200 by Binh Mott RN  Body Position:   turned   left  Skin Protection:   adhesive use limited   incontinence pads utilized   tubing/devices free from skin contact   transparent dressing maintained   skin-to-device areas padded  Taken 3/4/2023 0000 by Binh Mott RN  Body Position:   turned   right  Skin Protection:   adhesive use limited   incontinence pads utilized   transparent dressing maintained   tubing/devices free from skin contact   skin-to-device areas padded  Taken 3/3/2023 2200 by Binh Mott RN  Body Position:   turned   supine  Skin Protection:   adhesive use limited   incontinence pads utilized   transparent dressing maintained   tubing/devices free from skin contact   skin-to-device areas padded  Taken 3/3/2023 2030 by Binh Mott RN  Body Position:   turned   left  Skin Protection:   adhesive use limited   incontinence pads utilized   transparent dressing maintained   tubing/devices free from skin contact   skin-to-device areas padded   skin sealant/moisture barrier applied

## 2023-03-05 PROCEDURE — 94761 N-INVAS EAR/PLS OXIMETRY MLT: CPT

## 2023-03-05 PROCEDURE — 25010000002 METHYLPREDNISOLONE PER 40 MG: Performed by: INTERNAL MEDICINE

## 2023-03-05 PROCEDURE — 25010000002 METHYLPREDNISOLONE PER 40 MG: Performed by: FAMILY MEDICINE

## 2023-03-05 PROCEDURE — 25010000002 ZIPRASIDONE MESYLATE PER 10 MG: Performed by: INTERNAL MEDICINE

## 2023-03-05 PROCEDURE — 99231 SBSQ HOSP IP/OBS SF/LOW 25: CPT | Performed by: FAMILY MEDICINE

## 2023-03-05 PROCEDURE — 25010000002 DIPHENHYDRAMINE PER 50 MG: Performed by: INTERNAL MEDICINE

## 2023-03-05 PROCEDURE — 94799 UNLISTED PULMONARY SVC/PX: CPT

## 2023-03-05 PROCEDURE — 97116 GAIT TRAINING THERAPY: CPT

## 2023-03-05 PROCEDURE — 97530 THERAPEUTIC ACTIVITIES: CPT

## 2023-03-05 PROCEDURE — 25010000002 HEPARIN (PORCINE) PER 1000 UNITS: Performed by: INTERNAL MEDICINE

## 2023-03-05 RX ORDER — METHYLPREDNISOLONE SODIUM SUCCINATE 40 MG/ML
20 INJECTION, POWDER, LYOPHILIZED, FOR SOLUTION INTRAMUSCULAR; INTRAVENOUS EVERY 12 HOURS
Status: COMPLETED | OUTPATIENT
Start: 2023-03-05 | End: 2023-03-06

## 2023-03-05 RX ADMIN — METHYLPREDNISOLONE SODIUM SUCCINATE 20 MG: 40 INJECTION, POWDER, FOR SOLUTION INTRAMUSCULAR; INTRAVENOUS at 16:55

## 2023-03-05 RX ADMIN — POLYETHYLENE GLYCOL 3350 17 G: 17 POWDER, FOR SOLUTION ORAL at 08:16

## 2023-03-05 RX ADMIN — MONTELUKAST 10 MG: 10 TABLET, FILM COATED ORAL at 20:09

## 2023-03-05 RX ADMIN — Medication 10 ML: at 20:12

## 2023-03-05 RX ADMIN — PANTOPRAZOLE SODIUM 40 MG: 40 TABLET, DELAYED RELEASE ORAL at 05:55

## 2023-03-05 RX ADMIN — LEVOTHYROXINE SODIUM 50 MCG: 50 TABLET ORAL at 05:55

## 2023-03-05 RX ADMIN — SENNOSIDES AND DOCUSATE SODIUM 2 TABLET: 8.6; 5 TABLET ORAL at 20:09

## 2023-03-05 RX ADMIN — METOPROLOL TARTRATE 50 MG: 50 TABLET, FILM COATED ORAL at 20:09

## 2023-03-05 RX ADMIN — HEPARIN SODIUM 5000 UNITS: 5000 INJECTION INTRAVENOUS; SUBCUTANEOUS at 22:10

## 2023-03-05 RX ADMIN — METHYLPREDNISOLONE SODIUM SUCCINATE 40 MG: 40 INJECTION, POWDER, FOR SOLUTION INTRAMUSCULAR; INTRAVENOUS at 02:06

## 2023-03-05 RX ADMIN — Medication 10 ML: at 08:17

## 2023-03-05 RX ADMIN — ATORVASTATIN CALCIUM 20 MG: 20 TABLET, FILM COATED ORAL at 20:09

## 2023-03-05 RX ADMIN — TAMSULOSIN HYDROCHLORIDE 0.4 MG: 0.4 CAPSULE ORAL at 08:16

## 2023-03-05 RX ADMIN — MEMANTINE 5 MG: 10 TABLET ORAL at 08:16

## 2023-03-05 RX ADMIN — IPRATROPIUM BROMIDE AND ALBUTEROL SULFATE 3 ML: 2.5; .5 SOLUTION RESPIRATORY (INHALATION) at 12:30

## 2023-03-05 RX ADMIN — METOPROLOL TARTRATE 50 MG: 50 TABLET, FILM COATED ORAL at 08:20

## 2023-03-05 RX ADMIN — QUETIAPINE FUMARATE 25 MG: 25 TABLET ORAL at 08:16

## 2023-03-05 RX ADMIN — DIPHENHYDRAMINE HYDROCHLORIDE 25 MG: 50 INJECTION INTRAMUSCULAR; INTRAVENOUS at 15:20

## 2023-03-05 RX ADMIN — HEPARIN SODIUM 5000 UNITS: 5000 INJECTION INTRAVENOUS; SUBCUTANEOUS at 13:24

## 2023-03-05 RX ADMIN — CITALOPRAM HYDROBROMIDE 20 MG: 20 TABLET ORAL at 08:16

## 2023-03-05 RX ADMIN — IPRATROPIUM BROMIDE AND ALBUTEROL SULFATE 3 ML: 2.5; .5 SOLUTION RESPIRATORY (INHALATION) at 19:40

## 2023-03-05 RX ADMIN — QUETIAPINE FUMARATE 75 MG: 25 TABLET ORAL at 20:09

## 2023-03-05 RX ADMIN — IPRATROPIUM BROMIDE AND ALBUTEROL SULFATE 3 ML: 2.5; .5 SOLUTION RESPIRATORY (INHALATION) at 16:23

## 2023-03-05 RX ADMIN — SENNOSIDES AND DOCUSATE SODIUM 2 TABLET: 8.6; 5 TABLET ORAL at 08:15

## 2023-03-05 RX ADMIN — IPRATROPIUM BROMIDE AND ALBUTEROL SULFATE 3 ML: 2.5; .5 SOLUTION RESPIRATORY (INHALATION) at 09:53

## 2023-03-05 RX ADMIN — ZIPRASIDONE MESYLATE 5 MG: 20 INJECTION, POWDER, LYOPHILIZED, FOR SOLUTION INTRAMUSCULAR at 15:20

## 2023-03-05 RX ADMIN — HEPARIN SODIUM 5000 UNITS: 5000 INJECTION INTRAVENOUS; SUBCUTANEOUS at 05:55

## 2023-03-05 RX ADMIN — DONEPEZIL HYDROCHLORIDE 10 MG: 10 TABLET, FILM COATED ORAL at 20:09

## 2023-03-05 NOTE — PROGRESS NOTES
[facility] Medicine Services  PROGRESS NOTE    Patient Name: Dorota Membreno  : 10/31/1931  MRN: [id number]    Date of Admission: 2023  Primary Care Physician: Kylie Zapata DO    Subjective   Subjective     CC:  resp failure, pneumonia, afib, dementia    HPI:  Pt resting comfortably in bed  No acute events over night  Still in soft restraints     ROS:  Unable to assess     Objective   Objective     Vital Signs:   Temp:  [97.6 °F (36.4 °C)-98.9 °F (37.2 °C)] 98.3 °F (36.8 °C)  Heart Rate:  [] 77  Resp:  [14-20] 20  BP: (153-179)/() 159/74  Flow (L/min):  [4-5] 4    Physical Exam:  Constitutional: No acute distress, awake, alert  HENT: NCAT, mucous membranes moist  Respiratory: Clear to auscultation bilaterally, respiratory effort normal 5L NC  Cardiovascular: RRR, no murmurs, rubs, or gallops  Gastrointestinal:, nondistended  Musculoskeletal: No bilateral ankle edema  Psychiatric: Appropriate affect, cooperative  Neurologic: oriented to self   Skin: No rashes        Results Reviewed:  LAB RESULTS:      Lab 23  0617 23  0700 23  0659 23  1145   WBC 16.96* 19.46* 15.97* 16.92*   HEMOGLOBIN 13.2 12.5 11.6* 11.3*   HEMATOCRIT 39.0 36.3 37.1 37.0   PLATELETS 318 342 290 245   MCV 92.9 91.2 102.5* 101.9*         Lab 23  0617 23  0700 23  0659 23  2211 23  1145   SODIUM 140 143 143  --  140   POTASSIUM 3.8 3.9 4.2  --  3.9   CHLORIDE 99 102 106  --  106   CO2 27.0 26.0 21.0*  --  17.0*   ANION GAP 14.0 15.0 16.0*  --  17.0*   BUN 21 23 31*  --  32*   CREATININE 0.62 0.66 0.63  --  0.61   EGFR 84.2 82.9 83.9  --  84.5   GLUCOSE 117* 103* 129*  --  129*   CALCIUM 8.8 8.6 8.7  --  8.2   IONIZED CALCIUM  --   --   --   --  1.21   MAGNESIUM 1.8 2.6* 1.8  --  1.8   PHOSPHORUS  --   --  2.8 2.3* 1.7*         Lab 23  0617   TOTAL PROTEIN 6.5   ALBUMIN 3.5   GLOBULIN 3.0   ALT (SGPT) 36*   AST (SGOT) 26   BILIRUBIN 0.4    ALK PHOS 143*                     Brief Urine Lab Results  (Last result in the past 365 days)      Color   Clarity   Blood   Leuk Est   Nitrite   Protein   CREAT   Urine HCG        02/26/23 0600 Yellow   Clear   Negative   Negative   Negative   Trace                 Microbiology Results Abnormal     Procedure Component Value - Date/Time    Blood Culture - Blood, Arm, Right [[id number]]  (Normal) Collected: 02/26/23 0525    Lab Status: Final result Specimen: Blood from Arm, Right Updated: 03/03/23 0730     Blood Culture No growth at 5 days    Blood Culture - Blood, Arm, Right [[id number]]  (Normal) Collected: 02/26/23 0525    Lab Status: Final result Specimen: Blood from Arm, Right Updated: 03/03/23 0730     Blood Culture No growth at 5 days    S. Pneumo Ag Urine or CSF - Urine, Urine, Clean Catch [[id number]]  (Normal) Collected: 02/26/23 1514    Lab Status: Final result Specimen: Urine, Clean Catch Updated: 02/26/23 2125     Strep Pneumo Ag Negative    Legionella Antigen, Urine - Urine, Urine, Clean Catch [[id number]]  (Normal) Collected: 02/26/23 1514    Lab Status: Final result Specimen: Urine, Clean Catch Updated: 02/26/23 2124     LEGIONELLA ANTIGEN, URINE Negative    MRSA Screen, PCR (Inpatient) - Swab, Nares [[id number]]  (Normal) Collected: 02/26/23 1113    Lab Status: Final result Specimen: Swab from Nares Updated: 02/26/23 1228     MRSA PCR Negative    Narrative:      The negative predictive value of this diagnostic test is high and should only be used to consider de-escalating anti-MRSA therapy. A positive result may indicate colonization with MRSA and must be correlated clinically.  MRSA Negative    COVID PRE-OP / PRE-PROCEDURE SCREENING ORDER (NO ISOLATION) - Swab, Nasopharynx [[id number]]  (Normal) Collected: 02/26/23 0525    Lab Status: Final result Specimen: Swab from Nasopharynx Updated: 02/26/23 0637    Narrative:      The following orders were created for panel order COVID PRE-OP / PRE-PROCEDURE  SCREENING ORDER (NO ISOLATION) - Swab, Nasopharynx.  Procedure                               Abnormality         Status                     ---------                               -----------         ------                     Respiratory Panel PCR w/...[[id number]]  Normal              Final result                 Please view results for these tests on the individual orders.    Respiratory Panel PCR w/COVID-19(SARS-CoV-2) KRYSTIAN/ANISHA/LEIDA/PAD/COR/MAD/JAILENE In-House, NP Swab in UTM/VTM, 3-4 HR TAT - Swab, Nasopharynx [[id number]]  (Normal) Collected: 02/26/23 0525    Lab Status: Final result Specimen: Swab from Nasopharynx Updated: 02/26/23 0637     ADENOVIRUS, PCR Not Detected     Coronavirus 229E Not Detected     Coronavirus HKU1 Not Detected     Coronavirus NL63 Not Detected     Coronavirus OC43 Not Detected     COVID19 Not Detected     Human Metapneumovirus Not Detected     Human Rhinovirus/Enterovirus Not Detected     Influenza A PCR Not Detected     Influenza B PCR Not Detected     Parainfluenza Virus 1 Not Detected     Parainfluenza Virus 2 Not Detected     Parainfluenza Virus 3 Not Detected     Parainfluenza Virus 4 Not Detected     RSV, PCR Not Detected     Bordetella pertussis pcr Not Detected     Bordetella parapertussis PCR Not Detected     Chlamydophila pneumoniae PCR Not Detected     Mycoplasma pneumo by PCR Not Detected    Narrative:      In the setting of a positive respiratory panel with a viral infection PLUS a negative procalcitonin without other underlying concern for bacterial infection, consider observing off antibiotics or discontinuation of antibiotics and continue supportive care. If the respiratory panel is positive for atypical bacterial infection (Bordetella pertussis, Chlamydophila pneumoniae, or Mycoplasma pneumoniae), consider antibiotic de-escalation to target atypical bacterial infection.          Adult Transthoracic Echo Complete w/ Color, Spectral and Contrast if Necessary Per  Protocol    Result Date: 2/26/2023  •  Left ventricular ejection fraction appears to be 61 - 65%. •  Left ventricular diastolic function was normal. •  Left atrial volume is moderately increased. •  Moderate tricuspid valve regurgitation is present. •  Estimated right ventricular systolic pressure from tricuspid regurgitation is moderately elevated (45-55 mmHg). Calculated right ventricular systolic pressure from tricuspid regurgitation is 47 mmHg.     XR Chest 1 View    Result Date: 2/26/2023  EXAMINATION: XR CHEST 1 VW  DATE: 2/26/2023 6:00 AM INDICATIONS: SOA triage protocol. COMPARISON: Image from chest radiograph dated 4/7/2018. Report not available at time of dictation. FINDINGS: Evaluation somewhat limited by patient body habitus. Stable elevation of the right hemidiaphragm. Nonspecific mild diffuse interstitial prominence. Mild bibasilar opacities suggest atelectasis, but infiltrate not excluded. No sizable effusion or pneumothorax. Atherosclerotic calcifications of the aortic arch. Cardiac and mediastinal silhouette otherwise unremarkable. No acute osseous abnormality identified on single frontal view.     Impression: 1.  Nonspecific interstitial prominence could reflect chronic changes, but mild edema or atypical infectious/inflammatory process can also appear this way in the correct clinical setting. 2.  Mild bibasilar opacities suggest atelectasis, but infiltrate or edema not excluded.  Electronically signed by:  Nikita Keller D.O.  2/26/2023 4:47 AM Mountain Time    CT Angiogram Chest    Addendum Date: 2/26/2023    ADDENDUM #1 Additional impression point: Narrowing of the lower trachea and proximal mainstem bronchi may represent tracheomalacia. Electronically Signed: Son Bella  2/26/2023 9:04 AM EST  Workstation ID: [id number] REPORT  CT ANGIOGRAM CHEST Date of Exam: 2/26/2023 7:03 AM EST Indication: Sepsis, hypoxia, tachycardia, dimer. Comparison: CTA chest 6/18/2013 Technique: CTA of the  chest was performed after the uneventful intravenous administration of 65 mL Isovue-370. Reconstructed coronal and sagittal images were also obtained. In addition, a 3-D volume rendered image was created for interpretation. Automated exposure control and iterative reconstruction methods were used. Findings: Motion degraded exam. Pulmonary arteries: No evidence of pulmonary embolus. Normal main pulmonary artery diameter. Heart and pericardium:No flattening of the interventricular septum. Coronary artery calcification is seen. No substantial pericardial effusion. Vessels:Normal caliber aorta. Atherosclerotic calcification of the aorta. No evidence of acute aortic injury on this nondedicated exam. Venous structures including the superior vena cava and inferior vena cava appear grossly patent. Mediastinum:Unremarkable appearance of the esophagus. Few enlarged mediastinal lymph nodes including a right paratracheal node measuring 13 mm in short axis (image 29). No anterior mediastinal masses. Lower neck:No suspicious or enlarged supraclavicular or axillary lymphadenopathy. Diminutive appearance of the thyroid. Pulmonary parenchyma:No evidence of pulmonary infarct. Muscular spine nodules. Few foci of consolidative opacities seen in the right upper lobe. Scattered calcified granulomas. Bilateral dependent atelectasis. Pleura: Trace bilateral pleural effusions. No pneumothorax. Airways: Narrowing of the lower trachea and proximal mainstem bronchi may represent tracheomalacia. Diffuse bronchial wall thickening. Chest wall and bones:No acute osseous abnormality. Degenerative changes of thoracic spine. Unremarkable appearance of soft tissues. Upper abdomen:No lesion seen within the visualized liver. Upper abdomen is unremarkable. Small hiatal hernia. Impression: Mildly motion degraded exam. No evidence of pulmonary embolus. Few foci of consolidative opacity in the right upper lobe may represent infection. Few mildly enlarged  mediastinal lymph nodes may be reactive. Trace bilateral pleural effusions. Electronically Signed: Son Bella  2/26/2023 8:17 AM EST  Workstation ID: [id number]    Result Date: 2/26/2023  CT ANGIOGRAM CHEST Date of Exam: 2/26/2023 7:03 AM EST Indication: Sepsis, hypoxia, tachycardia, dimer. Comparison: CTA chest 6/18/2013 Technique: CTA of the chest was performed after the uneventful intravenous administration of 65 mL Isovue-370. Reconstructed coronal and sagittal images were also obtained. In addition, a 3-D volume rendered image was created for interpretation. Automated exposure control and iterative reconstruction methods were used. Findings: Motion degraded exam. Pulmonary arteries: No evidence of pulmonary embolus. Normal main pulmonary artery diameter. Heart and pericardium:No flattening of the interventricular septum. Coronary artery calcification is seen. No substantial pericardial effusion. Vessels:Normal caliber aorta. Atherosclerotic calcification of the aorta. No evidence of acute aortic injury on this nondedicated exam. Venous structures including the superior vena cava and inferior vena cava appear grossly patent. Mediastinum:Unremarkable appearance of the esophagus. Few enlarged mediastinal lymph nodes including a right paratracheal node measuring 13 mm in short axis (image 29). No anterior mediastinal masses. Lower neck:No suspicious or enlarged supraclavicular or axillary lymphadenopathy. Diminutive appearance of the thyroid. Pulmonary parenchyma:No evidence of pulmonary infarct. Muscular spine nodules. Few foci of consolidative opacities seen in the right upper lobe. Scattered calcified granulomas. Bilateral dependent atelectasis. Pleura: Trace bilateral pleural effusions. No pneumothorax. Airways: Narrowing of the lower trachea and proximal mainstem bronchi may represent tracheomalacia. Diffuse bronchial wall thickening. Chest wall and bones:No acute osseous abnormality. Degenerative changes  of thoracic spine. Unremarkable appearance of soft tissues. Upper abdomen:No lesion seen within the visualized liver. Upper abdomen is unremarkable. Small hiatal hernia.     Impression: Impression: Mildly motion degraded exam. No evidence of pulmonary embolus. Few foci of consolidative opacity in the right upper lobe may represent infection. Few mildly enlarged mediastinal lymph nodes may be reactive. Trace bilateral pleural effusions. Electronically Signed: Son RicksShayne  2/26/2023 8:17 AM EST  Workstation ID: [id number]      Results for orders placed during the hospital encounter of 02/26/23    Adult Transthoracic Echo Complete w/ Color, Spectral and Contrast if Necessary Per Protocol    Interpretation Summary  •  Left ventricular ejection fraction appears to be 61 - 65%.  •  Left ventricular diastolic function was normal.  •  Left atrial volume is moderately increased.  •  Moderate tricuspid valve regurgitation is present.  •  Estimated right ventricular systolic pressure from tricuspid regurgitation is moderately elevated (45-55 mmHg). Calculated right ventricular systolic pressure from tricuspid regurgitation is 47 mmHg.      Current medications:  Scheduled Meds:atorvastatin, 20 mg, Oral, Nightly  citalopram, 20 mg, Oral, Daily  donepezil, 10 mg, Oral, Nightly  heparin (porcine), 5,000 Units, Subcutaneous, Q8H  ipratropium-albuterol, 3 mL, Nebulization, 4x Daily - RT  levothyroxine, 50 mcg, Oral, Q AM  memantine, 5 mg, Oral, Daily  methylPREDNISolone sodium succinate, 40 mg, Intravenous, Q12H  metoprolol tartrate, 50 mg, Oral, Q12H  montelukast, 10 mg, Oral, Nightly  pantoprazole, 40 mg, Oral, Q AM  polyethylene glycol, 17 g, Oral, Daily  QUEtiapine, 25 mg, Oral, Daily  QUEtiapine, 75 mg, Oral, Nightly  senna-docusate sodium, 2 tablet, Oral, BID  sodium chloride, 10 mL, Intravenous, Q12H  tamsulosin, 0.4 mg, Oral, Daily      Continuous Infusions:   PRN Meds:.•  acetaminophen  •  Calcium Replacement - Initiate  Nurse / BPA Driven Protocol  •  diphenhydrAMINE  •  labetalol  •  Magnesium Standard Dose Replacement - Initiate Nurse / BPA Driven Protocol  •  ondansetron  •  Phosphorus Replacement - Initiate Nurse / BPA Driven Protocol  •  Potassium Replacement - Initiate Nurse / BPA Driven Protocol  •  sodium chloride  •  sodium chloride  •  sodium chloride  •  ziprasidone    Assessment & Plan   Assessment & Plan     Active Hospital Problems    Diagnosis  POA   • **Sepsis (HCC) [A41.9]  Yes   • Atrial fibrillation (HCC) [I48.91]  Yes   • Leukocytosis [D72.829]  Yes   • Hypomagnesemia [E83.42]  Yes   • Elevated brain natriuretic peptide (BNP) level [R79.89]  Yes   • Proteinuria [R80.9]  Yes   • Bilateral Pleural effusions [J90]  Yes   • Pneumonia [J18.9]  Yes   • Respiratory failure (HCC) [J96.90]  Yes   • Elevated alkaline phosphatase level [R74.8]  Yes   • Sepsis, due to unspecified organism, unspecified whether acute organ dysfunction present (HCC) [A41.9]  Yes   • Late onset Alzheimer's disease with behavioral disturbance (HCC) [G30.1, 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        Brief Hospital Course to date:  Dorota Membreno is a 89 y.o. female w/ hx alzheimers dementia, giant cell arteritis (2000), asthma, htn, hl, hypothyroidism, anxiety, remote colon cancer (s/p resection at age 37) who presented to [facility] ED via ems on 2/26/23 after being found down at nursing facility and was found to be hypoxic (sats 80s) w/ tachycardia. In ED temp 99.9, hr 138, sbp 89/79, sats 83%. Was placed on nonrebreather mask and initiated on esmolol dripl and iv fluids. Bp improved w/ fluids and was weaned to nasal canula. Troponins were mildly elevated and flat. Respiratory pcr was negative. U/a not compelling for uti. D-dimer mildly elevated at 1.45, probnp 1095, wbc 16,260. Cta chest revealed mildly motion degraded exam with no evidence of pulmonary embolish and few foci of consolidative opacity right  upper lobe, few mildly enlarged mediastinal lymph notes (possibly reactive) and trace bilateral pleural effusions, some narrowing of lower trachea and proximal mainstem bronchi possibly representing tracheomalacia. Initiated on cefepime and vanc empirically by intensivist service and admitted to telemetry floor as patient had clinically improved. hospitalist service assumed care on 2/27/23    This patient's problems and plans were partially entered by my partner and updated as appropriate by me 03/05/23.        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 (narrowing lower trachea and prox mainstem bronchi on imaging)  Leukocytosis  -admission wbc 16,260, procal negative  -ct angio chest: no pe, rul opacity, few mildly enlarged mediastinal lymph nodes (possibly reactive) and trace effusions   -stop vanc (nasal mrsa pcr negative)  -had worsening respiratory status and agitation 2/27/23, required hi flow canula ~65% fio2) and required geodon and benadryl   -s/p SLP eval 3/27/23: on regular texture, thins   Completed empiric rocephin & doxy empiric rx  - continue duonebs; continue solumedrol 20mg Iv bid,(decreased 3/5)   - currently on 4L NC    New onset afib w/ rvr (converted back to nsr)  -echo: ef 61-65%, diastolic dysfunction, mod tr w/ ervsp moderately elevated 45-55mmhg  -tsh ok  -weaned off esmolol  -increased metoprolol to 50mg bid for rate control      Urinary retention  -u/a benign  -flomax    Alzheimers dementia, severe  Agitation  -continue home memantine and aricept  -increase night seroquel to 75mg nightly. Continue seroquel 25mg po qam  -continue prn geodon/benadryl  -continue celexa  -soft wrist restraints currently,    Debility  -pt/ot evals pending    Hypothyroidism  -tsh 6.0  -continue synthroid    Goals/limits  Palliative consulted. Discussing GOC with daughter         Expected Discharge Location and Transportation:  facility  Expected Discharge   Expected Discharge Date and Time     Expected Discharge Date Expected Discharge Time    Mar 6, 2023          DVT prophylaxis:  Medical and mechanical DVT prophylaxis orders are present. sq heparin    AM-PAC 6 Clicks Score (PT): 11 (03/04/23 2000)    CODE STATUS:   Code Status and Medical Interventions:   Ordered at: 02/27/23 1522     Medical Intervention Limits:    NO intubation (DNI)    NO artificial nutrition    NO dialysis    NO vasopressors    Other     Code Status (Patient has no pulse and is not breathing):    No CPR (Do Not Attempt to Resuscitate)     Medical Interventions (Patient has pulse or is breathing):    Limited Support     Additional Medical Interventions Limits:    no invasive procedures, no icu transfer     Release to patient:    Routine Release       Rina Vidales,   03/05/23

## 2023-03-05 NOTE — PLAN OF CARE
Goal Outcome Evaluation:  Plan of Care Reviewed With: patient        Progress: improving  Outcome Evaluation: Patient requiring less physical assistance for bed mobility, transfers, and ambulation. Continues to demonstrate significant unsteadiness with early fatigue during gait. SpO2 levels maintain > 95% on 5 liters of O2 with activity. Cont IP PT POC.

## 2023-03-05 NOTE — THERAPY TREATMENT NOTE
Patient Name: Dorota Membreno  : 10/31/1931    MRN: [id number]                              Today's Date: 3/5/2023       Admit Date: 2023    Visit Dx:     ICD-10-CM ICD-9-CM   1. Late onset Alzheimer's disease with behavioral disturbance (HCC)  G30.1 331.0    F02.818 294.11   2. Sepsis, due to unspecified organism, unspecified whether acute organ dysfunction present ([facility])  A41.9 038.9     995.91   3. Acute respiratory failure with hypoxia ([facility])  J96.01 518.81   4. Atrial fibrillation with RVR ([facility])  I48.91 427.31   5. Hypotension, unspecified hypotension type  I95.9 458.9     Patient Active Problem List   Diagnosis   • Gastroesophageal reflux disease   • Atopic rhinitis   • Asthma   • Late onset Alzheimer's disease with behavioral disturbance (HCC)   • Eczema   • Dyslipidemia   • Hypothyroidism   • Essential hypertension   • Post-menopausal osteoporosis   • Arteritis (HCC)   • Dizziness   • Edema   • Fatigue   • Shingles   • Syncope and collapse   • Atrial fibrillation (HCC)   • Leukocytosis   • Hypomagnesemia   • Elevated brain natriuretic peptide (BNP) level   • Proteinuria   • Bilateral Pleural effusions   • Pneumonia   • Respiratory failure (HCC)   • Elevated alkaline phosphatase level   • Sepsis (HCC)   • Sepsis, due to unspecified organism, unspecified whether acute organ dysfunction present (HCC)     Past Medical History:   Diagnosis Date   • Acid reflux 2016    stable   • Allergic rhinitis    • Alzheimer's disease ([facility]) 2017   • Arteritis ([facility])     A. 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 B. Improved with prednisone therapy   • Asthma    • Cancer (HCC)     colon. Status post resection at age 37   • Cognitive impairment, mild, so stated     A. Patient has been forgetting names continue med. Has f/u with neuro. She is having some times forgetting directions. She has moved into a new house and this has not helped with directions   • Dyslipidemia     lipids  and cmp   • Hypertension     lipids, cmp, urine micro   • Hypothyroidism     A. On replacement therapy   • Mammogram abnormal     A. 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   • Panic attacks    • Pneumonia    • Postmenopausal osteoporosis    • Shingles 03/2018     Past Surgical History:   Procedure Laterality Date   • CATARACT EXTRACTION     • COLECTOMY PARTIAL / TOTAL      Patient diagnosed with colon cancer at age 37   • HYSTERECTOMY        General Information     Row Name 03/05/23 1054          Physical Therapy Time and Intention    Document Type therapy note (daily note)  -     Mode of Treatment physical therapy  -     Row Name 03/05/23 1054          General Information    Patient Profile Reviewed yes  -LO     Existing Precautions/Restrictions fall;oxygen therapy device and L/min;other (see comments)  dementia, UE retraints for pulling at lines  -     Row Name 03/05/23 1052          Cognition    Orientation Status (Cognition) oriented to;person;disoriented to;place;time;situation;verbal cues/prompts needed for orientation;other (see comments)  -     Row Name 03/05/23 1057          Safety Issues, Functional Mobility    Impairments Affecting Function (Mobility) balance;cognition;coordination;endurance/activity tolerance;postural/trunk control;shortness of breath;strength  -     Cognitive Impairments, Mobility Safety/Performance awareness, need for assistance;insight into deficits/self-awareness;safety precaution awareness;safety precaution follow-through;sequencing abilities  -LO           User Key  (r) = Recorded By, (t) = Taken By, (c) = Cosigned By    Initials Name Provider Type     Cary Zacarias, PT Physical Therapist               Mobility     Row Name 03/05/23 1056          Bed Mobility    Bed Mobility supine-sit;scooting/bridging;sit-supine  -LO     Scooting/Bridging [place] (Bed Mobility) minimum assist (75% patient effort);verbal cues;nonverbal cues (demo/gesture)   -LO     Supine-Sit [place] (Bed Mobility) minimum assist (75% patient effort);verbal cues;nonverbal cues (demo/gesture)  -LO     Sit-Supine [place] (Bed Mobility) minimum assist (75% patient effort);verbal cues;nonverbal cues (demo/gesture)  -LO     Assistive Device (Bed Mobility) bed rails;head of bed elevated  -LO     Comment, (Bed Mobility) less physical assistance required this date, better command following with vc for sequencing and use of BR  -LO     Row Name 03/05/23 1055          Transfers    Comment, (Transfers) EOB>FWW>EOB x 2 with vc for HP  -LO     Row Name 03/05/23 1055          Bed-Chair Transfer    Bed-Chair [place] (Transfers) not tested  -LO     Row Name 03/05/23 1055          Sit-Stand Transfer    Sit-Stand [place] (Transfers) minimum assist (75% patient effort);verbal cues;nonverbal cues (demo/gesture)  -LO     Assistive Device (Sit-Stand Transfers) walker, front-wheeled  -LO     Row Name 03/05/23 1055          Gait/Stairs (Locomotion)    [place] Level (Gait) moderate assist (50% patient effort)  -LO     Assistive Device (Gait) walker, front-wheeled  -LO     Distance in Feet (Gait) 3  -LO     Deviations/Abnormal Patterns (Gait) bilateral deviations;annette decreased;gait speed decreased;stride length decreased;weight shifting decreased  -LO     Bilateral Gait Deviations forward flexed posture;heel strike decreased  -LO     Comment, (Gait/Stairs) Very unsteady and fearful with gait with FWW. SpO2 levels maintain > 95% on 5 liters of O2  -LO           User Key  (r) = Recorded By, (t) = Taken By, (c) = Cosigned By    Initials Name Provider Type    Cary Wiley PT Physical Therapist               Obj/Interventions     Row Name 03/05/23 1058          Motor Skills    Coordination gross motor deficit;lower extremity;moderate impairment  -LO     Functional Endurance fair  -LO     Row Name 03/05/23 1058          Balance    Balance Assessment sitting static balance;sitting  dynamic balance;standing static balance;standing dynamic balance  -LO     Static Sitting Balance standby assist  -LO     Dynamic Sitting Balance contact guard  -LO     Position, Sitting Balance unsupported;sitting edge of bed  -LO     Static Standing Balance contact guard;verbal cues  -LO     Dynamic Standing Balance minimal assist;verbal cues  -LO     Position/Device Used, Standing Balance supported;walker, rolling  -LO     Comment, Balance Hakeem with FWW in standing  -LO           User Key  (r) = Recorded By, (t) = Taken By, (c) = Cosigned By    Initials Name Provider Type    Cary Wiley, PT Physical Therapist               Goals/Plan    No documentation.                Clinical Impression     Row Name 03/05/23 1059          Pain    Pretreatment Pain Rating 0/10 - no pain  -LO     Posttreatment Pain Rating 0/10 - no pain  -LO     Row Name 03/05/23 1059          Plan of Care Review    Plan of Care Reviewed With patient  -LO     Progress improving  -     Outcome Evaluation Patient requiring less physical assistance for bed mobility, transfers, and ambulation. Continues to demonstrate significant unsteadiness with early fatigue during gait. SpO2 levels maintain > 95% on 5 liters of O2 with activity. Cont IP PT POC.  -     Row Name 03/05/23 1059          Therapy Assessment/Plan (PT)    Rehab Potential (PT) good, to achieve stated therapy goals  -     Criteria for Skilled Interventions Met (PT) yes;meets criteria;skilled treatment is necessary  -     Therapy Frequency (PT) daily  -     Row Name 03/05/23 1059          Vital Signs    Pre Systolic BP Rehab 120  -LO     Pre Treatment Diastolic BP 78  -LO     Pretreatment Heart Rate (beats/min) 60  -LO     Pre SpO2 (%) 100  -LO     O2 Delivery Pre Treatment nasal cannula  -LO     Intra SpO2 (%) 95  -LO     O2 Delivery Intra Treatment nasal cannula  -LO     Post SpO2 (%) 98  -LO     O2 Delivery Post Treatment nasal cannula  -LO     Pre Patient Position Supine   -LO     Intra Patient Position Standing  -LO     Post Patient Position Supine  -LO     Rest Breaks  2  -LO     Row Name 03/05/23 1059          Positioning and Restraints    Pre-Treatment Position in bed  -LO     Post Treatment Position bed  -LO     In Bed notified nsg;supine;encouraged to call for assist;call light within reach;fowlers;with other staff  -LO     Restraints released:;reapplied:;notified nsg:;soft limb  -LO           User Key  (r) = Recorded By, (t) = Taken By, (c) = Cosigned By    Initials Name Provider Type    Cary Wiley, TED Physical Therapist               Outcome Measures     Row Name 03/05/23 1101          How much help from another person do you currently need...    Turning from your back to your side while in flat bed without using bedrails? 3  -LO     Moving from lying on back to sitting on the side of a flat bed without bedrails? 3  -LO     Moving to and from a bed to a chair (including a wheelchair)? 3  -LO     Standing up from a chair using your arms (e.g., wheelchair, bedside chair)? 3  -LO     Climbing 3-5 steps with a railing? 1  -LO     To walk in hospital room? 2  -LO     AM-PAC 6 Clicks Score (PT) 15  -LO     Highest level of mobility 4 --> Transferred to chair/commode  -LO     Row Name 03/05/23 1101          Functional Assessment    Outcome Measure Options AM-PAC 6 Clicks Basic Mobility (PT)  -LO           User Key  (r) = Recorded By, (t) = Taken By, (c) = Cosigned By    Initials Name Provider Type    Cary Wiley PT Physical Therapist                             Physical Therapy Education     Title: PT OT SLP Therapies (In Progress)     Topic: Physical Therapy (Done)     Point: Mobility training (Done)     Learning Progress Summary           Patient Acceptance, E, VU,NL,NR by JAMES at 3/5/2023 0931    Comment: PT POC    Acceptance, E, NR by LARISA at 3/1/2023 1445                   Point: Home exercise program (Done)     Learning Progress Summary           Patient Acceptance, E,  VU,NL,NR by  at 3/5/2023 0931    Comment: PT POC                   Point: Body mechanics (Done)     Learning Progress Summary           Patient Acceptance, E, VU,NL,NR by  at 3/5/2023 0931    Comment: PT POC    Acceptance, E, NR by BA at 3/1/2023 1445                   Point: Precautions (Done)     Learning Progress Summary           Patient Acceptance, E, VU,NL,NR by  at 3/5/2023 0931    Comment: PT POC    Acceptance, E, NR by BA at 3/1/2023 1445                               User Key     Initials Effective Dates Name Provider Type Discipline     06/16/21 -  Cary Zacarias, PT Physical Therapist PT    BA 09/21/21 -  Mayela Last, PT Physical Therapist PT              PT Recommendation and Plan     Plan of Care Reviewed With: patient  Progress: improving  Outcome Evaluation: Patient requiring less physical assistance for bed mobility, transfers, and ambulation. Continues to demonstrate significant unsteadiness with early fatigue during gait. SpO2 levels maintain > 95% on 5 liters of O2 with activity. Cont IP PT POC.     Time Calculation:    PT Charges     Row Name 03/05/23 0931             Time Calculation    Start Time 0931  -LO      PT Received On 03/05/23  -LO      PT Goal Re-Cert Due Date 03/11/23  -LO         Timed Charges    10683 - Gait Training Minutes  5  -LO      57670 - PT Therapeutic Activity Minutes 20  -LO         Total Minutes    Timed Charges Total Minutes 25  -LO       Total Minutes 25  -LO            User Key  (r) = Recorded By, (t) = Taken By, (c) = Cosigned By    Initials Name Provider Type     Cary Zacarias, PT Physical Therapist              Therapy Charges for Today     Code Description Service Date Service Provider Modifiers Qty    95680507739 HC GAIT TRAINING EA 15 MIN 3/5/2023 Cary Zacarias, PT GP 1    18060150006 HC PT THERAPEUTIC ACT EA 15 MIN 3/5/2023 Cary Zacarias, PT GP 1          PT G-Codes  Outcome Measure Options: AM-PAC 6 Clicks Basic Mobility (PT)  AM-PAC 6 Clicks Score  (PT): 15  AM-PAC 6 Clicks Score (OT): 15  PT Discharge Summary  Anticipated Discharge Disposition (PT): skilled nursing facility    Cary Zacarias, PT  3/5/2023

## 2023-03-05 NOTE — PLAN OF CARE
Goal Outcome Evaluation:              Outcome Evaluation: VSS. maintained o2 >90% on 3L NC. NSR on monitor. moments of intermittent agitation throughout shift.

## 2023-03-06 LAB
QT INTERVAL: 400 MS
QTC INTERVAL: 422 MS

## 2023-03-06 PROCEDURE — 93005 ELECTROCARDIOGRAM TRACING: CPT | Performed by: STUDENT IN AN ORGANIZED HEALTH CARE EDUCATION/TRAINING PROGRAM

## 2023-03-06 PROCEDURE — 25010000002 METHYLPREDNISOLONE PER 40 MG: Performed by: FAMILY MEDICINE

## 2023-03-06 PROCEDURE — 99232 SBSQ HOSP IP/OBS MODERATE 35: CPT | Performed by: STUDENT IN AN ORGANIZED HEALTH CARE EDUCATION/TRAINING PROGRAM

## 2023-03-06 PROCEDURE — 97535 SELF CARE MNGMENT TRAINING: CPT

## 2023-03-06 PROCEDURE — 93010 ELECTROCARDIOGRAM REPORT: CPT | Performed by: INTERNAL MEDICINE

## 2023-03-06 PROCEDURE — 25010000002 HEPARIN (PORCINE) PER 1000 UNITS: Performed by: INTERNAL MEDICINE

## 2023-03-06 PROCEDURE — 25010000002 METHYLPREDNISOLONE PER 40 MG: Performed by: STUDENT IN AN ORGANIZED HEALTH CARE EDUCATION/TRAINING PROGRAM

## 2023-03-06 PROCEDURE — 97116 GAIT TRAINING THERAPY: CPT

## 2023-03-06 PROCEDURE — 94799 UNLISTED PULMONARY SVC/PX: CPT

## 2023-03-06 RX ORDER — PREDNISONE 20 MG/1
40 TABLET ORAL
Status: DISCONTINUED | OUTPATIENT
Start: 2023-03-07 | End: 2023-03-09 | Stop reason: HOSPADM

## 2023-03-06 RX ADMIN — HEPARIN SODIUM 5000 UNITS: 5000 INJECTION INTRAVENOUS; SUBCUTANEOUS at 13:50

## 2023-03-06 RX ADMIN — Medication 10 ML: at 09:02

## 2023-03-06 RX ADMIN — CITALOPRAM HYDROBROMIDE 20 MG: 20 TABLET ORAL at 09:00

## 2023-03-06 RX ADMIN — METOPROLOL TARTRATE 25 MG: 25 TABLET, FILM COATED ORAL at 08:59

## 2023-03-06 RX ADMIN — METHYLPREDNISOLONE SODIUM SUCCINATE 20 MG: 40 INJECTION, POWDER, FOR SOLUTION INTRAMUSCULAR; INTRAVENOUS at 03:35

## 2023-03-06 RX ADMIN — TAMSULOSIN HYDROCHLORIDE 0.4 MG: 0.4 CAPSULE ORAL at 08:59

## 2023-03-06 RX ADMIN — QUETIAPINE FUMARATE 25 MG: 25 TABLET ORAL at 08:59

## 2023-03-06 RX ADMIN — IPRATROPIUM BROMIDE AND ALBUTEROL SULFATE 3 ML: 2.5; .5 SOLUTION RESPIRATORY (INHALATION) at 20:04

## 2023-03-06 RX ADMIN — METHYLPREDNISOLONE SODIUM SUCCINATE 20 MG: 40 INJECTION, POWDER, FOR SOLUTION INTRAMUSCULAR; INTRAVENOUS at 15:50

## 2023-03-06 RX ADMIN — MEMANTINE 5 MG: 10 TABLET ORAL at 09:00

## 2023-03-06 RX ADMIN — HEPARIN SODIUM 5000 UNITS: 5000 INJECTION INTRAVENOUS; SUBCUTANEOUS at 06:24

## 2023-03-06 RX ADMIN — METOPROLOL TARTRATE 25 MG: 25 TABLET, FILM COATED ORAL at 20:09

## 2023-03-06 RX ADMIN — Medication 10 ML: at 20:10

## 2023-03-06 RX ADMIN — IPRATROPIUM BROMIDE AND ALBUTEROL SULFATE 3 ML: 2.5; .5 SOLUTION RESPIRATORY (INHALATION) at 16:04

## 2023-03-06 RX ADMIN — POLYETHYLENE GLYCOL 3350 17 G: 17 POWDER, FOR SOLUTION ORAL at 09:00

## 2023-03-06 RX ADMIN — IPRATROPIUM BROMIDE AND ALBUTEROL SULFATE 3 ML: 2.5; .5 SOLUTION RESPIRATORY (INHALATION) at 12:56

## 2023-03-06 RX ADMIN — QUETIAPINE FUMARATE 75 MG: 25 TABLET ORAL at 20:10

## 2023-03-06 RX ADMIN — HEPARIN SODIUM 5000 UNITS: 5000 INJECTION INTRAVENOUS; SUBCUTANEOUS at 22:00

## 2023-03-06 RX ADMIN — MONTELUKAST 10 MG: 10 TABLET, FILM COATED ORAL at 20:10

## 2023-03-06 RX ADMIN — ATORVASTATIN CALCIUM 20 MG: 20 TABLET, FILM COATED ORAL at 20:10

## 2023-03-06 RX ADMIN — DONEPEZIL HYDROCHLORIDE 10 MG: 10 TABLET, FILM COATED ORAL at 20:10

## 2023-03-06 NOTE — PROGRESS NOTES
Palliative Care Progress Note    Date of Admission: 2/26/2023    Subjective: Patient appears to be much weaker today, nursing staff states that she remains in restraints.  Current Code Status     Date Active Code Status Order ID Comments User Context       2/27/2023 1522 No CPR (Do Not Attempt to Resuscitate) [id number]  Russell Guerin MD Inpatient      Question Answer    Code Status (Patient has no pulse and is not breathing) No CPR (Do Not Attempt to Resuscitate)    Medical Interventions (Patient has pulse or is breathing) Limited Support    Medical Intervention Limits: NO intubation (DNI)     NO artificial nutrition     NO dialysis     NO vasopressors     Other    Additional Medical Interventions Limits: no invasive procedures, no icu transfer    Release to patient Routine Release              No current facility-administered medications on file prior to encounter.     Current Outpatient Medications on File Prior to Encounter   Medication Sig Dispense Refill   • amLODIPine (NORVASC) 5 MG tablet GIVE 1 TABLET BY MOUTH DAILY AT BEDTIME (Patient taking differently: Take 1 tablet by mouth Daily.) 30 tablet 10   • atorvastatin (LIPITOR) 20 MG tablet GIVE 1 TABLET BY MOUTH DAILY (Patient taking differently: Take 1 tablet by mouth Every Night.) 30 tablet 11   • bisoprolol-hydrochlorothiazide (ZIAC) 2.5-6.25 MG per tablet Take 1 tablet by mouth Daily.     • Cholecalciferol (vitamin D3) 125 MCG (5000 UT) tablet tablet GIVE 1 TABLET BY MOUTH DAILY (Patient taking differently: 1 tablet Daily.) 30 tablet 11   • citalopram (CeleXA) 20 MG tablet TAKE 1 TABLET BY MOUTH EVERY MORNING FOR DEPRESSION (Patient taking differently: Take 1 tablet by mouth Daily.) 30 tablet 11   • donepezil (ARICEPT) 5 MG tablet GIVE 1 TABLET BY MOUTH DAILY AT BEDTIME (Patient taking differently: Take 1 tablet by mouth Every Night.) 30 tablet 0   • QUEtiapine (SEROquel) 25 MG tablet GIVE 1 TABLET BY MOUTH EVERY MORNING (Patient taking differently:  "Take 1 tablet by mouth Every Morning.) 30 tablet 0   • Bismatrol 262 MG/15ML suspension GIVE 15 ML BY MOUTH EVERY 8 HOURS AS NEEDED FOR DIARRHEA (Patient taking differently: Take 15 mL by mouth Every 8 (Eight) Hours As Needed for Diarrhea.) 236 mL 11   • hydrOXYzine (ATARAX) 25 MG tablet Take 1 tablet by mouth 3 (Three) Times a Day As Needed for Itching or Anxiety.     • KLOR-CON 10 MEQ CR tablet TAKE 1 TABLET BY MOUTH: MON,WED,FRI 90 tablet 0   • levothyroxine (SYNTHROID, LEVOTHROID) 50 MCG tablet GIVE 1 TABLET BY MOUTH DAILY (Patient taking differently: Take 1 tablet by mouth Every Morning.) 30 tablet 11   • memantine (NAMENDA) 5 MG tablet GIVE 1 TABLET BY MOUTH TWICE A DAY (Patient taking differently: Take 1 tablet by mouth 2 (Two) Times a Day.) 60 tablet 11   • metoprolol succinate XL (TOPROL-XL) 50 MG 24 hr tablet TAKE 1 TABLET BY MOUTH DAILY. 30 tablet 11   • montelukast (SINGULAIR) 10 MG tablet TAKE 1 TABLET BY MOUTH EVERY NIGHT. 90 tablet 1   • nystatin-triamcinolone (MYCOLOG) 685883-8.1 UNIT/GM-% ointment APPLY TO SKIN TWICE DAILY 30 g 0   • ondansetron (ZOFRAN) 4 MG tablet GIVE 1 TABLET BY MOUTH EVERY 12 HOURS AS NEEDED FOR NAUSEA/VOMITING (Patient taking differently: Take 1 tablet by mouth Every 12 (Twelve) Hours As Needed for Nausea or Vomiting.) 12 tablet 2        •  acetaminophen  •  Calcium Replacement - Initiate Nurse / BPA Driven Protocol  •  diphenhydrAMINE  •  labetalol  •  Magnesium Standard Dose Replacement - Initiate Nurse / BPA Driven Protocol  •  ondansetron  •  Phosphorus Replacement - Initiate Nurse / BPA Driven Protocol  •  Potassium Replacement - Initiate Nurse / BPA Driven Protocol  •  sodium chloride  •  sodium chloride  •  sodium chloride  •  ziprasidone    Objective: /62 (BP Location: Right arm, Patient Position: Sitting)   Pulse 66   Temp 97.3 °F (36.3 °C) (Oral)   Resp 18   Ht 147.3 cm (58\")   Wt 71.2 kg (157 lb)   SpO2 95%   BMI 32.81 kg/m²      Intake/Output Summary " (Last 24 hours) at 3/6/2023 1341  Last data filed at 3/6/2023 0500  Gross per 24 hour   Intake 170 ml   Output 650 ml   Net -480 ml     Physical Exam:      General Appearance:   Frail appearing   Head:    Normocephalic, without obvious abnormality, atraumatic   Eyes:            Lids and lashes normal, conjunctivae and sclerae normal, no   icterus, no pallor, corneas clear, PERRLA   Ears:    Ears appear intact with no abnormalities noted   Throat:   No oral lesions, no thrush, oral mucosa moist   Neck:   No adenopathy, supple, trachea midline, no thyromegaly, no     carotid bruit, no JVD   Back:     No kyphosis present, no scoliosis present, no skin lesions,       erythema or scars, no tenderness to percussion or                   palpation,   range of motion normal   Lungs:     Clear to auscultation,respirations regular, even and                   unlabored    Heart:    Regular rhythm and normal rate, normal S1 and S2, no            murmur, no gallop, no rub, no click   Breast Exam:    Deferred   Abdomen:     Normal bowel sounds, no masses, no organomegaly, soft        non-tender, non-distended, no guarding, no rebound                 tenderness   Genitalia:    Deferred   Extremities:   Moves all extremities well, no edema, no cyanosis, no              redness   Pulses:   Pulses palpable and equal bilaterally   Skin:   No bleeding, bruising or rash   Lymph nodes:   No palpable adenopathy   Neurologic:   Cranial nerves 2 - 12 grossly intact, sensation intact, DTR        present and equal bilaterally     Results from last 7 days   Lab Units 03/02/23  0617   WBC 10*3/mm3 16.96*   HEMOGLOBIN g/dL 13.2   HEMATOCRIT % 39.0   PLATELETS 10*3/mm3 318     Results from last 7 days   Lab Units 03/02/23  0617   SODIUM mmol/L 140   POTASSIUM mmol/L 3.8   CHLORIDE mmol/L 99   CO2 mmol/L 27.0   BUN mg/dL 21   CREATININE mg/dL 0.62   CALCIUM mg/dL 8.8   BILIRUBIN mg/dL 0.4   ALK PHOS U/L 143*   ALT (SGPT) U/L 36*   AST (SGOT) U/L 26    GLUCOSE mg/dL 117*       Impression: Sepsis  Dementia  Dyspnea  Restlessness  Change in status  Goals of care  Plan: Patient has had a significant amount of improvement over the weekend and is now on nasal cannula.  We will continue to follow and support.  Patient most likely will need placement, will have case management working on this.          Adonis Chang DO  03/06/23  13:41 EST

## 2023-03-06 NOTE — PLAN OF CARE
Goal Outcome Evaluation:  Plan of Care Reviewed With: patient        Progress: improving  Outcome Evaluation: Pt with good improvement in transfers this date completing sit to stand with CGA and took steps to chair with minAx1. Pt completed grooming with setup. MaxA for all tray prep for meals. Continues to present below baseline with decreased strength, tolerance, and overall independence with ADLS. Recommend d/c to SNF.

## 2023-03-06 NOTE — PROGRESS NOTES
[facility] Medicine Services  PROGRESS NOTE    Patient Name: Dorota Membreno  : 10/31/1931  MRN: [id number]    Date of Admission: 2023  Primary Care Physician: Kylie Zapata DO    Subjective   Subjective     CC:  resp failure, pneumonia, afib, dementia    HPI:  On 3 L overnight, heart rate was in the 50s overnight.  Removed from restraints at 2 AM today.  Denied any pain.  Satting on 3 L low 90s nasal cannula out of patient's nose on exam.    ROS:  Gen- No fevers, chills  CV- No chest pain, palpitations  Resp- No cough, dyspnea  GI- No N/V/D, abd pain    Objective   Objective     Vital Signs:   Temp:  [97.7 °F (36.5 °C)-98.7 °F (37.1 °C)] 97.8 °F (36.6 °C)  Heart Rate:  [50-89] 64  Resp:  [18-20] 18  BP: (118-159)/() 153/100  Flow (L/min):  [2-6] 3    Physical Exam:  Constitutional: No acute distress, awake, alert  HENT: NCAT, mucous membranes moist  Respiratory: Clear to auscultation bilaterally, respiratory effort normal 3L NC  Cardiovascular: RRR, no murmurs, rubs, or gallops  Gastrointestinal:, nondistended  Musculoskeletal: No bilateral ankle edema  Psychiatric: Appropriate affect, cooperative  Neurologic: oriented to self only, PERRL, symmetric facies, symmetric palate rise, tongue midline, moving all extremities equally, following all commands  Skin: No rashes on exposed arms and legs        Results Reviewed:  LAB RESULTS:      Lab 23  0617 23  0700 23  0659 23  1145   WBC 16.96* 19.46* 15.97* 16.92*   HEMOGLOBIN 13.2 12.5 11.6* 11.3*   HEMATOCRIT 39.0 36.3 37.1 37.0   PLATELETS 318 342 290 245   MCV 92.9 91.2 102.5* 101.9*         Lab 23  0617 23  0700 23  0659 23  2211 23  1145   SODIUM 140 143 143  --  140   POTASSIUM 3.8 3.9 4.2  --  3.9   CHLORIDE 99 102 106  --  106   CO2 27.0 26.0 21.0*  --  17.0*   ANION GAP 14.0 15.0 16.0*  --  17.0*   BUN 21 23 31*  --  32*   CREATININE 0.62 0.66 0.63  --  0.61    EGFR 84.2 82.9 83.9  --  84.5   GLUCOSE 117* 103* 129*  --  129*   CALCIUM 8.8 8.6 8.7  --  8.2   IONIZED CALCIUM  --   --   --   --  1.21   MAGNESIUM 1.8 2.6* 1.8  --  1.8   PHOSPHORUS  --   --  2.8 2.3* 1.7*         Lab 03/02/23  0617   TOTAL PROTEIN 6.5   ALBUMIN 3.5   GLOBULIN 3.0   ALT (SGPT) 36*   AST (SGOT) 26   BILIRUBIN 0.4   ALK PHOS 143*                     Brief Urine Lab Results  (Last result in the past 365 days)      Color   Clarity   Blood   Leuk Est   Nitrite   Protein   CREAT   Urine HCG        02/26/23 0600 Yellow   Clear   Negative   Negative   Negative   Trace                 Microbiology Results Abnormal     Procedure Component Value - Date/Time    Blood Culture - Blood, Arm, Right [[id number]]  (Normal) Collected: 02/26/23 0525    Lab Status: Final result Specimen: Blood from Arm, Right Updated: 03/03/23 0730     Blood Culture No growth at 5 days    Blood Culture - Blood, Arm, Right [[id number]]  (Normal) Collected: 02/26/23 0525    Lab Status: Final result Specimen: Blood from Arm, Right Updated: 03/03/23 0730     Blood Culture No growth at 5 days    S. Pneumo Ag Urine or CSF - Urine, Urine, Clean Catch [[id number]]  (Normal) Collected: 02/26/23 1514    Lab Status: Final result Specimen: Urine, Clean Catch Updated: 02/26/23 2125     Strep Pneumo Ag Negative    Legionella Antigen, Urine - Urine, Urine, Clean Catch [[id number]]  (Normal) Collected: 02/26/23 1514    Lab Status: Final result Specimen: Urine, Clean Catch Updated: 02/26/23 2124     LEGIONELLA ANTIGEN, URINE Negative    MRSA Screen, PCR (Inpatient) - Swab, Nares [[id number]]  (Normal) Collected: 02/26/23 1113    Lab Status: Final result Specimen: Swab from Nares Updated: 02/26/23 1228     MRSA PCR Negative    Narrative:      The negative predictive value of this diagnostic test is high and should only be used to consider de-escalating anti-MRSA therapy. A positive result may indicate colonization with MRSA and must be correlated  clinically.  MRSA Negative    COVID PRE-OP / PRE-PROCEDURE SCREENING ORDER (NO ISOLATION) - Swab, Nasopharynx [[id number]]  (Normal) Collected: 02/26/23 0525    Lab Status: Final result Specimen: Swab from Nasopharynx Updated: 02/26/23 0637    Narrative:      The following orders were created for panel order COVID PRE-OP / PRE-PROCEDURE SCREENING ORDER (NO ISOLATION) - Swab, Nasopharynx.  Procedure                               Abnormality         Status                     ---------                               -----------         ------                     Respiratory Panel PCR w/...[[id number]]  Normal              Final result                 Please view results for these tests on the individual orders.    Respiratory Panel PCR w/COVID-19(SARS-CoV-2) KRYSTIAN/ANISHA/LEIDA/PAD/COR/MAD/JAILENE In-House, NP Swab in UTM/VTM, 3-4 HR TAT - Swab, Nasopharynx [[id number]]  (Normal) Collected: 02/26/23 0525    Lab Status: Final result Specimen: Swab from Nasopharynx Updated: 02/26/23 0637     ADENOVIRUS, PCR Not Detected     Coronavirus 229E Not Detected     Coronavirus HKU1 Not Detected     Coronavirus NL63 Not Detected     Coronavirus OC43 Not Detected     COVID19 Not Detected     Human Metapneumovirus Not Detected     Human Rhinovirus/Enterovirus Not Detected     Influenza A PCR Not Detected     Influenza B PCR Not Detected     Parainfluenza Virus 1 Not Detected     Parainfluenza Virus 2 Not Detected     Parainfluenza Virus 3 Not Detected     Parainfluenza Virus 4 Not Detected     RSV, PCR Not Detected     Bordetella pertussis pcr Not Detected     Bordetella parapertussis PCR Not Detected     Chlamydophila pneumoniae PCR Not Detected     Mycoplasma pneumo by PCR Not Detected    Narrative:      In the setting of a positive respiratory panel with a viral infection PLUS a negative procalcitonin without other underlying concern for bacterial infection, consider observing off antibiotics or discontinuation of antibiotics and continue  supportive care. If the respiratory panel is positive for atypical bacterial infection (Bordetella pertussis, Chlamydophila pneumoniae, or Mycoplasma pneumoniae), consider antibiotic de-escalation to target atypical bacterial infection.          Adult Transthoracic Echo Complete w/ Color, Spectral and Contrast if Necessary Per Protocol    Result Date: 2/26/2023  •  Left ventricular ejection fraction appears to be 61 - 65%. •  Left ventricular diastolic function was normal. •  Left atrial volume is moderately increased. •  Moderate tricuspid valve regurgitation is present. •  Estimated right ventricular systolic pressure from tricuspid regurgitation is moderately elevated (45-55 mmHg). Calculated right ventricular systolic pressure from tricuspid regurgitation is 47 mmHg.     XR Chest 1 View    Result Date: 2/26/2023  EXAMINATION: XR CHEST 1 VW  DATE: 2/26/2023 6:00 AM INDICATIONS: SOA triage protocol. COMPARISON: Image from chest radiograph dated 4/7/2018. Report not available at time of dictation. FINDINGS: Evaluation somewhat limited by patient body habitus. Stable elevation of the right hemidiaphragm. Nonspecific mild diffuse interstitial prominence. Mild bibasilar opacities suggest atelectasis, but infiltrate not excluded. No sizable effusion or pneumothorax. Atherosclerotic calcifications of the aortic arch. Cardiac and mediastinal silhouette otherwise unremarkable. No acute osseous abnormality identified on single frontal view.     Impression: 1.  Nonspecific interstitial prominence could reflect chronic changes, but mild edema or atypical infectious/inflammatory process can also appear this way in the correct clinical setting. 2.  Mild bibasilar opacities suggest atelectasis, but infiltrate or edema not excluded.  Electronically signed by:  Nikita Keller D.O.  2/26/2023 4:47 AM Mountain Time    CT Angiogram Chest    Addendum Date: 2/26/2023    ADDENDUM #1 Additional impression point: Narrowing of the lower  trachea and proximal mainstem bronchi may represent tracheomalacia. Electronically Signed: Son Bella  2/26/2023 9:04 AM EST  Workstation ID: [id number] REPORT  CT ANGIOGRAM CHEST Date of Exam: 2/26/2023 7:03 AM EST Indication: Sepsis, hypoxia, tachycardia, dimer. Comparison: CTA chest 6/18/2013 Technique: CTA of the chest was performed after the uneventful intravenous administration of 65 mL Isovue-370. Reconstructed coronal and sagittal images were also obtained. In addition, a 3-D volume rendered image was created for interpretation. Automated exposure control and iterative reconstruction methods were used. Findings: Motion degraded exam. Pulmonary arteries: No evidence of pulmonary embolus. Normal main pulmonary artery diameter. Heart and pericardium:No flattening of the interventricular septum. Coronary artery calcification is seen. No substantial pericardial effusion. Vessels:Normal caliber aorta. Atherosclerotic calcification of the aorta. No evidence of acute aortic injury on this nondedicated exam. Venous structures including the superior vena cava and inferior vena cava appear grossly patent. Mediastinum:Unremarkable appearance of the esophagus. Few enlarged mediastinal lymph nodes including a right paratracheal node measuring 13 mm in short axis (image 29). No anterior mediastinal masses. Lower neck:No suspicious or enlarged supraclavicular or axillary lymphadenopathy. Diminutive appearance of the thyroid. Pulmonary parenchyma:No evidence of pulmonary infarct. Muscular spine nodules. Few foci of consolidative opacities seen in the right upper lobe. Scattered calcified granulomas. Bilateral dependent atelectasis. Pleura: Trace bilateral pleural effusions. No pneumothorax. Airways: Narrowing of the lower trachea and proximal mainstem bronchi may represent tracheomalacia. Diffuse bronchial wall thickening. Chest wall and bones:No acute osseous abnormality. Degenerative changes of thoracic spine.  Unremarkable appearance of soft tissues. Upper abdomen:No lesion seen within the visualized liver. Upper abdomen is unremarkable. Small hiatal hernia. Impression: Mildly motion degraded exam. No evidence of pulmonary embolus. Few foci of consolidative opacity in the right upper lobe may represent infection. Few mildly enlarged mediastinal lymph nodes may be reactive. Trace bilateral pleural effusions. Electronically Signed: Son Thompsony  2/26/2023 8:17 AM EST  Workstation ID: [id number]    Result Date: 2/26/2023  CT ANGIOGRAM CHEST Date of Exam: 2/26/2023 7:03 AM EST Indication: Sepsis, hypoxia, tachycardia, dimer. Comparison: CTA chest 6/18/2013 Technique: CTA of the chest was performed after the uneventful intravenous administration of 65 mL Isovue-370. Reconstructed coronal and sagittal images were also obtained. In addition, a 3-D volume rendered image was created for interpretation. Automated exposure control and iterative reconstruction methods were used. Findings: Motion degraded exam. Pulmonary arteries: No evidence of pulmonary embolus. Normal main pulmonary artery diameter. Heart and pericardium:No flattening of the interventricular septum. Coronary artery calcification is seen. No substantial pericardial effusion. Vessels:Normal caliber aorta. Atherosclerotic calcification of the aorta. No evidence of acute aortic injury on this nondedicated exam. Venous structures including the superior vena cava and inferior vena cava appear grossly patent. Mediastinum:Unremarkable appearance of the esophagus. Few enlarged mediastinal lymph nodes including a right paratracheal node measuring 13 mm in short axis (image 29). No anterior mediastinal masses. Lower neck:No suspicious or enlarged supraclavicular or axillary lymphadenopathy. Diminutive appearance of the thyroid. Pulmonary parenchyma:No evidence of pulmonary infarct. Muscular spine nodules. Few foci of consolidative opacities seen in the right upper lobe.  Scattered calcified granulomas. Bilateral dependent atelectasis. Pleura: Trace bilateral pleural effusions. No pneumothorax. Airways: Narrowing of the lower trachea and proximal mainstem bronchi may represent tracheomalacia. Diffuse bronchial wall thickening. Chest wall and bones:No acute osseous abnormality. Degenerative changes of thoracic spine. Unremarkable appearance of soft tissues. Upper abdomen:No lesion seen within the visualized liver. Upper abdomen is unremarkable. Small hiatal hernia.     Impression: Impression: Mildly motion degraded exam. No evidence of pulmonary embolus. Few foci of consolidative opacity in the right upper lobe may represent infection. Few mildly enlarged mediastinal lymph nodes may be reactive. Trace bilateral pleural effusions. Electronically Signed: Son Bella  2/26/2023 8:17 AM EST  Workstation ID: [id number]      Results for orders placed during the hospital encounter of 02/26/23    Adult Transthoracic Echo Complete w/ Color, Spectral and Contrast if Necessary Per Protocol    Interpretation Summary  •  Left ventricular ejection fraction appears to be 61 - 65%.  •  Left ventricular diastolic function was normal.  •  Left atrial volume is moderately increased.  •  Moderate tricuspid valve regurgitation is present.  •  Estimated right ventricular systolic pressure from tricuspid regurgitation is moderately elevated (45-55 mmHg). Calculated right ventricular systolic pressure from tricuspid regurgitation is 47 mmHg.      Current medications:  Scheduled Meds:atorvastatin, 20 mg, Oral, Nightly  citalopram, 20 mg, Oral, Daily  donepezil, 10 mg, Oral, Nightly  heparin (porcine), 5,000 Units, Subcutaneous, Q8H  ipratropium-albuterol, 3 mL, Nebulization, 4x Daily - RT  levothyroxine, 50 mcg, Oral, Q AM  memantine, 5 mg, Oral, Daily  methylPREDNISolone sodium succinate, 20 mg, Intravenous, Q12H  metoprolol tartrate, 25 mg, Oral, Q12H  montelukast, 10 mg, Oral, Nightly  pantoprazole, 40  mg, Oral, Q AM  polyethylene glycol, 17 g, Oral, Daily  QUEtiapine, 25 mg, Oral, Daily  QUEtiapine, 75 mg, Oral, Nightly  senna-docusate sodium, 2 tablet, Oral, BID  sodium chloride, 10 mL, Intravenous, Q12H  tamsulosin, 0.4 mg, Oral, Daily      Continuous Infusions:   PRN Meds:.•  acetaminophen  •  Calcium Replacement - Initiate Nurse / BPA Driven Protocol  •  diphenhydrAMINE  •  labetalol  •  Magnesium Standard Dose Replacement - Initiate Nurse / BPA Driven Protocol  •  ondansetron  •  Phosphorus Replacement - Initiate Nurse / BPA Driven Protocol  •  Potassium Replacement - Initiate Nurse / BPA Driven Protocol  •  sodium chloride  •  sodium chloride  •  sodium chloride  •  ziprasidone    Assessment & Plan   Assessment & Plan     Active Hospital Problems    Diagnosis  POA   • **Sepsis (HCC) [A41.9]  Yes   • Atrial fibrillation (HCC) [I48.91]  Yes   • Leukocytosis [D72.829]  Yes   • Hypomagnesemia [E83.42]  Yes   • Elevated brain natriuretic peptide (BNP) level [R79.89]  Yes   • Proteinuria [R80.9]  Yes   • Bilateral Pleural effusions [J90]  Yes   • Pneumonia [J18.9]  Yes   • Respiratory failure (HCC) [J96.90]  Yes   • Elevated alkaline phosphatase level [R74.8]  Yes   • Sepsis, due to unspecified organism, unspecified whether acute organ dysfunction present (HCC) [A41.9]  Yes   • Late onset Alzheimer's disease with behavioral disturbance (HCC) [G30.1, 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        Brief Hospital Course to date:  Dorota Membreno is a 89 y.o. female w/ hx alzheimers dementia, giant cell arteritis (2000), asthma, htn, hl, hypothyroidism, anxiety, remote colon cancer (s/p resection at age 37) who presented to [facility] ED via ems on 2/26/23 after being found down at nursing facility and was found to be hypoxic (sats 80s) w/ tachycardia. In ED temp 99.9, hr 138, sbp 89/79, sats 83%. Was placed on nonrebreather mask and initiated on esmolol dripl and iv fluids. Bp  improved w/ fluids and was weaned to nasal canula. Troponins were mildly elevated and flat. Respiratory pcr was negative. U/a not compelling for uti. D-dimer mildly elevated at 1.45, probnp 1095, wbc 16,260. Cta chest revealed mildly motion degraded exam with no evidence of pulmonary embolish and few foci of consolidative opacity right upper lobe, few mildly enlarged mediastinal lymph notes (possibly reactive) and trace bilateral pleural effusions, some narrowing of lower trachea and proximal mainstem bronchi possibly representing tracheomalacia. Initiated on cefepime and vanc empirically by intensivist service and admitted to telemetry floor as patient had clinically improved. Hospitalist service assumed care on 2/27/23    This patient's problems and plans were partially entered by my partner and updated as appropriate by me 03/06/23.    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 (narrowing lower trachea and prox mainstem bronchi on imaging)  Leukocytosis  -admission wbc 16,260, procal negative  -ct angio chest: no pe, rul opacity, few mildly enlarged mediastinal lymph nodes (possibly reactive) and trace effusions   -stop vanc (nasal mrsa pcr negative)  -had worsening respiratory status and agitation 2/27/23, required hi flow canula ~65% fio2) and required geodon and benadryl   -s/p SLP eval 3/27/23: on regular texture, thins   Completed empiric rocephin & doxy empiric rx  - continue duonebs; continue solumedrol 20mg Iv bid,(decreased 3/5) and will transition to oral prednisone on 3/7  - currently on 3L NC    New onset afib w/ rvr (converted back to nsr)  -echo: ef 61-65%, diastolic dysfunction, mod tr w/ ervsp moderately elevated 45-55mmhg  -tsh mildly elevated at 6, free T4 within normal limits  -weaned off esmolol  -metoprolol was increased to 50mg bid for rate control, however, developed bradycardia to the low 50s, dose was then  decreased to 25 mg twice daily      Urinary retention  -u/a benign  -flomax    Alzheimers dementia, severe  Agitation  -continue home memantine and aricept  -increased night seroquel to 75mg nightly. Continue seroquel 25mg po qam  -QTc on 3/6 was less than 500  -continue prn geodon/benadryl  -continue celexa  -soft wrist restraints as needed    Debility  -pt/ot evals     Hypothyroidism  -tsh 6.0  -continue synthroid    Goals/limits  Palliative consulted. Discussing GOC with daughter       Expected Discharge Location and Transportation: facility  Expected Discharge   Expected Discharge Date and Time     Expected Discharge Date Expected Discharge Time    Mar 8, 2023          DVT prophylaxis:  Medical and mechanical DVT prophylaxis orders are present. sq heparin    AM-PAC 6 Clicks Score (PT): 15 (03/05/23 1101)    CODE STATUS:   Code Status and Medical Interventions:   Ordered at: 02/27/23 1522     Medical Intervention Limits:    NO intubation (DNI)    NO artificial nutrition    NO dialysis    NO vasopressors    Other     Code Status (Patient has no pulse and is not breathing):    No CPR (Do Not Attempt to Resuscitate)     Medical Interventions (Patient has pulse or is breathing):    Limited Support     Additional Medical Interventions Limits:    no invasive procedures, no icu transfer     Release to patient:    Routine Release       Julia Zamora MD  03/06/23

## 2023-03-06 NOTE — THERAPY TREATMENT NOTE
Patient Name: Dorota Membreno  : 10/31/1931    MRN: [id number]                              Today's Date: 3/6/2023       Admit Date: 2023    Visit Dx:     ICD-10-CM ICD-9-CM   1. Late onset Alzheimer's disease with behavioral disturbance (HCC)  G30.1 331.0    F02.818 294.11   2. Sepsis, due to unspecified organism, unspecified whether acute organ dysfunction present ([facility])  A41.9 038.9     995.91   3. Acute respiratory failure with hypoxia ([facility])  J96.01 518.81   4. Atrial fibrillation with RVR ([facility])  I48.91 427.31   5. Hypotension, unspecified hypotension type  I95.9 458.9     Patient Active Problem List   Diagnosis   • Gastroesophageal reflux disease   • Atopic rhinitis   • Asthma   • Late onset Alzheimer's disease with behavioral disturbance (HCC)   • Eczema   • Dyslipidemia   • Hypothyroidism   • Essential hypertension   • Post-menopausal osteoporosis   • Arteritis (HCC)   • Dizziness   • Edema   • Fatigue   • Shingles   • Syncope and collapse   • Atrial fibrillation (HCC)   • Leukocytosis   • Hypomagnesemia   • Elevated brain natriuretic peptide (BNP) level   • Proteinuria   • Bilateral Pleural effusions   • Pneumonia   • Respiratory failure (HCC)   • Elevated alkaline phosphatase level   • Sepsis (HCC)   • Sepsis, due to unspecified organism, unspecified whether acute organ dysfunction present (HCC)     Past Medical History:   Diagnosis Date   • Acid reflux 2016    stable   • Allergic rhinitis    • Alzheimer's disease ([facility]) 2017   • Arteritis ([facility])     A. 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 B. Improved with prednisone therapy   • Asthma    • Cancer (HCC)     colon. Status post resection at age 37   • Cognitive impairment, mild, so stated     A. Patient has been forgetting names continue med. Has f/u with neuro. She is having some times forgetting directions. She has moved into a new house and this has not helped with directions   • Dyslipidemia     lipids  and cmp   • Hypertension     lipids, cmp, urine micro   • Hypothyroidism     A. On replacement therapy   • Mammogram abnormal     A. 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   • Panic attacks    • Pneumonia    • Postmenopausal osteoporosis    • Shingles 03/2018     Past Surgical History:   Procedure Laterality Date   • CATARACT EXTRACTION     • COLECTOMY PARTIAL / TOTAL      Patient diagnosed with colon cancer at age 37   • HYSTERECTOMY        General Information     Row Name 03/06/23 1031          Physical Therapy Time and Intention    Document Type therapy note (daily note)  -KW     Mode of Treatment physical therapy;individual therapy  -KW     Row Name 03/06/23 1031          General Information    Patient Profile Reviewed yes  -KW     Existing Precautions/Restrictions fall;oxygen therapy device and L/min;other (see comments)  dementia  -KW     Row Name 03/06/23 1031          Safety Issues, Functional Mobility    Impairments Affecting Function (Mobility) balance;cognition;coordination;endurance/activity tolerance;postural/trunk control;shortness of breath;strength  -KW           User Key  (r) = Recorded By, (t) = Taken By, (c) = Cosigned By    Initials Name Provider Type    KW Beatriz Mulligan, PT Physical Therapist               Mobility     Row Name 03/06/23 1031          Sit-Stand Transfer    Sit-Stand [place] (Transfers) contact guard;verbal cues  -KW     Assistive Device (Sit-Stand Transfers) walker, front-wheeled  -KW     Row Name 03/06/23 1031          Gait/Stairs (Locomotion)    [place] Level (Gait) minimum assist (75% patient effort)  -KW     Assistive Device (Gait) walker, front-wheeled  -KW     Distance in Feet (Gait) 80  -KW     Deviations/Abnormal Patterns (Gait) bilateral deviations;annette decreased;gait speed decreased;stride length decreased;weight shifting decreased  -KW     Bilateral Gait Deviations forward flexed posture;heel strike decreased  -KW            User Key  (r) = Recorded By, (t) = Taken By, (c) = Cosigned By    Initials Name Provider Type    Beatriz Lyons PT Physical Therapist               Obj/Interventions     Row Name 03/06/23 1031          Balance    Balance Assessment sitting static balance;sitting dynamic balance;sit to stand dynamic balance;standing dynamic balance;standing static balance  -KW     Static Sitting Balance supervision  -KW     Dynamic Sitting Balance contact guard  -KW     Position, Sitting Balance unsupported;sitting edge of bed  -KW     Sit to Stand Dynamic Balance minimal assist  -KW     Static Standing Balance contact guard  -KW     Dynamic Standing Balance minimal assist  -KW     Position/Device Used, Standing Balance supported;walker, rolling  -KW     Balance Interventions sitting;standing;sit to stand  -KW           User Key  (r) = Recorded By, (t) = Taken By, (c) = Cosigned By    Initials Name Provider Type    Beatriz Lyons PT Physical Therapist               Goals/Plan    No documentation.                Clinical Impression     Row Name 03/06/23 1031          Pain    Pretreatment Pain Rating 0/10 - no pain  -KW     Row Name 03/06/23 1031          Plan of Care Review    Plan of Care Reviewed With patient  -KW     Progress improving  -KW     Outcome Evaluation Patient demonstrating improvement in ambulation distance today. She is able to follow more commands this date but continues to require cues for safety awareness and AD management. Patient ambulates with shortened shuffled steps, pathway deviations, and B knee buckling. She fatigues quickly and would benefit from continued skilled PT services to improve in strength, mobility and gait.  -KW     Row Name 03/06/23 1031          Vital Signs    Pre Systolic BP Rehab 149  -KW     Pre Treatment Diastolic BP 90  -KW     Pretreatment Heart Rate (beats/min) 78  -KW     Intratreatment Resp Rate (breaths/min) 95  -KW     Row Name 03/06/23 1031           Positioning and Restraints    Pre-Treatment Position sitting in chair/recliner  -KW     Post Treatment Position chair  -KW     In Chair reclined;call light within reach;encouraged to call for assist;exit alarm on  -KW           User Key  (r) = Recorded By, (t) = Taken By, (c) = Cosigned By    Initials Name Provider Type    Beatriz Lyons, ETD Physical Therapist               Outcome Measures     Row Name 03/06/23 1031 03/06/23 0800       How much help from another person do you currently need...    Turning from your back to your side while in flat bed without using bedrails? 3  -KW 3  -MG    Moving from lying on back to sitting on the side of a flat bed without bedrails? 3  -KW 3  -MG    Moving to and from a bed to a chair (including a wheelchair)? 3  -KW 3  -MG    Standing up from a chair using your arms (e.g., wheelchair, bedside chair)? 3  -KW 3  -MG    Climbing 3-5 steps with a railing? 2  -KW 1  -MG    To walk in hospital room? 3  -KW 2  -MG    AM-PAC 6 Clicks Score (PT) 17  -KW 15  -MG    Highest level of mobility 5 --> Static standing  -KW 4 --> Transferred to chair/commode  -MG    Row Name 03/06/23 1031 03/06/23 0942       Functional Assessment    Outcome Measure Options AM-PAC 6 Clicks Basic Mobility (PT)  -KW AM-PAC 6 Clicks Daily Activity (OT)  -HM          User Key  (r) = Recorded By, (t) = Taken By, (c) = Cosigned By    Initials Name Provider Type     Zoe Tang, MARIAN Occupational Therapist    MG Jazmyne Mccord RN Registered Nurse    Beatriz Lyons, TED Physical Therapist                             Physical Therapy Education     Title: PT OT SLP Therapies (In Progress)     Topic: Physical Therapy (Done)     Point: Mobility training (Done)     Learning Progress Summary           Patient Acceptance, E, VU,NL,NR by JAMES at 3/5/2023 0931    Comment: PT POC    Acceptance, E, NR by LARISA at 3/1/2023 1445                   Point: Home exercise program (Done)     Learning Progress  Summary           Patient Acceptance, E, VU,NL,NR by  at 3/5/2023 0931    Comment: PT POC                   Point: Body mechanics (Done)     Learning Progress Summary           Patient Acceptance, E, VU,NL,NR by  at 3/5/2023 0931    Comment: PT POC    Acceptance, E, NR by BA at 3/1/2023 1445                   Point: Precautions (Done)     Learning Progress Summary           Patient Acceptance, E, VU,NL,NR by  at 3/5/2023 0931    Comment: PT POC    Acceptance, E, NR by BA at 3/1/2023 1445                               User Key     Initials Effective Dates Name Provider Type Discipline     06/16/21 -  Cary Zacarias, PT Physical Therapist PT     09/21/21 -  Mayela Last, PT Physical Therapist PT              PT Recommendation and Plan     Plan of Care Reviewed With: patient  Progress: improving  Outcome Evaluation: Patient demonstrating improvement in ambulation distance today. She is able to follow more commands this date but continues to require cues for safety awareness and AD management. Patient ambulates with shortened shuffled steps, pathway deviations, and B knee buckling. She fatigues quickly and would benefit from continued skilled PT services to improve in strength, mobility and gait.     Time Calculation:    PT Charges     Row Name 03/06/23 1031             Time Calculation    Start Time 1031  -KW      PT Received On 03/06/23  -KW      PT Goal Re-Cert Due Date 03/11/23  -KW         Timed Charges    63410 - Gait Training Minutes  13  -KW         Total Minutes    Timed Charges Total Minutes 13  -KW       Total Minutes 13  -KW            User Key  (r) = Recorded By, (t) = Taken By, (c) = Cosigned By    Initials Name Provider Type    Beatriz Lyons PT Physical Therapist              Therapy Charges for Today     Code Description Service Date Service Provider Modifiers Qty    [id number] HC GAIT TRAINING EA 15 MIN 3/6/2023 Beatriz Mulligan, PT GP 1          PT G-Codes  Outcome Measure  Options: AM-PAC 6 Clicks Basic Mobility (PT)  AM-PAC 6 Clicks Score (PT): 17  AM-PAC 6 Clicks Score (OT): 15  PT Discharge Summary  Anticipated Discharge Disposition (PT): skilled nursing facility    Beatriz Mulligan, PT  3/6/2023

## 2023-03-06 NOTE — CASE MANAGEMENT/SOCIAL WORK
Continued Stay Note  [facility]     Patient Name: Dorota Membreno  MRN: [id number]  Today's Date: 3/6/2023    Admit Date: 2/26/2023    Plan: ONGOING   Discharge Plan     Row Name 03/06/23 1518       Plan    Plan ONGOING    Plan Comments O2 weaned to 2-3lpm/NC.  Pt out of restraints today at 0200.  Last Geodon dose yest around 1520.  She is working with therapy.  May be a rehab candidate if she's able to be without restraints and Geodon for 24-48h.  CM will cont to follow hospital course and have further discussions with family in am.    Final Discharge Disposition Code 30 - still a patient               Discharge Codes    No documentation.               Expected Discharge Date and Time     Expected Discharge Date Expected Discharge Time    Mar 9, 2023             Vero Russell RN

## 2023-03-06 NOTE — PLAN OF CARE
Goal Outcome Evaluation:  Plan of Care Reviewed With: patient        Progress: improving  Outcome Evaluation: Patient demonstrating improvement in ambulation distance today. She is able to follow more commands this date but continues to require cues for safety awareness and AD management. Patient ambulates with shortened shuffled steps, pathway deviations, and B knee buckling. She fatigues quickly and would benefit from continued skilled PT services to improve in strength, mobility and gait.

## 2023-03-06 NOTE — THERAPY TREATMENT NOTE
Patient Name: Dorota Membreno  : 10/31/1931    MRN: [id number]                              Today's Date: 3/6/2023       Admit Date: 2023    Visit Dx:     ICD-10-CM ICD-9-CM   1. Late onset Alzheimer's disease with behavioral disturbance (HCC)  G30.1 331.0    F02.818 294.11   2. Sepsis, due to unspecified organism, unspecified whether acute organ dysfunction present ([facility])  A41.9 038.9     995.91   3. Acute respiratory failure with hypoxia ([facility])  J96.01 518.81   4. Atrial fibrillation with RVR ([facility])  I48.91 427.31   5. Hypotension, unspecified hypotension type  I95.9 458.9     Patient Active Problem List   Diagnosis   • Gastroesophageal reflux disease   • Atopic rhinitis   • Asthma   • Late onset Alzheimer's disease with behavioral disturbance (HCC)   • Eczema   • Dyslipidemia   • Hypothyroidism   • Essential hypertension   • Post-menopausal osteoporosis   • Arteritis (HCC)   • Dizziness   • Edema   • Fatigue   • Shingles   • Syncope and collapse   • Atrial fibrillation (HCC)   • Leukocytosis   • Hypomagnesemia   • Elevated brain natriuretic peptide (BNP) level   • Proteinuria   • Bilateral Pleural effusions   • Pneumonia   • Respiratory failure (HCC)   • Elevated alkaline phosphatase level   • Sepsis (HCC)   • Sepsis, due to unspecified organism, unspecified whether acute organ dysfunction present (HCC)     Past Medical History:   Diagnosis Date   • Acid reflux 2016    stable   • Allergic rhinitis    • Alzheimer's disease ([facility]) 2017   • Arteritis ([facility])     A. 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 B. Improved with prednisone therapy   • Asthma    • Cancer (HCC)     colon. Status post resection at age 37   • Cognitive impairment, mild, so stated     A. Patient has been forgetting names continue med. Has f/u with neuro. She is having some times forgetting directions. She has moved into a new house and this has not helped with directions   • Dyslipidemia     lipids  and cmp   • Hypertension     lipids, cmp, urine micro   • Hypothyroidism     A. On replacement therapy   • Mammogram abnormal     A. 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   • Panic attacks    • Pneumonia    • Postmenopausal osteoporosis    • Shingles 03/2018     Past Surgical History:   Procedure Laterality Date   • CATARACT EXTRACTION     • COLECTOMY PARTIAL / TOTAL      Patient diagnosed with colon cancer at age 37   • HYSTERECTOMY        General Information     Row Name 03/06/23 0845          OT Time and Intention    Document Type therapy note (daily note)  -     Mode of Treatment occupational therapy  -     Row Name 03/06/23 0845          General Information    Patient Profile Reviewed yes  -     Existing Precautions/Restrictions fall;oxygen therapy device and L/min;other (see comments)  Dementia  -     Barriers to Rehab medically complex;previous functional deficit;cognitive status  -     Row Name 03/06/23 0845          Cognition    Orientation Status (Cognition) oriented to;person;disoriented to;place;time;situation;verbal cues/prompts needed for orientation;other (see comments)  -     Row Name 03/06/23 0845          Safety Issues, Functional Mobility    Safety Issues Affecting Function (Mobility) safety precautions follow-through/compliance;safety precaution awareness;insight into deficits/self-awareness;ability to follow commands;awareness of need for assistance;judgment;problem-solving;sequencing abilities;at risk behavior observed  -     Impairments Affecting Function (Mobility) balance;cognition;coordination;endurance/activity tolerance;postural/trunk control;shortness of breath;strength  -     Cognitive Impairments, Mobility Safety/Performance attention;awareness, need for assistance;insight into deficits/self-awareness;judgment;problem-solving/reasoning;sequencing abilities;safety precaution follow-through;safety precaution awareness  -     Comment, Safety  "Issues/Impairments (Mobility) Max single step directions for all bed mobility and transfers.  -           User Key  (r) = Recorded By, (t) = Taken By, (c) = Cosigned By    Initials Name Provider Type     Zoe Tang OT Occupational Therapist                 Mobility/ADL's     Row Name 03/06/23 0846          Bed Mobility    Bed Mobility scooting/bridging;supine-sit  -     Scooting/Bridging [place] (Bed Mobility) maximum assist (25% patient effort);1 person assist;verbal cues  -     Supine-Sit [place] (Bed Mobility) moderate assist (50% patient effort);1 person assist;verbal cues  -     Bed Mobility, Safety Issues cognitive deficits limit understanding;decreased use of arms for pushing/pulling;decreased use of legs for bridging/pushing  -     Assistive Device (Bed Mobility) bed rails;head of bed elevated  -     Comment, (Bed Mobility) Cued pt to advance into sitting at EOB and pt responded \"I don't know how\". Step by step verbal directions required for all bed mobility and transfers.  -     Row Name 03/06/23 0846          Transfers    Transfers sit-stand transfer;bed-chair transfer  -     Row Name 03/06/23 0846          Bed-Chair Transfer    Bed-Chair [place] (Transfers) minimum assist (75% patient effort);1 person assist;verbal cues  -     Assistive Device (Bed-Chair Transfers) other (see comments)  UE support  -     Row Name 03/06/23 0846          Sit-Stand Transfer    Sit-Stand [place] (Transfers) contact guard;verbal cues  -     Assistive Device (Sit-Stand Transfers) other (see comments)  none  -     Row Name 03/06/23 0846          Functional Mobility    Functional Mobility- Comment not tested  -     Row Name 03/06/23 0846          Activities of Daily Living    BADL Assessment/Intervention lower body dressing;grooming;feeding  -     Row Name 03/06/23 0846          Lower Body Dressing Assessment/Training    [place] Level (Lower Body Dressing) " don;socks;dependent (less than 25% patient effort)  -     Position (Lower Body Dressing) supine  -     Row Name 03/06/23 0846          Grooming Assessment/Training    [place] Level (Grooming) hair care, combing/brushing;wash face, hands;set up  -     Position (Grooming) supported sitting  -     Row Name 03/06/23 0846          Self-Feeding Assessment/Training    [place] Level (Feeding) prepare tray/open items;knife use;maximum assist (25% patient effort);scoop food and bring to mouth;supervision  -     Position (Self-Feeding) supported sitting  -           User Key  (r) = Recorded By, (t) = Taken By, (c) = Cosigned By    Initials Name Provider Type     Zoe Tang OT Occupational Therapist               Obj/Interventions     Row Name 03/06/23 0851          Sensory Assessment (Somatosensory)    Sensory Assessment (Somatosensory) sensation intact  -     Row Name 03/06/23 0802          Balance    Balance Assessment sitting static balance;sitting dynamic balance;standing static balance;standing dynamic balance  -     Static Sitting Balance supervision  -     Dynamic Sitting Balance contact guard  -HM     Position, Sitting Balance unsupported;sitting edge of bed  -     Static Standing Balance contact guard  -HM     Dynamic Standing Balance minimal assist  -     Position/Device Used, Standing Balance supported;other (see comments)  UE support  -     Balance Interventions sitting;occupation based/functional task  -           User Key  (r) = Recorded By, (t) = Taken By, (c) = Cosigned By    Initials Name Provider Type     Zoe Tang, OT Occupational Therapist               Goals/Plan    No documentation.                Clinical Impression     Row Name 03/06/23 0939          Pain Assessment    Pretreatment Pain Rating 0/10 - no pain  -     Posttreatment Pain Rating 0/10 - no pain  -     Row Name 03/06/23 0939          Plan of Care Review    Plan of Care Reviewed With  patient  -HM     Progress improving  -     Outcome Evaluation Pt with good improvement in transfers this date completing sit to stand with CGA and took steps to chair with minAx1. Pt completed grooming with setup. MaxA for all tray prep for meals. Continues to present below baseline with decreased strength, tolerance, and overall independence with ADLS. Recommend d/c to SNF.  -     Row Name 03/06/23 0939          Therapy Assessment/Plan (OT)    Criteria for Skilled Therapeutic Interventions Met (OT) yes;meets criteria;skilled treatment is necessary  -     Therapy Frequency (OT) daily  -     Row Name 03/06/23 0939          Therapy Plan Review/Discharge Plan (OT)    Anticipated Discharge Disposition (OT) [facility] nursing facility  -     Row Name 03/06/23 0939          Vital Signs    Pre Systolic BP Rehab 159  -HM     Pre Treatment Diastolic BP 89  -HM     Post Systolic BP Rehab 149  -HM     Post Treatment Diastolic BP 80  -HM     O2 Delivery Pre Treatment supplemental O2  -HM     O2 Delivery Intra Treatment supplemental O2  -HM     O2 Delivery Post Treatment supplemental O2  -HM     Pre Patient Position Supine  -     Intra Patient Position Standing  -     Post Patient Position Sitting  -     Row Name 03/06/23 0939          Positioning and Restraints    Pre-Treatment Position in bed  -     Post Treatment Position chair  -HM     In Chair notified nsg;reclined;exit alarm on;call light within reach;encouraged to call for assist;waffle cushion  -           User Key  (r) = Recorded By, (t) = Taken By, (c) = Cosigned By    Initials Name Provider Type     Zoe Tang, OT Occupational Therapist               Outcome Measures     Row Name 03/06/23 0942          How much help from another is currently needed...    Putting on and taking off regular lower body clothing? 2  -HM     Bathing (including washing, rinsing, and drying) 2  -HM     Toileting (which includes using toilet bed pan or urinal) 2  -      Putting on and taking off regular upper body clothing 3  -HM     Taking care of personal grooming (such as brushing teeth) 3  -     Eating meals 3  -     AM-PAC 6 Clicks Score (OT) 15  -     Row Name 03/06/23 0942          Functional Assessment    Outcome Measure Options AM-PAC 6 Clicks Daily Activity (OT)  -           User Key  (r) = Recorded By, (t) = Taken By, (c) = Cosigned By    Initials Name Provider Type     Zoe Tang OT Occupational Therapist                Occupational Therapy Education     Title: PT OT SLP Therapies (In Progress)     Topic: Occupational Therapy (In Progress)     Point: ADL training (Done)     Description:   Instruct learner(s) on proper safety adaptation and remediation techniques during self care or transfers.   Instruct in proper use of assistive devices.              Learning Progress Summary           Patient Acceptance, E,TB,D, VU,NR by  at 3/6/2023 0943    Acceptance, E,TB,D, NR by  at 3/1/2023 0945                   Point: Home exercise program (In Progress)     Description:   Instruct learner(s) on appropriate technique for monitoring, assisting and/or progressing therapeutic exercises/activities.              Learning Progress Summary           Patient Acceptance, E,TB,D, NR by KF at 3/1/2023 0945                   Point: Precautions (Done)     Description:   Instruct learner(s) on prescribed precautions during self-care and functional transfers.              Learning Progress Summary           Patient Acceptance, E,TB,D, VU,NR by  at 3/6/2023 0943    Acceptance, E,TB,D, NR by KF at 3/1/2023 0945                   Point: Body mechanics (Done)     Description:   Instruct learner(s) on proper positioning and spine alignment during self-care, functional mobility activities and/or exercises.              Learning Progress Summary           Patient Acceptance, E,TB,D, VU,NR by  at 3/6/2023 0943    Acceptance, E,TB,D, NR by KF at 3/1/2023 0945                                User Key     Initials Effective Dates Name Provider Type Discipline     10/25/22 -  Zoe Tang, OT Occupational Therapist OT     06/16/21 -  Giovanna Condon, OT Occupational Therapist OT              OT Recommendation and Plan  Therapy Frequency (OT): daily  Plan of Care Review  Plan of Care Reviewed With: patient  Progress: improving  Outcome Evaluation: Pt with good improvement in transfers this date completing sit to stand with CGA and took steps to chair with minAx1. Pt completed grooming with setup. MaxA for all tray prep for meals. Continues to present below baseline with decreased strength, tolerance, and overall independence with ADLS. Recommend d/c to SNF.     Time Calculation:    Time Calculation- OT     Row Name 03/06/23 0916             Time Calculation- OT    OT Start Time 0816  -HM      OT Received On 03/06/23  -      OT Goal Re-Cert Due Date 03/16/23  -         Timed Charges    35999 - OT Self Care/Mgmt Minutes 34  -HM         Total Minutes    Timed Charges Total Minutes 34  -HM       Total Minutes 34  -HM            User Key  (r) = Recorded By, (t) = Taken By, (c) = Cosigned By    Initials Name Provider Type     Zoe Tang, OT Occupational Therapist              Therapy Charges for Today     Code Description Service Date Service Provider Modifiers Qty    86758570221 HC OT SELF CARE/MGMT/TRAIN EA 15 MIN 3/6/2023 Zoe Tang, OT GO 2               Zoe Tang OT  3/6/2023

## 2023-03-06 NOTE — PLAN OF CARE
Goal Outcome Evaluation:           Progress: improving  Outcome Evaluation: VSS, weaned to 2L NC. HR can zahra to 49-50 bpm while asleep. BL soft wrist restraints removed at 0200.

## 2023-03-06 NOTE — PLAN OF CARE
Goal Outcome Evaluation:   VSS/ 2L NC/ oriented to self only. Restraints remain off, although patient has pulled 2 IVs out. FC d/c at 1730.

## 2023-03-07 LAB
ANION GAP SERPL CALCULATED.3IONS-SCNC: 9 MMOL/L (ref 5–15)
BUN SERPL-MCNC: 35 MG/DL (ref 8–23)
BUN/CREAT SERPL: 48.6 (ref 7–25)
CALCIUM SPEC-SCNC: 8.3 MG/DL (ref 8.2–9.6)
CHLORIDE SERPL-SCNC: 101 MMOL/L (ref 98–107)
CO2 SERPL-SCNC: 31 MMOL/L (ref 22–29)
CREAT SERPL-MCNC: 0.72 MG/DL (ref 0.57–1)
EGFRCR SERPLBLD CKD-EPI 2021: 79 ML/MIN/1.73
GLUCOSE SERPL-MCNC: 85 MG/DL (ref 65–99)
MAGNESIUM SERPL-MCNC: 1.4 MG/DL (ref 1.7–2.3)
POTASSIUM SERPL-SCNC: 3.9 MMOL/L (ref 3.5–5.2)
SODIUM SERPL-SCNC: 141 MMOL/L (ref 136–145)

## 2023-03-07 PROCEDURE — 80048 BASIC METABOLIC PNL TOTAL CA: CPT | Performed by: STUDENT IN AN ORGANIZED HEALTH CARE EDUCATION/TRAINING PROGRAM

## 2023-03-07 PROCEDURE — 83735 ASSAY OF MAGNESIUM: CPT | Performed by: STUDENT IN AN ORGANIZED HEALTH CARE EDUCATION/TRAINING PROGRAM

## 2023-03-07 PROCEDURE — 99232 SBSQ HOSP IP/OBS MODERATE 35: CPT | Performed by: STUDENT IN AN ORGANIZED HEALTH CARE EDUCATION/TRAINING PROGRAM

## 2023-03-07 PROCEDURE — 25010000002 MAGNESIUM SULFATE 2 GM/50ML SOLUTION: Performed by: STUDENT IN AN ORGANIZED HEALTH CARE EDUCATION/TRAINING PROGRAM

## 2023-03-07 PROCEDURE — 25010000002 HEPARIN (PORCINE) PER 1000 UNITS: Performed by: INTERNAL MEDICINE

## 2023-03-07 PROCEDURE — 63710000001 PREDNISONE PER 1 MG: Performed by: STUDENT IN AN ORGANIZED HEALTH CARE EDUCATION/TRAINING PROGRAM

## 2023-03-07 RX ORDER — IPRATROPIUM BROMIDE AND ALBUTEROL SULFATE 2.5; .5 MG/3ML; MG/3ML
3 SOLUTION RESPIRATORY (INHALATION) EVERY 4 HOURS PRN
Status: DISCONTINUED | OUTPATIENT
Start: 2023-03-07 | End: 2023-03-09 | Stop reason: HOSPADM

## 2023-03-07 RX ORDER — QUETIAPINE FUMARATE 25 MG/1
50 TABLET, FILM COATED ORAL DAILY
Status: DISCONTINUED | OUTPATIENT
Start: 2023-03-08 | End: 2023-03-09 | Stop reason: HOSPADM

## 2023-03-07 RX ORDER — MAGNESIUM SULFATE HEPTAHYDRATE 40 MG/ML
2 INJECTION, SOLUTION INTRAVENOUS
Status: DISCONTINUED | OUTPATIENT
Start: 2023-03-07 | End: 2023-03-07

## 2023-03-07 RX ORDER — QUETIAPINE FUMARATE 25 MG/1
25 TABLET, FILM COATED ORAL ONCE
Status: COMPLETED | OUTPATIENT
Start: 2023-03-07 | End: 2023-03-07

## 2023-03-07 RX ADMIN — SENNOSIDES AND DOCUSATE SODIUM 2 TABLET: 8.6; 5 TABLET ORAL at 08:53

## 2023-03-07 RX ADMIN — MAGNESIUM OXIDE TAB 400 MG (241.3 MG ELEMENTAL MG) 400 MG: 400 (241.3 MG) TAB at 12:50

## 2023-03-07 RX ADMIN — CITALOPRAM HYDROBROMIDE 20 MG: 20 TABLET ORAL at 08:52

## 2023-03-07 RX ADMIN — MAGNESIUM OXIDE TAB 400 MG (241.3 MG ELEMENTAL MG) 400 MG: 400 (241.3 MG) TAB at 17:50

## 2023-03-07 RX ADMIN — QUETIAPINE FUMARATE 25 MG: 25 TABLET ORAL at 10:57

## 2023-03-07 RX ADMIN — PREDNISONE 40 MG: 20 TABLET ORAL at 08:52

## 2023-03-07 RX ADMIN — DONEPEZIL HYDROCHLORIDE 10 MG: 10 TABLET, FILM COATED ORAL at 21:08

## 2023-03-07 RX ADMIN — METOPROLOL TARTRATE 25 MG: 25 TABLET, FILM COATED ORAL at 08:52

## 2023-03-07 RX ADMIN — ATORVASTATIN CALCIUM 20 MG: 20 TABLET, FILM COATED ORAL at 21:08

## 2023-03-07 RX ADMIN — MAGNESIUM SULFATE HEPTAHYDRATE 2 G: 2 INJECTION, SOLUTION INTRAVENOUS at 08:52

## 2023-03-07 RX ADMIN — QUETIAPINE FUMARATE 25 MG: 25 TABLET ORAL at 08:53

## 2023-03-07 RX ADMIN — SENNOSIDES AND DOCUSATE SODIUM 2 TABLET: 8.6; 5 TABLET ORAL at 21:07

## 2023-03-07 RX ADMIN — MEMANTINE 5 MG: 10 TABLET ORAL at 08:53

## 2023-03-07 RX ADMIN — TAMSULOSIN HYDROCHLORIDE 0.4 MG: 0.4 CAPSULE ORAL at 08:52

## 2023-03-07 RX ADMIN — METOPROLOL TARTRATE 25 MG: 25 TABLET, FILM COATED ORAL at 21:08

## 2023-03-07 RX ADMIN — HEPARIN SODIUM 5000 UNITS: 5000 INJECTION INTRAVENOUS; SUBCUTANEOUS at 05:59

## 2023-03-07 RX ADMIN — MONTELUKAST 10 MG: 10 TABLET, FILM COATED ORAL at 21:07

## 2023-03-07 RX ADMIN — POLYETHYLENE GLYCOL 3350 17 G: 17 POWDER, FOR SOLUTION ORAL at 08:55

## 2023-03-07 RX ADMIN — LEVOTHYROXINE SODIUM 50 MCG: 50 TABLET ORAL at 05:59

## 2023-03-07 RX ADMIN — Medication 10 ML: at 08:53

## 2023-03-07 RX ADMIN — APIXABAN 5 MG: 5 TABLET, FILM COATED ORAL at 21:07

## 2023-03-07 RX ADMIN — QUETIAPINE FUMARATE 75 MG: 25 TABLET ORAL at 21:07

## 2023-03-07 NOTE — DISCHARGE PLACEMENT REQUEST
"Vero Russell, RN    [phone]      STR with plan to return to [facility]                Dorota Vogt (89 y.o. Female)     Date of Birth   10/31/1931    Social Security Number       Address   [address] [place] [address]    Home Phone   [phone]    MRN   [id number]       Gnosticism   None    Marital Status                               Admission Date   2/26/23    Admission Type   Emergency    Admitting Provider   Julia Zamora MD    Attending Provider   Julia Zamora MD    Department, Room/Bed   [facility] 4G, S454/1       Discharge Date       Discharge Disposition       Discharge Destination                               Attending Provider: Julia Zamora MD    Allergies: Codeine, Hydrochlorothiazide W-triamterene, Levofloxacin, Penicillins    Isolation: None   Infection: None   Code Status: No CPR    Ht: 147.3 cm (58\")   Wt: 71.2 kg (157 lb)    Admission Cmt: None   Principal Problem: Sepsis (HCC) [A41.9]                 Active Insurance as of 2/26/2023     Primary Coverage     Payor Plan Insurance Group Employer/Plan Group    MEDICARE MEDICARE A & B      Payor Plan Address Payor Plan Phone Number Payor Plan Fax Number Effective Dates    [address] [phone]  10/1/1996 - None Entered    [facility] [id number]       Subscriber Name Subscriber Birth Date Member ID       DOROTA VOGT 10/31/1931 [id number]           Secondary Coverage     Payor Plan Insurance Group Employer/Plan Group    [facility] [id number]     Payor Plan Address Payor Plan Phone Number Payor Plan Fax Number Effective Dates    [address] [address]   1/1/2016 - None Entered    [facility] [id number]       Subscriber Name Subscriber Birth Date Member ID       DOROTA VOGT 10/31/1931 [id number]                 Emergency Contacts      (Rel.) Home Phone Work Phone Mobile Phone    Jovana Vogt (Daughter) [phone] -- [phone]    " Carlos Membreno (Son) [phone] -- [phone]    ANABEL NAGEL (Grandchild) [phone] -- [phone]               History & Physical      Raimundo Aiken MD at 02/26/23 0943          Intensive Care Admission Note     Sepsis (HCC)    History of Present Illness     Ms. Membreno is a 89 yo female with PMH GERD, Alzheimer's disease, Giant cell arteritis (2000), Asthma, Colon cancer (s/p resection at age 37), HLD, HTn, hypothyroidism, anxiety who presents to the [facility] ED on 2/26/23 after being found down at her nursing home. Information is limited but according to sources, patient lives at [facility] where she was found down on the floor this morning (unclear if she fell or if she sat down independently). At that time her vitals were checked and she was found to be hypoxic into the 80%'s as well as with rapid heart rate. EMS was called and she was brought to the [facility] ED for evaluation.     On arrival to the ED she was found to be in Rapid A. Fib and hypoxia. VS on arrival temp 99.9, Hr 138, RR 26, BP 89/79, SpO2 83%. She was placed on a NRB and started on Esmolol. She developed hypotension requiring fluid bolus but quickly converted out of A. Fib. Esmolol was turned off and her BP normalized. She was able to be weaned from NRB down to 4L NC with SpO2 in the high 90%'s.     Significant Labs include: ABG on NRB 7.43 / 44.5 / 144 / 29.8. HS troponin 19 and then 20, Respiratory PCR negative for all analytes,  UA positive for protein, D-Dimer 1.45, BNP 1095, PCT 0.13, TSH 6, Free T4 1.03, glucose 139, BUN 28, creat 0.86, Na 139, K 3.8, AST 19, ALT 13, Alk phos 120, albumin 3.4, phos 2.6, Mag 1.6, lactate 1.5, WBC 16.26, HgB 11.5, Pls 264.    CTA showed mildly motion degraded exam with no evidence of pulmonary embolism. Few foci of consolidative opacity in the right upper lobe may represent infection. Few mildly enlarged mediastinal lymph nodes may be reactive. Trace bilateral pleural effusions. Narrowing  of the lower trachea and proximal mainstem bronchi may represent tracheomalacia.     CXR non specific interstitial prominence could reflect chronic changes, but mild edema or atypical infectious/inflammatory process can also appear this way in the correct clinical setting. Mild bibasilar opacities suggest atelectasis but infiltrate or edema not excluded.     While in the ED she received 1g Magnesium, 1.5 L NS bolus, LR infusion, esmolol drip, 10 mg IV dexamethasone, Vancomycin, Cefepime and Duonebs. She is being admitted to the ICU service for management.     Of note, patient has Living Will on file that notes she is a DNR/DNI. I spoke with Daughter Jovana Membreno who confirmed this.    I spent 20 minutes of time on this.  Carol Rebollar, MSN, AGACNP-BC, APRN    Electronically signed by XAVIER Mccain, 02/26/23, 9:58 AM EST.        Problem List, Surgical History, Family, Social History, and ROS     Patient Active Problem List    Diagnosis    • *Sepsis (HCC) [A41.9]    • Atrial fibrillation (HCC) [I48.91]    • Leukocytosis [D72.829]    • Hypomagnesemia [E83.42]    • Elevated brain natriuretic peptide (BNP) level [R79.89]    • Proteinuria [R80.9]    • Bilateral Pleural effusions [J90]    • Pneumonia [J18.9]    • Respiratory failure (HCC) [J96.90]    • Elevated alkaline phosphatase level [R74.8]    • Syncope and collapse [R55]    • Shingles [B02.9]    • Dizziness [R42]    • Edema [R60.9]    • Fatigue [R53.83]    • Arteritis (HCC) [I77.6]    • Gastroesophageal reflux disease [K21.9]    • Atopic rhinitis [J30.9]    • Asthma [J45.909]    • Late onset Alzheimer's disease with behavioral disturbance (HCC) [G30.1, F02.818]    • Eczema [L30.9]    • Dyslipidemia [E78.5]    • Hypothyroidism [E03.9]    • Essential hypertension [I10]    • Post-menopausal osteoporosis [M81.0]      Past Surgical History:   Procedure Laterality Date   • CATARACT EXTRACTION     • COLECTOMY PARTIAL / TOTAL      Patient diagnosed with colon cancer at  age 37   • HYSTERECTOMY         Allergies   Allergen Reactions   • Codeine    • Hydrochlorothiazide W-Triamterene    • Levofloxacin    • Penicillins      No current facility-administered medications on file prior to encounter.     Current Outpatient Medications on File Prior to Encounter   Medication Sig   • amLODIPine (NORVASC) 5 MG tablet GIVE 1 TABLET BY MOUTH DAILY AT BEDTIME   • atorvastatin (LIPITOR) 20 MG tablet GIVE 1 TABLET BY MOUTH DAILY   • Bismatrol 262 MG/15ML suspension GIVE 15 ML BY MOUTH EVERY 8 HOURS AS NEEDED FOR DIARRHEA   • Cholecalciferol (vitamin D3) 125 MCG (5000 UT) tablet tablet GIVE 1 TABLET BY MOUTH DAILY   • citalopram (CeleXA) 20 MG tablet TAKE 1 TABLET BY MOUTH EVERY MORNING FOR DEPRESSION   • donepezil (ARICEPT) 5 MG tablet GIVE 1 TABLET BY MOUTH DAILY AT BEDTIME   • KLOR-CON 10 MEQ CR tablet TAKE 1 TABLET BY MOUTH: MON,WED,FRI   • levothyroxine (SYNTHROID, LEVOTHROID) 50 MCG tablet GIVE 1 TABLET BY MOUTH DAILY   • memantine (NAMENDA) 5 MG tablet GIVE 1 TABLET BY MOUTH TWICE A DAY   • metoprolol succinate XL (TOPROL-XL) 50 MG 24 hr tablet TAKE 1 TABLET BY MOUTH DAILY.   • montelukast (SINGULAIR) 10 MG tablet TAKE 1 TABLET BY MOUTH EVERY NIGHT.   • nystatin-triamcinolone (MYCOLOG) 026576-0.1 UNIT/GM-% ointment APPLY TO SKIN TWICE DAILY   • ondansetron (ZOFRAN) 4 MG tablet GIVE 1 TABLET BY MOUTH EVERY 12 HOURS AS NEEDED FOR NAUSEA/VOMITING   • QUEtiapine (SEROquel) 25 MG tablet GIVE 1 TABLET BY MOUTH EVERY MORNING     MEDICATION LIST AND ALLERGIES REVIEWED.    Family History   Problem Relation Age of Onset   • Other Mother         medical problems   • Allergies Son    • Alcohol abuse Other      Social History     Tobacco Use   • Smoking status: Never   • Smokeless tobacco: Never   Substance Use Topics   • Alcohol use: No   • Drug use: No     Social History     Social History Narrative   • Not on file     FAMILY AND SOCIAL HISTORY REVIEWED.    Review of Systems  Limited review of system  due to patient's dementia    Physical Exam and Clinical Information   /78 Comment: Provider MD Jeremiah is aware of hypotension.  Pulse 77   Temp 99.9 °F (37.7 °C) (Oral)   Resp 22   Wt 86.2 kg (190 lb)   SpO2 95%   BMI 39.71 kg/m²   Physical Exam  General Appearance: Awake, alert, in no acute distress  Head:    Atraumatic, Normocephalic, without obvious abnormality   Lungs:   B/L Breath sounds present with decreased breath sounds on bases, diffuse wheezing heard, occ crackles.   Heart: S1 and S2 present, no murmur  Abdomen: Soft, nontender, no guarding or rigidity, bowel sounds positive  Extremities:  no cyanosis or clubbing,  no edema, warm to touch.  Pulses: Positive and symmetric.  Neurologic:  Moving all four extremities. Good strength bilaterally.     Results from last 7 days   Lab Units 02/26/23  0525   WBC 10*3/mm3 16.26*   HEMOGLOBIN g/dL 11.5*   PLATELETS 10*3/mm3 264     Results from last 7 days   Lab Units 02/26/23  0525   SODIUM mmol/L 139   POTASSIUM mmol/L 3.8   CO2 mmol/L 30.0*   BUN mg/dL 28*   CREATININE mg/dL 0.86   MAGNESIUM mg/dL 1.6*   PHOSPHORUS mg/dL 2.6   GLUCOSE mg/dL 139*     CrCl cannot be calculated (Unknown ideal weight.).      Results from last 7 days   Lab Units 02/26/23  0544   PH, ARTERIAL pH units 7.434   PCO2, ARTERIAL mm Hg 44.5   PO2 ART mm Hg 144.0*     Lab Results   Component Value Date    LACTATE 1.5 02/26/2023        Images:   CT angiogram reviewed and did not see any obvious pulmonary embolism.  Consolidative infiltrate noted in the right upper lobe and patchy opacities in the bases with small effusions.  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    I reviewed the patient's results and images.     Chest x-ray reviewed personally and showed basilar atelectasis, small effusion.  Right upper lobe opacity noted.    EKG reviewed and showed normal sinus rhythm, nonspecific ST changes.  QTc is in normal range.  EKG earlier last night showed atrial fibrillation with rapid  ventricular response which is now converted back to sinus rhythm.    Impression     Sepsis (HCC)    Late onset Alzheimer's disease with behavioral disturbance (HCC)    Atrial fibrillation (HCC)    Leukocytosis    Hypomagnesemia    Elevated brain natriuretic peptide (BNP) level    Proteinuria    Bilateral Pleural effusions    Pneumonia    Respiratory failure (HCC)    Elevated alkaline phosphatase level    Plan/Recommendations     89-year-old female with past medical history of GERD, Alzheimer's dementia, giant cell arteritis, asthma, colon cancer s/p resection at age 37, dyslipidemia, hypertension, hypothyroidism, anxiety/depression transferred from nursing home after found down at her nursing home.  Patient history is not very clear but apparently she was very hypoxic on arrival and saturations in the 80s and normally not on any supplemental oxygen at nursing home.  She was also in new onset A-fib with RVR.  On arrival to ED patient was found to be low-grade fever with 99.9, heart rate in 138 range, hypotensive and hypoxic.  Patient was placed on nonrebreather mask and and also given esmolol.  Developed hypotension for which she  was given 2 L of fluid boluses with improvement in blood pressure.  Underwent CT angiogram which did not show any pulmonary embolism but evidence of probable pneumonia with consolidative infiltrate in the right upper lobe.  Procalcitonin level is however normal range.  Patient is very hard to get history from but denies any significant cough or sputum production.  Urinalysis checked and negative for any UTI.  Patient has been wheezing and was given nebulizer therapy.  Initially patient was discussed with hospitalist team and they thought given patient's multiple issues she would be better served in ICU.  When I evaluated the patient in the ER patient is on 4 L nasal cannula.  She is hemodynamically stable.  Has converted back to sinus rhythm and not on any pressors currently. patient needs  hospital admission and will be admitted to telemetry floor rather than ICU at this point with close monitoring.   goals of care were discussed with patient's daughter by my NP and according to her and according to the living will on file patient is DNR/DNI.    1.  Admit patient to telemetry floor for closer monitoring.  2.  New onset A-fib with RVR, currently converted back to sinus rhythm.  Troponins are flat.  Will get echocardiogram to evaluate cardiac function.  Monitor electrolytes and replace per protocol.  May need esmolol if developed rapid ventricular rate again.  3.  Concern for possible pneumonia with consolidated infiltrate in the right upper lobe and patchy infiltrate otherwise.  Cannot rule out aspiration related event.  We will get speech to evaluate the patient.  Keep n.p.o. for now except meds.  Will give vancomycin and cefepime pending cultures.  Get MRSA screen.  Check urine Legionella and strep antigen.  4.  With ongoing wheezing will give DuoNeb nebulizers every 6 hours.  Avoid further fluid overload and stop maintenance IV fluids for now.  Will use as needed fluid boluses if needed.  Patient has not been on any medications to treat her asthma at the nursing home.  Will escalate therapy if needed including systemic steroids.  5.  COVID swab and viral screens negative.  6.  Continue home medications of memantine and donepezil.  Continue antidepressant medications.  7.  GI and DVT prophylaxis.  8.  Check labs in the morning.   9.  Continue levothyroxine for hypothyroidism    DNR/DNI after discussions with family.  Will have hospitalist team  patient in the morning.    Time spent 45 min (exclusive of procedure time)  including high complexity decision making to assess, manipulate, and support vital organ system failure in this individual who has impairment of one or more vital organ systems such that there is a high probability of imminent or life threatening deterioration in the patient’s  condition.      Raimundo Aiken MD, [facility]  Pulmonary and Critical Care Medicine  23 10:06 EST     CC: Kylie Zapata DO      Electronically signed by Raimundo Aiken MD at 23 1204          Physician Progress Notes (most recent note)      Julia Zamora MD at 23 0583              [facility] Medicine Services  PROGRESS NOTE    Patient Name: Dorota Membreno  : 10/31/1931  MRN: [id number]    Date of Admission: 2023  Primary Care Physician: Kylie Zapata DO    Subjective   Subjective     CC:  resp failure, pneumonia, afib, dementia    HPI:  On 2 L nasal cannula.  Had a bowel movement.  Pulling out IVs repeatedly.  Has not been urinating well on her own requiring in and out cath since Buck was removed.    ROS:  Although patient denied any symptoms, she is an unreliable historian given advanced dementia    Objective   Objective     Vital Signs:   Temp:  [98 °F (36.7 °C)-98.1 °F (36.7 °C)] 98.1 °F (36.7 °C)  Heart Rate:  [56-87] 61  Resp:  [17-18] 18  BP: (137-144)/(62-72) 144/64  Flow (L/min):  [1-2] 1    Physical Exam:  Constitutional: No acute distress, awake, alert  HENT: NCAT, mucous membranes moist  Respiratory: Clear to auscultation bilaterally, respiratory effort normal 3L NC  Cardiovascular: RRR, no murmurs, rubs, or gallops  Gastrointestinal:, nondistended  Musculoskeletal: No bilateral ankle edema  Psychiatric: Appropriate affect, cooperative  Neurologic: oriented to self only, thinks she is in a school house, PERRL, symmetric facies, symmetric palate rise, tongue midline, moving all extremities equally, following all commands  Skin: No rashes on exposed arms and legs        Results Reviewed:  LAB RESULTS:      Lab 23  0617 23  0700   WBC 16.96* 19.46*   HEMOGLOBIN 13.2 12.5   HEMATOCRIT 39.0 36.3   PLATELETS 318 342   MCV 92.9 91.2         Lab 23  0535 23  0617 23  0700   SODIUM 141 140 143   POTASSIUM 3.9 3.8 3.9    CHLORIDE 101 99 102   CO2 31.0* 27.0 26.0   ANION GAP 9.0 14.0 15.0   BUN 35* 21 23   CREATININE 0.72 0.62 0.66   EGFR 79.0 84.2 82.9   GLUCOSE 85 117* 103*   CALCIUM 8.3 8.8 8.6   MAGNESIUM 1.4* 1.8 2.6*         Lab 03/02/23  0617   TOTAL PROTEIN 6.5   ALBUMIN 3.5   GLOBULIN 3.0   ALT (SGPT) 36*   AST (SGOT) 26   BILIRUBIN 0.4   ALK PHOS 143*                     Brief Urine Lab Results  (Last result in the past 365 days)      Color   Clarity   Blood   Leuk Est   Nitrite   Protein   CREAT   Urine HCG        02/26/23 0600 Yellow   Clear   Negative   Negative   Negative   Trace                 Microbiology Results Abnormal     Procedure Component Value - Date/Time    Blood Culture - Blood, Arm, Right [[id number]]  (Normal) Collected: 02/26/23 0525    Lab Status: Final result Specimen: Blood from Arm, Right Updated: 03/03/23 0730     Blood Culture No growth at 5 days    Blood Culture - Blood, Arm, Right [[id number]]  (Normal) Collected: 02/26/23 0525    Lab Status: Final result Specimen: Blood from Arm, Right Updated: 03/03/23 0730     Blood Culture No growth at 5 days    S. Pneumo Ag Urine or CSF - Urine, Urine, Clean Catch [[id number]]  (Normal) Collected: 02/26/23 1514    Lab Status: Final result Specimen: Urine, Clean Catch Updated: 02/26/23 2125     Strep Pneumo Ag Negative    Legionella Antigen, Urine - Urine, Urine, Clean Catch [[id number]]  (Normal) Collected: 02/26/23 1514    Lab Status: Final result Specimen: Urine, Clean Catch Updated: 02/26/23 2124     LEGIONELLA ANTIGEN, URINE Negative    MRSA Screen, PCR (Inpatient) - Swab, Nares [[id number]]  (Normal) Collected: 02/26/23 1113    Lab Status: Final result Specimen: Swab from Nares Updated: 02/26/23 1228     MRSA PCR Negative    Narrative:      The negative predictive value of this diagnostic test is high and should only be used to consider de-escalating anti-MRSA therapy. A positive result may indicate colonization with MRSA and must be correlated  clinically.  MRSA Negative    COVID PRE-OP / PRE-PROCEDURE SCREENING ORDER (NO ISOLATION) - Swab, Nasopharynx [[id number]]  (Normal) Collected: 02/26/23 0525    Lab Status: Final result Specimen: Swab from Nasopharynx Updated: 02/26/23 0637    Narrative:      The following orders were created for panel order COVID PRE-OP / PRE-PROCEDURE SCREENING ORDER (NO ISOLATION) - Swab, Nasopharynx.  Procedure                               Abnormality         Status                     ---------                               -----------         ------                     Respiratory Panel PCR w/...[[id number]]  Normal              Final result                 Please view results for these tests on the individual orders.    Respiratory Panel PCR w/COVID-19(SARS-CoV-2) KRYSTIAN/ANISHA/LEIDA/PAD/COR/MAD/JAILENE In-House, NP Swab in UTM/VTM, 3-4 HR TAT - Swab, Nasopharynx [[id number]]  (Normal) Collected: 02/26/23 0525    Lab Status: Final result Specimen: Swab from Nasopharynx Updated: 02/26/23 0637     ADENOVIRUS, PCR Not Detected     Coronavirus 229E Not Detected     Coronavirus HKU1 Not Detected     Coronavirus NL63 Not Detected     Coronavirus OC43 Not Detected     COVID19 Not Detected     Human Metapneumovirus Not Detected     Human Rhinovirus/Enterovirus Not Detected     Influenza A PCR Not Detected     Influenza B PCR Not Detected     Parainfluenza Virus 1 Not Detected     Parainfluenza Virus 2 Not Detected     Parainfluenza Virus 3 Not Detected     Parainfluenza Virus 4 Not Detected     RSV, PCR Not Detected     Bordetella pertussis pcr Not Detected     Bordetella parapertussis PCR Not Detected     Chlamydophila pneumoniae PCR Not Detected     Mycoplasma pneumo by PCR Not Detected    Narrative:      In the setting of a positive respiratory panel with a viral infection PLUS a negative procalcitonin without other underlying concern for bacterial infection, consider observing off antibiotics or discontinuation of antibiotics and continue  supportive care. If the respiratory panel is positive for atypical bacterial infection (Bordetella pertussis, Chlamydophila pneumoniae, or Mycoplasma pneumoniae), consider antibiotic de-escalation to target atypical bacterial infection.          Adult Transthoracic Echo Complete w/ Color, Spectral and Contrast if Necessary Per Protocol    Result Date: 2/26/2023  •  Left ventricular ejection fraction appears to be 61 - 65%. •  Left ventricular diastolic function was normal. •  Left atrial volume is moderately increased. •  Moderate tricuspid valve regurgitation is present. •  Estimated right ventricular systolic pressure from tricuspid regurgitation is moderately elevated (45-55 mmHg). Calculated right ventricular systolic pressure from tricuspid regurgitation is 47 mmHg.     XR Chest 1 View    Result Date: 2/26/2023  EXAMINATION: XR CHEST 1 VW  DATE: 2/26/2023 6:00 AM INDICATIONS: SOA triage protocol. COMPARISON: Image from chest radiograph dated 4/7/2018. Report not available at time of dictation. FINDINGS: Evaluation somewhat limited by patient body habitus. Stable elevation of the right hemidiaphragm. Nonspecific mild diffuse interstitial prominence. Mild bibasilar opacities suggest atelectasis, but infiltrate not excluded. No sizable effusion or pneumothorax. Atherosclerotic calcifications of the aortic arch. Cardiac and mediastinal silhouette otherwise unremarkable. No acute osseous abnormality identified on single frontal view.     Impression: 1.  Nonspecific interstitial prominence could reflect chronic changes, but mild edema or atypical infectious/inflammatory process can also appear this way in the correct clinical setting. 2.  Mild bibasilar opacities suggest atelectasis, but infiltrate or edema not excluded.  Electronically signed by:  Nikita Keller D.O.  2/26/2023 4:47 AM Mountain Time    CT Angiogram Chest    Addendum Date: 2/26/2023    ADDENDUM #1 Additional impression point: Narrowing of the lower  trachea and proximal mainstem bronchi may represent tracheomalacia. Electronically Signed: Son Bella  2/26/2023 9:04 AM EST  Workstation ID: [id number] REPORT  CT ANGIOGRAM CHEST Date of Exam: 2/26/2023 7:03 AM EST Indication: Sepsis, hypoxia, tachycardia, dimer. Comparison: CTA chest 6/18/2013 Technique: CTA of the chest was performed after the uneventful intravenous administration of 65 mL Isovue-370. Reconstructed coronal and sagittal images were also obtained. In addition, a 3-D volume rendered image was created for interpretation. Automated exposure control and iterative reconstruction methods were used. Findings: Motion degraded exam. Pulmonary arteries: No evidence of pulmonary embolus. Normal main pulmonary artery diameter. Heart and pericardium:No flattening of the interventricular septum. Coronary artery calcification is seen. No substantial pericardial effusion. Vessels:Normal caliber aorta. Atherosclerotic calcification of the aorta. No evidence of acute aortic injury on this nondedicated exam. Venous structures including the superior vena cava and inferior vena cava appear grossly patent. Mediastinum:Unremarkable appearance of the esophagus. Few enlarged mediastinal lymph nodes including a right paratracheal node measuring 13 mm in short axis (image 29). No anterior mediastinal masses. Lower neck:No suspicious or enlarged supraclavicular or axillary lymphadenopathy. Diminutive appearance of the thyroid. Pulmonary parenchyma:No evidence of pulmonary infarct. Muscular spine nodules. Few foci of consolidative opacities seen in the right upper lobe. Scattered calcified granulomas. Bilateral dependent atelectasis. Pleura: Trace bilateral pleural effusions. No pneumothorax. Airways: Narrowing of the lower trachea and proximal mainstem bronchi may represent tracheomalacia. Diffuse bronchial wall thickening. Chest wall and bones:No acute osseous abnormality. Degenerative changes of thoracic spine.  Unremarkable appearance of soft tissues. Upper abdomen:No lesion seen within the visualized liver. Upper abdomen is unremarkable. Small hiatal hernia. Impression: Mildly motion degraded exam. No evidence of pulmonary embolus. Few foci of consolidative opacity in the right upper lobe may represent infection. Few mildly enlarged mediastinal lymph nodes may be reactive. Trace bilateral pleural effusions. Electronically Signed: Son Thompsony  2/26/2023 8:17 AM EST  Workstation ID: [id number]    Result Date: 2/26/2023  CT ANGIOGRAM CHEST Date of Exam: 2/26/2023 7:03 AM EST Indication: Sepsis, hypoxia, tachycardia, dimer. Comparison: CTA chest 6/18/2013 Technique: CTA of the chest was performed after the uneventful intravenous administration of 65 mL Isovue-370. Reconstructed coronal and sagittal images were also obtained. In addition, a 3-D volume rendered image was created for interpretation. Automated exposure control and iterative reconstruction methods were used. Findings: Motion degraded exam. Pulmonary arteries: No evidence of pulmonary embolus. Normal main pulmonary artery diameter. Heart and pericardium:No flattening of the interventricular septum. Coronary artery calcification is seen. No substantial pericardial effusion. Vessels:Normal caliber aorta. Atherosclerotic calcification of the aorta. No evidence of acute aortic injury on this nondedicated exam. Venous structures including the superior vena cava and inferior vena cava appear grossly patent. Mediastinum:Unremarkable appearance of the esophagus. Few enlarged mediastinal lymph nodes including a right paratracheal node measuring 13 mm in short axis (image 29). No anterior mediastinal masses. Lower neck:No suspicious or enlarged supraclavicular or axillary lymphadenopathy. Diminutive appearance of the thyroid. Pulmonary parenchyma:No evidence of pulmonary infarct. Muscular spine nodules. Few foci of consolidative opacities seen in the right upper lobe.  Scattered calcified granulomas. Bilateral dependent atelectasis. Pleura: Trace bilateral pleural effusions. No pneumothorax. Airways: Narrowing of the lower trachea and proximal mainstem bronchi may represent tracheomalacia. Diffuse bronchial wall thickening. Chest wall and bones:No acute osseous abnormality. Degenerative changes of thoracic spine. Unremarkable appearance of soft tissues. Upper abdomen:No lesion seen within the visualized liver. Upper abdomen is unremarkable. Small hiatal hernia.     Impression: Impression: Mildly motion degraded exam. No evidence of pulmonary embolus. Few foci of consolidative opacity in the right upper lobe may represent infection. Few mildly enlarged mediastinal lymph nodes may be reactive. Trace bilateral pleural effusions. Electronically Signed: Son Bella  2/26/2023 8:17 AM EST  Workstation ID: [id number]      Results for orders placed during the hospital encounter of 02/26/23    Adult Transthoracic Echo Complete w/ Color, Spectral and Contrast if Necessary Per Protocol    Interpretation Summary  •  Left ventricular ejection fraction appears to be 61 - 65%.  •  Left ventricular diastolic function was normal.  •  Left atrial volume is moderately increased.  •  Moderate tricuspid valve regurgitation is present.  •  Estimated right ventricular systolic pressure from tricuspid regurgitation is moderately elevated (45-55 mmHg). Calculated right ventricular systolic pressure from tricuspid regurgitation is 47 mmHg.      Current medications:  Scheduled Meds:apixaban, 5 mg, Oral, Q12H  atorvastatin, 20 mg, Oral, Nightly  citalopram, 20 mg, Oral, Daily  donepezil, 10 mg, Oral, Nightly  levothyroxine, 50 mcg, Oral, Q AM  magnesium oxide, 400 mg, Oral, BID  memantine, 5 mg, Oral, Daily  metoprolol tartrate, 25 mg, Oral, Q12H  montelukast, 10 mg, Oral, Nightly  pantoprazole, 40 mg, Oral, Q AM  polyethylene glycol, 17 g, Oral, Daily  predniSONE, 40 mg, Oral, Daily With  Breakfast  [START ON 3/8/2023] QUEtiapine, 50 mg, Oral, Daily  QUEtiapine, 75 mg, Oral, Nightly  senna-docusate sodium, 2 tablet, Oral, BID  sodium chloride, 10 mL, Intravenous, Q12H  tamsulosin, 0.4 mg, Oral, Daily      Continuous Infusions:   PRN Meds:.•  acetaminophen  •  Calcium Replacement - Initiate Nurse / BPA Driven Protocol  •  diphenhydrAMINE  •  ipratropium-albuterol  •  labetalol  •  Magnesium Standard Dose Replacement - Initiate Nurse / BPA Driven Protocol  •  ondansetron  •  Phosphorus Replacement - Initiate Nurse / BPA Driven Protocol  •  Potassium Replacement - Initiate Nurse / BPA Driven Protocol  •  sodium chloride  •  sodium chloride  •  sodium chloride    Assessment & Plan   Assessment & Plan     Active Hospital Problems    Diagnosis  POA   • **Sepsis (HCC) [A41.9]  Yes   • Atrial fibrillation (HCC) [I48.91]  Yes   • Leukocytosis [D72.829]  Yes   • Hypomagnesemia [E83.42]  Yes   • Elevated brain natriuretic peptide (BNP) level [R79.89]  Yes   • Proteinuria [R80.9]  Yes   • Bilateral Pleural effusions [J90]  Yes   • Pneumonia [J18.9]  Yes   • Respiratory failure (HCC) [J96.90]  Yes   • Elevated alkaline phosphatase level [R74.8]  Yes   • Sepsis, due to unspecified organism, unspecified whether acute organ dysfunction present (HCC) [A41.9]  Yes   • Late onset Alzheimer's disease with behavioral disturbance (HCC) [G30.1, 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        Brief Hospital Course to date:  Dorota Membreno is a 89 y.o. female w/ hx alzheimers dementia, giant cell arteritis (2000), asthma, htn, hld, hypothyroidism, anxiety, remote colon cancer (s/p resection at age 37) who presented to [facility] ED via ems on 2/26/23 after being found down at nursing facility and was found to be hypoxic (sats 80s) w/ tachycardia. In ED temp 99.9, hr 138, sbp 89/79, sats 83%. Was placed on nonrebreather mask and initiated on esmolol dripl and iv fluids. Bp improved w/ fluids  and was weaned to nasal canula. Troponins were mildly elevated and flat. Respiratory pcr was negative. U/a not compelling for uti. D-dimer mildly elevated at 1.45, probnp 1095, wbc 16,260. Cta chest revealed mildly motion degraded exam with no evidence of pulmonary embolish and few foci of consolidative opacity right upper lobe, few mildly enlarged mediastinal lymph notes (possibly reactive) and trace bilateral pleural effusions, some narrowing of lower trachea and proximal mainstem bronchi possibly representing tracheomalacia. Initiated on cefepime and vanc empirically by intensivist service and admitted to telemetry floor as patient had clinically improved. Hospitalist service assumed care on 2/27/23    This patient's problems and plans were partially entered by my partner and updated as appropriate by me 03/07/23.    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 (narrowing lower trachea and prox mainstem bronchi on imaging)  Leukocytosis  -admission wbc 16,260, procal negative  -ct angio chest: no pe, rul opacity, few mildly enlarged mediastinal lymph nodes (possibly reactive) and trace effusions   -stopped vanc (nasal mrsa pcr negative)  -had worsening respiratory status and agitation 2/27/23, required hi flow canula ~65% fio2) and required geodon and benadryl   -s/p SLP eval 3/27/23: on regular texture, thins   Completed empiric rocephin & doxy empiric rx  - continue duonebs; weaned from IV Solu-Medrol to oral prednisone on 3/7  - currently on 2L NC    New onset afib w/ rvr (converted back to nsr)  -[id number] 4; eliquis started  -echo: ef 61-65%, diastolic dysfunction, mod tr w/ ervsp moderately elevated 45-55mmhg  -tsh mildly elevated at 6, free T4 within normal limits  -weaned off esmolol  -metoprolol was increased to 50mg bid for rate control, however, developed bradycardia to the low 50s, dose was then decreased to 25 mg twice  daily      Urinary retention  -u/a benign  -flomax    Alzheimers dementia, severe  Agitation  -continue home memantine and aricept  -increased night seroquel to 75mg nightly. Increased to seroquel 50mg po qam  -QTc on 3/6 was less than 500  -continue celexa  -soft wrist restraints as needed    Debility  -pt/ot evals     Hypothyroidism  -tsh 6.0  -continue synthroid    Goals/limits  Palliative consulted. Discussing GOC with daughter       Expected Discharge Location and Transportation: facility  Expected Discharge   Expected Discharge Date and Time     Expected Discharge Date Expected Discharge Time    Mar 8, 2023          DVT prophylaxis:  Medical and mechanical DVT prophylaxis orders are present. sq heparin    AM-PAC 6 Clicks Score (PT): 17 (03/06/23 1031)    CODE STATUS:   Code Status and Medical Interventions:   Ordered at: 02/27/23 1522     Medical Intervention Limits:    NO intubation (DNI)    NO artificial nutrition    NO dialysis    NO vasopressors    Other     Code Status (Patient has no pulse and is not breathing):    No CPR (Do Not Attempt to Resuscitate)     Medical Interventions (Patient has pulse or is breathing):    Limited Support     Additional Medical Interventions Limits:    no invasive procedures, no icu transfer     Release to patient:    Routine Release       Julia Zamora MD  03/07/23        Electronically signed by Julia Zamora MD at 03/07/23 1410          Physical Therapy Notes (most recent note)      Beatriz Mulligan, PT at 03/06/23 1031  Version 1 of 1       Goal Outcome Evaluation:  Plan of Care Reviewed With: patient        Progress: improving  Outcome Evaluation: Patient demonstrating improvement in ambulation distance today. She is able to follow more commands this date but continues to require cues for safety awareness and AD management. Patient ambulates with shortened shuffled steps, pathway deviations, and B knee buckling. She fatigues quickly and would benefit from continued  skilled PT services to improve in strength, mobility and gait.    Electronically signed by Beatriz Mulligan, PT at 23 5553          Occupational Therapy Notes (most recent note)      Zoe Tang, OT at 23 0895          Patient Name: Dorota Membreno  : 10/31/1931    MRN: [id number]                              Today's Date: 3/6/2023       Admit Date: 2023    Visit Dx:     ICD-10-CM ICD-9-CM   1. Late onset Alzheimer's disease with behavioral disturbance ([facility])  G30.1 331.0    F02.818 294.11   2. Sepsis, due to unspecified organism, unspecified whether acute organ dysfunction present ([facility])  A41.9 038.9     995.91   3. Acute respiratory failure with hypoxia ([facility])  J96.01 518.81   4. Atrial fibrillation with RVR ([facility])  I48.91 427.31   5. Hypotension, unspecified hypotension type  I95.9 458.9     Patient Active Problem List   Diagnosis   • Gastroesophageal reflux disease   • Atopic rhinitis   • Asthma   • Late onset Alzheimer's disease with behavioral disturbance (HCC)   • Eczema   • Dyslipidemia   • Hypothyroidism   • Essential hypertension   • Post-menopausal osteoporosis   • Arteritis (HCC)   • Dizziness   • Edema   • Fatigue   • Shingles   • Syncope and collapse   • Atrial fibrillation (HCC)   • Leukocytosis   • Hypomagnesemia   • Elevated brain natriuretic peptide (BNP) level   • Proteinuria   • Bilateral Pleural effusions   • Pneumonia   • Respiratory failure (HCC)   • Elevated alkaline phosphatase level   • Sepsis ([facility])   • Sepsis, due to unspecified organism, unspecified whether acute organ dysfunction present ([facility])     Past Medical History:   Diagnosis Date   • Acid reflux 2016    stable   • Allergic rhinitis    • Alzheimer's disease ([facility]) 2017   • Arteritis ([facility])     A. 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 B. Improved with prednisone therapy   • Asthma    • Cancer (HCC)     colon. Status post resection at age 37   • Cognitive  impairment, mild, so stated     A. Patient has been forgetting names continue med. Has f/u with neuro. She is having some times forgetting directions. She has moved into a new house and this has not helped with directions   • Dyslipidemia     lipids and cmp   • Hypertension     lipids, cmp, urine micro   • Hypothyroidism     A. On replacement therapy   • Mammogram abnormal     A. 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   • Panic attacks    • Pneumonia    • Postmenopausal osteoporosis    • Shingles 03/2018     Past Surgical History:   Procedure Laterality Date   • CATARACT EXTRACTION     • COLECTOMY PARTIAL / TOTAL      Patient diagnosed with colon cancer at age 37   • HYSTERECTOMY        General Information     Row Name 03/06/23 0845          OT Time and Intention    Document Type therapy note (daily note)  -     Mode of Treatment occupational therapy  -     Row Name 03/06/23 0845          General Information    Patient Profile Reviewed yes  -     Existing Precautions/Restrictions fall;oxygen therapy device and L/min;other (see comments)  Dementia  -     Barriers to Rehab medically complex;previous functional deficit;cognitive status  -     Row Name 03/06/23 0845          Cognition    Orientation Status (Cognition) oriented to;person;disoriented to;place;time;situation;verbal cues/prompts needed for orientation;other (see comments)  -     Row Name 03/06/23 0845          Safety Issues, Functional Mobility    Safety Issues Affecting Function (Mobility) safety precautions follow-through/compliance;safety precaution awareness;insight into deficits/self-awareness;ability to follow commands;awareness of need for assistance;judgment;problem-solving;sequencing abilities;at risk behavior observed  -     Impairments Affecting Function (Mobility) balance;cognition;coordination;endurance/activity tolerance;postural/trunk control;shortness of breath;strength  -     Cognitive Impairments,  "Mobility Safety/Performance attention;awareness, need for assistance;insight into deficits/self-awareness;judgment;problem-solving/reasoning;sequencing abilities;safety precaution follow-through;safety precaution awareness  -     Comment, Safety Issues/Impairments (Mobility) Max single step directions for all bed mobility and transfers.  -           User Key  (r) = Recorded By, (t) = Taken By, (c) = Cosigned By    Initials Name Provider Type     Zoe Tang OT Occupational Therapist                 Mobility/ADL's     Row Name 03/06/23 0846          Bed Mobility    Bed Mobility scooting/bridging;supine-sit  -HM     Scooting/Bridging Dora (Bed Mobility) maximum assist (25% patient effort);1 person assist;verbal cues  -     Supine-Sit Dora (Bed Mobility) moderate assist (50% patient effort);1 person assist;verbal cues  -     Bed Mobility, Safety Issues cognitive deficits limit understanding;decreased use of arms for pushing/pulling;decreased use of legs for bridging/pushing  -     Assistive Device (Bed Mobility) bed rails;head of bed elevated  -     Comment, (Bed Mobility) Cued pt to advance into sitting at EOB and pt responded \"I don't know how\". Step by step verbal directions required for all bed mobility and transfers.  -     Row Name 03/06/23 0846          Transfers    Transfers sit-stand transfer;bed-chair transfer  -     Row Name 03/06/23 0846          Bed-Chair Transfer    Bed-Chair Dora (Transfers) minimum assist (75% patient effort);1 person assist;verbal cues  -     Assistive Device (Bed-Chair Transfers) other (see comments)  UE support  -     Row Name 03/06/23 0846          Sit-Stand Transfer    Sit-Stand Dora (Transfers) contact guard;verbal cues  -     Assistive Device (Sit-Stand Transfers) other (see comments)  none  -     Row Name 03/06/23 0846          Functional Mobility    Functional Mobility- Comment not tested  -     Row Name " 03/06/23 0846          Activities of Daily Living    BADL Assessment/Intervention lower body dressing;grooming;feeding  -     Row Name 03/06/23 0846          Lower Body Dressing Assessment/Training    [place] Level (Lower Body Dressing) don;socks;dependent (less than 25% patient effort)  -     Position (Lower Body Dressing) supine  -     Row Name 03/06/23 0846          Grooming Assessment/Training    [place] Level (Grooming) hair care, combing/brushing;wash face, hands;set up  -     Position (Grooming) supported sitting  -     Row Name 03/06/23 0846          Self-Feeding Assessment/Training    [place] Level (Feeding) prepare tray/open items;knife use;maximum assist (25% patient effort);scoop food and bring to mouth;supervision  -     Position (Self-Feeding) supported sitting  -           User Key  (r) = Recorded By, (t) = Taken By, (c) = Cosigned By    Initials Name Provider Type     Zoe Tang OT Occupational Therapist               Obj/Interventions     Row Name 03/06/23 0851          Sensory Assessment (Somatosensory)    Sensory Assessment (Somatosensory) sensation intact  -     Row Name 03/06/23 0851          Balance    Balance Assessment sitting static balance;sitting dynamic balance;standing static balance;standing dynamic balance  -     Static Sitting Balance supervision  -     Dynamic Sitting Balance contact guard  -HM     Position, Sitting Balance unsupported;sitting edge of bed  -     Static Standing Balance contact guard  -HM     Dynamic Standing Balance minimal assist  -     Position/Device Used, Standing Balance supported;other (see comments)  UE support  -     Balance Interventions sitting;occupation based/functional task  -           User Key  (r) = Recorded By, (t) = Taken By, (c) = Cosigned By    Initials Name Provider Type     Zoe Tang OT Occupational Therapist               Goals/Plan    No documentation.                Clinical  Impression     Row Name 03/06/23 0939          Pain Assessment    Pretreatment Pain Rating 0/10 - no pain  -     Posttreatment Pain Rating 0/10 - no pain  -     Row Name 03/06/23 0939          Plan of Care Review    Plan of Care Reviewed With patient  -     Progress improving  -     Outcome Evaluation Pt with good improvement in transfers this date completing sit to stand with CGA and took steps to chair with minAx1. Pt completed grooming with setup. MaxA for all tray prep for meals. Continues to present below baseline with decreased strength, tolerance, and overall independence with ADLS. Recommend d/c to SNF.  -     Row Name 03/06/23 0939          Therapy Assessment/Plan (OT)    Criteria for Skilled Therapeutic Interventions Met (OT) yes;meets criteria;skilled treatment is necessary  -     Therapy Frequency (OT) daily  -     Row Name 03/06/23 0939          Therapy Plan Review/Discharge Plan (OT)    Anticipated Discharge Disposition (OT) skilled nursing facility  -     Row Name 03/06/23 0939          Vital Signs    Pre Systolic BP Rehab 159  -HM     Pre Treatment Diastolic BP 89  -HM     Post Systolic BP Rehab 149  -HM     Post Treatment Diastolic BP 80  -HM     O2 Delivery Pre Treatment supplemental O2  -HM     O2 Delivery Intra Treatment supplemental O2  -HM     O2 Delivery Post Treatment supplemental O2  -HM     Pre Patient Position Supine  -     Intra Patient Position Standing  -     Post Patient Position Sitting  -     Row Name 03/06/23 0939          Positioning and Restraints    Pre-Treatment Position in bed  -     Post Treatment Position chair  -HM     In Chair notified nsg;reclined;exit alarm on;call light within reach;encouraged to call for assist;waffle cushion  -           User Key  (r) = Recorded By, (t) = Taken By, (c) = Cosigned By    Initials Name Provider Type     Zoe Tang, OT Occupational Therapist               Outcome Measures     Row Name 03/06/23 0942           How much help from another is currently needed...    Putting on and taking off regular lower body clothing? 2  -HM     Bathing (including washing, rinsing, and drying) 2  -HM     Toileting (which includes using toilet bed pan or urinal) 2  -HM     Putting on and taking off regular upper body clothing 3  -HM     Taking care of personal grooming (such as brushing teeth) 3  -HM     Eating meals 3  -HM     AM-PAC 6 Clicks Score (OT) 15  -     Row Name 03/06/23 0942          Functional Assessment    Outcome Measure Options AM-PAC 6 Clicks Daily Activity (OT)  -           User Key  (r) = Recorded By, (t) = Taken By, (c) = Cosigned By    Initials Name Provider Type     Zoe Tang OT Occupational Therapist                Occupational Therapy Education     Title: PT OT SLP Therapies (In Progress)     Topic: Occupational Therapy (In Progress)     Point: ADL training (Done)     Description:   Instruct learner(s) on proper safety adaptation and remediation techniques during self care or transfers.   Instruct in proper use of assistive devices.              Learning Progress Summary           Patient Acceptance, E,TB,D, VU,NR by  at 3/6/2023 0943    Acceptance, E,TB,D, NR by KF at 3/1/2023 0945                   Point: Home exercise program (In Progress)     Description:   Instruct learner(s) on appropriate technique for monitoring, assisting and/or progressing therapeutic exercises/activities.              Learning Progress Summary           Patient Acceptance, E,TB,D, NR by KF at 3/1/2023 0945                   Point: Precautions (Done)     Description:   Instruct learner(s) on prescribed precautions during self-care and functional transfers.              Learning Progress Summary           Patient Acceptance, E,TB,D, VU,NR by  at 3/6/2023 0943    Acceptance, E,TB,D, NR by KF at 3/1/2023 0945                   Point: Body mechanics (Done)     Description:   Instruct learner(s) on proper positioning and  spine alignment during self-care, functional mobility activities and/or exercises.              Learning Progress Summary           Patient Acceptance, E,TB,D, VU,NR by  at 3/6/2023 0943    Acceptance, E,TB,D, NR by  at 3/1/2023 0945                               User Key     Initials Effective Dates Name Provider Type Discipline     10/25/22 -  Zoe Tang OT Occupational Therapist OT    KF 06/16/21 -  Giovanna Condon, OT Occupational Therapist OT              OT Recommendation and Plan  Therapy Frequency (OT): daily  Plan of Care Review  Plan of Care Reviewed With: patient  Progress: improving  Outcome Evaluation: Pt with good improvement in transfers this date completing sit to stand with CGA and took steps to chair with minAx1. Pt completed grooming with setup. MaxA for all tray prep for meals. Continues to present below baseline with decreased strength, tolerance, and overall independence with ADLS. Recommend d/c to SNF.     Time Calculation:    Time Calculation- OT     Row Name 03/06/23 0916             Time Calculation- OT    OT Start Time 0816  -      OT Received On 03/06/23  -      OT Goal Re-Cert Due Date 03/16/23  -         Timed Charges    75312 - OT Self Care/Mgmt Minutes 34  -HM         Total Minutes    Timed Charges Total Minutes 34  -HM       Total Minutes 34  -HM            User Key  (r) = Recorded By, (t) = Taken By, (c) = Cosigned By    Initials Name Provider Type     Zoe Tang OT Occupational Therapist              Therapy Charges for Today     Code Description Service Date Service Provider Modifiers Qty    [id number] HC OT SELF CARE/MGMT/TRAIN EA 15 MIN 3/6/2023 Zoe Tang OT GO 2               Zoe Tang OT  3/6/2023    Electronically signed by Zoe Tang OT at 03/06/23 0944

## 2023-03-07 NOTE — CASE MANAGEMENT/SOCIAL WORK
Discharge Planning Assessment  [facility]     Patient Name: Dorota Membreno  MRN: [id number]  Today's Date: 3/7/2023    Admit Date: 2/26/2023    Plan: [facility]   Discharge Needs Assessment    No documentation.                Discharge Plan     Row Name 03/07/23 1523       Plan    Plan Comments Spoke with April at [facility] who advised that pt requires assist x 2 during her on-site assessment and that pt would benefit from STR prior to returning to [facility].  Of note, pt walked 80' with min A yesterday.  CM called/faxed proactive referrals to [facility] SNFs.  Will cont to follow.    Final Discharge Disposition Code 03 - skilled nursing facility (SNF)              Continued Care and Services - Admitted Since 2/26/2023     Destination     Service Provider Request Status Selected Services Address Phone Fax Patient Preferred    [facility] Pending - Request Sent N/A [address] [id number] [phone] --    [facility] Pending - No Request Sent N/A [address] [address] [phone] --    [facility] Pending - No Request Sent N/A [address] [facility] [address] [phone] -- --    [facility] Pending - No Request Sent N/A [address] [facility] [address] [phone] --    [facility] Pending - No Request Sent N/A [address] [facility] [address] [phone] --    [facility] Pending - No Request Sent N/A [address] [id number] [phone] --              Expected Discharge Date and Time     Expected Discharge Date Expected Discharge Time    Mar 8, 2023          Demographic Summary    No documentation.                Functional Status    No documentation.                Psychosocial    No documentation.                 Abuse/Neglect    No documentation.                Legal    No documentation.                Substance Abuse    No documentation.                Patient Forms    No documentation.                   Vero Russell RN

## 2023-03-07 NOTE — PLAN OF CARE
Goal Outcome Evaluation:  Plan of Care Reviewed With: patient        Progress: improving  Outcome Evaluation: Pt with unlabored respirations on O2nc 1 L at time of Palliative RN encounter. Pt denied pain, SOA, nausea, any discomfort; was taking occasional bites of her sandwich, did not observe any swallowing difficulty. Pt stated that she needed to get out of bed, but easily redirected by offering the dessert on her tray. Family desires return to [facility]; CM working on discharge disposition.    1300 Palliative IDT meeting: JOCELYN Peters, RN, [facility]; MARIA ELENA Luz, APRN; PUNEET Baca, KEMALW, [facility]-; JEANETTE Koroma, CSW    After hours, weekends and holidays, contact Palliative Provider by calling [phone].

## 2023-03-07 NOTE — PROGRESS NOTES
[facility] Medicine Services  PROGRESS NOTE    Patient Name: Dorota Membreno  : 10/31/1931  MRN: [id number]    Date of Admission: 2023  Primary Care Physician: Kylie Zapata DO    Subjective   Subjective     CC:  resp failure, pneumonia, afib, dementia    HPI:  On 2 L nasal cannula.  Had a bowel movement.  Pulling out IVs repeatedly.  Has not been urinating well on her own requiring in and out cath since Buck was removed.    ROS:  Although patient denied any symptoms, she is an unreliable historian given advanced dementia    Objective   Objective     Vital Signs:   Temp:  [98 °F (36.7 °C)-98.1 °F (36.7 °C)] 98.1 °F (36.7 °C)  Heart Rate:  [56-87] 61  Resp:  [17-18] 18  BP: (137-144)/(62-72) 144/64  Flow (L/min):  [1-2] 1    Physical Exam:  Constitutional: No acute distress, awake, alert  HENT: NCAT, mucous membranes moist  Respiratory: Clear to auscultation bilaterally, respiratory effort normal 3L NC  Cardiovascular: RRR, no murmurs, rubs, or gallops  Gastrointestinal:, nondistended  Musculoskeletal: No bilateral ankle edema  Psychiatric: Appropriate affect, cooperative  Neurologic: oriented to self only, thinks she is in a school house, PERRL, symmetric facies, symmetric palate rise, tongue midline, moving all extremities equally, following all commands  Skin: No rashes on exposed arms and legs        Results Reviewed:  LAB RESULTS:      Lab 23  0617 23  0700   WBC 16.96* 19.46*   HEMOGLOBIN 13.2 12.5   HEMATOCRIT 39.0 36.3   PLATELETS 318 342   MCV 92.9 91.2         Lab 23  0535 23  0617 23  0700   SODIUM 141 140 143   POTASSIUM 3.9 3.8 3.9   CHLORIDE 101 99 102   CO2 31.0* 27.0 26.0   ANION GAP 9.0 14.0 15.0   BUN 35* 21 23   CREATININE 0.72 0.62 0.66   EGFR 79.0 84.2 82.9   GLUCOSE 85 117* 103*   CALCIUM 8.3 8.8 8.6   MAGNESIUM 1.4* 1.8 2.6*         Lab 23  0617   TOTAL PROTEIN 6.5   ALBUMIN 3.5   GLOBULIN 3.0   ALT (SGPT) 36*   AST  (SGOT) 26   BILIRUBIN 0.4   ALK PHOS 143*                     Brief Urine Lab Results  (Last result in the past 365 days)      Color   Clarity   Blood   Leuk Est   Nitrite   Protein   CREAT   Urine HCG        02/26/23 0600 Yellow   Clear   Negative   Negative   Negative   Trace                 Microbiology Results Abnormal     Procedure Component Value - Date/Time    Blood Culture - Blood, Arm, Right [[id number]]  (Normal) Collected: 02/26/23 0525    Lab Status: Final result Specimen: Blood from Arm, Right Updated: 03/03/23 0730     Blood Culture No growth at 5 days    Blood Culture - Blood, Arm, Right [[id number]]  (Normal) Collected: 02/26/23 0525    Lab Status: Final result Specimen: Blood from Arm, Right Updated: 03/03/23 0730     Blood Culture No growth at 5 days    S. Pneumo Ag Urine or CSF - Urine, Urine, Clean Catch [[id number]]  (Normal) Collected: 02/26/23 1514    Lab Status: Final result Specimen: Urine, Clean Catch Updated: 02/26/23 2125     Strep Pneumo Ag Negative    Legionella Antigen, Urine - Urine, Urine, Clean Catch [[id number]]  (Normal) Collected: 02/26/23 1514    Lab Status: Final result Specimen: Urine, Clean Catch Updated: 02/26/23 2124     LEGIONELLA ANTIGEN, URINE Negative    MRSA Screen, PCR (Inpatient) - Swab, Nares [[id number]]  (Normal) Collected: 02/26/23 1113    Lab Status: Final result Specimen: Swab from Nares Updated: 02/26/23 1228     MRSA PCR Negative    Narrative:      The negative predictive value of this diagnostic test is high and should only be used to consider de-escalating anti-MRSA therapy. A positive result may indicate colonization with MRSA and must be correlated clinically.  MRSA Negative    COVID PRE-OP / PRE-PROCEDURE SCREENING ORDER (NO ISOLATION) - Swab, Nasopharynx [[id number]]  (Normal) Collected: 02/26/23 0525    Lab Status: Final result Specimen: Swab from Nasopharynx Updated: 02/26/23 0637    Narrative:      The following orders were created for panel order COVID  PRE-OP / PRE-PROCEDURE SCREENING ORDER (NO ISOLATION) - Swab, Nasopharynx.  Procedure                               Abnormality         Status                     ---------                               -----------         ------                     Respiratory Panel PCR w/...[[id number]]  Normal              Final result                 Please view results for these tests on the individual orders.    Respiratory Panel PCR w/COVID-19(SARS-CoV-2) KRYSTIAN/ANISHA/LEIDA/PAD/COR/MAD/JAILENE In-House, NP Swab in UTM/VTM, 3-4 HR TAT - Swab, Nasopharynx [[id number]]  (Normal) Collected: 02/26/23 0525    Lab Status: Final result Specimen: Swab from Nasopharynx Updated: 02/26/23 0637     ADENOVIRUS, PCR Not Detected     Coronavirus 229E Not Detected     Coronavirus HKU1 Not Detected     Coronavirus NL63 Not Detected     Coronavirus OC43 Not Detected     COVID19 Not Detected     Human Metapneumovirus Not Detected     Human Rhinovirus/Enterovirus Not Detected     Influenza A PCR Not Detected     Influenza B PCR Not Detected     Parainfluenza Virus 1 Not Detected     Parainfluenza Virus 2 Not Detected     Parainfluenza Virus 3 Not Detected     Parainfluenza Virus 4 Not Detected     RSV, PCR Not Detected     Bordetella pertussis pcr Not Detected     Bordetella parapertussis PCR Not Detected     Chlamydophila pneumoniae PCR Not Detected     Mycoplasma pneumo by PCR Not Detected    Narrative:      In the setting of a positive respiratory panel with a viral infection PLUS a negative procalcitonin without other underlying concern for bacterial infection, consider observing off antibiotics or discontinuation of antibiotics and continue supportive care. If the respiratory panel is positive for atypical bacterial infection (Bordetella pertussis, Chlamydophila pneumoniae, or Mycoplasma pneumoniae), consider antibiotic de-escalation to target atypical bacterial infection.          Adult Transthoracic Echo Complete w/ Color, Spectral and Contrast if  Necessary Per Protocol    Result Date: 2/26/2023  •  Left ventricular ejection fraction appears to be 61 - 65%. •  Left ventricular diastolic function was normal. •  Left atrial volume is moderately increased. •  Moderate tricuspid valve regurgitation is present. •  Estimated right ventricular systolic pressure from tricuspid regurgitation is moderately elevated (45-55 mmHg). Calculated right ventricular systolic pressure from tricuspid regurgitation is 47 mmHg.     XR Chest 1 View    Result Date: 2/26/2023  EXAMINATION: XR CHEST 1 VW  DATE: 2/26/2023 6:00 AM INDICATIONS: SOA triage protocol. COMPARISON: Image from chest radiograph dated 4/7/2018. Report not available at time of dictation. FINDINGS: Evaluation somewhat limited by patient body habitus. Stable elevation of the right hemidiaphragm. Nonspecific mild diffuse interstitial prominence. Mild bibasilar opacities suggest atelectasis, but infiltrate not excluded. No sizable effusion or pneumothorax. Atherosclerotic calcifications of the aortic arch. Cardiac and mediastinal silhouette otherwise unremarkable. No acute osseous abnormality identified on single frontal view.     Impression: 1.  Nonspecific interstitial prominence could reflect chronic changes, but mild edema or atypical infectious/inflammatory process can also appear this way in the correct clinical setting. 2.  Mild bibasilar opacities suggest atelectasis, but infiltrate or edema not excluded.  Electronically signed by:  Nikita Keller D.O.  2/26/2023 4:47 AM Mountain Time    CT Angiogram Chest    Addendum Date: 2/26/2023    ADDENDUM #1 Additional impression point: Narrowing of the lower trachea and proximal mainstem bronchi may represent tracheomalacia. Electronically Signed: Son Bella  2/26/2023 9:04 AM EST  Workstation ID: [id number] REPORT  CT ANGIOGRAM CHEST Date of Exam: 2/26/2023 7:03 AM EST Indication: Sepsis, hypoxia, tachycardia, dimer. Comparison: CTA chest 6/18/2013  Technique: CTA of the chest was performed after the uneventful intravenous administration of 65 mL Isovue-370. Reconstructed coronal and sagittal images were also obtained. In addition, a 3-D volume rendered image was created for interpretation. Automated exposure control and iterative reconstruction methods were used. Findings: Motion degraded exam. Pulmonary arteries: No evidence of pulmonary embolus. Normal main pulmonary artery diameter. Heart and pericardium:No flattening of the interventricular septum. Coronary artery calcification is seen. No substantial pericardial effusion. Vessels:Normal caliber aorta. Atherosclerotic calcification of the aorta. No evidence of acute aortic injury on this nondedicated exam. Venous structures including the superior vena cava and inferior vena cava appear grossly patent. Mediastinum:Unremarkable appearance of the esophagus. Few enlarged mediastinal lymph nodes including a right paratracheal node measuring 13 mm in short axis (image 29). No anterior mediastinal masses. Lower neck:No suspicious or enlarged supraclavicular or axillary lymphadenopathy. Diminutive appearance of the thyroid. Pulmonary parenchyma:No evidence of pulmonary infarct. Muscular spine nodules. Few foci of consolidative opacities seen in the right upper lobe. Scattered calcified granulomas. Bilateral dependent atelectasis. Pleura: Trace bilateral pleural effusions. No pneumothorax. Airways: Narrowing of the lower trachea and proximal mainstem bronchi may represent tracheomalacia. Diffuse bronchial wall thickening. Chest wall and bones:No acute osseous abnormality. Degenerative changes of thoracic spine. Unremarkable appearance of soft tissues. Upper abdomen:No lesion seen within the visualized liver. Upper abdomen is unremarkable. Small hiatal hernia. Impression: Mildly motion degraded exam. No evidence of pulmonary embolus. Few foci of consolidative opacity in the right upper lobe may represent infection.  Few mildly enlarged mediastinal lymph nodes may be reactive. Trace bilateral pleural effusions. Electronically Signed: Son Bella  2/26/2023 8:17 AM EST  Workstation ID: [id number]    Result Date: 2/26/2023  CT ANGIOGRAM CHEST Date of Exam: 2/26/2023 7:03 AM EST Indication: Sepsis, hypoxia, tachycardia, dimer. Comparison: CTA chest 6/18/2013 Technique: CTA of the chest was performed after the uneventful intravenous administration of 65 mL Isovue-370. Reconstructed coronal and sagittal images were also obtained. In addition, a 3-D volume rendered image was created for interpretation. Automated exposure control and iterative reconstruction methods were used. Findings: Motion degraded exam. Pulmonary arteries: No evidence of pulmonary embolus. Normal main pulmonary artery diameter. Heart and pericardium:No flattening of the interventricular septum. Coronary artery calcification is seen. No substantial pericardial effusion. Vessels:Normal caliber aorta. Atherosclerotic calcification of the aorta. No evidence of acute aortic injury on this nondedicated exam. Venous structures including the superior vena cava and inferior vena cava appear grossly patent. Mediastinum:Unremarkable appearance of the esophagus. Few enlarged mediastinal lymph nodes including a right paratracheal node measuring 13 mm in short axis (image 29). No anterior mediastinal masses. Lower neck:No suspicious or enlarged supraclavicular or axillary lymphadenopathy. Diminutive appearance of the thyroid. Pulmonary parenchyma:No evidence of pulmonary infarct. Muscular spine nodules. Few foci of consolidative opacities seen in the right upper lobe. Scattered calcified granulomas. Bilateral dependent atelectasis. Pleura: Trace bilateral pleural effusions. No pneumothorax. Airways: Narrowing of the lower trachea and proximal mainstem bronchi may represent tracheomalacia. Diffuse bronchial wall thickening. Chest wall and bones:No acute osseous abnormality.  Degenerative changes of thoracic spine. Unremarkable appearance of soft tissues. Upper abdomen:No lesion seen within the visualized liver. Upper abdomen is unremarkable. Small hiatal hernia.     Impression: Impression: Mildly motion degraded exam. No evidence of pulmonary embolus. Few foci of consolidative opacity in the right upper lobe may represent infection. Few mildly enlarged mediastinal lymph nodes may be reactive. Trace bilateral pleural effusions. Electronically Signed: Son Bella  2/26/2023 8:17 AM EST  Workstation ID: [id number]      Results for orders placed during the hospital encounter of 02/26/23    Adult Transthoracic Echo Complete w/ Color, Spectral and Contrast if Necessary Per Protocol    Interpretation Summary  •  Left ventricular ejection fraction appears to be 61 - 65%.  •  Left ventricular diastolic function was normal.  •  Left atrial volume is moderately increased.  •  Moderate tricuspid valve regurgitation is present.  •  Estimated right ventricular systolic pressure from tricuspid regurgitation is moderately elevated (45-55 mmHg). Calculated right ventricular systolic pressure from tricuspid regurgitation is 47 mmHg.      Current medications:  Scheduled Meds:apixaban, 5 mg, Oral, Q12H  atorvastatin, 20 mg, Oral, Nightly  citalopram, 20 mg, Oral, Daily  donepezil, 10 mg, Oral, Nightly  levothyroxine, 50 mcg, Oral, Q AM  magnesium oxide, 400 mg, Oral, BID  memantine, 5 mg, Oral, Daily  metoprolol tartrate, 25 mg, Oral, Q12H  montelukast, 10 mg, Oral, Nightly  pantoprazole, 40 mg, Oral, Q AM  polyethylene glycol, 17 g, Oral, Daily  predniSONE, 40 mg, Oral, Daily With Breakfast  [START ON 3/8/2023] QUEtiapine, 50 mg, Oral, Daily  QUEtiapine, 75 mg, Oral, Nightly  senna-docusate sodium, 2 tablet, Oral, BID  sodium chloride, 10 mL, Intravenous, Q12H  tamsulosin, 0.4 mg, Oral, Daily      Continuous Infusions:   PRN Meds:.•  acetaminophen  •  Calcium Replacement - Initiate Nurse / BPA Driven  Protocol  •  diphenhydrAMINE  •  ipratropium-albuterol  •  labetalol  •  Magnesium Standard Dose Replacement - Initiate Nurse / BPA Driven Protocol  •  ondansetron  •  Phosphorus Replacement - Initiate Nurse / BPA Driven Protocol  •  Potassium Replacement - Initiate Nurse / BPA Driven Protocol  •  sodium chloride  •  sodium chloride  •  sodium chloride    Assessment & Plan   Assessment & Plan     Active Hospital Problems    Diagnosis  POA   • **Sepsis (HCC) [A41.9]  Yes   • Atrial fibrillation (HCC) [I48.91]  Yes   • Leukocytosis [D72.829]  Yes   • Hypomagnesemia [E83.42]  Yes   • Elevated brain natriuretic peptide (BNP) level [R79.89]  Yes   • Proteinuria [R80.9]  Yes   • Bilateral Pleural effusions [J90]  Yes   • Pneumonia [J18.9]  Yes   • Respiratory failure (HCC) [J96.90]  Yes   • Elevated alkaline phosphatase level [R74.8]  Yes   • Sepsis, due to unspecified organism, unspecified whether acute organ dysfunction present (HCC) [A41.9]  Yes   • Late onset Alzheimer's disease with behavioral disturbance (HCC) [G30.1, 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        Brief Hospital Course to date:  Dorota Membreno is a 89 y.o. female w/ hx alzheimers dementia, giant cell arteritis (2000), asthma, htn, hld, hypothyroidism, anxiety, remote colon cancer (s/p resection at age 37) who presented to [facility] ED via ems on 2/26/23 after being found down at nursing facility and was found to be hypoxic (sats 80s) w/ tachycardia. In ED temp 99.9, hr 138, sbp 89/79, sats 83%. Was placed on nonrebreather mask and initiated on esmolol dripl and iv fluids. Bp improved w/ fluids and was weaned to nasal canula. Troponins were mildly elevated and flat. Respiratory pcr was negative. U/a not compelling for uti. D-dimer mildly elevated at 1.45, probnp 1095, wbc 16,260. Cta chest revealed mildly motion degraded exam with no evidence of pulmonary embolish and few foci of consolidative opacity right upper  lobe, few mildly enlarged mediastinal lymph notes (possibly reactive) and trace bilateral pleural effusions, some narrowing of lower trachea and proximal mainstem bronchi possibly representing tracheomalacia. Initiated on cefepime and vanc empirically by intensivist service and admitted to telemetry floor as patient had clinically improved. Hospitalist service assumed care on 2/27/23    This patient's problems and plans were partially entered by my partner and updated as appropriate by me 03/07/23.    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 (narrowing lower trachea and prox mainstem bronchi on imaging)  Leukocytosis  -admission wbc 16,260, procal negative  -ct angio chest: no pe, rul opacity, few mildly enlarged mediastinal lymph nodes (possibly reactive) and trace effusions   -stopped vanc (nasal mrsa pcr negative)  -had worsening respiratory status and agitation 2/27/23, required hi flow canula ~65% fio2) and required geodon and benadryl   -s/p SLP eval 3/27/23: on regular texture, thins   Completed empiric rocephin & doxy empiric rx  - continue duonebs; weaned from IV Solu-Medrol to oral prednisone on 3/7  - currently on 2L NC    New onset afib w/ rvr (converted back to nsr)  -Ehyi4zuza 4; eliquis started  -echo: ef 61-65%, diastolic dysfunction, mod tr w/ ervsp moderately elevated 45-55mmhg  -tsh mildly elevated at 6, free T4 within normal limits  -weaned off esmolol  -metoprolol was increased to 50mg bid for rate control, however, developed bradycardia to the low 50s, dose was then decreased to 25 mg twice daily      Urinary retention  -u/a benign  -flomax    Alzheimers dementia, severe  Agitation  -continue home memantine and aricept  -increased night seroquel to 75mg nightly. Increased to seroquel 50mg po qam  -QTc on 3/6 was less than 500  -continue celexa  -soft wrist restraints as needed    Debility  -pt/ot evals      Hypothyroidism  -tsh 6.0  -continue synthroid    Goals/limits  Palliative consulted. Discussing GOC with daughter       Expected Discharge Location and Transportation: facility  Expected Discharge   Expected Discharge Date and Time     Expected Discharge Date Expected Discharge Time    Mar 8, 2023          DVT prophylaxis:  Medical and mechanical DVT prophylaxis orders are present. sq heparin    AM-PAC 6 Clicks Score (PT): 17 (03/06/23 1031)    CODE STATUS:   Code Status and Medical Interventions:   Ordered at: 02/27/23 1522     Medical Intervention Limits:    NO intubation (DNI)    NO artificial nutrition    NO dialysis    NO vasopressors    Other     Code Status (Patient has no pulse and is not breathing):    No CPR (Do Not Attempt to Resuscitate)     Medical Interventions (Patient has pulse or is breathing):    Limited Support     Additional Medical Interventions Limits:    no invasive procedures, no icu transfer     Release to patient:    Routine Release       Julia Zamora MD  03/07/23

## 2023-03-07 NOTE — NURSING NOTE
Paged PA overnight r/t pt pulling out multiple IVs (4 in last 24 hours). Pt is difficult stick, has required multiple attempts from Charge RNs and House supervisor. Last IV was US guided. Pt does not have scheduled IV meds, is awaiting discharge back to SNF. PA advised RN that she needs an IV and to contact house supervisor again for US guided access, also placed orders for wrist restraints. Patient does pull off nasal cannula this AM but is redirectable to staff replacing back in nares, is resting in bed, calm and cooperative with all cares.

## 2023-03-07 NOTE — CASE MANAGEMENT/SOCIAL WORK
Discharge Planning Assessment  [facility]     Patient Name: Dorota Membreno  MRN: [id number]  Today's Date: 3/7/2023    Admit Date: 2/26/2023    Plan: [facility]   Discharge Needs Assessment    No documentation.                Discharge Plan     Row Name 03/07/23 1130       Plan    Plan [facility]    Patient/Family in Agreement with Plan yes    Plan Comments Spoke with pt's GUSTAVO/Jovana METCALF via phone.  Discussed pt's progress to date as well as DC options (SNF vs return to U).  She prefers pt return to [facility].  CM spoke with April at [facility] and she will make an on-site assessment to determine pt's ability to return to the facility today.  CM will cont to follow.    Final Discharge Disposition Code 30 - still a patient              Continued Care and Services - Admitted Since 2/26/2023    Coordination has not been started for this encounter.       Expected Discharge Date and Time     Expected Discharge Date Expected Discharge Time    Mar 8, 2023          Demographic Summary    No documentation.                Functional Status    No documentation.                Psychosocial    No documentation.                Abuse/Neglect    No documentation.                Legal    No documentation.                Substance Abuse    No documentation.                Patient Forms    No documentation.                   Vero Russell RN

## 2023-03-07 NOTE — PLAN OF CARE
Goal Outcome Evaluation:  Plan of Care Reviewed With: patient        Progress: improving  Outcome Evaluation: Pt was restless at shift change, but agreeable to HS meds and cares. Pt pulled out one IV, required st cath x1 this shift.

## 2023-03-08 ENCOUNTER — APPOINTMENT (OUTPATIENT)
Dept: GENERAL RADIOLOGY | Facility: HOSPITAL | Age: 88
DRG: 871 | End: 2023-03-08
Payer: MEDICARE

## 2023-03-08 LAB
ANION GAP SERPL CALCULATED.3IONS-SCNC: 6 MMOL/L (ref 5–15)
BUN SERPL-MCNC: 33 MG/DL (ref 8–23)
BUN/CREAT SERPL: 56.9 (ref 7–25)
CALCIUM SPEC-SCNC: 8.6 MG/DL (ref 8.2–9.6)
CHLORIDE SERPL-SCNC: 102 MMOL/L (ref 98–107)
CO2 SERPL-SCNC: 32 MMOL/L (ref 22–29)
CREAT SERPL-MCNC: 0.58 MG/DL (ref 0.57–1)
EGFRCR SERPLBLD CKD-EPI 2021: 85.6 ML/MIN/1.73
GLUCOSE SERPL-MCNC: 92 MG/DL (ref 65–99)
MAGNESIUM SERPL-MCNC: 1.4 MG/DL (ref 1.7–2.3)
PHOSPHATE SERPL-MCNC: 3.1 MG/DL (ref 2.5–4.5)
POTASSIUM SERPL-SCNC: 3.6 MMOL/L (ref 3.5–5.2)
SODIUM SERPL-SCNC: 140 MMOL/L (ref 136–145)

## 2023-03-08 PROCEDURE — 80048 BASIC METABOLIC PNL TOTAL CA: CPT | Performed by: STUDENT IN AN ORGANIZED HEALTH CARE EDUCATION/TRAINING PROGRAM

## 2023-03-08 PROCEDURE — 83735 ASSAY OF MAGNESIUM: CPT | Performed by: STUDENT IN AN ORGANIZED HEALTH CARE EDUCATION/TRAINING PROGRAM

## 2023-03-08 PROCEDURE — 97110 THERAPEUTIC EXERCISES: CPT

## 2023-03-08 PROCEDURE — 63710000001 PREDNISONE PER 1 MG: Performed by: STUDENT IN AN ORGANIZED HEALTH CARE EDUCATION/TRAINING PROGRAM

## 2023-03-08 PROCEDURE — 99231 SBSQ HOSP IP/OBS SF/LOW 25: CPT | Performed by: STUDENT IN AN ORGANIZED HEALTH CARE EDUCATION/TRAINING PROGRAM

## 2023-03-08 PROCEDURE — 71045 X-RAY EXAM CHEST 1 VIEW: CPT

## 2023-03-08 PROCEDURE — 97535 SELF CARE MNGMENT TRAINING: CPT

## 2023-03-08 PROCEDURE — 97116 GAIT TRAINING THERAPY: CPT

## 2023-03-08 PROCEDURE — 84100 ASSAY OF PHOSPHORUS: CPT | Performed by: STUDENT IN AN ORGANIZED HEALTH CARE EDUCATION/TRAINING PROGRAM

## 2023-03-08 RX ADMIN — QUETIAPINE FUMARATE 50 MG: 25 TABLET ORAL at 09:29

## 2023-03-08 RX ADMIN — DONEPEZIL HYDROCHLORIDE 10 MG: 10 TABLET, FILM COATED ORAL at 20:44

## 2023-03-08 RX ADMIN — SENNOSIDES AND DOCUSATE SODIUM 2 TABLET: 8.6; 5 TABLET ORAL at 20:44

## 2023-03-08 RX ADMIN — TAMSULOSIN HYDROCHLORIDE 0.4 MG: 0.4 CAPSULE ORAL at 09:31

## 2023-03-08 RX ADMIN — MAGNESIUM OXIDE TAB 400 MG (241.3 MG ELEMENTAL MG) 400 MG: 400 (241.3 MG) TAB at 20:44

## 2023-03-08 RX ADMIN — APIXABAN 5 MG: 5 TABLET, FILM COATED ORAL at 09:30

## 2023-03-08 RX ADMIN — QUETIAPINE FUMARATE 75 MG: 25 TABLET ORAL at 20:44

## 2023-03-08 RX ADMIN — METOPROLOL TARTRATE 25 MG: 25 TABLET, FILM COATED ORAL at 09:30

## 2023-03-08 RX ADMIN — APIXABAN 5 MG: 5 TABLET, FILM COATED ORAL at 20:45

## 2023-03-08 RX ADMIN — MEMANTINE 5 MG: 10 TABLET ORAL at 09:30

## 2023-03-08 RX ADMIN — LEVOTHYROXINE SODIUM 50 MCG: 50 TABLET ORAL at 05:33

## 2023-03-08 RX ADMIN — METOPROLOL TARTRATE 25 MG: 25 TABLET, FILM COATED ORAL at 20:44

## 2023-03-08 RX ADMIN — POLYETHYLENE GLYCOL 3350 17 G: 17 POWDER, FOR SOLUTION ORAL at 09:31

## 2023-03-08 RX ADMIN — PANTOPRAZOLE SODIUM 40 MG: 40 TABLET, DELAYED RELEASE ORAL at 05:33

## 2023-03-08 RX ADMIN — MONTELUKAST 10 MG: 10 TABLET, FILM COATED ORAL at 20:44

## 2023-03-08 RX ADMIN — SENNOSIDES AND DOCUSATE SODIUM 2 TABLET: 8.6; 5 TABLET ORAL at 09:29

## 2023-03-08 RX ADMIN — CITALOPRAM HYDROBROMIDE 20 MG: 20 TABLET ORAL at 09:29

## 2023-03-08 RX ADMIN — ATORVASTATIN CALCIUM 20 MG: 20 TABLET, FILM COATED ORAL at 20:44

## 2023-03-08 RX ADMIN — MAGNESIUM OXIDE TAB 400 MG (241.3 MG ELEMENTAL MG) 400 MG: 400 (241.3 MG) TAB at 09:29

## 2023-03-08 RX ADMIN — PREDNISONE 40 MG: 20 TABLET ORAL at 09:30

## 2023-03-08 NOTE — PROGRESS NOTES
[facility] Medicine Services  PROGRESS NOTE    Patient Name: Dorota Membreno  : 10/31/1931  MRN: [id number]    Date of Admission: 2023  Primary Care Physician: Kylie Zapata DO    Subjective   Subjective     CC:  resp failure, pneumonia, afib, dementia    HPI:  On 2 L nasal cannula, was out of her nose and she was satting well on room air at time of evaluation.  Requiring in and out straight caths for urination.  Reports feeling well.  Eating and drinking okay.    ROS:  Although patient denied any symptoms, she is an unreliable historian given advanced dementia    Objective   Objective     Vital Signs:   Temp:  [97.9 °F (36.6 °C)-98.1 °F (36.7 °C)] 97.9 °F (36.6 °C)  Heart Rate:  [56-92] 59  Resp:  [17-20] 20  BP: (133-162)/(61-80) 162/71  Flow (L/min):  [2] 2    Physical Exam:  Constitutional: No acute distress, awake, alert, sitting in chair  HENT: NCAT, mucous membranes moist  Respiratory: Clear to auscultation bilaterally, respiratory effort normal 2L NC that is out of her nose  Cardiovascular: RRR, no murmurs, rubs, or gallops  Gastrointestinal:, nondistended  Musculoskeletal: No bilateral ankle edema  Psychiatric: Appropriate affect, cooperative  Neurologic: oriented to self only, thinks she is in a school house, PERRL, symmetric facies, symmetric palate rise, tongue midline, moving all extremities equally, following all commands  Skin: No rashes on exposed arms and legs        Results Reviewed:  LAB RESULTS:      Lab 23  0617   WBC 16.96*   HEMOGLOBIN 13.2   HEMATOCRIT 39.0   PLATELETS 318   MCV 92.9         Lab 23  0456 23  0535 23  0617   SODIUM 140 141 140   POTASSIUM 3.6 3.9 3.8   CHLORIDE 102 101 99   CO2 32.0* 31.0* 27.0   ANION GAP 6.0 9.0 14.0   BUN 33* 35* 21   CREATININE 0.58 0.72 0.62   EGFR 85.6 79.0 84.2   GLUCOSE 92 85 117*   CALCIUM 8.6 8.3 8.8   MAGNESIUM 1.4* 1.4* 1.8   PHOSPHORUS 3.1  --   --          Lab 23  0617   TOTAL  PROTEIN 6.5   ALBUMIN 3.5   GLOBULIN 3.0   ALT (SGPT) 36*   AST (SGOT) 26   BILIRUBIN 0.4   ALK PHOS 143*                     Brief Urine Lab Results  (Last result in the past 365 days)      Color   Clarity   Blood   Leuk Est   Nitrite   Protein   CREAT   Urine HCG        02/26/23 0600 Yellow   Clear   Negative   Negative   Negative   Trace                 Microbiology Results Abnormal     Procedure Component Value - Date/Time    Blood Culture - Blood, Arm, Right [[id number]]  (Normal) Collected: 02/26/23 0525    Lab Status: Final result Specimen: Blood from Arm, Right Updated: 03/03/23 0730     Blood Culture No growth at 5 days    Blood Culture - Blood, Arm, Right [[id number]]  (Normal) Collected: 02/26/23 0525    Lab Status: Final result Specimen: Blood from Arm, Right Updated: 03/03/23 0730     Blood Culture No growth at 5 days    S. Pneumo Ag Urine or CSF - Urine, Urine, Clean Catch [[id number]]  (Normal) Collected: 02/26/23 1514    Lab Status: Final result Specimen: Urine, Clean Catch Updated: 02/26/23 2125     Strep Pneumo Ag Negative    Legionella Antigen, Urine - Urine, Urine, Clean Catch [[id number]]  (Normal) Collected: 02/26/23 1514    Lab Status: Final result Specimen: Urine, Clean Catch Updated: 02/26/23 2124     LEGIONELLA ANTIGEN, URINE Negative    MRSA Screen, PCR (Inpatient) - Swab, Nares [[id number]]  (Normal) Collected: 02/26/23 1113    Lab Status: Final result Specimen: Swab from Nares Updated: 02/26/23 1228     MRSA PCR Negative    Narrative:      The negative predictive value of this diagnostic test is high and should only be used to consider de-escalating anti-MRSA therapy. A positive result may indicate colonization with MRSA and must be correlated clinically.  MRSA Negative    COVID PRE-OP / PRE-PROCEDURE SCREENING ORDER (NO ISOLATION) - Swab, Nasopharynx [[id number]]  (Normal) Collected: 02/26/23 0525    Lab Status: Final result Specimen: Swab from Nasopharynx Updated: 02/26/23 0637    Narrative:       The following orders were created for panel order COVID PRE-OP / PRE-PROCEDURE SCREENING ORDER (NO ISOLATION) - Swab, Nasopharynx.  Procedure                               Abnormality         Status                     ---------                               -----------         ------                     Respiratory Panel PCR w/...[[id number]]  Normal              Final result                 Please view results for these tests on the individual orders.    Respiratory Panel PCR w/COVID-19(SARS-CoV-2) KRYSTIAN/ANISHA/LEIDA/PAD/COR/MAD/JAILENE In-House, NP Swab in UTM/VTM, 3-4 HR TAT - Swab, Nasopharynx [[id number]]  (Normal) Collected: 02/26/23 0525    Lab Status: Final result Specimen: Swab from Nasopharynx Updated: 02/26/23 0637     ADENOVIRUS, PCR Not Detected     Coronavirus 229E Not Detected     Coronavirus HKU1 Not Detected     Coronavirus NL63 Not Detected     Coronavirus OC43 Not Detected     COVID19 Not Detected     Human Metapneumovirus Not Detected     Human Rhinovirus/Enterovirus Not Detected     Influenza A PCR Not Detected     Influenza B PCR Not Detected     Parainfluenza Virus 1 Not Detected     Parainfluenza Virus 2 Not Detected     Parainfluenza Virus 3 Not Detected     Parainfluenza Virus 4 Not Detected     RSV, PCR Not Detected     Bordetella pertussis pcr Not Detected     Bordetella parapertussis PCR Not Detected     Chlamydophila pneumoniae PCR Not Detected     Mycoplasma pneumo by PCR Not Detected    Narrative:      In the setting of a positive respiratory panel with a viral infection PLUS a negative procalcitonin without other underlying concern for bacterial infection, consider observing off antibiotics or discontinuation of antibiotics and continue supportive care. If the respiratory panel is positive for atypical bacterial infection (Bordetella pertussis, Chlamydophila pneumoniae, or Mycoplasma pneumoniae), consider antibiotic de-escalation to target atypical bacterial infection.          Adult  Transthoracic Echo Complete w/ Color, Spectral and Contrast if Necessary Per Protocol    Result Date: 2/26/2023  •  Left ventricular ejection fraction appears to be 61 - 65%. •  Left ventricular diastolic function was normal. •  Left atrial volume is moderately increased. •  Moderate tricuspid valve regurgitation is present. •  Estimated right ventricular systolic pressure from tricuspid regurgitation is moderately elevated (45-55 mmHg). Calculated right ventricular systolic pressure from tricuspid regurgitation is 47 mmHg.     XR Chest 1 View    Result Date: 2/26/2023  EXAMINATION: XR CHEST 1 VW  DATE: 2/26/2023 6:00 AM INDICATIONS: SOA triage protocol. COMPARISON: Image from chest radiograph dated 4/7/2018. Report not available at time of dictation. FINDINGS: Evaluation somewhat limited by patient body habitus. Stable elevation of the right hemidiaphragm. Nonspecific mild diffuse interstitial prominence. Mild bibasilar opacities suggest atelectasis, but infiltrate not excluded. No sizable effusion or pneumothorax. Atherosclerotic calcifications of the aortic arch. Cardiac and mediastinal silhouette otherwise unremarkable. No acute osseous abnormality identified on single frontal view.     Impression: 1.  Nonspecific interstitial prominence could reflect chronic changes, but mild edema or atypical infectious/inflammatory process can also appear this way in the correct clinical setting. 2.  Mild bibasilar opacities suggest atelectasis, but infiltrate or edema not excluded.  Electronically signed by:  Nikita Keller D.O.  2/26/2023 4:47 AM Mountain Time    CT Angiogram Chest    Addendum Date: 2/26/2023    ADDENDUM #1 Additional impression point: Narrowing of the lower trachea and proximal mainstem bronchi may represent tracheomalacia. Electronically Signed: Son Bella  2/26/2023 9:04 AM EST  Workstation ID: [id number] REPORT  CT ANGIOGRAM CHEST Date of Exam: 2/26/2023 7:03 AM EST Indication: Sepsis,  hypoxia, tachycardia, dimer. Comparison: CTA chest 6/18/2013 Technique: CTA of the chest was performed after the uneventful intravenous administration of 65 mL Isovue-370. Reconstructed coronal and sagittal images were also obtained. In addition, a 3-D volume rendered image was created for interpretation. Automated exposure control and iterative reconstruction methods were used. Findings: Motion degraded exam. Pulmonary arteries: No evidence of pulmonary embolus. Normal main pulmonary artery diameter. Heart and pericardium:No flattening of the interventricular septum. Coronary artery calcification is seen. No substantial pericardial effusion. Vessels:Normal caliber aorta. Atherosclerotic calcification of the aorta. No evidence of acute aortic injury on this nondedicated exam. Venous structures including the superior vena cava and inferior vena cava appear grossly patent. Mediastinum:Unremarkable appearance of the esophagus. Few enlarged mediastinal lymph nodes including a right paratracheal node measuring 13 mm in short axis (image 29). No anterior mediastinal masses. Lower neck:No suspicious or enlarged supraclavicular or axillary lymphadenopathy. Diminutive appearance of the thyroid. Pulmonary parenchyma:No evidence of pulmonary infarct. Muscular spine nodules. Few foci of consolidative opacities seen in the right upper lobe. Scattered calcified granulomas. Bilateral dependent atelectasis. Pleura: Trace bilateral pleural effusions. No pneumothorax. Airways: Narrowing of the lower trachea and proximal mainstem bronchi may represent tracheomalacia. Diffuse bronchial wall thickening. Chest wall and bones:No acute osseous abnormality. Degenerative changes of thoracic spine. Unremarkable appearance of soft tissues. Upper abdomen:No lesion seen within the visualized liver. Upper abdomen is unremarkable. Small hiatal hernia. Impression: Mildly motion degraded exam. No evidence of pulmonary embolus. Few foci of  consolidative opacity in the right upper lobe may represent infection. Few mildly enlarged mediastinal lymph nodes may be reactive. Trace bilateral pleural effusions. Electronically Signed: Son Bella  2/26/2023 8:17 AM EST  Workstation ID: [id number]    Result Date: 2/26/2023  CT ANGIOGRAM CHEST Date of Exam: 2/26/2023 7:03 AM EST Indication: Sepsis, hypoxia, tachycardia, dimer. Comparison: CTA chest 6/18/2013 Technique: CTA of the chest was performed after the uneventful intravenous administration of 65 mL Isovue-370. Reconstructed coronal and sagittal images were also obtained. In addition, a 3-D volume rendered image was created for interpretation. Automated exposure control and iterative reconstruction methods were used. Findings: Motion degraded exam. Pulmonary arteries: No evidence of pulmonary embolus. Normal main pulmonary artery diameter. Heart and pericardium:No flattening of the interventricular septum. Coronary artery calcification is seen. No substantial pericardial effusion. Vessels:Normal caliber aorta. Atherosclerotic calcification of the aorta. No evidence of acute aortic injury on this nondedicated exam. Venous structures including the superior vena cava and inferior vena cava appear grossly patent. Mediastinum:Unremarkable appearance of the esophagus. Few enlarged mediastinal lymph nodes including a right paratracheal node measuring 13 mm in short axis (image 29). No anterior mediastinal masses. Lower neck:No suspicious or enlarged supraclavicular or axillary lymphadenopathy. Diminutive appearance of the thyroid. Pulmonary parenchyma:No evidence of pulmonary infarct. Muscular spine nodules. Few foci of consolidative opacities seen in the right upper lobe. Scattered calcified granulomas. Bilateral dependent atelectasis. Pleura: Trace bilateral pleural effusions. No pneumothorax. Airways: Narrowing of the lower trachea and proximal mainstem bronchi may represent tracheomalacia. Diffuse  bronchial wall thickening. Chest wall and bones:No acute osseous abnormality. Degenerative changes of thoracic spine. Unremarkable appearance of soft tissues. Upper abdomen:No lesion seen within the visualized liver. Upper abdomen is unremarkable. Small hiatal hernia.     Impression: Impression: Mildly motion degraded exam. No evidence of pulmonary embolus. Few foci of consolidative opacity in the right upper lobe may represent infection. Few mildly enlarged mediastinal lymph nodes may be reactive. Trace bilateral pleural effusions. Electronically Signed: Son Bella  2/26/2023 8:17 AM EST  Workstation ID: [id number]      Results for orders placed during the hospital encounter of 02/26/23    Adult Transthoracic Echo Complete w/ Color, Spectral and Contrast if Necessary Per Protocol    Interpretation Summary  •  Left ventricular ejection fraction appears to be 61 - 65%.  •  Left ventricular diastolic function was normal.  •  Left atrial volume is moderately increased.  •  Moderate tricuspid valve regurgitation is present.  •  Estimated right ventricular systolic pressure from tricuspid regurgitation is moderately elevated (45-55 mmHg). Calculated right ventricular systolic pressure from tricuspid regurgitation is 47 mmHg.      Current medications:  Scheduled Meds:apixaban, 5 mg, Oral, Q12H  atorvastatin, 20 mg, Oral, Nightly  citalopram, 20 mg, Oral, Daily  donepezil, 10 mg, Oral, Nightly  levothyroxine, 50 mcg, Oral, Q AM  magnesium oxide, 400 mg, Oral, BID  memantine, 5 mg, Oral, Daily  metoprolol tartrate, 25 mg, Oral, Q12H  montelukast, 10 mg, Oral, Nightly  pantoprazole, 40 mg, Oral, Q AM  polyethylene glycol, 17 g, Oral, Daily  predniSONE, 40 mg, Oral, Daily With Breakfast  QUEtiapine, 50 mg, Oral, Daily  QUEtiapine, 75 mg, Oral, Nightly  senna-docusate sodium, 2 tablet, Oral, BID  sodium chloride, 10 mL, Intravenous, Q12H  tamsulosin, 0.4 mg, Oral, Daily      Continuous Infusions:   PRN Meds:.•   acetaminophen  •  Calcium Replacement - Initiate Nurse / BPA Driven Protocol  •  diphenhydrAMINE  •  ipratropium-albuterol  •  labetalol  •  Magnesium Standard Dose Replacement - Initiate Nurse / BPA Driven Protocol  •  ondansetron  •  Phosphorus Replacement - Initiate Nurse / BPA Driven Protocol  •  Potassium Replacement - Initiate Nurse / BPA Driven Protocol  •  sodium chloride  •  sodium chloride  •  sodium chloride    Assessment & Plan   Assessment & Plan     Active Hospital Problems    Diagnosis  POA   • **Sepsis (HCC) [A41.9]  Yes   • Atrial fibrillation (HCC) [I48.91]  Yes   • Leukocytosis [D72.829]  Yes   • Hypomagnesemia [E83.42]  Yes   • Elevated brain natriuretic peptide (BNP) level [R79.89]  Yes   • Proteinuria [R80.9]  Yes   • Bilateral Pleural effusions [J90]  Yes   • Pneumonia [J18.9]  Yes   • Respiratory failure (HCC) [J96.90]  Yes   • Elevated alkaline phosphatase level [R74.8]  Yes   • Sepsis, due to unspecified organism, unspecified whether acute organ dysfunction present (HCC) [A41.9]  Yes   • Late onset Alzheimer's disease with behavioral disturbance (HCC) [G30.1, 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        Brief Hospital Course to date:  Dorota Membreno is a 89 y.o. female w/ hx alzheimers dementia, giant cell arteritis (2000), asthma, htn, hld, hypothyroidism, anxiety, remote colon cancer (s/p resection at age 37) who presented to [facility] ED via ems on 2/26/23 after being found down at nursing facility and was found to be hypoxic (sats 80s) w/ tachycardia. In ED temp 99.9, hr 138, sbp 89/79, sats 83%. Was placed on nonrebreather mask and initiated on esmolol dripl and iv fluids. Bp improved w/ fluids and was weaned to nasal canula. Troponins were mildly elevated and flat. Respiratory pcr was negative. U/a not compelling for uti. D-dimer mildly elevated at 1.45, probnp 1095, wbc 16,260. Cta chest revealed mildly motion degraded exam with no evidence of  pulmonary embolish and few foci of consolidative opacity right upper lobe, few mildly enlarged mediastinal lymph notes (possibly reactive) and trace bilateral pleural effusions, some narrowing of lower trachea and proximal mainstem bronchi possibly representing tracheomalacia. Initiated on cefepime and vanc empirically by intensivist service and admitted to telemetry floor as patient had clinically improved. Hospitalist service assumed care on 2/27/23    This patient's problems and plans were partially entered by my partner and updated as appropriate by me 03/08/23.    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 (narrowing lower trachea and prox mainstem bronchi on imaging)  Leukocytosis  -admission wbc 16,260, procal negative  -ct angio chest: no pe, rul opacity, few mildly enlarged mediastinal lymph nodes (possibly reactive) and trace effusions   -stopped vanc (nasal mrsa pcr negative)  -had worsening respiratory status and agitation 2/27/23, required hi flow canula ~65% fio2) and required geodon and benadryl   -s/p SLP eval 3/27/23: on regular texture, thins   Completed empiric rocephin & doxy empiric rx  - continue duonebs; weaned from IV Solu-Medrol to oral prednisone on 3/7  - currently on 2L NC    New onset afib w/ rvr (converted back to nsr)  -Emgi4atfn 4; eliquis started  -echo: ef 61-65%, diastolic dysfunction, mod tr w/ ervsp moderately elevated 45-55mmhg  -tsh mildly elevated at 6, free T4 within normal limits  -weaned off esmolol  -metoprolol was increased to 50mg bid for rate control, however, developed bradycardia to the low 50s, dose was then decreased to 25 mg twice daily      Urinary retention  -u/a benign  -flomax  -straight caths for urinary retention; urinary retention protocol    Alzheimers dementia, severe  Agitation  -continue home memantine and aricept  -increased night seroquel to 75mg nightly. Increased to  seroquel 50mg po qam  -QTc on 3/6 was less than 500  -continue celexa    Debility  -pt/ot evals     Hypothyroidism  -tsh 6.0  -continue synthroid    Goals/limits  Palliative consulted. Discussing GOC with daughter       Expected Discharge Location and Transportation: facility, likely SNF; medically ready and awaiting SNF   Expected Discharge   Expected Discharge Date and Time     Expected Discharge Date Expected Discharge Time    Mar 9, 2023          DVT prophylaxis:  Medical and mechanical DVT prophylaxis orders are present. sq heparin    AM-PAC 6 Clicks Score (PT): 16 (03/08/23 0914)    CODE STATUS:   Code Status and Medical Interventions:   Ordered at: 02/27/23 1522     Medical Intervention Limits:    NO intubation (DNI)    NO artificial nutrition    NO dialysis    NO vasopressors    Other     Code Status (Patient has no pulse and is not breathing):    No CPR (Do Not Attempt to Resuscitate)     Medical Interventions (Patient has pulse or is breathing):    Limited Support     Additional Medical Interventions Limits:    no invasive procedures, no icu transfer     Release to patient:    Routine Release       Julia Zamora MD  03/08/23

## 2023-03-08 NOTE — PLAN OF CARE
Goal Outcome Evaluation:  Plan of Care Reviewed With: patient        Progress: improving  Outcome Evaluation: patient ambulated 50' + 50 ' with Min assist x1 and rolling walker for support, patient very unsteady and needs lots of verbal cues and assist to control walker, h/o B knee buckling but did not notice this date, patient with decreased safety awareness with all activity. Recommend SNF at d/c.

## 2023-03-08 NOTE — PLAN OF CARE
Goal Outcome Evaluation:         PATIENT TO BE DISCHARGED TOMORROW TO FACILITY, NO RESTRAINTS ARE BEING USED, PATIENT CALM AND COOPERATIVE, PATIENT IS A HIGH FALL RSK, VITALS STABLE

## 2023-03-08 NOTE — THERAPY TREATMENT NOTE
Patient Name: Dorota Membreno  : 10/31/1931    MRN: [id number]                              Today's Date: 3/8/2023       Admit Date: 2023    Visit Dx:     ICD-10-CM ICD-9-CM   1. Late onset Alzheimer's disease with behavioral disturbance (HCC)  G30.1 331.0    F02.818 294.11   2. Sepsis, due to unspecified organism, unspecified whether acute organ dysfunction present ([facility])  A41.9 038.9     995.91   3. Acute respiratory failure with hypoxia ([facility])  J96.01 518.81   4. Atrial fibrillation with RVR ([facility])  I48.91 427.31   5. Hypotension, unspecified hypotension type  I95.9 458.9     Patient Active Problem List   Diagnosis   • Gastroesophageal reflux disease   • Atopic rhinitis   • Asthma   • Late onset Alzheimer's disease with behavioral disturbance (HCC)   • Eczema   • Dyslipidemia   • Hypothyroidism   • Essential hypertension   • Post-menopausal osteoporosis   • Arteritis (HCC)   • Dizziness   • Edema   • Fatigue   • Shingles   • Syncope and collapse   • Atrial fibrillation (HCC)   • Leukocytosis   • Hypomagnesemia   • Elevated brain natriuretic peptide (BNP) level   • Proteinuria   • Bilateral Pleural effusions   • Pneumonia   • Respiratory failure (HCC)   • Elevated alkaline phosphatase level   • Sepsis (HCC)   • Sepsis, due to unspecified organism, unspecified whether acute organ dysfunction present (HCC)     Past Medical History:   Diagnosis Date   • Acid reflux 2016    stable   • Allergic rhinitis    • Alzheimer's disease ([facility]) 2017   • Arteritis ([facility])     A. 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 B. Improved with prednisone therapy   • Asthma    • Cancer (HCC)     colon. Status post resection at age 37   • Cognitive impairment, mild, so stated     A. Patient has been forgetting names continue med. Has f/u with neuro. She is having some times forgetting directions. She has moved into a new house and this has not helped with directions   • Dyslipidemia     lipids  and cmp   • Hypertension     lipids, cmp, urine micro   • Hypothyroidism     A. On replacement therapy   • Mammogram abnormal     A. 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   • Panic attacks    • Pneumonia    • Postmenopausal osteoporosis    • Shingles 03/2018     Past Surgical History:   Procedure Laterality Date   • CATARACT EXTRACTION     • COLECTOMY PARTIAL / TOTAL      Patient diagnosed with colon cancer at age 37   • HYSTERECTOMY        General Information     Row Name 03/08/23 0908          Physical Therapy Time and Intention    Document Type therapy note (daily note)  -AS     Mode of Treatment physical therapy  -AS     Row Name 03/08/23 09          General Information    Patient Profile Reviewed yes  -AS     Existing Precautions/Restrictions fall;oxygen therapy device and L/min;other (see comments)  dementia  -AS     Barriers to Rehab medically complex;previous functional deficit;cognitive status  -AS     Row Name 03/08/23 0908          Cognition    Orientation Status (Cognition) oriented to;person;disoriented to;place;situation;time  -AS     Row Name 03/08/23 0908          Safety Issues, Functional Mobility    Safety Issues Affecting Function (Mobility) ability to follow commands;insight into deficits/self-awareness;safety precaution awareness;safety precautions follow-through/compliance;sequencing abilities;positioning of assistive device;awareness of need for assistance  -AS     Impairments Affecting Function (Mobility) balance;cognition;coordination;endurance/activity tolerance;postural/trunk control;shortness of breath;strength  -AS     Cognitive Impairments, Mobility Safety/Performance attention;awareness, need for assistance;insight into deficits/self-awareness;problem-solving/reasoning;safety precaution awareness;safety precaution follow-through;sequencing abilities  -AS     Comment, Safety Issues/Impairments (Mobility) patient alert to self only  -AS           User Key   (r) = Recorded By, (t) = Taken By, (c) = Cosigned By    Initials Name Provider Type    AS Karen Handy PTA Physical Therapist Assistant               Mobility     Row Name 03/08/23 0909          Bed Mobility    Comment, (Bed Mobility) sitting [facility]IC pre/post tx  -AS     Row Name 03/08/23 0909          Transfers    Comment, (Transfers) verbal cues for hand placement on/off BSC then to recliner  -AS     Row Name 03/08/23 0909          Bed-Chair Transfer    Bed-Chair [place] (Transfers) verbal cues;minimum assist (75% patient effort);1 person assist  -AS     Assistive Device (Bed-Chair Transfers) walker, front-wheeled  -AS     Comment, (Bed-Chair Transfer) increased assist with direcitonal changes, 2 episodes of LOB requiring assist to correct  -AS     Row Name 03/08/23 0909          Sit-Stand Transfer    Sit-Stand [place] (Transfers) verbal cues;contact guard;1 person assist  -AS     Assistive Device (Sit-Stand Transfers) walker, front-wheeled  -AS     Row Name 03/08/23 0909          Gait/Stairs (Locomotion)    [place] Level (Gait) verbal cues;minimum assist (75% patient effort);1 person assist  -AS     Assistive Device (Gait) walker, front-wheeled  -AS     Distance in Feet (Gait) 50 + 50  -AS     Deviations/Abnormal Patterns (Gait) bilateral deviations;annette decreased;gait speed decreased;stride length decreased;weight shifting decreased  -AS     Bilateral Gait Deviations forward flexed posture;heel strike decreased  -AS     Comment, (Gait/Stairs) patient ambulated 50' + 50 ' with Min assist x1 and rolling walker for support, patient very unsteady and needs lots of verbal cues and assist to control walker, h/o B knee bucklinmg but did not notice this date, patient with decreased safety awareness with all activity.  -AS           User Key  (r) = Recorded By, (t) = Taken By, (c) = Cosigned By    Initials Name Provider Type    AS Karen Handy PTA Physical Therapist Assistant                Obj/Interventions     [facility] Name 03/08/23 0912          Motor Skills    Therapeutic Exercise shoulder;knee;ankle  -AS     [facility] Name 03/08/23 0912          Shoulder (Therapeutic Exercise)    Shoulder (Therapeutic Exercise) AROM (active range of motion)  -AS     Shoulder AROM (Therapeutic Exercise) bilateral;flexion;extension;aBduction;aDduction;sitting;10 repetitions  -AS     [facility] Name 03/08/23 0912          Knee (Therapeutic Exercise)    Knee (Therapeutic Exercise) strengthening exercise  -AS     Knee Strengthening (Therapeutic Exercise) bilateral;marching while seated;LAQ (long arc quad);sitting;10 repetitions  -AS     [facility] Name 03/08/23 0912          Ankle (Therapeutic Exercise)    Ankle (Therapeutic Exercise) AROM (active range of motion)  -AS     Ankle AROM (Therapeutic Exercise) bilateral;dorsiflexion;plantarflexion;sitting;10 repetitions  -AS     [facility] Name 03/08/23 0912          Balance    Dynamic Standing Balance verbal cues;minimal assist;1-person assist  -AS     Position/Device Used, Standing Balance supported;walker, front-wheeled  -AS     Comment, Balance unsteadiness and LOB with directional changes  -AS           User Key  (r) = Recorded By, (t) = Taken By, (c) = Cosigned By    Initials Name Provider Type    AS Karen Handy, PTA Physical Therapist Assistant               Goals/Plan    No documentation.                Clinical Impression     [facility] Name 03/08/23 0913          Pain    Pretreatment Pain Rating 0/10 - no pain  -AS     Posttreatment Pain Rating 0/10 - no pain  -AS     Row Name 03/08/23 0913          Plan of Care Review    Plan of Care Reviewed With patient  -AS     Progress improving  -AS     Outcome Evaluation patient ambulated 50' + 50 ' with Min assist x1 and rolling walker for support, patient very unsteady and needs lots of verbal cues and assist to control walker, h/o B knee buckling but did not notice this date, patient with decreased safety awareness with all activity. Recommend SNF at  d/c.  -AS     Row Name 03/08/23 0913          Positioning and Restraints    Pre-Treatment Position sitting in chair/recliner  -AS     Post Treatment Position chair  -AS     In Chair reclined;call light within reach;encouraged to call for assist;exit alarm on;waffle cushion;legs elevated;notified nsg  -AS           User Key  (r) = Recorded By, (t) = Taken By, (c) = Cosigned By    Initials Name Provider Type    AS Karen Handy PTA Physical Therapist Assistant               Outcome Measures     Row Name 03/08/23 0914 03/08/23 0730       How much help from another person do you currently need...    Turning from your back to your side while in flat bed without using bedrails? 3  -AS 3  -TH    Moving from lying on back to sitting on the side of a flat bed without bedrails? 3  -AS 3  -TH    Moving to and from a bed to a chair (including a wheelchair)? 3  -AS 3  -TH    Standing up from a chair using your arms (e.g., wheelchair, bedside chair)? 3  -AS 3  -TH    Climbing 3-5 steps with a railing? 2  -AS 2  -TH    To walk in hospital room? 2  -AS 3  -TH    AM-PAC 6 Clicks Score (PT) 16  -AS 17  -TH    Highest level of mobility 5 --> Static standing  -AS 5 --> Static standing  -TH    Row Name 03/08/23 0914          Functional Assessment    Outcome Measure Options AM-PAC 6 Clicks Basic Mobility (PT)  -AS           User Key  (r) = Recorded By, (t) = Taken By, (c) = Cosigned By    Initials Name Provider Type    AS Karen Handy PTA Physical Therapist Assistant    Esdras Yi RN Registered Nurse                             Physical Therapy Education     Title: PT OT SLP Therapies (In Progress)     Topic: Physical Therapy (In Progress)     Point: Mobility training (In Progress)     Learning Progress Summary           Patient Acceptance, E, NR by AS at 3/8/2023 0914    Acceptance, E,TB, VU by MEÑO at 3/6/2023 1031    Comment: continued benefit of skilled PT services    Acceptance, E, VU,NL,NR by JAMES at 3/5/2023  0931    Comment: PT POC    Acceptance, E, NR by BA at 3/1/2023 1445                   Point: Home exercise program (In Progress)     Learning Progress Summary           Patient Acceptance, E, NR by AS at 3/8/2023 0914    Acceptance, E,TB, VU by KW at 3/6/2023 1031    Comment: continued benefit of skilled PT services    Acceptance, E, VU,NL,NR by LO at 3/5/2023 0931    Comment: PT POC                   Point: Body mechanics (In Progress)     Learning Progress Summary           Patient Acceptance, E, NR by AS at 3/8/2023 0914    Acceptance, E,TB, VU by KW at 3/6/2023 1031    Comment: continued benefit of skilled PT services    Acceptance, E, VU,NL,NR by LO at 3/5/2023 0931    Comment: PT POC    Acceptance, E, NR by BA at 3/1/2023 1445                   Point: Precautions (In Progress)     Learning Progress Summary           Patient Acceptance, E, NR by AS at 3/8/2023 0914    Acceptance, E,TB, VU by KW at 3/6/2023 1031    Comment: continued benefit of skilled PT services    Acceptance, E, VU,NL,NR by LO at 3/5/2023 0931    Comment: PT POC    Acceptance, E, NR by BA at 3/1/2023 1445                               User Key     Initials Effective Dates Name Provider Type Discipline    AS 02/03/23 -  Karen Handy, PTA Physical Therapist Assistant PT    LO 06/16/21 -  Cary Zacarias, PT Physical Therapist PT    BA 09/21/21 -  Mayela Last, PT Physical Therapist PT    KW 01/27/22 -  Beatriz Mulligan, PT Physical Therapist PT              PT Recommendation and Plan     Plan of Care Reviewed With: patient  Progress: improving  Outcome Evaluation: patient ambulated 50' + 50 ' with Min assist x1 and rolling walker for support, patient very unsteady and needs lots of verbal cues and assist to control walker, h/o B knee buckling but did not notice this date, patient with decreased safety awareness with all activity. Recommend SNF at d/c.     Time Calculation:    PT Charges     Row Name 03/08/23 0914             Time  Calculation    Start Time 0848  -AS      PT Received On 03/08/23  -AS      PT Goal Re-Cert Due Date 03/11/23  -AS         Timed Charges    25145 - PT Therapeutic Exercise Minutes 10  -AS      68067 - Gait Training Minutes  13  -AS         Total Minutes    Timed Charges Total Minutes 23  -AS       Total Minutes 23  -AS            User Key  (r) = Recorded By, (t) = Taken By, (c) = Cosigned By    Initials Name Provider Type    AS Karen Handy PTA Physical Therapist Assistant              Therapy Charges for Today     Code Description Service Date Service Provider Modifiers Qty    87567194954 HC PT THER PROC EA 15 MIN 3/8/2023 Karne Handy, VIET GP 1    67926851713 HC GAIT TRAINING EA 15 MIN 3/8/2023 Karen Handy, VIET GP 1          PT G-Codes  Outcome Measure Options: AM-PAC 6 Clicks Basic Mobility (PT)  AM-PAC 6 Clicks Score (PT): 16  AM-PAC 6 Clicks Score (OT): 15       Karen Handy PTA  3/8/2023

## 2023-03-08 NOTE — THERAPY TREATMENT NOTE
Patient Name: Dorota Membreno  : 10/31/1931    MRN: [id number]                              Today's Date: 3/8/2023       Admit Date: 2023    Visit Dx:     ICD-10-CM ICD-9-CM   1. Late onset Alzheimer's disease with behavioral disturbance (HCC)  G30.1 331.0    F02.818 294.11   2. Sepsis, due to unspecified organism, unspecified whether acute organ dysfunction present ([facility])  A41.9 038.9     995.91   3. Acute respiratory failure with hypoxia ([facility])  J96.01 518.81   4. Atrial fibrillation with RVR ([facility])  I48.91 427.31   5. Hypotension, unspecified hypotension type  I95.9 458.9     Patient Active Problem List   Diagnosis   • Gastroesophageal reflux disease   • Atopic rhinitis   • Asthma   • Late onset Alzheimer's disease with behavioral disturbance (HCC)   • Eczema   • Dyslipidemia   • Hypothyroidism   • Essential hypertension   • Post-menopausal osteoporosis   • Arteritis (HCC)   • Dizziness   • Edema   • Fatigue   • Shingles   • Syncope and collapse   • Atrial fibrillation (HCC)   • Leukocytosis   • Hypomagnesemia   • Elevated brain natriuretic peptide (BNP) level   • Proteinuria   • Bilateral Pleural effusions   • Pneumonia   • Respiratory failure (HCC)   • Elevated alkaline phosphatase level   • Sepsis (HCC)   • Sepsis, due to unspecified organism, unspecified whether acute organ dysfunction present (HCC)     Past Medical History:   Diagnosis Date   • Acid reflux 2016    stable   • Allergic rhinitis    • Alzheimer's disease ([facility]) 2017   • Arteritis ([facility])     A. 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 B. Improved with prednisone therapy   • Asthma    • Cancer (HCC)     colon. Status post resection at age 37   • Cognitive impairment, mild, so stated     A. Patient has been forgetting names continue med. Has f/u with neuro. She is having some times forgetting directions. She has moved into a new house and this has not helped with directions   • Dyslipidemia     lipids  and cmp   • Hypertension     lipids, cmp, urine micro   • Hypothyroidism     A. On replacement therapy   • Mammogram abnormal     A. 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   • Panic attacks    • Pneumonia    • Postmenopausal osteoporosis    • Shingles 03/2018     Past Surgical History:   Procedure Laterality Date   • CATARACT EXTRACTION     • COLECTOMY PARTIAL / TOTAL      Patient diagnosed with colon cancer at age 37   • HYSTERECTOMY        General Information     Row Name 03/08/23 0913          OT Time and Intention    Document Type therapy note (daily note)  -     Mode of Treatment occupational therapy  -     Row Name 03/08/23 0913          General Information    Patient Profile Reviewed yes  -     Existing Precautions/Restrictions fall;oxygen therapy device and L/min;other (see comments)  Alzheimer's  -     Barriers to Rehab medically complex;previous functional deficit;cognitive status  -     Row Name 03/08/23 0913          Cognition    Orientation Status (Cognition) oriented to;person;disoriented to;place;situation;time  -     Row Name 03/08/23 0913          Safety Issues, Functional Mobility    Safety Issues Affecting Function (Mobility) safety precautions follow-through/compliance;safety precaution awareness;insight into deficits/self-awareness;awareness of need for assistance;ability to follow commands  -     Impairments Affecting Function (Mobility) balance;cognition;coordination;endurance/activity tolerance;postural/trunk control;shortness of breath;strength  -     Cognitive Impairments, Mobility Safety/Performance attention;awareness, need for assistance;insight into deficits/self-awareness;judgment;problem-solving/reasoning;safety precaution follow-through;sequencing abilities;safety precaution awareness  -           User Key  (r) = Recorded By, (t) = Taken By, (c) = Cosigned By    Initials Name Provider Type     Zoe Tang OT Occupational  Therapist                 Mobility/ADL's     Row Name 03/08/23 0913          Bed Mobility    Bed Mobility scooting/bridging;supine-sit  -     Scooting/Bridging [place] (Bed Mobility) supervision;verbal cues  -     Supine-Sit [place] (Bed Mobility) supervision;verbal cues  -     Bed Mobility, Safety Issues cognitive deficits limit understanding;decreased use of arms for pushing/pulling;decreased use of legs for bridging/pushing  -     Assistive Device (Bed Mobility) bed rails;head of bed elevated  -     Comment, (Bed Mobility) Completed all bed mobility with supervision and verbal cues to advance BLE to EOB. Good improvement in bed mobility this date.  -     Row Name 03/08/23 0913          Transfers    Transfers sit-stand transfer;toilet transfer  -     Comment, (Transfers) Completed sit to stand, bed to BSC, and BSC to chair transfer.  -     Row Name 03/08/23 0913          Sit-Stand Transfer    Sit-Stand [place] (Transfers) verbal cues;contact guard;1 person assist  -     Assistive Device (Sit-Stand Transfers) --  UE support  -     Row Name 03/08/23 0913          Toilet Transfer    Type (Toilet Transfer) sit-stand;stand-sit  -     [place] Level (Toilet Transfer) minimum assist (75% patient effort);1 person assist  -     Assistive Device (Toilet Transfer) commode, bedside without drop arms  -     Row Name 03/08/23 0913          Functional Mobility    Functional Mobility- Ind. Level minimum assist (75% patient effort);verbal cues required  -     Functional Mobility- Device --  R UE support  -     Functional Mobility-Distance (Feet) 5  -     Functional Mobility- Safety Issues sequencing ability decreased;weight-shifting ability decreased;step length decreased;supplemental O2  -     Functional Mobility- Comment Steps from BSC to chair.  -     Row Name 03/08/23 0913          Activities of Daily Living    BADL Assessment/Intervention lower body  dressing;grooming;toileting  -     Row Name 03/08/23 0913          Lower Body Dressing Assessment/Training    [place] Level (Lower Body Dressing) don;socks;dependent (less than 25% patient effort)  -     Position (Lower Body Dressing) supine  -     Row Name 03/08/23 0913          Grooming Assessment/Training    [place] Level (Grooming) hair care, combing/brushing;oral care regimen;wash face, hands;supervision  -     Position (Grooming) supported sitting  -     Comment, (Grooming) Verbal cues for completion and supervision as pt asks when she is done.  -     Row Name 03/08/23 0913          Toileting Assessment/Training    [place] Level (Toileting) adjust/manage clothing;perform perineal hygiene;dependent (less than 25% patient effort)  -     Position (Toileting) unsupported sitting;supported standing  -     Comment, (Toileting) Dependent for vivienne care. Must have constant supervision for toileting as when she is finished she attempts to wipe with bare hand.  -           User Key  (r) = Recorded By, (t) = Taken By, (c) = Cosigned By    Initials Name Provider Type     Zoe Tang OT Occupational Therapist               Obj/Interventions     Row Name 03/08/23 0916          Sensory Assessment (Somatosensory)    Sensory Assessment (Somatosensory) sensation intact  -     Row Name 03/08/23 0916          Vision Assessment/Intervention    Visual Impairment/Limitations WFL  -     Row Name 03/08/23 0916          Balance    Balance Assessment sitting static balance;sitting dynamic balance;standing static balance;standing dynamic balance  -     Static Sitting Balance supervision  -     Dynamic Sitting Balance contact guard  -     Position, Sitting Balance unsupported  on BSC  -     Static Standing Balance contact guard  -     Dynamic Standing Balance minimal assist  -     Position/Device Used, Standing Balance supported;other (see comments)  UE support  -     Balance  Interventions sitting;standing;occupation based/functional task  -           User Key  (r) = Recorded By, (t) = Taken By, (c) = Cosigned By    Initials Name Provider Type     Zoe Tang, OT Occupational Therapist               Goals/Plan    No documentation.                Clinical Impression     Row Name 03/08/23 0918          Pain Assessment    Additional Documentation Pain Scale: FACES Pre/Post-Treatment (Group)  -     Row Name 03/08/23 0918          Pain Scale: FACES Pre/Post-Treatment    Pain: FACES Scale, Pretreatment 0-->no hurt  -     Posttreatment Pain Rating 4-->hurts little more  -     Pain Location - Side/Orientation Right  -     Pain Location generalized  -     Pain Location - flank  -     Row Name 03/08/23 0918          Plan of Care Review    Plan of Care Reviewed With patient  -     Progress improving  -     Outcome Evaluation Pt with good improvement in bed mobility this date completing with supervision. Continues to transfer sit to stand with CGA and require Hakeem for bed to BSC. Dependent for vivienne care. Continues to present below baseline with decreased safety awareness and endurance. Recommend d/c to SNF.  -     Row Name 03/08/23 0918          Therapy Assessment/Plan (OT)    Patient/Family Therapy Goal Statement (OT) Pt would like to improve and return home.  -     Rehab Potential (OT) good, to achieve stated therapy goals  -     Criteria for Skilled Therapeutic Interventions Met (OT) yes;meets criteria;skilled treatment is necessary  -     Therapy Frequency (OT) daily  -     Row Name 03/08/23 0918          Therapy Plan Review/Discharge Plan (OT)    Anticipated Discharge Disposition (OT) skilled nursing facility  -     Row Name 03/08/23 0918          Vital Signs    Pre Systolic BP Rehab 162  -HM     Pre Treatment Diastolic BP 71  -HM     Post Systolic BP Rehab 136  -HM     Post Treatment Diastolic BP 60  -HM     O2 Delivery Pre Treatment supplemental O2  -      O2 Delivery Intra Treatment supplemental O2  -HM     O2 Delivery Post Treatment supplemental O2  -HM     Pre Patient Position Supine  -HM     Intra Patient Position Standing  -HM     Post Patient Position Sitting  -HM     Row Name 03/08/23 0918          Positioning and Restraints    Pre-Treatment Position in bed  -HM     Post Treatment Position chair  -HM     In Chair notified nsg;reclined;call light within reach;encouraged to call for assist;exit alarm on;waffle cushion  -HM           User Key  (r) = Recorded By, (t) = Taken By, (c) = Cosigned By    Initials Name Provider Type     Zoe Tang, OT Occupational Therapist               Outcome Measures     Row Name 03/08/23 0920          How much help from another is currently needed...    Putting on and taking off regular lower body clothing? 2  -HM     Bathing (including washing, rinsing, and drying) 2  -HM     Toileting (which includes using toilet bed pan or urinal) 2  -HM     Putting on and taking off regular upper body clothing 3  -HM     Taking care of personal grooming (such as brushing teeth) 3  -HM     Eating meals 3  -HM     AM-PAC 6 Clicks Score (OT) 15  -HM     Row Name 03/08/23 0914 03/08/23 0730       How much help from another person do you currently need...    Turning from your back to your side while in flat bed without using bedrails? 3  -AS 3  -TH    Moving from lying on back to sitting on the side of a flat bed without bedrails? 3  -AS 3  -TH    Moving to and from a bed to a chair (including a wheelchair)? 3  -AS 3  -TH    Standing up from a chair using your arms (e.g., wheelchair, bedside chair)? 3  -AS 3  -TH    Climbing 3-5 steps with a railing? 2  -AS 2  -TH    To walk in hospital room? 2  -AS 3  -TH    AM-PAC 6 Clicks Score (PT) 16  -AS 17  -TH    Highest level of mobility 5 --> Static standing  -AS 5 --> Static standing  -TH    Row Name 03/08/23 0914          Functional Assessment    Outcome Measure Options AM-PAC 6 Clicks Basic  Mobility (PT)  -AS           User Key  (r) = Recorded By, (t) = Taken By, (c) = Cosigned By    Initials Name Provider Type    AS Karen Handy, VIET Physical Therapist Assistant     Zoe Tang, OT Occupational Therapist    Esdras Yi, RN Registered Nurse                Occupational Therapy Education     Title: PT OT SLP Therapies (In Progress)     Topic: Occupational Therapy (In Progress)     Point: ADL training (Done)     Description:   Instruct learner(s) on proper safety adaptation and remediation techniques during self care or transfers.   Instruct in proper use of assistive devices.              Learning Progress Summary           Patient Acceptance, E,TB,D, VU,NR by  at 3/8/2023 0921    Acceptance, E,TB,D, VU,NR by  at 3/6/2023 0943    Acceptance, E,TB,D, NR by  at 3/1/2023 0945                   Point: Home exercise program (In Progress)     Description:   Instruct learner(s) on appropriate technique for monitoring, assisting and/or progressing therapeutic exercises/activities.              Learning Progress Summary           Patient Acceptance, E,TB,D, NR by  at 3/1/2023 0945                   Point: Precautions (Done)     Description:   Instruct learner(s) on prescribed precautions during self-care and functional transfers.              Learning Progress Summary           Patient Acceptance, E,TB,D, VU,NR by  at 3/8/2023 0921    Acceptance, E,TB,D, VU,NR by  at 3/6/2023 0943    Acceptance, E,TB,D, NR by  at 3/1/2023 0945                   Point: Body mechanics (Done)     Description:   Instruct learner(s) on proper positioning and spine alignment during self-care, functional mobility activities and/or exercises.              Learning Progress Summary           Patient Acceptance, E,TB,D, VU,NR by  at 3/8/2023 0921    Acceptance, E,TB,D, VU,NR by  at 3/6/2023 0943    Acceptance, E,TB,D, NR by  at 3/1/2023 0945                               User Key     Initials  Effective Dates Name Provider Type Discipline     10/25/22 -  Zoe Tang, OT Occupational Therapist OT     06/16/21 -  Giovanna Condon, OT Occupational Therapist OT              OT Recommendation and Plan  Therapy Frequency (OT): daily  Plan of Care Review  Plan of Care Reviewed With: patient  Progress: improving  Outcome Evaluation: Pt with good improvement in bed mobility this date completing with supervision. Continues to transfer sit to stand with CGA and require Hakeem for bed to BSC. Dependent for vivienne care. Continues to present below baseline with decreased safety awareness and endurance. Recommend d/c to SNF.     Time Calculation:    Time Calculation- OT     Row Name 03/08/23 0914 03/08/23 0814          Time Calculation- OT    OT Start Time -- 0814  -     OT Received On -- 03/08/23  -        Timed Charges    62395 - Gait Training Minutes  13  -AS --     74766 - OT Self Care/Mgmt Minutes -- 16  -HM        Total Minutes    Timed Charges Total Minutes 13  -AS 16  -HM      Total Minutes 13  -AS 16  -HM           User Key  (r) = Recorded By, (t) = Taken By, (c) = Cosigned By    Initials Name Provider Type    AS Karen Handy, PTA Physical Therapist Assistant     Zoe Tang, OT Occupational Therapist              Therapy Charges for Today     Code Description Service Date Service Provider Modifiers Qty    [id number] HC OT SELF CARE/MGMT/TRAIN EA 15 MIN 3/8/2023 Zoe Tang, OT GO 1               Zoe Tang OT  3/8/2023

## 2023-03-08 NOTE — DISCHARGE PLACEMENT REQUEST
"Vero Russell, RN    [phone]                  Dorota Vogt (89 y.o. Female)     Date of Birth   10/31/1931    Social Security Number       [place]   [facility] [place] [address]    Home Phone   [phone]    MRN   [id number]       [place]   None    Marital Status                               Admission Date   2/26/23    Admission Type   Emergency    Admitting Provider   Julia Zamora MD    Attending Provider   Julia Zamora MD    Department, Room/Bed   [address], S454/1       Discharge Date       Discharge Disposition       Discharge Destination                               Attending Provider: Julia Zamora MD    Allergies: Codeine, Hydrochlorothiazide W-triamterene, Levofloxacin, Penicillins    Isolation: None   Infection: None   Code Status: No CPR    Ht: 147.3 cm (58\")   Wt: 71.2 kg (157 lb)    Admission Cmt: None   Principal Problem: Sepsis (HCC) [A41.9]                 Active Insurance as of 2/26/2023     Primary Coverage     Payor Plan Insurance Group Employer/Plan Group    MEDICARE MEDICARE A & B      Payor Plan Address Payor Plan Phone Number Payor Plan Fax Number Effective Dates    [address] [phone]  10/1/1996 - None Entered    formerly [facility] [id number]       Subscriber Name Subscriber Birth Date Member ID       DOROTA VOGT 10/31/1931 [id number]           Secondary Coverage     Payor Plan Insurance Group Employer/Plan Group    [facility] [id number]     Payor Plan Address Payor Plan Phone Number Payor Plan Fax Number Effective Dates    [address] [address]   1/1/2016 - None Entered    [facility] [id number]       Subscriber Name Subscriber Birth Date Member ID       DOROTA VOGT 10/31/1931 [id number]                 Emergency Contacts      (Rel.) Home Phone Work Phone Mobile Phone    HaseebJovana (Daughter) [phone] -- [phone]    HaseebCarlos (Son) [phone] -- [phone]    ANABEL NAGEL " (Grandchild) [phone] -- [phone]                          Raimundo Aiken MD   Physician  Intensivist  H&P     Signed  Date of Service:  02/26/23 0943  Creation Time:  02/26/23 0943     Signed        Expand All Collapse All    Intensive Care Admission Note      Sepsis (HCC)     History of Present Illness      Ms. Membreno is a 89 yo female with PMH GERD, Alzheimer's disease, Giant cell arteritis (2000), Asthma, Colon cancer (s/p resection at age 37), HLD, HTn, hypothyroidism, anxiety who presents to the [facility] ED on 2/26/23 after being found down at her nursing home. Information is limited but according to sources, patient lives at [facility] where she was found down on the floor this morning (unclear if she fell or if she sat down independently). At that time her vitals were checked and she was found to be hypoxic into the 80%'s as well as with rapid heart rate. EMS was called and she was brought to the [facility] ED for evaluation.      On arrival to the ED she was found to be in Rapid A. Fib and hypoxia. VS on arrival temp 99.9, Hr 138, RR 26, BP 89/79, SpO2 83%. She was placed on a NRB and started on Esmolol. She developed hypotension requiring fluid bolus but quickly converted out of A. Fib. Esmolol was turned off and her BP normalized. She was able to be weaned from NRB down to 4L NC with SpO2 in the high 90%'s.      Significant Labs include: ABG on NRB 7.43 / 44.5 / 144 / 29.8. HS troponin 19 and then 20, Respiratory PCR negative for all analytes,  UA positive for protein, D-Dimer 1.45, BNP 1095, PCT 0.13, TSH 6, Free T4 1.03, glucose 139, BUN 28, creat 0.86, Na 139, K 3.8, AST 19, ALT 13, Alk phos 120, albumin 3.4, phos 2.6, Mag 1.6, lactate 1.5, WBC 16.26, HgB 11.5, Pls 264.     CTA showed mildly motion degraded exam with no evidence of pulmonary embolism. Few foci of consolidative opacity in the right upper lobe may represent infection. Few mildly enlarged mediastinal lymph nodes may be  reactive. Trace bilateral pleural effusions. Narrowing of the lower trachea and proximal mainstem bronchi may represent tracheomalacia.      CXR non specific interstitial prominence could reflect chronic changes, but mild edema or atypical infectious/inflammatory process can also appear this way in the correct clinical setting. Mild bibasilar opacities suggest atelectasis but infiltrate or edema not excluded.      While in the ED she received 1g Magnesium, 1.5 L NS bolus, LR infusion, esmolol drip, 10 mg IV dexamethasone, Vancomycin, Cefepime and Duonebs. She is being admitted to the ICU service for management.      Of note, patient has Living Will on file that notes she is a DNR/DNI. I spoke with Daughter Jovana Membreno who confirmed this.     I spent 20 minutes of time on this.  Carol Rebollar, MSN, AGACNP-BC, APRN     Electronically signed by XAVIER Mccain, 02/26/23, 9:58 AM EST.           Problem List, Surgical History, Family, Social History, and ROS           Patient Active Problem List     Diagnosis     • *Sepsis ([facility]) [A41.9]     • Atrial fibrillation ([facility]) [I48.91]     • Leukocytosis [D72.829]     • Hypomagnesemia [E83.42]     • Elevated brain natriuretic peptide (BNP) level [R79.89]     • Proteinuria [R80.9]     • Bilateral Pleural effusions [J90]     • Pneumonia [J18.9]     • Respiratory failure (HCC) [J96.90]     • Elevated alkaline phosphatase level [R74.8]     • Syncope and collapse [R55]     • Shingles [B02.9]     • Dizziness [R42]     • Edema [R60.9]     • Fatigue [R53.83]     • Arteritis (HCC) [I77.6]     • Gastroesophageal reflux disease [K21.9]     • Atopic rhinitis [J30.9]     • Asthma [J45.909]     • Late onset Alzheimer's disease with behavioral disturbance (HCC) [G30.1, F02.818]     • Eczema [L30.9]     • Dyslipidemia [E78.5]     • Hypothyroidism [E03.9]     • Essential hypertension [I10]     • Post-menopausal osteoporosis [M81.0]        Surgical History         Past Surgical History:    Procedure Laterality Date   • CATARACT EXTRACTION       • COLECTOMY PARTIAL / TOTAL         Patient diagnosed with colon cancer at age 37   • HYSTERECTOMY                     Allergies   Allergen Reactions   • Codeine     • Hydrochlorothiazide W-Triamterene     • Levofloxacin     • Penicillins        No current facility-administered medications on file prior to encounter.           Current Outpatient Medications on File Prior to Encounter   Medication Sig   • amLODIPine (NORVASC) 5 MG tablet GIVE 1 TABLET BY MOUTH DAILY AT BEDTIME   • atorvastatin (LIPITOR) 20 MG tablet GIVE 1 TABLET BY MOUTH DAILY   • Bismatrol 262 MG/15ML suspension GIVE 15 ML BY MOUTH EVERY 8 HOURS AS NEEDED FOR DIARRHEA   • Cholecalciferol (vitamin D3) 125 MCG (5000 UT) tablet tablet GIVE 1 TABLET BY MOUTH DAILY   • citalopram (CeleXA) 20 MG tablet TAKE 1 TABLET BY MOUTH EVERY MORNING FOR DEPRESSION   • donepezil (ARICEPT) 5 MG tablet GIVE 1 TABLET BY MOUTH DAILY AT BEDTIME   • KLOR-CON 10 MEQ CR tablet TAKE 1 TABLET BY MOUTH: MON,WED,FRI   • levothyroxine (SYNTHROID, LEVOTHROID) 50 MCG tablet GIVE 1 TABLET BY MOUTH DAILY   • memantine (NAMENDA) 5 MG tablet GIVE 1 TABLET BY MOUTH TWICE A DAY   • metoprolol succinate XL (TOPROL-XL) 50 MG 24 hr tablet TAKE 1 TABLET BY MOUTH DAILY.   • montelukast (SINGULAIR) 10 MG tablet TAKE 1 TABLET BY MOUTH EVERY NIGHT.   • nystatin-triamcinolone (MYCOLOG) 601224-0.1 UNIT/GM-% ointment APPLY TO SKIN TWICE DAILY   • ondansetron (ZOFRAN) 4 MG tablet GIVE 1 TABLET BY MOUTH EVERY 12 HOURS AS NEEDED FOR NAUSEA/VOMITING   • QUEtiapine (SEROquel) 25 MG tablet GIVE 1 TABLET BY MOUTH EVERY MORNING      MEDICATION LIST AND ALLERGIES REVIEWED.           Family History   Problem Relation Age of Onset   • Other Mother           medical problems   • Allergies Son     • Alcohol abuse Other        Social History           Tobacco Use   • Smoking status: Never   • Smokeless tobacco: Never   Substance Use Topics   • Alcohol  use: No   • Drug use: No      Social History          Social History Narrative   • Not on file      FAMILY AND SOCIAL HISTORY REVIEWED.     Review of Systems  Limited review of system due to patient's dementia     Physical Exam and Clinical Information   /78 Comment: Provider MD Jeremiah is aware of hypotension.  Pulse 77   Temp 99.9 °F (37.7 °C) (Oral)   Resp 22   Wt 86.2 kg (190 lb)   SpO2 95%   BMI 39.71 kg/m²   Physical Exam  General Appearance: Awake, alert, in no acute distress  Head:    Atraumatic, Normocephalic, without obvious abnormality     Lungs:   B/L Breath sounds present with decreased breath sounds on bases, diffuse wheezing heard, occ crackles.   Heart: S1 and S2 present, no murmur  Abdomen: Soft, nontender, no guarding or rigidity, bowel sounds positive  Extremities:  no cyanosis or clubbing,  no edema, warm to touch.  Pulses: Positive and symmetric.  Neurologic:  Moving all four extremities. Good strength bilaterally.           Results from last 7 days   Lab Units 02/26/23  0525   WBC 10*3/mm3 16.26*   HEMOGLOBIN g/dL 11.5*   PLATELETS 10*3/mm3 264           Results from last 7 days   Lab Units 02/26/23  0525   SODIUM mmol/L 139   POTASSIUM mmol/L 3.8   CO2 mmol/L 30.0*   BUN mg/dL 28*   CREATININE mg/dL 0.86   MAGNESIUM mg/dL 1.6*   PHOSPHORUS mg/dL 2.6   GLUCOSE mg/dL 139*      CrCl cannot be calculated (Unknown ideal weight.).           Results from last 7 days   Lab Units 02/26/23  0544   PH, ARTERIAL pH units 7.434   PCO2, ARTERIAL mm Hg 44.5   PO2 ART mm Hg 144.0*            Lab Results   Component Value Date     LACTATE 1.5 02/26/2023          Images:   CT angiogram reviewed and did not see any obvious pulmonary embolism.  Consolidative infiltrate noted in the right upper lobe and patchy opacities in the bases with small effusions.  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     I reviewed the patient's results and images.      Chest x-ray reviewed personally and showed basilar atelectasis,  small effusion.  Right upper lobe opacity noted.     EKG reviewed and showed normal sinus rhythm, nonspecific ST changes.  QTc is in normal range.  EKG earlier last night showed atrial fibrillation with rapid ventricular response which is now converted back to sinus rhythm.     Impression     Sepsis (HCC)    Late onset Alzheimer's disease with behavioral disturbance (HCC)    Atrial fibrillation (HCC)    Leukocytosis    Hypomagnesemia    Elevated brain natriuretic peptide (BNP) level    Proteinuria    Bilateral Pleural effusions    Pneumonia    Respiratory failure (HCC)    Elevated alkaline phosphatase level     Plan/Recommendations      89-year-old female with past medical history of GERD, Alzheimer's dementia, giant cell arteritis, asthma, colon cancer s/p resection at age 37, dyslipidemia, hypertension, hypothyroidism, anxiety/depression transferred from nursing home after found down at her nursing home.  Patient history is not very clear but apparently she was very hypoxic on arrival and saturations in the 80s and normally not on any supplemental oxygen at nursing home.  She was also in new onset A-fib with RVR.  On arrival to ED patient was found to be low-grade fever with 99.9, heart rate in 138 range, hypotensive and hypoxic.  Patient was placed on nonrebreather mask and and also given esmolol.  Developed hypotension for which she  was given 2 L of fluid boluses with improvement in blood pressure.  Underwent CT angiogram which did not show any pulmonary embolism but evidence of probable pneumonia with consolidative infiltrate in the right upper lobe.  Procalcitonin level is however normal range.  Patient is very hard to get history from but denies any significant cough or sputum production.  Urinalysis checked and negative for any UTI.  Patient has been wheezing and was given nebulizer therapy.  Initially patient was discussed with hospitalist team and they thought given patient's multiple issues she would be  better served in ICU.  When I evaluated the patient in the ER patient is on 4 L nasal cannula.  She is hemodynamically stable.  Has converted back to sinus rhythm and not on any pressors currently. patient needs hospital admission and will be admitted to telemetry floor rather than ICU at this point with close monitoring.   goals of care were discussed with patient's daughter by my NP and according to her and according to the living will on file patient is DNR/DNI.     1.  Admit patient to telemetry floor for closer monitoring.  2.  New onset A-fib with RVR, currently converted back to sinus rhythm.  Troponins are flat.  Will get echocardiogram to evaluate cardiac function.  Monitor electrolytes and replace per protocol.  May need esmolol if developed rapid ventricular rate again.  3.  Concern for possible pneumonia with consolidated infiltrate in the right upper lobe and patchy infiltrate otherwise.  Cannot rule out aspiration related event.  We will get speech to evaluate the patient.  Keep n.p.o. for now except meds.  Will give vancomycin and cefepime pending cultures.  Get MRSA screen.  Check urine Legionella and strep antigen.  4.  With ongoing wheezing will give DuoNeb nebulizers every 6 hours.  Avoid further fluid overload and stop maintenance IV fluids for now.  Will use as needed fluid boluses if needed.  Patient has not been on any medications to treat her asthma at the nursing home.  Will escalate therapy if needed including systemic steroids.  5.  COVID swab and viral screens negative.  6.  Continue home medications of memantine and donepezil.  Continue antidepressant medications.  7.  GI and DVT prophylaxis.  8.  Check labs in the morning.   9.  Continue levothyroxine for hypothyroidism     DNR/DNI after discussions with family.  Will have hospitalist team  patient in the morning.     Time spent 45 min (exclusive of procedure time)  including high complexity decision making to assess, manipulate,  and support vital organ system failure in this individual who has impairment of one or more vital organ systems such that there is a high probability of imminent or life threatening deterioration in the patient’s condition.        Raimundo Aiken MD, [facility]  Pulmonary and Critical Care Medicine  23 10:06 EST      CC: Kylie Zapata DO                                              Physical Therapy Notes (most recent note)      Karen Handy, PTA at 23 0848  Version 1 of 1         Patient Name: Dorota Membreno  : 10/31/1931    MRN: [id number]                              Today's Date: 3/8/2023       Admit Date: 2023    Visit Dx:     ICD-10-CM ICD-9-CM   1. Late onset Alzheimer's disease with behavioral disturbance ([facility])  G30.1 331.0    F02.818 294.11   2. Sepsis, due to unspecified organism, unspecified whether acute organ dysfunction present ([facility])  A41.9 038.9     995.91   3. Acute respiratory failure with hypoxia ([facility])  J96.01 518.81   4. Atrial fibrillation with RVR ([facility])  I48.91 427.31   5. Hypotension, unspecified hypotension type  I95.9 458.9     Patient Active Problem List   Diagnosis   • Gastroesophageal reflux disease   • Atopic rhinitis   • Asthma   • Late onset Alzheimer's disease with behavioral disturbance (HCC)   • Eczema   • Dyslipidemia   • Hypothyroidism   • Essential hypertension   • Post-menopausal osteoporosis   • Arteritis (HCC)   • Dizziness   • Edema   • Fatigue   • Shingles   • Syncope and collapse   • Atrial fibrillation (HCC)   • Leukocytosis   • Hypomagnesemia   • Elevated brain natriuretic peptide (BNP) level   • Proteinuria   • Bilateral Pleural effusions   • Pneumonia   • Respiratory failure (HCC)   • Elevated alkaline phosphatase level   • Sepsis (HCC)   • Sepsis, due to unspecified organism, unspecified whether acute organ dysfunction present (HCC)     Past Medical History:   Diagnosis Date   • Acid reflux 2016    stable   • Allergic rhinitis    •  Alzheimer's disease (HCC) 12/2017   • Arteritis (HCC)     A. Giant cell arteritis, diagnosed around 2000 when she lost sight in her eye. B. Improved with prednisone therapy   • Asthma    • Cancer (HCC)     colon. Status post resection at age 37   • Cognitive impairment, mild, so stated     A. Patient has been forgetting names continue med. Has f/u with neuro. She is having some times forgetting directions. She has moved into a new house and this has not helped with directions   • Dyslipidemia     lipids and cmp   • Hypertension     lipids, cmp, urine micro   • Hypothyroidism     A. On replacement therapy   • Mammogram abnormal     A. 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   • Panic attacks    • Pneumonia    • Postmenopausal osteoporosis    • Shingles 03/2018     Past Surgical History:   Procedure Laterality Date   • CATARACT EXTRACTION     • COLECTOMY PARTIAL / TOTAL      Patient diagnosed with colon cancer at age 37   • HYSTERECTOMY        General Information     Row Name 03/08/23 [facility]          Physical Therapy Time and Intention    Document Type therapy note (daily note)  -AS     Mode of Treatment physical therapy  -AS     Row Name 03/08/23 [facility]          General Information    Patient Profile Reviewed yes  -AS     Existing Precautions/Restrictions fall;oxygen therapy device and L/min;other (see comments)  dementia  -AS     Barriers to Rehab medically complex;previous functional deficit;cognitive status  -AS     Row Name 03/08/23 09          Cognition    Orientation Status (Cognition) oriented to;person;disoriented to;place;situation;time  -AS     Row Name 03/08/23 [facility]          Safety Issues, Functional Mobility    Safety Issues Affecting Function (Mobility) ability to follow commands;insight into deficits/self-awareness;safety precaution awareness;safety precautions follow-through/compliance;sequencing abilities;positioning of assistive device;awareness of need for assistance  -AS      Impairments Affecting Function (Mobility) balance;cognition;coordination;endurance/activity tolerance;postural/trunk control;shortness of breath;strength  -AS     Cognitive Impairments, Mobility Safety/Performance attention;awareness, need for assistance;insight into deficits/self-awareness;problem-solving/reasoning;safety precaution awareness;safety precaution follow-through;sequencing abilities  -AS     Comment, Safety Issues/Impairments (Mobility) patient alert to self only  -AS           User Key  (r) = Recorded By, (t) = Taken By, (c) = Cosigned By    Initials Name Provider Type    AS Karen Handy, VIET Physical Therapist Assistant               Mobility     Row Name 03/08/23 0909          Bed Mobility    Comment, (Bed Mobility) sitting UIC pre/post tx  -AS     Row Name 03/08/23 0909          Transfers    Comment, (Transfers) verbal cues for hand placement on/off BSC then to recliner  -AS     Row Name 03/08/23 0909          Bed-Chair Transfer    Bed-Chair [place] (Transfers) verbal cues;minimum assist (75% patient effort);1 person assist  -AS     Assistive Device (Bed-Chair Transfers) walker, front-wheeled  -AS     Comment, (Bed-Chair Transfer) increased assist with direcitonal changes, 2 episodes of LOB requiring assist to correct  -AS     Row Name 03/08/23 0909          Sit-Stand Transfer    Sit-Stand [place] (Transfers) verbal cues;contact guard;1 person assist  -AS     Assistive Device (Sit-Stand Transfers) walker, front-wheeled  -AS     Row Name 03/08/23 0909          Gait/Stairs (Locomotion)    [place]mer Level (Gait) verbal cues;minimum assist (75% patient effort);1 person assist  -AS     Assistive Device (Gait) walker, front-wheeled  -AS     Distance in Feet (Gait) 50 + 50  -AS     Deviations/Abnormal Patterns (Gait) bilateral deviations;annette decreased;gait speed decreased;stride length decreased;weight shifting decreased  -AS     Bilateral Gait Deviations forward flexed  posture;heel strike decreased  -AS     Comment, (Gait/Stairs) patient ambulated 50' + 50 ' with Min assist x1 and rolling walker for support, patient very unsteady and needs lots of verbal cues and assist to control walker, h/o B knee bucklinmg but did not notice this date, patient with decreased safety awareness with all activity.  -AS           User Key  (r) = Recorded By, (t) = Taken By, (c) = Cosigned By    Initials Name Provider Type    AS Karen Handy PTA Physical Therapist Assistant               Obj/Interventions     Row Name 03/08/23 0912          Motor Skills    Therapeutic Exercise shoulder;knee;ankle  -AS     Row Name 03/08/23 0912          Shoulder (Therapeutic Exercise)    Shoulder (Therapeutic Exercise) AROM (active range of motion)  -AS     Shoulder AROM (Therapeutic Exercise) bilateral;flexion;extension;aBduction;aDduction;sitting;10 repetitions  -AS     Row Name 03/08/23 0912          Knee (Therapeutic Exercise)    Knee (Therapeutic Exercise) strengthening exercise  -AS     Knee Strengthening (Therapeutic Exercise) bilateral;marching while seated;LAQ (long arc quad);sitting;10 repetitions  -AS     Row Name 03/08/23 0912          Ankle (Therapeutic Exercise)    Ankle (Therapeutic Exercise) AROM (active range of motion)  -AS     Ankle AROM (Therapeutic Exercise) bilateral;dorsiflexion;plantarflexion;sitting;10 repetitions  -AS     Row Name 03/08/23 0912          Balance    Dynamic Standing Balance verbal cues;minimal assist;1-person assist  -AS     Position/Device Used, Standing Balance supported;walker, front-wheeled  -AS     Comment, Balance unsteadiness and LOB with directional changes  -AS           User Key  (r) = Recorded By, (t) = Taken By, (c) = Cosigned By    Initials Name Provider Type    AS Karen Handy PTA Physical Therapist Assistant               Goals/Plan    No documentation.                Clinical Impression     Row Name 03/08/23 0913          Pain    Pretreatment  Pain Rating 0/10 - no pain  -AS     Posttreatment Pain Rating 0/10 - no pain  -AS     Row Name 03/08/23 0913          Plan of Care Review    Plan of Care Reviewed With patient  -AS     Progress improving  -AS     Outcome Evaluation patient ambulated 50' + 50 ' with Min assist x1 and rolling walker for support, patient very unsteady and needs lots of verbal cues and assist to control walker, h/o B knee buckling but did not notice this date, patient with decreased safety awareness with all activity. Recommend SNF at d/c.  -AS     Row Name 03/08/23 0913          Positioning and Restraints    Pre-Treatment Position sitting in chair/recliner  -AS     Post Treatment Position chair  -AS     In Chair reclined;call light within reach;encouraged to call for assist;exit alarm on;waffle cushion;legs elevated;notified nsg  -AS           User Key  (r) = Recorded By, (t) = Taken By, (c) = Cosigned By    Initials Name Provider Type    AS Karen Handy PTA Physical Therapist Assistant               Outcome Measures     Row Name 03/08/23 0914 03/08/23 0730       How much help from another person do you currently need...    Turning from your back to your side while in flat bed without using bedrails? 3  -AS 3  -TH    Moving from lying on back to sitting on the side of a flat bed without bedrails? 3  -AS 3  -TH    Moving to and from a bed to a chair (including a wheelchair)? 3  -AS 3  -TH    Standing up from a chair using your arms (e.g., wheelchair, bedside chair)? 3  -AS 3  -TH    Climbing 3-5 steps with a railing? 2  -AS 2  -TH    To walk in hospital room? 2  -AS 3  -TH    AM-[facility] 6 Clicks Score (PT) 16  -AS 17  -TH    Highest level of mobility 5 --> Static standing  -AS 5 --> Static standing  -TH    Row Name 03/08/23 0914          Functional Assessment    Outcome Measure Options AM-PAC 6 Clicks Basic Mobility (PT)  -AS           User Key  (r) = Recorded By, (t) = Taken By, (c) = Cosigned By    Initials Name Provider Type     AS Karen Handy, VIET Physical Therapist Assistant    Esdras Yi RN Registered Nurse                             Physical Therapy Education     Title: PT OT SLP Therapies (In Progress)     Topic: Physical Therapy (In Progress)     Point: Mobility training (In Progress)     Learning Progress Summary           Patient Acceptance, E, NR by AS at 3/8/2023 0914    Acceptance, E,TB, VU by KW at 3/6/2023 1031    Comment: continued benefit of skilled PT services    Acceptance, E, VU,NL,NR by LO at 3/5/2023 0931    Comment: PT POC    Acceptance, E, NR by BA at 3/1/2023 1445                   Point: Home exercise program (In Progress)     Learning Progress Summary           Patient Acceptance, E, NR by AS at 3/8/2023 0914    Acceptance, E,TB, VU by KW at 3/6/2023 1031    Comment: continued benefit of skilled PT services    Acceptance, E, VU,NL,NR by LO at 3/5/2023 0931    Comment: PT POC                   Point: Body mechanics (In Progress)     Learning Progress Summary           Patient Acceptance, E, NR by AS at 3/8/2023 0914    Acceptance, E,TB, VU by KW at 3/6/2023 1031    Comment: continued benefit of skilled PT services    Acceptance, E, VU,NL,NR by LO at 3/5/2023 0931    Comment: PT POC    Acceptance, E, NR by BA at 3/1/2023 1445                   Point: Precautions (In Progress)     Learning Progress Summary           Patient Acceptance, E, NR by AS at 3/8/2023 0914    Acceptance, E,TB, VU by KW at 3/6/2023 1031    Comment: continued benefit of skilled PT services    Acceptance, E, VU,NL,NR by LO at 3/5/2023 0931    Comment: PT POC    Acceptance, E, NR by BA at 3/1/2023 1445                               User Key     Initials Effective Dates Name Provider Type Discipline    AS 02/03/23 -  Karen Handy, PTA Physical Therapist Assistant PT    LO 06/16/21 -  Cary Zacarias, PT Physical Therapist PT    BA 09/21/21 -  Mayela Last, PT Physical Therapist PT    KW 01/27/22 -  Beatriz Mulligan, PT  Physical Therapist PT              PT Recommendation and Plan     Plan of Care Reviewed With: patient  Progress: improving  Outcome Evaluation: patient ambulated 50' + 50 ' with Min assist x1 and rolling walker for support, patient very unsteady and needs lots of verbal cues and assist to control walker, h/o B knee buckling but did not notice this date, patient with decreased safety awareness with all activity. Recommend SNF at d/c.     Time Calculation:    PT Charges     Row Name 23             Time Calculation    Start Time 0848  -AS      PT Received On 23  -AS      PT Goal Re-Cert Due Date 23  -AS         Timed Charges    29247 - PT Therapeutic Exercise Minutes 10  -AS      69636 - Gait Training Minutes  13  -AS         Total Minutes    Timed Charges Total Minutes 23  -AS       Total Minutes 23  -AS            User Key  (r) = Recorded By, (t) = Taken By, (c) = Cosigned By    Initials Name Provider Type    AS Karen Handy PTA Physical Therapist Assistant              Therapy Charges for Today     Code Description Service Date Service Provider Modifiers Qty    43520013193 HC PT THER PROC EA 15 MIN 3/8/2023 Karen Handy PTA GP 1    74300734502 HC GAIT TRAINING EA 15 MIN 3/8/2023 Karen Handy PTA GP 1          PT G-Codes  Outcome Measure Options: AM-PAC 6 Clicks Basic Mobility (PT)  AM-PAC 6 Clicks Score (PT): 16  AM-PAC 6 Clicks Score (OT): 15       Karen Handy PTA  3/8/2023      Electronically signed by Karen Handy PTA at 23 0915          Occupational Therapy Notes (most recent note)      Zoe Tang, OT at 23 0814          Patient Name: Dorota Membreno  : 10/31/1931    MRN: [id number]                              Today's Date: 3/8/2023       Admit Date: 2023    Visit Dx:     ICD-10-CM ICD-9-CM   1. Late onset Alzheimer's disease with behavioral disturbance (HCC)  G30.1 331.0    F02.818 294.11   2. Sepsis, due to unspecified  organism, unspecified whether acute organ dysfunction present ([facility])  A41.9 038.9     995.91   3. Acute respiratory failure with hypoxia ([facility])  J96.01 518.81   4. Atrial fibrillation with RVR ([facility])  I48.91 427.31   5. Hypotension, unspecified hypotension type  I95.9 458.9     Patient Active Problem List   Diagnosis   • Gastroesophageal reflux disease   • Atopic rhinitis   • Asthma   • Late onset Alzheimer's disease with behavioral disturbance ([facility])   • Eczema   • Dyslipidemia   • Hypothyroidism   • Essential hypertension   • Post-menopausal osteoporosis   • Arteritis ([facility])   • Dizziness   • Edema   • Fatigue   • Shingles   • Syncope and collapse   • Atrial fibrillation ([facility])   • Leukocytosis   • Hypomagnesemia   • Elevated brain natriuretic peptide (BNP) level   • Proteinuria   • Bilateral Pleural effusions   • Pneumonia   • Respiratory failure ([facility])   • Elevated alkaline phosphatase level   • Sepsis ([facility])   • Sepsis, due to unspecified organism, unspecified whether acute organ dysfunction present ([facility])     Past Medical History:   Diagnosis Date   • Acid reflux 03/14/2016    stable   • Allergic rhinitis    • Alzheimer's disease ([facility]) 12/2017   • Arteritis ([facility])     A. Giant cell arteritis, diagnosed around 2000 when she lost sight in her eye. B. Improved with prednisone therapy   • Asthma    • Cancer ([facility])     colon. Status post resection at age 37   • Cognitive impairment, mild, so stated     A. Patient has been forgetting names continue med. Has f/u with neuro. She is having some times forgetting directions. She has moved into a new house and this has not helped with directions   • Dyslipidemia     lipids and cmp   • Hypertension     lipids, cmp, urine micro   • Hypothyroidism     A. On replacement therapy   • Mammogram abnormal     A. 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   • Panic attacks    • Pneumonia    • Postmenopausal osteoporosis    • Shingles 03/2018     Past Surgical History:    Procedure Laterality Date   • CATARACT EXTRACTION     • COLECTOMY PARTIAL / TOTAL      Patient diagnosed with colon cancer at age 37   • HYSTERECTOMY        General Information     Row Name 03/08/23 0913          OT Time and Intention    Document Type therapy note (daily note)  -     Mode of Treatment occupational therapy  -     Row Name 03/08/23 0913          General Information    Patient Profile Reviewed yes  -     Existing Precautions/Restrictions fall;oxygen therapy device and L/min;other (see comments)  Alzheimer's  -     Barriers to Rehab medically complex;previous functional deficit;cognitive status  -     Row Name 03/08/23 0913          Cognition    Orientation Status (Cognition) oriented to;person;disoriented to;place;situation;time  -     Row Name 03/08/23 0913          Safety Issues, Functional Mobility    Safety Issues Affecting Function (Mobility) safety precautions follow-through/compliance;safety precaution awareness;insight into deficits/self-awareness;awareness of need for assistance;ability to follow commands  -     Impairments Affecting Function (Mobility) balance;cognition;coordination;endurance/activity tolerance;postural/trunk control;shortness of breath;strength  -     Cognitive Impairments, Mobility Safety/Performance attention;awareness, need for assistance;insight into deficits/self-awareness;judgment;problem-solving/reasoning;safety precaution follow-through;sequencing abilities;safety precaution awareness  -           User Key  (r) = Recorded By, (t) = Taken By, (c) = Cosigned By    Initials Name Provider Type     Zoe Tang, OT Occupational Therapist                 Mobility/ADL's     Row Name 03/08/23 0913          Bed Mobility    Bed Mobility scooting/bridging;supine-sit  -     Scooting/Bridging [place] (Bed Mobility) supervision;verbal cues  -     Supine-Sit [place] (Bed Mobility) supervision;verbal cues  -     Bed Mobility, Safety Issues  cognitive deficits limit understanding;decreased use of arms for pushing/pulling;decreased use of legs for bridging/pushing  -     Assistive Device (Bed Mobility) bed rails;head of bed elevated  -     Comment, (Bed Mobility) Completed all bed mobility with supervision and verbal cues to advance BLE to EOB. Good improvement in bed mobility this date.  -     Row Name 03/08/23 0913          Transfers    Transfers sit-stand transfer;toilet transfer  -     Comment, (Transfers) Completed sit to stand, bed to BSC, and BSC to chair transfer.  -     Row Name 03/08/23 0913          Sit-Stand Transfer    Sit-Stand [place] (Transfers) verbal cues;contact guard;1 person assist  -     Assistive Device (Sit-Stand Transfers) --  UE support  -     Row Name 03/08/23 0913          Toilet Transfer    Type (Toilet Transfer) sit-stand;stand-sit  -     [place] Level (Toilet Transfer) minimum assist (75% patient effort);1 person assist  -     Assistive Device (Toilet Transfer) commode, bedside without drop arms  -     Row Name 03/08/23 0913          Functional Mobility    Functional Mobility- Ind. Level minimum assist (75% patient effort);verbal cues required  -     Functional Mobility- Device --  R UE support  -     Functional Mobility-Distance (Feet) 5  -     Functional Mobility- Safety Issues sequencing ability decreased;weight-shifting ability decreased;step length decreased;supplemental O2  -     Functional Mobility- Comment Steps from BSC to chair.  -     Row Name 03/08/23 0913          Activities of Daily Living    BADL Assessment/Intervention lower body dressing;grooming;toileting  -     Row Name 03/08/23 0913          Lower Body Dressing Assessment/Training    [place] Level (Lower Body Dressing) don;socks;dependent (less than 25% patient effort)  -     Position (Lower Body Dressing) supine  -     Row Name 03/08/23 0913          Grooming Assessment/Training    [place] Level  (Grooming) hair care, combing/brushing;oral care regimen;wash face, hands;supervision  -     Position (Grooming) supported sitting  -     Comment, (Grooming) Verbal cues for completion and supervision as pt asks when she is done.  -     Row Name 03/08/23 0913          Toileting Assessment/Training    [place] Level (Toileting) adjust/manage clothing;perform perineal hygiene;dependent (less than 25% patient effort)  -     Position (Toileting) unsupported sitting;supported standing  -     Comment, (Toileting) Dependent for vivienne care. Must have constant supervision for toileting as when she is finished she attempts to wipe with bare hand.  -           User Key  (r) = Recorded By, (t) = Taken By, (c) = Cosigned By    Initials Name Provider Type     Zoe Tang OT Occupational Therapist               Obj/Interventions     Row Name 03/08/23 0916          Sensory Assessment (Somatosensory)    Sensory Assessment (Somatosensory) sensation intact  -     Row Name 03/08/23 0916          Vision Assessment/Intervention    Visual Impairment/Limitations WFL  -     Row Name 03/08/23 0916          Balance    Balance Assessment sitting static balance;sitting dynamic balance;standing static balance;standing dynamic balance  -     Static Sitting Balance supervision  -     Dynamic Sitting Balance contact guard  -HM     Position, Sitting Balance unsupported  on BSC  -     Static Standing Balance contact guard  -HM     Dynamic Standing Balance minimal assist  -     Position/Device Used, Standing Balance supported;other (see comments)  UE support  -     Balance Interventions sitting;standing;occupation based/functional task  -           User Key  (r) = Recorded By, (t) = Taken By, (c) = Cosigned By    Initials Name Provider Type     Zoe Tang OT Occupational Therapist               Goals/Plan    No documentation.                Clinical Impression     Row Name 03/08/23 0918          Pain  Assessment    Additional Documentation Pain Scale: FACES Pre/Post-Treatment (Group)  -     Row Name 03/08/23 0918          Pain Scale: FACES Pre/Post-Treatment    Pain: FACES Scale, Pretreatment 0-->no hurt  -     Posttreatment Pain Rating 4-->hurts little more  -     Pain Location - Side/Orientation Right  -     Pain Location generalized  -     Pain Location - flank  -     Row Name 03/08/23 0918          Plan of Care Review    Plan of Care Reviewed With patient  -HM     Progress improving  -     Outcome Evaluation Pt with good improvement in bed mobility this date completing with supervision. Continues to transfer sit to stand with CGA and require Hakeem for bed to BSC. Dependent for vivienne care. Continues to present below baseline with decreased safety awareness and endurance. Recommend d/c to SNF.  -     Row Name 03/08/23 0918          Therapy Assessment/Plan (OT)    Patient/Family Therapy Goal Statement (OT) Pt would like to improve and return home.  -     Rehab Potential (OT) good, to achieve stated therapy goals  -     Criteria for Skilled Therapeutic Interventions Met (OT) yes;meets criteria;skilled treatment is necessary  -     Therapy Frequency (OT) daily  -     Row Name 03/08/23 0918          Therapy Plan Review/Discharge Plan (OT)    Anticipated Discharge Disposition (OT) skilled nursing facility  -     Row Name 03/08/23 0918          Vital Signs    Pre Systolic BP Rehab 162  -HM     Pre Treatment Diastolic BP 71  -HM     Post Systolic BP Rehab 136  -HM     Post Treatment Diastolic BP 60  -HM     O2 Delivery Pre Treatment supplemental O2  -HM     O2 Delivery Intra Treatment supplemental O2  -HM     O2 Delivery Post Treatment supplemental O2  -HM     Pre Patient Position Supine  -     Intra Patient Position Standing  -HM     Post Patient Position Sitting  -     Row Name 03/08/23 0918          Positioning and Restraints    Pre-Treatment Position in bed  -     Post Treatment  Position chair  -HM     In Chair notified nsg;reclined;call light within reach;encouraged to call for assist;exit alarm on;waffle cushion  -           User Key  (r) = Recorded By, (t) = Taken By, (c) = Cosigned By    Initials Name Provider Type     Zoe Tang OT Occupational Therapist               Outcome Measures     Row Name 03/08/23 0920          How much help from another is currently needed...    Putting on and taking off regular lower body clothing? 2  -HM     Bathing (including washing, rinsing, and drying) 2  -HM     Toileting (which includes using toilet bed pan or urinal) 2  -HM     Putting on and taking off regular upper body clothing 3  -HM     Taking care of personal grooming (such as brushing teeth) 3  -HM     Eating meals 3  -HM     AM-PAC 6 Clicks Score (OT) 15  -HM     Row Name 03/08/23 0914 03/08/23 0730       How much help from another person do you currently need...    Turning from your back to your side while in flat bed without using bedrails? 3  -AS 3  -TH    Moving from lying on back to sitting on the side of a flat bed without bedrails? 3  -AS 3  -TH    Moving to and from a bed to a chair (including a wheelchair)? 3  -AS 3  -TH    Standing up from a chair using your arms (e.g., wheelchair, bedside chair)? 3  -AS 3  -TH    Climbing 3-5 steps with a railing? 2  -AS 2  -TH    To walk in hospital room? 2  -AS 3  -TH    AM-PAC 6 Clicks Score (PT) 16  -AS 17  -TH    Highest level of mobility 5 --> Static standing  -AS 5 --> Static standing  -TH    Row Name 03/08/23 0914          Functional Assessment    Outcome Measure Options AM-PAC 6 Clicks Basic Mobility (PT)  -AS           User Key  (r) = Recorded By, (t) = Taken By, (c) = Cosigned By    Initials Name Provider Type    AS Karen Handy PTA Physical Therapist Assistant     Zoe Tang OT Occupational Therapist    TH Esdras Hughes, RN Registered Nurse                Occupational Therapy Education     Title: PT OT  SLP Therapies (In Progress)     Topic: Occupational Therapy (In Progress)     Point: ADL training (Done)     Description:   Instruct learner(s) on proper safety adaptation and remediation techniques during self care or transfers.   Instruct in proper use of assistive devices.              Learning Progress Summary           Patient Acceptance, E,TB,D, VU,NR by  at 3/8/2023 0921    Acceptance, E,TB,D, VU,NR by  at 3/6/2023 0943    Acceptance, E,TB,D, NR by  at 3/1/2023 0945                   Point: Home exercise program (In Progress)     Description:   Instruct learner(s) on appropriate technique for monitoring, assisting and/or progressing therapeutic exercises/activities.              Learning Progress Summary           Patient Acceptance, E,TB,D, NR by  at 3/1/2023 0945                   Point: Precautions (Done)     Description:   Instruct learner(s) on prescribed precautions during self-care and functional transfers.              Learning Progress Summary           Patient Acceptance, E,TB,D, VU,NR by  at 3/8/2023 0921    Acceptance, E,TB,D, VU,NR by  at 3/6/2023 0943    Acceptance, E,TB,D, NR by  at 3/1/2023 0945                   Point: Body mechanics (Done)     Description:   Instruct learner(s) on proper positioning and spine alignment during self-care, functional mobility activities and/or exercises.              Learning Progress Summary           Patient Acceptance, E,TB,D, VU,NR by  at 3/8/2023 0921    Acceptance, E,TB,D, VU,NR by  at 3/6/2023 0943    Acceptance, E,TB,D, NR by  at 3/1/2023 0945                               User Key     Initials Effective Dates Name Provider Type Discipline     10/25/22 -  Zoe Tang, OT Occupational Therapist OT     06/16/21 -  Giovanna Condon, OT Occupational Therapist OT              OT Recommendation and Plan  Therapy Frequency (OT): daily  Plan of Care Review  Plan of Care Reviewed With: patient  Progress: improving  Outcome  Evaluation: Pt with good improvement in bed mobility this date completing with supervision. Continues to transfer sit to stand with CGA and require Hakeem for bed to BSC. Dependent for vivienne care. Continues to present below baseline with decreased safety awareness and endurance. Recommend d/c to SNF.     Time Calculation:    Time Calculation- OT     Row Name 03/08/23 0914 03/08/23 0814          Time Calculation- OT    OT Start Time -- 0814  -HM     OT Received On -- 03/08/23  -        Timed Charges    96425 - Gait Training Minutes  13  -AS --     42417 - OT Self Care/Mgmt Minutes -- 16  -HM        Total Minutes    Timed Charges Total Minutes 13  -AS 16  -HM      Total Minutes 13  -AS 16  -HM           User Key  (r) = Recorded By, (t) = Taken By, (c) = Cosigned By    Initials Name Provider Type    AS Karen Handy, PTA Physical Therapist Assistant     Zoe Tang OT Occupational Therapist              Therapy Charges for Today     Code Description Service Date Service Provider Modifiers Qty    75566124797 HC OT SELF CARE/MGMT/TRAIN EA 15 MIN 3/8/2023 Zoe Tang OT GO 1               Zoe Tang OT  3/8/2023    Electronically signed by Zoe Tang OT at 03/08/23 0922

## 2023-03-08 NOTE — PLAN OF CARE
Goal Outcome Evaluation:  Plan of Care Reviewed With: other (see comments) (pt unable to participate in POC review, no family present)        Progress: improving  Outcome Evaluation: Pt sleeping at time of Palliative RN encounter; no observable signs of discomfort, no family present. Per documentation, pt improved with PT performance today. Awaiting placement for STR.    Problem: Palliative Care  Goal: Enhanced Quality of Life  Outcome: Ongoing, Progressing  Intervention: Optimize Function  Flowsheets (Taken 3/8/2023 2895)  Sensory Stimulation Regulation: quiet environment promoted  Sleep/Rest Enhancement: awakenings minimized

## 2023-03-09 VITALS
RESPIRATION RATE: 18 BRPM | SYSTOLIC BLOOD PRESSURE: 129 MMHG | BODY MASS INDEX: 31.84 KG/M2 | OXYGEN SATURATION: 93 % | WEIGHT: 151.7 LBS | TEMPERATURE: 98 F | HEART RATE: 61 BPM | DIASTOLIC BLOOD PRESSURE: 55 MMHG | HEIGHT: 58 IN

## 2023-03-09 PROBLEM — A41.9 SEPSIS, DUE TO UNSPECIFIED ORGANISM, UNSPECIFIED WHETHER ACUTE ORGAN DYSFUNCTION PRESENT (HCC): Status: RESOLVED | Noted: 2023-02-26 | Resolved: 2023-03-09

## 2023-03-09 PROBLEM — A41.9 SEPSIS: Status: RESOLVED | Noted: 2023-02-26 | Resolved: 2023-03-09

## 2023-03-09 LAB
FLUAV RNA RESP QL NAA+PROBE: NOT DETECTED
FLUBV RNA RESP QL NAA+PROBE: NOT DETECTED
POTASSIUM SERPL-SCNC: 3.8 MMOL/L (ref 3.5–5.2)
RSV RNA NPH QL NAA+NON-PROBE: NOT DETECTED
SARS-COV-2 RNA RESP QL NAA+PROBE: NOT DETECTED

## 2023-03-09 PROCEDURE — 84132 ASSAY OF SERUM POTASSIUM: CPT | Performed by: STUDENT IN AN ORGANIZED HEALTH CARE EDUCATION/TRAINING PROGRAM

## 2023-03-09 PROCEDURE — 99239 HOSP IP/OBS DSCHRG MGMT >30: CPT | Performed by: NURSE PRACTITIONER

## 2023-03-09 PROCEDURE — 87637 SARSCOV2&INF A&B&RSV AMP PRB: CPT | Performed by: NURSE PRACTITIONER

## 2023-03-09 PROCEDURE — 63710000001 PREDNISONE PER 1 MG: Performed by: STUDENT IN AN ORGANIZED HEALTH CARE EDUCATION/TRAINING PROGRAM

## 2023-03-09 RX ORDER — MEMANTINE HYDROCHLORIDE 5 MG/1
5 TABLET ORAL DAILY
Start: 2023-03-10 | End: 2023-03-14

## 2023-03-09 RX ORDER — PREDNISONE 20 MG/1
40 TABLET ORAL
Qty: 4 TABLET | Refills: 0
Start: 2023-03-10 | End: 2023-03-12

## 2023-03-09 RX ORDER — TAMSULOSIN HYDROCHLORIDE 0.4 MG/1
0.4 CAPSULE ORAL DAILY
Qty: 30 CAPSULE
Start: 2023-03-10

## 2023-03-09 RX ORDER — ATORVASTATIN CALCIUM 20 MG/1
20 TABLET, FILM COATED ORAL NIGHTLY
Qty: 90 TABLET
Start: 2023-03-09

## 2023-03-09 RX ORDER — IPRATROPIUM BROMIDE AND ALBUTEROL SULFATE 2.5; .5 MG/3ML; MG/3ML
3 SOLUTION RESPIRATORY (INHALATION) EVERY 4 HOURS PRN
Qty: 360 ML
Start: 2023-03-09

## 2023-03-09 RX ORDER — ACETAMINOPHEN 325 MG/1
650 TABLET ORAL EVERY 6 HOURS PRN
Start: 2023-03-09

## 2023-03-09 RX ORDER — AMOXICILLIN 250 MG
2 CAPSULE ORAL 2 TIMES DAILY
Start: 2023-03-09

## 2023-03-09 RX ORDER — QUETIAPINE FUMARATE 25 MG/1
75 TABLET, FILM COATED ORAL NIGHTLY
Start: 2023-03-09

## 2023-03-09 RX ORDER — PANTOPRAZOLE SODIUM 40 MG/1
40 TABLET, DELAYED RELEASE ORAL
Start: 2023-03-10

## 2023-03-09 RX ORDER — POLYETHYLENE GLYCOL 3350 17 G/17G
17 POWDER, FOR SOLUTION ORAL DAILY
Start: 2023-03-10

## 2023-03-09 RX ORDER — QUETIAPINE FUMARATE 50 MG/1
50 TABLET, FILM COATED ORAL DAILY
Start: 2023-03-10

## 2023-03-09 RX ORDER — DONEPEZIL HYDROCHLORIDE 10 MG/1
10 TABLET, FILM COATED ORAL NIGHTLY
Start: 2023-03-09

## 2023-03-09 RX ADMIN — SENNOSIDES AND DOCUSATE SODIUM 2 TABLET: 8.6; 5 TABLET ORAL at 08:30

## 2023-03-09 RX ADMIN — PANTOPRAZOLE SODIUM 40 MG: 40 TABLET, DELAYED RELEASE ORAL at 05:15

## 2023-03-09 RX ADMIN — LEVOTHYROXINE SODIUM 50 MCG: 50 TABLET ORAL at 05:15

## 2023-03-09 RX ADMIN — QUETIAPINE FUMARATE 50 MG: 25 TABLET ORAL at 08:30

## 2023-03-09 RX ADMIN — METOPROLOL TARTRATE 25 MG: 25 TABLET, FILM COATED ORAL at 08:32

## 2023-03-09 RX ADMIN — MEMANTINE 5 MG: 10 TABLET ORAL at 08:30

## 2023-03-09 RX ADMIN — CITALOPRAM HYDROBROMIDE 20 MG: 20 TABLET ORAL at 08:30

## 2023-03-09 RX ADMIN — TAMSULOSIN HYDROCHLORIDE 0.4 MG: 0.4 CAPSULE ORAL at 08:30

## 2023-03-09 RX ADMIN — PREDNISONE 40 MG: 20 TABLET ORAL at 08:30

## 2023-03-09 RX ADMIN — APIXABAN 5 MG: 5 TABLET, FILM COATED ORAL at 08:30

## 2023-03-09 RX ADMIN — POLYETHYLENE GLYCOL 3350 17 G: 17 POWDER, FOR SOLUTION ORAL at 08:30

## 2023-03-09 NOTE — CASE MANAGEMENT/SOCIAL WORK
Case Management Discharge Note      Final Note: Pt is being discharged to [facility] for skilled rehab, today. Pt and daughter are agreeable. COVID orders are in [facility]. Pharmacy updated in [facility]. I updated pt's RN/Mary by phone and Margot/XAVIER through messaging. RN call report to [phone]. Fax D/C summary to [phone]. [facility] will transport pt, by wheelchair, at 1300 today. They will pick pt up in her room. No other needs voiced or identified.    Kina/JORGE LUIS requests D/C summary to be faxed to [phone]    D/C summary faxed       Selected Continued Care - Admitted Since 2/26/2023     Destination Coordination complete.    Service Provider Selected Services Address Phone Fax Patient Preferred    [facility] [address] [place] [address] -- -- --          Durable Medical Equipment    No services have been selected for the patient.              Dialysis/Infusion    No services have been selected for the patient.              Home Medical Care    No services have been selected for the patient.              Therapy    No services have been selected for the patient.              Community Resources    No services have been selected for the patient.              Community & DME    No services have been selected for the patient.                  Transportation Services  W/C Van: Other ([facility] Wheelchair van 3/9/23 @ 1300)    Final Discharge Disposition Code: 03 - skilled nursing facility (SNF)

## 2023-03-09 NOTE — DISCHARGE SUMMARY
[facility] Medicine Services  TRANSFER SUMMARY    Patient Name: Dorota Membreno  : 10/31/1931  MRN: [id number]    Date of Admission: 2023  Date of Discharge:  3/9/2023  Length of Stay: 11  Primary Care Physician: Kylie Zapata DO    Consults     Date and Time Order Name Status Description    2023  3:21 PM Inpatient Palliative Care MD Consult Completed           Hospital Course     Presenting Problem:   Sepsis, due to unspecified organism, unspecified whether acute organ dysfunction present (HCC) [A41.9]    Active Hospital Problems    Diagnosis  POA   • Atrial fibrillation (HCC) [I48.91]  Yes   • Leukocytosis [D72.829]  Yes   • Hypomagnesemia [E83.42]  Yes   • Elevated brain natriuretic peptide (BNP) level [R79.89]  Yes   • Proteinuria [R80.9]  Yes   • Bilateral Pleural effusions [J90]  Yes   • Pneumonia [J18.9]  Yes   • Respiratory failure (HCC) [J96.90]  Yes   • Elevated alkaline phosphatase level [R74.8]  Yes   • Late onset Alzheimer's disease with behavioral disturbance (HCC) [G30.1, F02.818]  Yes      Resolved Hospital Problems    Diagnosis Date Resolved POA   • **Sepsis (HCC) [A41.9] 2023 Yes   • Sepsis, due to unspecified organism, unspecified whether acute organ dysfunction present (HCC) [A41.9] 2023 Yes          Hospital Course:  Dorota Membreno is a 89 y.o. female w/ hx alzheimers dementia, giant cell arteritis (), asthma, htn, hld, hypothyroidism, anxiety, remote colon cancer (s/p resection at age 37) who presented to [facility] ED via ems on 23 after being found down at nursing facility and was found to be hypoxic (sats 80s) w/ tachycardia. In ED temp 99.9, hr 138, sbp 89/79, sats 83%. Was placed on nonrebreather mask and initiated on esmolol dripl and iv fluids. Bp improved w/ fluids and was weaned to nasal canula. Troponins were mildly elevated and flat. Respiratory pcr was negative. U/a not compelling for uti. D-dimer mildly elevated at 1.45,  probnp 1095, wbc 16,260. Cta chest revealed mildly motion degraded exam with no evidence of pulmonary embolish and few foci of consolidative opacity right upper lobe, few mildly enlarged mediastinal lymph notes (possibly reactive) and trace bilateral pleural effusions, some narrowing of lower trachea and proximal mainstem bronchi possibly representing tracheomalacia. Initiated on cefepime and vanc empirically by intensivist service and admitted to telemetry floor as patient had clinically improved. 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  We will continue p.o. prednisone burst to complete course.  - currently on 2L NC, continue as needed.     New onset afib w/ rvr (converted back to nsr)  -[id number] 4; eliquis started  -echo: ef 61-65%, diastolic dysfunction, mod tr w/ ervsp moderately elevated 45-55mmhg  -tsh mildly elevated at 6, free T4 within normal limits  -weaned off esmolol  -metoprolol was increased to 50mg bid for rate control, however, developed bradycardia to the low 50s, dose was then decreased to 25 mg twice daily  --Continue current regimen.  Defer to outpatient provider to further manage.      Urinary retention  -u/a benign  -flomax  -straight caths for urinary retention; urinary retention  protocol  --Been continued on bladder training.  Recommend voiding at least every 8 hours and in and out cath for PVR greater than 350.     Alzheimers dementia, severe  Agitation  -continue home memantine and aricept  -increased night seroquel to 75mg nightly. Increased to seroquel 50mg po qam.  Continue current regimen on discharge as it appears to be working well.  -QTc on 3/6 was less than 500  -continue celexa  --Recommend outside provider/PCP recheck EKG periodically.     Debility  -pt/ot tri   --Recommended rehab.  Received a bed at [facility] with plans to transfer today.     Hypothyroidism  -tsh 6.0  -continue synthroid    Patient was seen resting up in bed in no acute distress.  Hemodynamically stable and afebrile.  Cleared for transfer to [facility] for rehab today.  Medications and follow-ups as below.        Discharge Follow Up Recommendations for outpatient labs/diagnostics:  Is cleared for transfer to rehab today by all services.  Going on medications as below with follow-up as noted.    --To be seen by provider at [facility] on arrival, PCP 1 week of discharge  -- Daughter given information on palliative care services outpatient and will contact if needed (per palliative inpatient team).    Day of Discharge     HPI:   She was seen resting up in bed in no acute distress.  Awake and alert.  Oriented x1 at baseline.  Pleasant and calm.  No new issues.  Nurse reports still requiring occasional in and out cathing.  Otherwise no new complaints.  Transferring to rehab today.    Review of Systems  Although patient denied any symptoms, she is an unreliable historian given advanced dementia     Vital Signs:   Temp:  [97.7 °F (36.5 °C)-98 °F (36.7 °C)] 98 °F (36.7 °C)  Heart Rate:  [51-79] 60  Resp:  [18] 18  BP: (129-151)/(55-65) 129/55     Physical Exam:  Constitutional: No acute distress, awake, alert, standing up in the bed.  Chronically ill-appearing.  HENT: NCAT, mucous membranes  moist  Respiratory: Clear to auscultation bilaterally but decreased at bases, respiratory effort normal 2L NC   Cardiovascular: RRR, no murmurs, rubs, or gallops  Gastrointestinal: Soft,, nondistended, nontender.  Positive bowel sounds. Obese abdomen.  Musculoskeletal: No bilateral ankle edema.  BELL spontaneously.  Psychiatric: Appropriate affect, cooperative  Neurologic: Awake and alert.  Oriented to self only.  symmetric facies, symmetric palate rise, moving all extremities equally spontaneously.  Will follow commands to wiggle toes and stick out tongue.  Skin: No rashes on exposed arms and legs.  Generalized bruising to upper extremities from IVs and lab draws.  Thin skin.    Pertinent Results     Results from last 7 days   Lab Units 03/09/23  0835 03/08/23  0456 03/07/23  0535   SODIUM mmol/L  --  140 141   POTASSIUM mmol/L 3.8 3.6 3.9   CHLORIDE mmol/L  --  102 101   CO2 mmol/L  --  32.0* 31.0*   BUN mg/dL  --  33* 35*   CREATININE mg/dL  --  0.58 0.72   GLUCOSE mg/dL  --  92 85   CALCIUM mg/dL  --  8.6 8.3           Invalid input(s): PROT, LABALBU        Invalid input(s): TG, LDLCALC, LDLREALC      Brief Urine Lab Results  (Last result in the past 365 days)      Color   Clarity   Blood   Leuk Est   Nitrite   Protein   CREAT   Urine HCG        02/26/23 0600 Yellow   Clear   Negative   Negative   Negative   Trace                 Microbiology Results Abnormal     Procedure Component Value - Date/Time    COVID PRE-OP / PRE-PROCEDURE SCREENING ORDER (NO ISOLATION) - Swab, Nasopharynx [[id number]]  (Normal) Collected: 03/09/23 1044    Lab Status: Final result Specimen: Swab from Nasopharynx Updated: 03/09/23 1152    Narrative:      The following orders were created for panel order COVID PRE-OP / PRE-PROCEDURE SCREENING ORDER (NO ISOLATION) - Swab, Nasopharynx.  Procedure                               Abnormality         Status                     ---------                               -----------         ------                      COVID-19, FLU A/B, RSV P...[[id number]]  Normal              Final result                 Please view results for these tests on the individual orders.    COVID-19, FLU A/B, RSV PCR - Swab, Nasopharynx [[id number]]  (Normal) Collected: 03/09/23 1044    Lab Status: Final result Specimen: Swab from Nasopharynx Updated: 03/09/23 1152     COVID19 Not Detected     Influenza A PCR Not Detected     Influenza B PCR Not Detected     RSV, PCR Not Detected    Narrative:      Fact sheet for providers: https://www.fda.gov/media/784575/download    Fact sheet for patients: https://www.fda.gov/media/253523/download    Test performed by PCR.    Blood Culture - Blood, Arm, Right [[id number]]  (Normal) Collected: 02/26/23 0525    Lab Status: Final result Specimen: Blood from Arm, Right Updated: 03/03/23 0730     Blood Culture No growth at 5 days    Blood Culture - Blood, Arm, Right [[id number]]  (Normal) Collected: 02/26/23 0525    Lab Status: Final result Specimen: Blood from Arm, Right Updated: 03/03/23 0730     Blood Culture No growth at 5 days    S. Pneumo Ag Urine or CSF - Urine, Urine, Clean Catch [[id number]]  (Normal) Collected: 02/26/23 1514    Lab Status: Final result Specimen: Urine, Clean Catch Updated: 02/26/23 2125     Strep Pneumo Ag Negative    Legionella Antigen, Urine - Urine, Urine, Clean Catch [[id number]]  (Normal) Collected: 02/26/23 1514    Lab Status: Final result Specimen: Urine, Clean Catch Updated: 02/26/23 2124     LEGIONELLA ANTIGEN, URINE Negative    MRSA Screen, PCR (Inpatient) - Swab, Nares [[id number]]  (Normal) Collected: 02/26/23 1113    Lab Status: Final result Specimen: Swab from Nares Updated: 02/26/23 1228     MRSA PCR Negative    Narrative:      The negative predictive value of this diagnostic test is high and should only be used to consider de-escalating anti-MRSA therapy. A positive result may indicate colonization with MRSA and must be correlated clinically.  MRSA Negative    COVID  PRE-OP / PRE-PROCEDURE SCREENING ORDER (NO ISOLATION) - Swab, Nasopharynx [[id number]]  (Normal) Collected: 02/26/23 0525    Lab Status: Final result Specimen: Swab from Nasopharynx Updated: 02/26/23 0637    Narrative:      The following orders were created for panel order COVID PRE-OP / PRE-PROCEDURE SCREENING ORDER (NO ISOLATION) - Swab, Nasopharynx.  Procedure                               Abnormality         Status                     ---------                               -----------         ------                     Respiratory Panel PCR w/...[[id number]]  Normal              Final result                 Please view results for these tests on the individual orders.    Respiratory Panel PCR w/COVID-19(SARS-CoV-2) KRYSTIAN/ANISHA/LEIDA/PAD/COR/MAD/JAILENE In-House, NP Swab in UTM/VTM, 3-4 HR TAT - Swab, Nasopharynx [[id number]]  (Normal) Collected: 02/26/23 0525    Lab Status: Final result Specimen: Swab from Nasopharynx Updated: 02/26/23 0637     ADENOVIRUS, PCR Not Detected     Coronavirus 229E Not Detected     Coronavirus HKU1 Not Detected     Coronavirus NL63 Not Detected     Coronavirus OC43 Not Detected     COVID19 Not Detected     Human Metapneumovirus Not Detected     Human Rhinovirus/Enterovirus Not Detected     Influenza A PCR Not Detected     Influenza B PCR Not Detected     Parainfluenza Virus 1 Not Detected     Parainfluenza Virus 2 Not Detected     Parainfluenza Virus 3 Not Detected     Parainfluenza Virus 4 Not Detected     RSV, PCR Not Detected     Bordetella pertussis pcr Not Detected     Bordetella parapertussis PCR Not Detected     Chlamydophila pneumoniae PCR Not Detected     Mycoplasma pneumo by PCR Not Detected    Narrative:      In the setting of a positive respiratory panel with a viral infection PLUS a negative procalcitonin without other underlying concern for bacterial infection, consider observing off antibiotics or discontinuation of antibiotics and continue supportive care. If the respiratory  panel is positive for atypical bacterial infection (Bordetella pertussis, Chlamydophila pneumoniae, or Mycoplasma pneumoniae), consider antibiotic de-escalation to target atypical bacterial infection.          Imaging Results (All)     Procedure Component Value Units Date/Time    XR Chest 1 View [[id number]] Collected: 03/08/23 2347     Updated: 03/08/23 2351    Narrative:      XR CHEST 1 VW    Date of Exam: 3/8/2023 11:27 PM EST    Indication: hypoxia.    Comparison: 3/2/2023    Findings:  Elevated right hemidiaphragm is again noted. There is persistent patchy opacity of the left lung base. Perihilar interstitial changes seen on the prior study have resolved and no new pulmonary parenchymal disease is seen elsewhere. The heart and   vasculature are normal in size. No pneumothorax is identified.      Impression:      Impression:    1. Interval clearing of perihilar edema since 3/2/2023 exam.    2. Persistent patchy left basilar atelectasis or effusion, stable to minimally increased.    Electronically Signed: Sreedhar Jauregui    3/8/2023 11:48 PM EST    Workstation ID: [id number]    XR Chest 1 View [[id number]] Collected: 03/02/23 0903     Updated: 03/02/23 0908    Narrative:      XR CHEST 1 VW    Date of Exam: 3/2/2023 4:57 AM EST    Indication: follow up pneumonia, hypoxemia.    Comparison: CT chest 2/26/2023, chest x-ray 2/26/2023    Findings:  Elevated right diaphragm. Heart size upper limits normal. Mild septal thickening groundglass opacity. Small left pleural effusion. Left lower lobe airspace opacity and septal thickening appears increased.      Impression:      Impression:  Increased septal thickening and left lower lobe airspace opacity since previous study. Component of pulmonary edema should be considered.     Electronically Signed: Estelita Paul    3/2/2023 9:05 AM EST    Workstation ID: [id number]    CT Angiogram Chest [[id number]] Collected: 02/26/23 0804     Updated: 02/26/23 0906    Addenda:         ADDENDUM #1   Additional impression point:    Narrowing of the lower trachea and proximal mainstem bronchi may represent   tracheomalacia.    Electronically Signed: Son Bella    2/26/2023 9:04 AM EST    Workstation ID: [id number]  ORIGINAL REPORT CT ANGIOGRAM CHEST    Date of Exam: 2/26/2023 7:03 AM EST    Indication: Sepsis, hypoxia, tachycardia, dimer.    Comparison: CTA chest 6/18/2013    Technique: CTA of the chest was performed after the uneventful intravenous   administration of 65 mL Isovue-370. Reconstructed coronal and sagittal   images were also obtained. In addition, a 3-D volume rendered image was   created for interpretation.   Automated exposure control and iterative reconstruction methods were used.       Findings:  Motion degraded exam.    Pulmonary arteries: No evidence of pulmonary embolus. Normal main   pulmonary artery diameter.    Heart and pericardium:No flattening of the interventricular septum.   Coronary artery calcification is seen. No substantial pericardial   effusion.    Vessels:Normal caliber aorta. Atherosclerotic calcification of the aorta.   No evidence of acute aortic injury on this nondedicated exam. Venous   structures including the superior vena cava and inferior vena cava appear   grossly patent.    Mediastinum:Unremarkable appearance of the esophagus. Few enlarged   mediastinal lymph nodes including a right paratracheal node measuring 13   mm in short axis (image 29). No anterior mediastinal masses.    Lower neck:No suspicious or enlarged supraclavicular or axillary   lymphadenopathy. Diminutive appearance of the thyroid.    Pulmonary parenchyma:No evidence of pulmonary infarct. Muscular spine   nodules. Few foci of consolidative opacities seen in the right upper lobe.   Scattered calcified granulomas. Bilateral dependent atelectasis.    Pleura: Trace bilateral pleural effusions. No pneumothorax.    Airways: Narrowing of the lower trachea and proximal mainstem bronchi may   represent  tracheomalacia. Diffuse bronchial wall thickening.    Chest wall and bones:No acute osseous abnormality. Degenerative changes of   thoracic spine. Unremarkable appearance of soft tissues.    Upper abdomen:No lesion seen within the visualized liver. Upper abdomen is   unremarkable. Small hiatal hernia.    Impression:  Mildly motion degraded exam.     No evidence of pulmonary embolus.    Few foci of consolidative opacity in the right upper lobe may represent   infection.    Few mildly enlarged mediastinal lymph nodes may be reactive.    Trace bilateral pleural effusions.    Electronically Signed: Son Bella    2/26/2023 8:17 AM EST    Workstation ID: [id number]  Signed: 02/26/23 0904 by Son Bella MD    Narrative:      CT ANGIOGRAM CHEST    Date of Exam: 2/26/2023 7:03 AM EST    Indication: Sepsis, hypoxia, tachycardia, dimer.    Comparison: CTA chest 6/18/2013    Technique: CTA of the chest was performed after the uneventful intravenous administration of 65 mL Isovue-370. Reconstructed coronal and sagittal images were also obtained. In addition, a 3-D volume rendered image was created for interpretation.   Automated exposure control and iterative reconstruction methods were used.     Findings:  Motion degraded exam.    Pulmonary arteries: No evidence of pulmonary embolus. Normal main pulmonary artery diameter.    Heart and pericardium:No flattening of the interventricular septum. Coronary artery calcification is seen. No substantial pericardial effusion.    Vessels:Normal caliber aorta. Atherosclerotic calcification of the aorta. No evidence of acute aortic injury on this nondedicated exam. Venous structures including the superior vena cava and inferior vena cava appear grossly patent.    Mediastinum:Unremarkable appearance of the esophagus. Few enlarged mediastinal lymph nodes including a right paratracheal node measuring 13 mm in short axis (image 29). No anterior mediastinal masses.    Lower neck:No  suspicious or enlarged supraclavicular or axillary lymphadenopathy. Diminutive appearance of the thyroid.    Pulmonary parenchyma:No evidence of pulmonary infarct. Muscular spine nodules. Few foci of consolidative opacities seen in the right upper lobe. Scattered calcified granulomas. Bilateral dependent atelectasis.    Pleura: Trace bilateral pleural effusions. No pneumothorax.    Airways: Narrowing of the lower trachea and proximal mainstem bronchi may represent tracheomalacia. Diffuse bronchial wall thickening.    Chest wall and bones:No acute osseous abnormality. Degenerative changes of thoracic spine. Unremarkable appearance of soft tissues.    Upper abdomen:No lesion seen within the visualized liver. Upper abdomen is unremarkable. Small hiatal hernia.      Impression:      Impression:  Mildly motion degraded exam.     No evidence of pulmonary embolus.    Few foci of consolidative opacity in the right upper lobe may represent infection.    Few mildly enlarged mediastinal lymph nodes may be reactive.    Trace bilateral pleural effusions.    Electronically Signed: Son Bella    2/26/2023 8:17 AM EST    Workstation ID: [id number]    XR Chest 1 View [[id number]] Collected: 02/26/23 0443     Updated: 02/26/23 0648    Narrative:      EXAMINATION: XR CHEST 1 VW      DATE: 2/26/2023 6:00 AM    INDICATIONS: SOA triage protocol.    COMPARISON: Image from chest radiograph dated 4/7/2018. Report not available at time of dictation.    FINDINGS:    Evaluation somewhat limited by patient body habitus.    Stable elevation of the right hemidiaphragm. Nonspecific mild diffuse interstitial prominence. Mild bibasilar opacities suggest atelectasis, but infiltrate not excluded. No sizable effusion or pneumothorax.    Atherosclerotic calcifications of the aortic arch. Cardiac and mediastinal silhouette otherwise unremarkable.    No acute osseous abnormality identified on single frontal view.        Impression:        1.   Nonspecific interstitial prominence could reflect chronic changes, but mild edema or atypical infectious/inflammatory process can also appear this way in the correct clinical setting.  2.  Mild bibasilar opacities suggest atelectasis, but infiltrate or edema not excluded.       Electronically signed by:  Nikita Keller D.O.    2/26/2023 4:47 AM Mountain Time          Results for orders placed during the hospital encounter of 02/26/23    Adult Transthoracic Echo Complete w/ Color, Spectral and Contrast if Necessary Per Protocol    Interpretation Summary  •  Left ventricular ejection fraction appears to be 61 - 65%.  •  Left ventricular diastolic function was normal.  •  Left atrial volume is moderately increased.  •  Moderate tricuspid valve regurgitation is present.  •  Estimated right ventricular systolic pressure from tricuspid regurgitation is moderately elevated (45-55 mmHg). Calculated right ventricular systolic pressure from tricuspid regurgitation is 47 mmHg.          Discharge Details        Discharge Medications      New Medications      Instructions Start Date   acetaminophen 325 MG tablet  Commonly known as: TYLENOL   650 mg, Oral, Every 6 Hours PRN      apixaban 5 MG tablet tablet  Commonly known as: ELIQUIS   5 mg, Oral, Every 12 Hours Scheduled      ipratropium-albuterol 0.5-2.5 mg/3 ml nebulizer  Commonly known as: DUO-NEB   3 mL, Nebulization, Every 4 Hours PRN      metoprolol tartrate 25 MG tablet  Commonly known as: LOPRESSOR   25 mg, Oral, Every 12 Hours Scheduled      pantoprazole 40 MG EC tablet  Commonly known as: PROTONIX   40 mg, Oral, Every Early Morning   Start Date: March 10, 2023     polyethylene glycol 17 g packet  Commonly known as: MIRALAX   17 g, Oral, Daily   Start Date: March 10, 2023     predniSONE 20 MG tablet  Commonly known as: DELTASONE   40 mg, Oral, Daily With Breakfast   Start Date: March 10, 2023     sennosides-docusate 8.6-50 MG per tablet  Commonly known as: PERICOLACE    2 tablets, Oral, 2 Times Daily      tamsulosin 0.4 MG capsule 24 hr capsule  Commonly known as: FLOMAX   0.4 mg, Oral, Daily   Start Date: March 10, 2023        Changes to Medications      Instructions Start Date   citalopram 20 MG tablet  Commonly known as: CeleXA  What changed: See the new instructions.   TAKE 1 TABLET BY MOUTH EVERY MORNING FOR DEPRESSION      donepezil 10 MG tablet  Commonly known as: ARICEPT  What changed:   · medication strength  · See the new instructions.   10 mg, Oral, Nightly      levothyroxine 50 MCG tablet  Commonly known as: SYNTHROID, LEVOTHROID  What changed: See the new instructions.   GIVE 1 TABLET BY MOUTH DAILY      memantine 5 MG tablet  Commonly known as: NAMENDA  What changed: See the new instructions.   5 mg, Oral, Daily   Start Date: March 10, 2023     ondansetron 4 MG tablet  Commonly known as: ZOFRAN  What changed: See the new instructions.   GIVE 1 TABLET BY MOUTH EVERY 12 HOURS AS NEEDED FOR NAUSEA/VOMITING      QUEtiapine 25 MG tablet  Commonly known as: SEROquel  What changed: You were already taking a medication with the same name, and this prescription was added. Make sure you understand how and when to take each.   75 mg, Oral, Nightly      QUEtiapine 50 MG tablet  Commonly known as: SEROquel  What changed:   · medication strength  · See the new instructions.   50 mg, Oral, Daily   Start Date: March 10, 2023     vitamin D3 125 MCG (5000 UT) tablet tablet  What changed: See the new instructions.   GIVE 1 TABLET BY MOUTH DAILY         Continue These Medications      Instructions Start Date   atorvastatin 20 MG tablet  Commonly known as: LIPITOR   20 mg, Oral, Nightly      hydrOXYzine 25 MG tablet  Commonly known as: ATARAX   25 mg, Oral, 3 Times Daily PRN      montelukast 10 MG tablet  Commonly known as: SINGULAIR   TAKE 1 TABLET BY MOUTH EVERY NIGHT.         Stop These Medications    amLODIPine 5 MG tablet  Commonly known as: NORVASC     Bismatrol 262 MG/15ML  suspension  Generic drug: bismuth subsalicylate     bisoprolol-hydrochlorothiazide 2.5-6.25 MG per tablet  Commonly known as: ZIAC     dextromethorphan-guaifenesin  MG/5ML syrup  Commonly known as: ROBITUSSIN-DM     KLOR-CON 10 MEQ CR tablet  Generic drug: potassium chloride     metoprolol succinate XL 50 MG 24 hr tablet  Commonly known as: TOPROL-XL     nystatin-triamcinolone 969987-9.1 UNIT/GM-% ointment  Commonly known as: MYCOLOG            Allergies   Allergen Reactions   • Codeine    • Hydrochlorothiazide W-Triamterene    • Levofloxacin    • Penicillins          Discharge Disposition:  Rehab Facility or Unit (DC - External)    Discharge Diet:  Diet Order   Procedures   • Diet: Regular/House Diet; Texture: Regular Texture (IDDSI 7); Fluid Consistency: Thin (IDDSI 0)       Discharge Activity:  Activity Instructions     Activity as Tolerated      Measure Blood Pressure              CODE STATUS:    Code Status and Medical Interventions:   Ordered at: 02/27/23 1522     Medical Intervention Limits:    NO intubation (DNI)    NO artificial nutrition    NO dialysis    NO vasopressors    Other     Code Status (Patient has no pulse and is not breathing):    No CPR (Do Not Attempt to Resuscitate)     Medical Interventions (Patient has pulse or is breathing):    Limited Support     Additional Medical Interventions Limits:    no invasive procedures, no icu transfer     Release to patient:    Routine Release         No future appointments.    Additional Instructions for the Follow-ups that You Need to Schedule     Discharge Follow-up with PCP   As directed       Currently Documented PCP:    Kylie Zapata DO    PCP Phone Number:    [phone]     Follow Up Details: Patient to be seen by provider at rehab facility on arrival, PCP 1 week of discharge               Electronically signed by XAVIER De La Torre, 03/09/23, 11:52 AM EST.      Time Spent on Discharge: I spent 40 minutes on this discharge  activity which included: face-to-face encounter with the patient, reviewing the data in the system, coordination of the care with the nursing staff as well as consultants, documentation, and entering orders.

## 2023-03-09 NOTE — DISCHARGE PLACEMENT REQUEST
"Dorota Vogt (89 y.o. Female)     Date of Birth   10/31/1931    Social Security Number       Address   [address] [place] [address]    Home Phone   [phone]    MRN   [id number]       Oriental orthodox   None    Marital Status                               Admission Date   2/26/23    Admission Type   Emergency    Admitting Provider   Julia Zamora MD    Attending Provider   Julia Zamora MD    Department, Room/Bed   [address], S454/1       Discharge Date       Discharge Disposition   Rehab Facility or Unit (DC - External)    Discharge Destination                               Attending Provider: Julia Zamora MD    Allergies: Codeine, Hydrochlorothiazide W-triamterene, Levofloxacin, Penicillins    Isolation: None   Infection: None   Code Status: No CPR    Ht: 147.3 cm (58\")   Wt: 68.8 kg (151 lb 11.2 oz)    Admission Cmt: None   Principal Problem: Sepsis (HCC) [A41.9]                 Active Insurance as of 2/26/2023     Primary Coverage     Payor Plan Insurance Group Employer/Plan Group    MEDICARE MEDICARE A & B      Payor Plan Address Payor Plan Phone Number Payor Plan Fax Number Effective Dates    [address] [phone]  10/1/1996 - None Entered    Formerly [facility] [id number]       Subscriber Name Subscriber Birth Date Member ID       DOROTA VOGT 10/31/1931 [id number]           Secondary Coverage     Payor Plan Insurance Group Employer/Plan Group    [facility] [id number]     Payor Plan Address Payor Plan Phone Number Payor Plan Fax Number Effective Dates    [address] [address]   1/1/2016 - None Entered    [facility] [id number]       Subscriber Name Subscriber Birth Date Member ID       DOROTA VOGT 10/31/1931 [id number]                 Emergency Contacts      (Rel.) Home Phone Work Phone Mobile Phone    HaseebJovana (Daughter) [phone] -- [phone]    HaseebCarlos (Son) [phone] -- [phone]    ANABEL NAGEL (Grandchild) " [phone] -- [phone]               Discharge Summary      Isabel Pop, APRN at 23 1030              [facility] Medicine Services  TRANSFER SUMMARY    Patient Name: Dorota Membreno  : 10/31/1931  MRN: [id number]    Date of Admission: 2023  Date of Discharge:  3/9/2023  Length of Stay: 11  Primary Care Physician: Kylie Zapata DO    Consults     Date and Time Order Name Status Description    2023  3:21 PM Inpatient Palliative Care MD Consult Completed           Hospital Course     Presenting Problem:   Sepsis, due to unspecified organism, unspecified whether acute organ dysfunction present (HCC) [A41.9]    Active Hospital Problems    Diagnosis  POA   • Atrial fibrillation (HCC) [I48.91]  Yes   • Leukocytosis [D72.829]  Yes   • Hypomagnesemia [E83.42]  Yes   • Elevated brain natriuretic peptide (BNP) level [R79.89]  Yes   • Proteinuria [R80.9]  Yes   • Bilateral Pleural effusions [J90]  Yes   • Pneumonia [J18.9]  Yes   • Respiratory failure (HCC) [J96.90]  Yes   • Elevated alkaline phosphatase level [R74.8]  Yes   • Late onset Alzheimer's disease with behavioral disturbance (HCC) [G30.1, F02.818]  Yes      Resolved Hospital Problems    Diagnosis Date Resolved POA   • **Sepsis (HCC) [A41.9] 2023 Yes   • Sepsis, due to unspecified organism, unspecified whether acute organ dysfunction present (HCC) [A41.9] 2023 Yes          Hospital Course:  Dorota Membreno is a 89 y.o. female w/ hx alzheimers dementia, giant cell arteritis (), asthma, htn, hld, hypothyroidism, anxiety, remote colon cancer (s/p resection at age 37) who presented to [facility] ED via ems on 23 after being found down at nursing facility and was found to be hypoxic (sats 80s) w/ tachycardia. In ED temp 99.9, hr 138, sbp 89/79, sats 83%. Was placed on nonrebreather mask and initiated on esmolol dripl and iv fluids. Bp improved w/ fluids and was weaned to nasal canula.  Troponins were mildly elevated and flat. Respiratory pcr was negative. U/a not compelling for uti. D-dimer mildly elevated at 1.45, probnp 1095, wbc 16,260. Cta chest revealed mildly motion degraded exam with no evidence of pulmonary embolish and few foci of consolidative opacity right upper lobe, few mildly enlarged mediastinal lymph notes (possibly reactive) and trace bilateral pleural effusions, some narrowing of lower trachea and proximal mainstem bronchi possibly representing tracheomalacia. Initiated on cefepime and vanc empirically by intensivist service and admitted to telemetry floor as patient had clinically improved. 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  We will continue p.o. prednisone burst to complete course.  - currently on 2L NC, continue as needed.     New onset afib w/ rvr (converted back to nsr)  -[id number] 4; eliquis started  -echo: ef 61-65%, diastolic dysfunction, mod tr w/ ervsp moderately elevated 45-55mmhg  -tsh mildly elevated at 6, free T4 within normal limits  -weaned off esmolol  -metoprolol was increased to 50mg bid for rate control, however, developed bradycardia to the low 50s, dose was then decreased to 25 mg twice daily  --Continue current regimen.  Defer to outpatient provider to  further manage.      Urinary retention  -u/a benign  -flomax  -straight caths for urinary retention; urinary retention protocol  --Been continued on bladder training.  Recommend voiding at least every 8 hours and in and out cath for PVR greater than 350.     Alzheimers dementia, severe  Agitation  -continue home memantine and aricept  -increased night seroquel to 75mg nightly. Increased to seroquel 50mg po qam.  Continue current regimen on discharge as it appears to be working well.  -QTc on 3/6 was less than 500  -continue celexa  --Recommend outside provider/PCP recheck EKG periodically.     Debility  -pt/ot evals   --Recommended rehab.  Received a bed at [facility] with plans to transfer today.     Hypothyroidism  -tsh 6.0  -continue synthroid    Patient was seen resting up in bed in no acute distress.  Hemodynamically stable and afebrile.  Cleared for transfer to [facility] for rehab today.  Medications and follow-ups as below.        Discharge Follow Up Recommendations for outpatient labs/diagnostics:  Is cleared for transfer to rehab today by all services.  Going on medications as below with follow-up as noted.    --To be seen by provider at [facility] on arrival, PCP 1 week of discharge  -- Daughter given information on palliative care services outpatient and will contact if needed (per palliative inpatient team).    Day of Discharge     HPI:   She was seen resting up in bed in no acute distress.  Awake and alert.  Oriented x1 at baseline.  Pleasant and calm.  No new issues.  Nurse reports still requiring occasional in and out cathing.  Otherwise no new complaints.  Transferring to rehab today.    Review of Systems  Although patient denied any symptoms, she is an unreliable historian given advanced dementia     Vital Signs:   Temp:  [97.7 °F (36.5 °C)-98 °F (36.7 °C)] 98 °F (36.7 °C)  Heart Rate:  [51-79] 60  Resp:  [18] 18  BP: (129-151)/(55-65) 129/55     Physical Exam:  Constitutional:  No acute distress, awake, alert, standing up in the bed.  Chronically ill-appearing.  HENT: NCAT, mucous membranes moist  Respiratory: Clear to auscultation bilaterally but decreased at bases, respiratory effort normal 2L NC   Cardiovascular: RRR, no murmurs, rubs, or gallops  Gastrointestinal: Soft,, nondistended, nontender.  Positive bowel sounds. Obese abdomen.  Musculoskeletal: No bilateral ankle edema.  BELL spontaneously.  Psychiatric: Appropriate affect, cooperative  Neurologic: Awake and alert.  Oriented to self only.  symmetric facies, symmetric palate rise, moving all extremities equally spontaneously.  Will follow commands to wiggle toes and stick out tongue.  Skin: No rashes on exposed arms and legs.  Generalized bruising to upper extremities from IVs and lab draws.  Thin skin.    Pertinent Results     Results from last 7 days   Lab Units 03/09/23  0835 03/08/23  0456 03/07/23  0535   SODIUM mmol/L  --  140 141   POTASSIUM mmol/L 3.8 3.6 3.9   CHLORIDE mmol/L  --  102 101   CO2 mmol/L  --  32.0* 31.0*   BUN mg/dL  --  33* 35*   CREATININE mg/dL  --  0.58 0.72   GLUCOSE mg/dL  --  92 85   CALCIUM mg/dL  --  8.6 8.3           Invalid input(s): PROT, LABALBU        Invalid input(s): TG, LDLCALC, LDLREALC      Brief Urine Lab Results  (Last result in the past 365 days)      Color   Clarity   Blood   Leuk Est   Nitrite   Protein   CREAT   Urine HCG        02/26/23 0600 Yellow   Clear   Negative   Negative   Negative   Trace                 Microbiology Results Abnormal     Procedure Component Value - Date/Time    COVID PRE-OP / PRE-PROCEDURE SCREENING ORDER (NO ISOLATION) - Swab, Nasopharynx [[id number]]  (Normal) Collected: 03/09/23 1044    Lab Status: Final result Specimen: Swab from Nasopharynx Updated: 03/09/23 1152    Narrative:      The following orders were created for panel order COVID PRE-OP / PRE-PROCEDURE SCREENING ORDER (NO ISOLATION) - Swab, Nasopharynx.  Procedure                                Abnormality         Status                     ---------                               -----------         ------                     COVID-19, FLU A/B, RSV P...[[id number]]  Normal              Final result                 Please view results for these tests on the individual orders.    COVID-19, FLU A/B, RSV PCR - Swab, Nasopharynx [[id number]]  (Normal) Collected: 03/09/23 1044    Lab Status: Final result Specimen: Swab from Nasopharynx Updated: 03/09/23 1152     COVID19 Not Detected     Influenza A PCR Not Detected     Influenza B PCR Not Detected     RSV, PCR Not Detected    Narrative:      Fact sheet for providers: https://www.fda.gov/media/285994/download    Fact sheet for patients: https://www.fda.gov/media/467177/download    Test performed by PCR.    Blood Culture - Blood, Arm, Right [[id number]]  (Normal) Collected: 02/26/23 0525    Lab Status: Final result Specimen: Blood from Arm, Right Updated: 03/03/23 0730     Blood Culture No growth at 5 days    Blood Culture - Blood, Arm, Right [[id number]]  (Normal) Collected: 02/26/23 0525    Lab Status: Final result Specimen: Blood from Arm, Right Updated: 03/03/23 0730     Blood Culture No growth at 5 days    S. Pneumo Ag Urine or CSF - Urine, Urine, Clean Catch [[id number]]  (Normal) Collected: 02/26/23 1514    Lab Status: Final result Specimen: Urine, Clean Catch Updated: 02/26/23 2125     Strep Pneumo Ag Negative    Legionella Antigen, Urine - Urine, Urine, Clean Catch [[id number]]  (Normal) Collected: 02/26/23 1514    Lab Status: Final result Specimen: Urine, Clean Catch Updated: 02/26/23 2124     LEGIONELLA ANTIGEN, URINE Negative    MRSA Screen, PCR (Inpatient) - Swab, Nares [[id number]]  (Normal) Collected: 02/26/23 1113    Lab Status: Final result Specimen: Swab from Nares Updated: 02/26/23 1228     MRSA PCR Negative    Narrative:      The negative predictive value of this diagnostic test is high and should only be used to consider de-escalating anti-MRSA  therapy. A positive result may indicate colonization with MRSA and must be correlated clinically.  MRSA Negative    COVID PRE-OP / PRE-PROCEDURE SCREENING ORDER (NO ISOLATION) - Swab, Nasopharynx [[id number]]  (Normal) Collected: 02/26/23 0525    Lab Status: Final result Specimen: Swab from Nasopharynx Updated: 02/26/23 0637    Narrative:      The following orders were created for panel order COVID PRE-OP / PRE-PROCEDURE SCREENING ORDER (NO ISOLATION) - Swab, Nasopharynx.  Procedure                               Abnormality         Status                     ---------                               -----------         ------                     Respiratory Panel PCR w/...[[id number]]  Normal              Final result                 Please view results for these tests on the individual orders.    Respiratory Panel PCR w/COVID-19(SARS-CoV-2) KRYSTIAN/ANISHA/LEIDA/PAD/COR/MAD/JAILENE In-House, NP Swab in UTM/VTM, 3-4 HR TAT - Swab, Nasopharynx [[id number]]  (Normal) Collected: 02/26/23 0525    Lab Status: Final result Specimen: Swab from Nasopharynx Updated: 02/26/23 0637     ADENOVIRUS, PCR Not Detected     Coronavirus 229E Not Detected     Coronavirus HKU1 Not Detected     Coronavirus NL63 Not Detected     Coronavirus OC43 Not Detected     COVID19 Not Detected     Human Metapneumovirus Not Detected     Human Rhinovirus/Enterovirus Not Detected     Influenza A PCR Not Detected     Influenza B PCR Not Detected     Parainfluenza Virus 1 Not Detected     Parainfluenza Virus 2 Not Detected     Parainfluenza Virus 3 Not Detected     Parainfluenza Virus 4 Not Detected     RSV, PCR Not Detected     Bordetella pertussis pcr Not Detected     Bordetella parapertussis PCR Not Detected     Chlamydophila pneumoniae PCR Not Detected     Mycoplasma pneumo by PCR Not Detected    Narrative:      In the setting of a positive respiratory panel with a viral infection PLUS a negative procalcitonin without other underlying concern for bacterial infection,  consider observing off antibiotics or discontinuation of antibiotics and continue supportive care. If the respiratory panel is positive for atypical bacterial infection (Bordetella pertussis, Chlamydophila pneumoniae, or Mycoplasma pneumoniae), consider antibiotic de-escalation to target atypical bacterial infection.          Imaging Results (All)     Procedure Component Value Units Date/Time    XR Chest 1 View [[id number]] Collected: 03/08/23 2347     Updated: 03/08/23 2351    Narrative:      XR CHEST 1 VW    Date of Exam: 3/8/2023 11:27 PM EST    Indication: hypoxia.    Comparison: 3/2/2023    Findings:  Elevated right hemidiaphragm is again noted. There is persistent patchy opacity of the left lung base. Perihilar interstitial changes seen on the prior study have resolved and no new pulmonary parenchymal disease is seen elsewhere. The heart and   vasculature are normal in size. No pneumothorax is identified.      Impression:      Impression:    1. Interval clearing of perihilar edema since 3/2/2023 exam.    2. Persistent patchy left basilar atelectasis or effusion, stable to minimally increased.    Electronically Signed: Sreedhar Jauregui    3/8/2023 11:48 PM EST    Workstation ID: [id number]    XR Chest 1 View [[id number]] Collected: 03/02/23 0903     Updated: 03/02/23 0908    Narrative:      XR CHEST 1 VW    Date of Exam: 3/2/2023 4:57 AM EST    Indication: follow up pneumonia, hypoxemia.    Comparison: CT chest 2/26/2023, chest x-ray 2/26/2023    Findings:  Elevated right diaphragm. Heart size upper limits normal. Mild septal thickening groundglass opacity. Small left pleural effusion. Left lower lobe airspace opacity and septal thickening appears increased.      Impression:      Impression:  Increased septal thickening and left lower lobe airspace opacity since previous study. Component of pulmonary edema should be considered.     Electronically Signed: Estelita Paul    3/2/2023 9:05 AM EST    Workstation ID: [id number]    CT  Angiogram Chest [[id number]] Collected: 02/26/23 0804     Updated: 02/26/23 0906    Addenda:         ADDENDUM #1  Additional impression point:    Narrowing of the lower trachea and proximal mainstem bronchi may represent   tracheomalacia.    Electronically Signed: Son Bella    2/26/2023 9:04 AM EST    Workstation ID: [id number]  ORIGINAL REPORT CT ANGIOGRAM CHEST    Date of Exam: 2/26/2023 7:03 AM EST    Indication: Sepsis, hypoxia, tachycardia, dimer.    Comparison: CTA chest 6/18/2013    Technique: CTA of the chest was performed after the uneventful intravenous   administration of 65 mL Isovue-370. Reconstructed coronal and sagittal   images were also obtained. In addition, a 3-D volume rendered image was   created for interpretation.   Automated exposure control and iterative reconstruction methods were used.       Findings:  Motion degraded exam.    Pulmonary arteries: No evidence of pulmonary embolus. Normal main   pulmonary artery diameter.    Heart and pericardium:No flattening of the interventricular septum.   Coronary artery calcification is seen. No substantial pericardial   effusion.    Vessels:Normal caliber aorta. Atherosclerotic calcification of the aorta.   No evidence of acute aortic injury on this nondedicated exam. Venous   structures including the superior vena cava and inferior vena cava appear   grossly patent.    Mediastinum:Unremarkable appearance of the esophagus. Few enlarged   mediastinal lymph nodes including a right paratracheal node measuring 13   mm in short axis (image 29). No anterior mediastinal masses.    Lower neck:No suspicious or enlarged supraclavicular or axillary   lymphadenopathy. Diminutive appearance of the thyroid.    Pulmonary parenchyma:No evidence of pulmonary infarct. Muscular spine   nodules. Few foci of consolidative opacities seen in the right upper lobe.   Scattered calcified granulomas. Bilateral dependent atelectasis.    Pleura: Trace bilateral pleural  effusions. No pneumothorax.    Airways: Narrowing of the lower trachea and proximal mainstem bronchi may   represent tracheomalacia. Diffuse bronchial wall thickening.    Chest wall and bones:No acute osseous abnormality. Degenerative changes of   thoracic spine. Unremarkable appearance of soft tissues.    Upper abdomen:No lesion seen within the visualized liver. Upper abdomen is   unremarkable. Small hiatal hernia.    Impression:  Mildly motion degraded exam.     No evidence of pulmonary embolus.    Few foci of consolidative opacity in the right upper lobe may represent   infection.    Few mildly enlarged mediastinal lymph nodes may be reactive.    Trace bilateral pleural effusions.    Electronically Signed: Son Bella    2/26/2023 8:17 AM EST    Workstation ID: [id number]  Signed: 02/26/23 0904 by Son Bella MD    Narrative:      CT ANGIOGRAM CHEST    Date of Exam: 2/26/2023 7:03 AM EST    Indication: Sepsis, hypoxia, tachycardia, dimer.    Comparison: CTA chest 6/18/2013    Technique: CTA of the chest was performed after the uneventful intravenous administration of 65 mL Isovue-370. Reconstructed coronal and sagittal images were also obtained. In addition, a 3-D volume rendered image was created for interpretation.   Automated exposure control and iterative reconstruction methods were used.     Findings:  Motion degraded exam.    Pulmonary arteries: No evidence of pulmonary embolus. Normal main pulmonary artery diameter.    Heart and pericardium:No flattening of the interventricular septum. Coronary artery calcification is seen. No substantial pericardial effusion.    Vessels:Normal caliber aorta. Atherosclerotic calcification of the aorta. No evidence of acute aortic injury on this nondedicated exam. Venous structures including the superior vena cava and inferior vena cava appear grossly patent.    Mediastinum:Unremarkable appearance of the esophagus. Few enlarged mediastinal lymph nodes including  a right paratracheal node measuring 13 mm in short axis (image 29). No anterior mediastinal masses.    Lower neck:No suspicious or enlarged supraclavicular or axillary lymphadenopathy. Diminutive appearance of the thyroid.    Pulmonary parenchyma:No evidence of pulmonary infarct. Muscular spine nodules. Few foci of consolidative opacities seen in the right upper lobe. Scattered calcified granulomas. Bilateral dependent atelectasis.    Pleura: Trace bilateral pleural effusions. No pneumothorax.    Airways: Narrowing of the lower trachea and proximal mainstem bronchi may represent tracheomalacia. Diffuse bronchial wall thickening.    Chest wall and bones:No acute osseous abnormality. Degenerative changes of thoracic spine. Unremarkable appearance of soft tissues.    Upper abdomen:No lesion seen within the visualized liver. Upper abdomen is unremarkable. Small hiatal hernia.      Impression:      Impression:  Mildly motion degraded exam.     No evidence of pulmonary embolus.    Few foci of consolidative opacity in the right upper lobe may represent infection.    Few mildly enlarged mediastinal lymph nodes may be reactive.    Trace bilateral pleural effusions.    Electronically Signed: Son Bella    2/26/2023 8:17 AM EST    Workstation ID: [id number]    XR Chest 1 View [[id number]] Collected: 02/26/23 0443     Updated: 02/26/23 0648    Narrative:      EXAMINATION: XR CHEST 1 VW      DATE: 2/26/2023 6:00 AM    INDICATIONS: SOA triage protocol.    COMPARISON: Image from chest radiograph dated 4/7/2018. Report not available at time of dictation.    FINDINGS:    Evaluation somewhat limited by patient body habitus.    Stable elevation of the right hemidiaphragm. Nonspecific mild diffuse interstitial prominence. Mild bibasilar opacities suggest atelectasis, but infiltrate not excluded. No sizable effusion or pneumothorax.    Atherosclerotic calcifications of the aortic arch. Cardiac and mediastinal silhouette otherwise  unremarkable.    No acute osseous abnormality identified on single frontal view.        Impression:        1.  Nonspecific interstitial prominence could reflect chronic changes, but mild edema or atypical infectious/inflammatory process can also appear this way in the correct clinical setting.  2.  Mild bibasilar opacities suggest atelectasis, but infiltrate or edema not excluded.       Electronically signed by:  Nikita Keller D.O.    2/26/2023 4:47 AM Mountain Time          Results for orders placed during the hospital encounter of 02/26/23    Adult Transthoracic Echo Complete w/ Color, Spectral and Contrast if Necessary Per Protocol    Interpretation Summary  •  Left ventricular ejection fraction appears to be 61 - 65%.  •  Left ventricular diastolic function was normal.  •  Left atrial volume is moderately increased.  •  Moderate tricuspid valve regurgitation is present.  •  Estimated right ventricular systolic pressure from tricuspid regurgitation is moderately elevated (45-55 mmHg). Calculated right ventricular systolic pressure from tricuspid regurgitation is 47 mmHg.          Discharge Details        Discharge Medications      New Medications      Instructions Start Date   acetaminophen 325 MG tablet  Commonly known as: TYLENOL   650 mg, Oral, Every 6 Hours PRN      apixaban 5 MG tablet tablet  Commonly known as: ELIQUIS   5 mg, Oral, Every 12 Hours Scheduled      ipratropium-albuterol 0.5-2.5 mg/3 ml nebulizer  Commonly known as: DUO-NEB   3 mL, Nebulization, Every 4 Hours PRN      metoprolol tartrate 25 MG tablet  Commonly known as: LOPRESSOR   25 mg, Oral, Every 12 Hours Scheduled      pantoprazole 40 MG EC tablet  Commonly known as: PROTONIX   40 mg, Oral, Every Early Morning   Start Date: March 10, 2023     polyethylene glycol 17 g packet  Commonly known as: MIRALAX   17 g, Oral, Daily   Start Date: March 10, 2023     predniSONE 20 MG tablet  Commonly known as: DELTASONE   40 mg, Oral, Daily With  Breakfast   Start Date: March 10, 2023     sennosides-docusate 8.6-50 MG per tablet  Commonly known as: PERICOLACE   2 tablets, Oral, 2 Times Daily      tamsulosin 0.4 MG capsule 24 hr capsule  Commonly known as: FLOMAX   0.4 mg, Oral, Daily   Start Date: March 10, 2023        Changes to Medications      Instructions Start Date   citalopram 20 MG tablet  Commonly known as: CeleXA  What changed: See the new instructions.   TAKE 1 TABLET BY MOUTH EVERY MORNING FOR DEPRESSION      donepezil 10 MG tablet  Commonly known as: ARICEPT  What changed:   · medication strength  · See the new instructions.   10 mg, Oral, Nightly      levothyroxine 50 MCG tablet  Commonly known as: SYNTHROID, LEVOTHROID  What changed: See the new instructions.   GIVE 1 TABLET BY MOUTH DAILY      memantine 5 MG tablet  Commonly known as: NAMENDA  What changed: See the new instructions.   5 mg, Oral, Daily   Start Date: March 10, 2023     ondansetron 4 MG tablet  Commonly known as: ZOFRAN  What changed: See the new instructions.   GIVE 1 TABLET BY MOUTH EVERY 12 HOURS AS NEEDED FOR NAUSEA/VOMITING      QUEtiapine 25 MG tablet  Commonly known as: SEROquel  What changed: You were already taking a medication with the same name, and this prescription was added. Make sure you understand how and when to take each.   75 mg, Oral, Nightly      QUEtiapine 50 MG tablet  Commonly known as: SEROquel  What changed:   · medication strength  · See the new instructions.   50 mg, Oral, Daily   Start Date: March 10, 2023     vitamin D3 125 MCG (5000 UT) tablet tablet  What changed: See the new instructions.   GIVE 1 TABLET BY MOUTH DAILY         Continue These Medications      Instructions Start Date   atorvastatin 20 MG tablet  Commonly known as: LIPITOR   20 mg, Oral, Nightly      hydrOXYzine 25 MG tablet  Commonly known as: ATARAX   25 mg, Oral, 3 Times Daily PRN      montelukast 10 MG tablet  Commonly known as: SINGULAIR   TAKE 1 TABLET BY MOUTH EVERY NIGHT.          Stop These Medications    amLODIPine 5 MG tablet  Commonly known as: NORVASC     Bismatrol 262 MG/15ML suspension  Generic drug: bismuth subsalicylate     bisoprolol-hydrochlorothiazide 2.5-6.25 MG per tablet  Commonly known as: ZIAC     dextromethorphan-guaifenesin  MG/5ML syrup  Commonly known as: ROBITUSSIN-DM     KLOR-CON 10 MEQ CR tablet  Generic drug: potassium chloride     metoprolol succinate XL 50 MG 24 hr tablet  Commonly known as: TOPROL-XL     nystatin-triamcinolone 128383-5.1 UNIT/GM-% ointment  Commonly known as: MYCOLOG            Allergies   Allergen Reactions   • Codeine    • Hydrochlorothiazide W-Triamterene    • Levofloxacin    • Penicillins          Discharge Disposition:  Rehab Facility or Unit (DC - External)    Discharge Diet:  Diet Order   Procedures   • Diet: Regular/House Diet; Texture: Regular Texture (IDDSI 7); Fluid Consistency: Thin (IDDSI 0)       Discharge Activity:  Activity Instructions     Activity as Tolerated      Measure Blood Pressure              CODE STATUS:    Code Status and Medical Interventions:   Ordered at: 02/27/23 1522     Medical Intervention Limits:    NO intubation (DNI)    NO artificial nutrition    NO dialysis    NO vasopressors    Other     Code Status (Patient has no pulse and is not breathing):    No CPR (Do Not Attempt to Resuscitate)     Medical Interventions (Patient has pulse or is breathing):    Limited Support     Additional Medical Interventions Limits:    no invasive procedures, no icu transfer     Release to patient:    Routine Release         No future appointments.    Additional Instructions for the Follow-ups that You Need to Schedule     Discharge Follow-up with PCP   As directed       Currently Documented PCP:    Kylie Zapata DO    PCP Phone Number:    [phone]     Follow Up Details: Patient to be seen by provider at rehab facility on arrival, PCP 1 week of discharge               Electronically signed by Isabel Frost  XAVIER Pop, 03/09/23, 11:52 AM EST.      Time Spent on Discharge: I spent 40 minutes on this discharge activity which included: face-to-face encounter with the patient, reviewing the data in the system, coordination of the care with the nursing staff as well as consultants, documentation, and entering orders.        Electronically signed by Isabel Pop APRN at 03/09/23 5756

## 2023-03-09 NOTE — PLAN OF CARE
Problem: Palliative Care  Goal: Enhanced Quality of Life  Outcome: Adequate for Care Transition   Patient to [facility] today at 1300.  Spoke with patient's daughter Jovana Membreno and emailed information on potential community palliative care support through [facility].

## 2023-03-09 NOTE — PLAN OF CARE
Goal Outcome Evaluation:  Plan of Care Reviewed With: patient         Pt VSS. NSR. 2L at night, Pt was in and out cathed once and got 350 out. Will continue with POC.

## 2023-03-14 RX ORDER — LEVOTHYROXINE SODIUM 0.05 MG/1
50 TABLET ORAL
Qty: 30 TABLET | Refills: 11 | Status: SHIPPED | OUTPATIENT
Start: 2023-03-14

## 2023-03-14 RX ORDER — MEMANTINE HYDROCHLORIDE 5 MG/1
TABLET ORAL
Qty: 60 TABLET | Refills: 11 | Status: SHIPPED | OUTPATIENT
Start: 2023-03-14

## 2023-03-14 RX ORDER — CITALOPRAM 20 MG/1
TABLET ORAL
Qty: 30 TABLET | Refills: 11 | Status: SHIPPED | OUTPATIENT
Start: 2023-03-14

## 2023-06-06 ENCOUNTER — APPOINTMENT (OUTPATIENT)
Dept: GENERAL RADIOLOGY | Facility: HOSPITAL | Age: 88
End: 2023-06-06
Payer: MEDICARE

## 2023-06-06 ENCOUNTER — HOSPITAL ENCOUNTER (EMERGENCY)
Facility: HOSPITAL | Age: 88
Discharge: HOME | End: 2023-06-07
Attending: EMERGENCY MEDICINE
Payer: MEDICARE

## 2023-06-06 DIAGNOSIS — J20.6 ACUTE BRONCHITIS DUE TO RHINOVIRUS: Primary | ICD-10-CM

## 2023-06-06 LAB
ALBUMIN SERPL-MCNC: 3.4 G/DL (ref 3.5–5.2)
ALBUMIN/GLOB SERPL: 1.1 G/DL
ALP SERPL-CCNC: 102 U/L (ref 39–117)
ALT SERPL W P-5'-P-CCNC: 16 U/L (ref 1–33)
ANION GAP SERPL CALCULATED.3IONS-SCNC: 10 MMOL/L (ref 5–15)
ARTERIAL PATENCY WRIST A: ABNORMAL
AST SERPL-CCNC: 16 U/L (ref 1–32)
ATMOSPHERIC PRESS: ABNORMAL MM[HG]
BASE EXCESS BLDA CALC-SCNC: 6.5 MMOL/L (ref 0–2)
BASOPHILS # BLD AUTO: 0.07 10*3/MM3 (ref 0–0.2)
BASOPHILS NFR BLD AUTO: 0.6 % (ref 0–1.5)
BDY SITE: ABNORMAL
BILIRUB SERPL-MCNC: 0.2 MG/DL (ref 0–1.2)
BODY TEMPERATURE: 37 C
BUN SERPL-MCNC: 20 MG/DL (ref 8–23)
BUN/CREAT SERPL: 23.8 (ref 7–25)
CALCIUM SPEC-SCNC: 8.6 MG/DL (ref 8.2–9.6)
CHLORIDE SERPL-SCNC: 101 MMOL/L (ref 98–107)
CO2 BLDA-SCNC: 32.6 MMOL/L (ref 22–33)
CO2 SERPL-SCNC: 30 MMOL/L (ref 22–29)
COHGB MFR BLD: 1.2 % (ref 0–2)
CREAT SERPL-MCNC: 0.84 MG/DL (ref 0.57–1)
DEPRECATED RDW RBC AUTO: 49.9 FL (ref 37–54)
EGFRCR SERPLBLD CKD-EPI 2021: 65.7 ML/MIN/1.73
EOSINOPHIL # BLD AUTO: 0.36 10*3/MM3 (ref 0–0.4)
EOSINOPHIL NFR BLD AUTO: 3.1 % (ref 0.3–6.2)
EPAP: 0
ERYTHROCYTE [DISTWIDTH] IN BLOOD BY AUTOMATED COUNT: 14.2 % (ref 12.3–15.4)
GLOBULIN UR ELPH-MCNC: 3.1 GM/DL
GLUCOSE SERPL-MCNC: 133 MG/DL (ref 65–99)
HCO3 BLDA-SCNC: 31.3 MMOL/L (ref 20–26)
HCT VFR BLD AUTO: 38.1 % (ref 34–46.6)
HCT VFR BLD CALC: 36.4 % (ref 38–51)
HGB BLD-MCNC: 11.6 G/DL (ref 12–15.9)
HGB BLDA-MCNC: 11.9 G/DL (ref 14–18)
IMM GRANULOCYTES # BLD AUTO: 0.15 10*3/MM3 (ref 0–0.05)
IMM GRANULOCYTES NFR BLD AUTO: 1.3 % (ref 0–0.5)
INHALED O2 CONCENTRATION: 28 %
IPAP: 0
LYMPHOCYTES # BLD AUTO: 1.82 10*3/MM3 (ref 0.7–3.1)
LYMPHOCYTES NFR BLD AUTO: 15.7 % (ref 19.6–45.3)
MCH RBC QN AUTO: 28.7 PG (ref 26.6–33)
MCHC RBC AUTO-ENTMCNC: 30.4 G/DL (ref 31.5–35.7)
MCV RBC AUTO: 94.3 FL (ref 79–97)
METHGB BLD QL: 0.3 % (ref 0–1.5)
MODALITY: ABNORMAL
MONOCYTES # BLD AUTO: 0.91 10*3/MM3 (ref 0.1–0.9)
MONOCYTES NFR BLD AUTO: 7.9 % (ref 5–12)
NEUTROPHILS NFR BLD AUTO: 71.4 % (ref 42.7–76)
NEUTROPHILS NFR BLD AUTO: 8.27 10*3/MM3 (ref 1.7–7)
NOTE: ABNORMAL
NRBC BLD AUTO-RTO: 0 /100 WBC (ref 0–0.2)
NT-PROBNP SERPL-MCNC: 251.4 PG/ML (ref 0–1800)
OXYHGB MFR BLDV: 95.1 % (ref 94–99)
PAW @ PEAK INSP FLOW SETTING VENT: 0 CMH2O
PCO2 BLDA: 44.5 MM HG (ref 35–45)
PCO2 TEMP ADJ BLD: 44.5 MM HG (ref 35–45)
PH BLDA: 7.46 PH UNITS (ref 7.35–7.45)
PH, TEMP CORRECTED: 7.46 PH UNITS
PLATELET # BLD AUTO: 291 10*3/MM3 (ref 140–450)
PMV BLD AUTO: 9.4 FL (ref 6–12)
PO2 BLDA: 75 MM HG (ref 83–108)
PO2 TEMP ADJ BLD: 75 MM HG (ref 83–108)
POTASSIUM SERPL-SCNC: 4 MMOL/L (ref 3.5–5.2)
PROT SERPL-MCNC: 6.5 G/DL (ref 6–8.5)
QT INTERVAL: 372 MS
QTC INTERVAL: 407 MS
RBC # BLD AUTO: 4.04 10*6/MM3 (ref 3.77–5.28)
SODIUM SERPL-SCNC: 141 MMOL/L (ref 136–145)
TOTAL RATE: 0 BREATHS/MINUTE
TROPONIN T SERPL HS-MCNC: 16 NG/L
WBC NRBC COR # BLD: 11.58 10*3/MM3 (ref 3.4–10.8)

## 2023-06-06 PROCEDURE — 83880 ASSAY OF NATRIURETIC PEPTIDE: CPT | Performed by: EMERGENCY MEDICINE

## 2023-06-06 PROCEDURE — 82805 BLOOD GASES W/O2 SATURATION: CPT

## 2023-06-06 PROCEDURE — 94640 AIRWAY INHALATION TREATMENT: CPT

## 2023-06-06 PROCEDURE — 99284 EMERGENCY DEPT VISIT MOD MDM: CPT

## 2023-06-06 PROCEDURE — 36600 WITHDRAWAL OF ARTERIAL BLOOD: CPT

## 2023-06-06 PROCEDURE — 82375 ASSAY CARBOXYHB QUANT: CPT

## 2023-06-06 PROCEDURE — 71045 X-RAY EXAM CHEST 1 VIEW: CPT

## 2023-06-06 PROCEDURE — 93005 ELECTROCARDIOGRAM TRACING: CPT | Performed by: EMERGENCY MEDICINE

## 2023-06-06 PROCEDURE — 80053 COMPREHEN METABOLIC PANEL: CPT | Performed by: EMERGENCY MEDICINE

## 2023-06-06 PROCEDURE — 94799 UNLISTED PULMONARY SVC/PX: CPT

## 2023-06-06 PROCEDURE — 83050 HGB METHEMOGLOBIN QUAN: CPT

## 2023-06-06 PROCEDURE — 85025 COMPLETE CBC W/AUTO DIFF WBC: CPT | Performed by: EMERGENCY MEDICINE

## 2023-06-06 PROCEDURE — 0202U NFCT DS 22 TRGT SARS-COV-2: CPT | Performed by: EMERGENCY MEDICINE

## 2023-06-06 PROCEDURE — 84484 ASSAY OF TROPONIN QUANT: CPT | Performed by: EMERGENCY MEDICINE

## 2023-06-06 RX ORDER — IPRATROPIUM BROMIDE AND ALBUTEROL SULFATE 2.5; .5 MG/3ML; MG/3ML
3 SOLUTION RESPIRATORY (INHALATION) ONCE
Status: COMPLETED | OUTPATIENT
Start: 2023-06-06 | End: 2023-06-06

## 2023-06-06 RX ORDER — SODIUM CHLORIDE 0.9 % (FLUSH) 0.9 %
10 SYRINGE (ML) INJECTION AS NEEDED
Status: DISCONTINUED | OUTPATIENT
Start: 2023-06-06 | End: 2023-06-07 | Stop reason: HOSPADM

## 2023-06-06 RX ADMIN — IPRATROPIUM BROMIDE AND ALBUTEROL SULFATE 3 ML: 2.5; .5 SOLUTION RESPIRATORY (INHALATION) at 23:39

## 2023-06-07 VITALS
BODY MASS INDEX: 29.44 KG/M2 | OXYGEN SATURATION: 94 % | DIASTOLIC BLOOD PRESSURE: 69 MMHG | WEIGHT: 160 LBS | SYSTOLIC BLOOD PRESSURE: 170 MMHG | TEMPERATURE: 98.7 F | HEIGHT: 62 IN | HEART RATE: 70 BPM | RESPIRATION RATE: 22 BRPM

## 2023-06-07 LAB
B PARAPERT DNA SPEC QL NAA+PROBE: NOT DETECTED
B PERT DNA SPEC QL NAA+PROBE: NOT DETECTED
C PNEUM DNA NPH QL NAA+NON-PROBE: NOT DETECTED
FLUAV SUBTYP SPEC NAA+PROBE: NOT DETECTED
FLUBV RNA ISLT QL NAA+PROBE: NOT DETECTED
HADV DNA SPEC NAA+PROBE: NOT DETECTED
HCOV 229E RNA SPEC QL NAA+PROBE: NOT DETECTED
HCOV HKU1 RNA SPEC QL NAA+PROBE: NOT DETECTED
HCOV NL63 RNA SPEC QL NAA+PROBE: NOT DETECTED
HCOV OC43 RNA SPEC QL NAA+PROBE: NOT DETECTED
HMPV RNA NPH QL NAA+NON-PROBE: NOT DETECTED
HOLD SPECIMEN: NORMAL
HPIV1 RNA ISLT QL NAA+PROBE: NOT DETECTED
HPIV2 RNA SPEC QL NAA+PROBE: NOT DETECTED
HPIV3 RNA NPH QL NAA+PROBE: NOT DETECTED
HPIV4 P GENE NPH QL NAA+PROBE: NOT DETECTED
M PNEUMO IGG SER IA-ACNC: NOT DETECTED
RHINOVIRUS RNA SPEC NAA+PROBE: DETECTED
RSV RNA NPH QL NAA+NON-PROBE: NOT DETECTED
SARS-COV-2 RNA NPH QL NAA+NON-PROBE: NOT DETECTED
WHOLE BLOOD HOLD COAG: NORMAL
WHOLE BLOOD HOLD SPECIMEN: NORMAL

## 2023-06-07 PROCEDURE — 25010000002 LORAZEPAM PER 2 MG: Performed by: EMERGENCY MEDICINE

## 2023-06-07 PROCEDURE — 96374 THER/PROPH/DIAG INJ IV PUSH: CPT

## 2023-06-07 PROCEDURE — 96375 TX/PRO/DX INJ NEW DRUG ADDON: CPT

## 2023-06-07 PROCEDURE — 25010000002 METHYLPREDNISOLONE PER 125 MG: Performed by: EMERGENCY MEDICINE

## 2023-06-07 RX ORDER — ALBUTEROL SULFATE 90 UG/1
2 AEROSOL, METERED RESPIRATORY (INHALATION) ONCE
Status: COMPLETED | OUTPATIENT
Start: 2023-06-07 | End: 2023-06-07

## 2023-06-07 RX ORDER — METHYLPREDNISOLONE SODIUM SUCCINATE 125 MG/2ML
125 INJECTION, POWDER, LYOPHILIZED, FOR SOLUTION INTRAMUSCULAR; INTRAVENOUS ONCE
Status: COMPLETED | OUTPATIENT
Start: 2023-06-07 | End: 2023-06-07

## 2023-06-07 RX ORDER — ALBUTEROL SULFATE 90 UG/1
2 AEROSOL, METERED RESPIRATORY (INHALATION) EVERY 4 HOURS PRN
Qty: 8 G | Refills: 0 | Status: SHIPPED | OUTPATIENT
Start: 2023-06-07 | End: 2023-07-07

## 2023-06-07 RX ORDER — LORAZEPAM 2 MG/ML
1 INJECTION INTRAMUSCULAR ONCE
Status: COMPLETED | OUTPATIENT
Start: 2023-06-07 | End: 2023-06-07

## 2023-06-07 RX ADMIN — LORAZEPAM 1 MG: 2 INJECTION INTRAMUSCULAR; INTRAVENOUS at 01:58

## 2023-06-07 RX ADMIN — METHYLPREDNISOLONE SODIUM SUCCINATE 125 MG: 125 INJECTION, POWDER, FOR SOLUTION INTRAMUSCULAR; INTRAVENOUS at 01:58

## 2023-06-07 RX ADMIN — ALBUTEROL SULFATE 2 PUFF: 90 AEROSOL, METERED RESPIRATORY (INHALATION) at 02:02

## 2023-06-07 NOTE — ED PROVIDER NOTES
Subjective   History of Present Illness  89-year-old female presented to the emergency department with some shortness of breath.  The patient presents from [facility] secondary to a longstanding history of dementia.  Apparently they called because she was having some difficulty breathing.  She had some audible wheezing on initial examination.  The patient is pleasantly demented.  When I asked her why she is here she is not actually sure.  She does state that she has been having some mild cough recently.  She denies any headache or change in vision.  No focal weakness.  No fevers or chills.  No chest pain or palpitations.  No abdominal pain or vomiting.    History provided by:  Medical records and EMS personnel   used: No      Review of Systems   Constitutional:  Negative for chills and fever.   HENT:  Negative for congestion, ear pain and sore throat.    Eyes:  Negative for visual disturbance.   Respiratory:  Positive for cough, shortness of breath and wheezing.    Cardiovascular:  Negative for chest pain.   Gastrointestinal:  Negative for abdominal pain.   Genitourinary:  Negative for difficulty urinating.   Musculoskeletal:  Negative for arthralgias.   Skin:  Negative for rash.   Neurological:  Negative for dizziness, weakness and numbness.   Psychiatric/Behavioral:  Negative for agitation.      Past Medical History:   Diagnosis Date    Acid reflux 03/14/2016    stable    Allergic rhinitis     Alzheimer's disease 12/2017    Arteritis     A. Giant cell arteritis, diagnosed around 2000 when she lost sight in her eye. B. Improved with prednisone therapy    Asthma     Cancer     colon. Status post resection at age 37    Cognitive impairment, mild, so stated     A. Patient has been forgetting names continue med. Has f/u with neuro. She is having some times forgetting directions. She has moved into a new house and this has not helped with directions    Dyslipidemia     lipids and cmp     Hypertension     lipids, cmp, urine micro    Hypothyroidism     A. On replacement therapy    Mammogram abnormal     A. 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    Panic attacks     Pneumonia     Postmenopausal osteoporosis     Shingles 03/2018       Allergies   Allergen Reactions    Codeine     Hydrochlorothiazide W-Triamterene     Levofloxacin     Penicillins        Past Surgical History:   Procedure Laterality Date    CATARACT EXTRACTION      COLECTOMY PARTIAL / TOTAL      Patient diagnosed with colon cancer at age 37    HYSTERECTOMY         Family History   Problem Relation Age of Onset    Other Mother         medical problems    Allergies Son     Alcohol abuse Other        Social History     Socioeconomic History    Marital status:    Tobacco Use    Smoking status: Never    Smokeless tobacco: Never   Vaping Use    Vaping Use: Never used   Substance and Sexual Activity    Alcohol use: No    Drug use: No    Sexual activity: Defer           Objective   Physical Exam  Vitals and nursing note reviewed.   Constitutional:       General: She is not in acute distress.     Appearance: She is not ill-appearing or toxic-appearing.   HENT:      Mouth/Throat:      Pharynx: No posterior oropharyngeal erythema.   Eyes:      Conjunctiva/sclera: Conjunctivae normal.      Pupils: Pupils are equal, round, and reactive to light.   Cardiovascular:      Rate and Rhythm: Normal rate and regular rhythm.   Pulmonary:      Effort: Pulmonary effort is normal. Tachypnea present. No respiratory distress.      Breath sounds: Wheezing present.   Abdominal:      General: Abdomen is flat. There is no distension.      Palpations: There is no mass.      Tenderness: There is no abdominal tenderness. There is no guarding or rebound.   Musculoskeletal:         General: No deformity. Normal range of motion.   Skin:     General: Skin is warm.      Findings: No rash.   Neurological:      General: No focal deficit present.       Mental Status: She is alert and oriented to person, place, and time.      Motor: No weakness.       ECG 12 Lead      Date/Time: 6/6/2023 11:02 PM  Performed by: Rayshawn Marino MD  Authorized by: Rayshawn Marino MD   Interpreted by physician  Comparison: compared with previous ECG   Similar to previous ECG  Rhythm: sinus rhythm  Rate: normal  QRS axis: normal  Conduction: conduction normal  Clinical impression: non-specific ECG  Comments: EKG was directly visualized by myself, interpretations as documented in hospital course.             ED Course  ED Course as of 06/07/23 0139 Wed Jun 07, 2023   0117 Blood Gas, Arterial With Co-Ox(!) [JK]   0117 Respiratory Panel PCR w/COVID-19(SARS-CoV-2) KRYSTIAN/ANISHA/LEIDA/PAD/COR/MAD/JAILENE In-House, NP Swab in UTM/VTM, 3-4 HR TAT - Swab, Nasopharynx(!) [JK]   0117 Comprehensive Metabolic Panel(!) [JK]   0117 Single High Sensitivity Troponin T(!) [JK]   0117 BNP [JK]   0117 CBC & Differential(!)  Laboratory studies were reviewed and interpreted directly by myself.  ABG showed a minor hypoxia, respiratory swab was positive for rhinovirus, CMP unremarkable, troponin marginal, CBC showed mild leukocytosis, BNP normal [JK]   0117 XR Chest 1 View  Imaging was directly visualized by myself, per my interpretations, no acute process. [JK]   0137 On reevaluation, the patient's wheezing is significantly improved.  She is maintaining her oxygen saturations without any evidence of respiratory distress.  She is afebrile.  Nontoxic.  Patient's symptoms seem consistent with acute bronchitis secondary to human rhinovirus.  Patient was already administered steroids as well as breathing treatments.  Nursing home is to continue home meds as previously discussed.  They were given strict return precautions for any change in symptoms. [JK]      ED Course User Index  [JK] Rayshawn Marino MD                                           Medical Decision Making  This is a 89-year-old female  presented to the emergency department with shortness of breath.  The patient is pleasantly demented.  She is having some confusion, however it appears to be her baseline.  The patient is mildly hypoxic on initial presentation.  She does have some diffuse wheezing on exam.  Seems like she does have a history of restrictive airway disease.  Symptoms seem consistent with bronchitis and reactive airway disease.  We will administer DuoNeb therapy.  IV access established.  Patient was placed on continuous telemetry and pulse oximeter monitoring.  Work-up initiated.      Differential diagnosis: COPD, asthma, bronchitis, pneumonia, viral syndrome      Amount and/or Complexity of Data Reviewed  External Data Reviewed: labs, radiology, ECG and notes.     Details: External laboratories, imaging as well as notes were reviewed personally by myself.    Date of previous record: 6/6/2023    Source of note: Records from [facility]    Summary: I did review the records sent with the patient from the [facility].  Past medical history and medications reviewed.  Labs: ordered. Decision-making details documented in ED Course.  Radiology: ordered and independent interpretation performed. Decision-making details documented in ED Course.  ECG/medicine tests: ordered and independent interpretation performed. Decision-making details documented in ED Course.    Risk  Prescription drug management.        Final diagnoses:   Acute bronchitis due to Rhinovirus       ED Disposition  ED Disposition       ED Disposition   Discharge    Condition   Stable    Comment   --               Anthony Medrano MD  [address]  [address] [address] [phone]    Call in 1 day           Medication List        New Prescriptions      albuterol sulfate  (90 Base) MCG/ACT inhaler  Commonly known as: PROVENTIL HFA;VENTOLIN HFA;PROAIR HFA  Inhale 2 puffs Every 4 (Four) Hours As Needed for Wheezing for up to 30 days.             Changed      ondansetron 4 MG tablet  Commonly known as: ZOFRAN  GIVE 1 TABLET BY MOUTH EVERY 12 HOURS AS NEEDED FOR NAUSEA/VOMITING  What changed: See the new instructions.               Where to Get Your Medications        These medications were sent to [place], [place] - [address] - [phone]  - [phone]   [address], [facility] [address]      Phone: [phone]   albuterol sulfate  (90 Base) MCG/ACT inhaler            Rayshawn Marino MD  06/07/23 0139

## 2024-02-08 ENCOUNTER — TELEPHONE (OUTPATIENT)
Dept: INTERNAL MEDICINE | Facility: CLINIC | Age: 89
End: 2024-02-08
Payer: MEDICARE

## 2024-02-08 NOTE — TELEPHONE ENCOUNTER
Caller: Jovana Membreno    Relationship: Emergency Contact    Best call back number: [phone]     Which medication are you concerned about: albuterol inhaler not cover by patient insurance    Who prescribed you this medication: rena johnson    When did you start taking this medication: na    What are your concerns: PATIENT DAUGHTER CALLED AND STATES PATIENT INSURANCE WILL NOT LONGER COVER THE ALBUTEROL INHALER. A DIFFERENT MEDICATION WILL HAVE TO BE USED.     How long have you had these concerns: NA

## 2024-02-09 NOTE — TELEPHONE ENCOUNTER
Spoke to pharmacy and the inhaler did go through and will send to pt tonight.  Lvm advising this

## 2024-03-12 RX ORDER — ATORVASTATIN CALCIUM 20 MG/1
20 TABLET, FILM COATED ORAL NIGHTLY
Qty: 30 TABLET | Refills: 10 | Status: SHIPPED | OUTPATIENT
Start: 2024-03-12

## 2024-03-12 RX ORDER — PANTOPRAZOLE SODIUM 40 MG/1
TABLET, DELAYED RELEASE ORAL
Qty: 30 TABLET | Refills: 10 | Status: SHIPPED | OUTPATIENT
Start: 2024-03-12

## 2024-03-12 RX ORDER — MAGNESIUM OXIDE TAB 400 MG (241.3 MG ELEMENTAL MG) 400 (241.3 MG) MG
TAB ORAL
Qty: 30 TABLET | Refills: 10 | Status: SHIPPED | OUTPATIENT
Start: 2024-03-12

## 2024-03-12 RX ORDER — CITALOPRAM 20 MG/1
TABLET ORAL
Qty: 30 TABLET | Refills: 10 | Status: SHIPPED | OUTPATIENT
Start: 2024-03-12

## 2024-03-12 RX ORDER — DONEPEZIL HYDROCHLORIDE 10 MG/1
TABLET, FILM COATED ORAL
Qty: 30 TABLET | Refills: 10 | Status: SHIPPED | OUTPATIENT
Start: 2024-03-12

## 2024-03-12 RX ORDER — CHOLECALCIFEROL (VITAMIN D3) 125 MCG
CAPSULE ORAL
Qty: 30 TABLET | Refills: 10 | Status: SHIPPED | OUTPATIENT
Start: 2024-03-12

## 2024-03-12 RX ORDER — APIXABAN 5 MG/1
TABLET, FILM COATED ORAL
Qty: 60 TABLET | Refills: 10 | Status: SHIPPED | OUTPATIENT
Start: 2024-03-12

## 2024-03-12 RX ORDER — MONTELUKAST SODIUM 10 MG/1
TABLET ORAL
Qty: 30 TABLET | Refills: 10 | Status: SHIPPED | OUTPATIENT
Start: 2024-03-12

## 2024-03-19 RX ORDER — QUETIAPINE FUMARATE 50 MG/1
TABLET, FILM COATED ORAL
Qty: 60 TABLET | Refills: 10 | Status: SHIPPED | OUTPATIENT
Start: 2024-03-19

## 2024-03-26 RX ORDER — LEVOTHYROXINE SODIUM 0.07 MG/1
TABLET ORAL
Qty: 30 TABLET | Refills: 10 | Status: SHIPPED | OUTPATIENT
Start: 2024-03-26

## 2024-05-22 RX ORDER — ONDANSETRON 4 MG/1
TABLET, FILM COATED ORAL
Qty: 60 TABLET | Refills: 10 | Status: SHIPPED | OUTPATIENT
Start: 2024-05-22

## 2024-05-22 RX ORDER — HYDROXYZINE HYDROCHLORIDE 25 MG/1
TABLET, FILM COATED ORAL
Qty: 90 TABLET | Refills: 4 | Status: SHIPPED | OUTPATIENT
Start: 2024-05-22

## 2024-05-30 ENCOUNTER — APPOINTMENT (OUTPATIENT)
Facility: HOSPITAL | Age: 89
End: 2024-05-30
Payer: MEDICARE

## 2024-05-30 ENCOUNTER — HOSPITAL ENCOUNTER (INPATIENT)
Facility: HOSPITAL | Age: 89
LOS: 6 days | Discharge: NURSING, REHAB OR LONG-TERM CARE | End: 2024-06-06
Attending: STUDENT IN AN ORGANIZED HEALTH CARE EDUCATION/TRAINING PROGRAM | Admitting: FAMILY MEDICINE
Payer: MEDICARE

## 2024-05-30 DIAGNOSIS — N39.0 URINARY TRACT INFECTION WITH HEMATURIA, SITE UNSPECIFIED: ICD-10-CM

## 2024-05-30 DIAGNOSIS — R31.9 URINARY TRACT INFECTION WITH HEMATURIA, SITE UNSPECIFIED: ICD-10-CM

## 2024-05-30 DIAGNOSIS — A41.9 SEPSIS, DUE TO UNSPECIFIED ORGANISM, UNSPECIFIED WHETHER ACUTE ORGAN DYSFUNCTION PRESENT: Primary | ICD-10-CM

## 2024-05-30 LAB
ALBUMIN SERPL-MCNC: 3.9 G/DL (ref 3.5–5.2)
ALBUMIN/GLOB SERPL: 1.1 G/DL
ALP SERPL-CCNC: 145 U/L (ref 39–117)
ALT SERPL W P-5'-P-CCNC: 7 U/L (ref 1–33)
AMMONIA BLD-SCNC: 132 UMOL/L (ref 11–51)
ANION GAP SERPL CALCULATED.3IONS-SCNC: 14.2 MMOL/L (ref 5–15)
APTT PPP: 33.4 SECONDS (ref 60–90)
AST SERPL-CCNC: 25 U/L (ref 1–32)
BACTERIA UR QL AUTO: ABNORMAL /HPF
BASOPHILS # BLD AUTO: 0.03 10*3/MM3 (ref 0–0.2)
BASOPHILS NFR BLD AUTO: 0.2 % (ref 0–1.5)
BILIRUB SERPL-MCNC: 0.5 MG/DL (ref 0–1.2)
BILIRUB UR QL STRIP: NEGATIVE
BUN SERPL-MCNC: 36 MG/DL (ref 8–23)
BUN/CREAT SERPL: 29.8 (ref 7–25)
CALCIUM SPEC-SCNC: 9.7 MG/DL (ref 8.2–9.6)
CHLORIDE SERPL-SCNC: 100 MMOL/L (ref 98–107)
CLARITY UR: ABNORMAL
CO2 SERPL-SCNC: 26.8 MMOL/L (ref 22–29)
COLOR UR: YELLOW
CREAT SERPL-MCNC: 1.21 MG/DL (ref 0.57–1)
D-LACTATE SERPL-SCNC: 3.1 MMOL/L (ref 0.5–2)
D-LACTATE SERPL-SCNC: 3.5 MMOL/L (ref 0.5–2)
D-LACTATE SERPL-SCNC: 3.5 MMOL/L (ref 0.5–2)
DEPRECATED RDW RBC AUTO: 46.1 FL (ref 37–54)
EGFRCR SERPLBLD CKD-EPI 2021: 42.1 ML/MIN/1.73
EOSINOPHIL # BLD AUTO: 0.03 10*3/MM3 (ref 0–0.4)
EOSINOPHIL NFR BLD AUTO: 0.2 % (ref 0.3–6.2)
ERYTHROCYTE [DISTWIDTH] IN BLOOD BY AUTOMATED COUNT: 14.4 % (ref 12.3–15.4)
FLUAV RNA RESP QL NAA+PROBE: NOT DETECTED
FLUBV RNA RESP QL NAA+PROBE: NOT DETECTED
GLOBULIN UR ELPH-MCNC: 3.5 GM/DL
GLUCOSE SERPL-MCNC: 116 MG/DL (ref 65–99)
GLUCOSE UR STRIP-MCNC: NEGATIVE MG/DL
HCT VFR BLD AUTO: 42.4 % (ref 34–46.6)
HGB BLD-MCNC: 14.4 G/DL (ref 12–15.9)
HGB UR QL STRIP.AUTO: ABNORMAL
HYALINE CASTS UR QL AUTO: ABNORMAL /LPF
IMM GRANULOCYTES # BLD AUTO: 0.04 10*3/MM3 (ref 0–0.05)
IMM GRANULOCYTES NFR BLD AUTO: 0.2 % (ref 0–0.5)
INR PPP: 1.19 (ref 0.89–1.12)
KETONES UR QL STRIP: NEGATIVE
LEUKOCYTE ESTERASE UR QL STRIP.AUTO: ABNORMAL
LYMPHOCYTES # BLD AUTO: 1.94 10*3/MM3 (ref 0.7–3.1)
LYMPHOCYTES NFR BLD AUTO: 10.5 % (ref 19.6–45.3)
MAGNESIUM SERPL-MCNC: 1.9 MG/DL (ref 1.7–2.3)
MCH RBC QN AUTO: 29.8 PG (ref 26.6–33)
MCHC RBC AUTO-ENTMCNC: 34 G/DL (ref 31.5–35.7)
MCV RBC AUTO: 87.6 FL (ref 79–97)
MONOCYTES # BLD AUTO: 0.97 10*3/MM3 (ref 0.1–0.9)
MONOCYTES NFR BLD AUTO: 5.3 % (ref 5–12)
NEUTROPHILS NFR BLD AUTO: 15.4 10*3/MM3 (ref 1.7–7)
NEUTROPHILS NFR BLD AUTO: 83.6 % (ref 42.7–76)
NITRITE UR QL STRIP: NEGATIVE
NT-PROBNP SERPL-MCNC: 494 PG/ML (ref 0–1800)
PH UR STRIP.AUTO: 8.5 [PH] (ref 5–8)
PLATELET # BLD AUTO: 314 10*3/MM3 (ref 140–450)
PMV BLD AUTO: 10.3 FL (ref 6–12)
POTASSIUM SERPL-SCNC: 4.7 MMOL/L (ref 3.5–5.2)
PROCALCITONIN SERPL-MCNC: 0.05 NG/ML (ref 0–0.25)
PROT SERPL-MCNC: 7.4 G/DL (ref 6–8.5)
PROT UR QL STRIP: ABNORMAL
PROTHROMBIN TIME: 15.7 SECONDS (ref 12.2–14.5)
QT INTERVAL: 356 MS
QTC INTERVAL: 415 MS
RBC # BLD AUTO: 4.84 10*6/MM3 (ref 3.77–5.28)
RBC # UR STRIP: ABNORMAL /HPF
REF LAB TEST METHOD: ABNORMAL
RSV RNA RESP QL NAA+PROBE: NOT DETECTED
SARS-COV-2 RNA RESP QL NAA+PROBE: NOT DETECTED
SODIUM SERPL-SCNC: 141 MMOL/L (ref 136–145)
SP GR UR STRIP: 1.01 (ref 1–1.03)
SQUAMOUS #/AREA URNS HPF: ABNORMAL /HPF
T4 FREE SERPL-MCNC: 1.37 NG/DL (ref 0.92–1.68)
TROPONIN T SERPL HS-MCNC: 13 NG/L
TSH SERPL DL<=0.05 MIU/L-ACNC: 2.04 UIU/ML (ref 0.27–4.2)
UROBILINOGEN UR QL STRIP: ABNORMAL
WBC # UR STRIP: ABNORMAL /HPF
WBC NRBC COR # BLD AUTO: 18.41 10*3/MM3 (ref 3.4–10.8)

## 2024-05-30 PROCEDURE — 99222 1ST HOSP IP/OBS MODERATE 55: CPT | Performed by: STUDENT IN AN ORGANIZED HEALTH CARE EDUCATION/TRAINING PROGRAM

## 2024-05-30 PROCEDURE — 99291 CRITICAL CARE FIRST HOUR: CPT

## 2024-05-30 PROCEDURE — 84439 ASSAY OF FREE THYROXINE: CPT | Performed by: STUDENT IN AN ORGANIZED HEALTH CARE EDUCATION/TRAINING PROGRAM

## 2024-05-30 PROCEDURE — 85025 COMPLETE CBC W/AUTO DIFF WBC: CPT | Performed by: STUDENT IN AN ORGANIZED HEALTH CARE EDUCATION/TRAINING PROGRAM

## 2024-05-30 PROCEDURE — 83605 ASSAY OF LACTIC ACID: CPT | Performed by: STUDENT IN AN ORGANIZED HEALTH CARE EDUCATION/TRAINING PROGRAM

## 2024-05-30 PROCEDURE — P9612 CATHETERIZE FOR URINE SPEC: HCPCS

## 2024-05-30 PROCEDURE — 94799 UNLISTED PULMONARY SVC/PX: CPT

## 2024-05-30 PROCEDURE — 82140 ASSAY OF AMMONIA: CPT | Performed by: STUDENT IN AN ORGANIZED HEALTH CARE EDUCATION/TRAINING PROGRAM

## 2024-05-30 PROCEDURE — 93005 ELECTROCARDIOGRAM TRACING: CPT | Performed by: STUDENT IN AN ORGANIZED HEALTH CARE EDUCATION/TRAINING PROGRAM

## 2024-05-30 PROCEDURE — 36415 COLL VENOUS BLD VENIPUNCTURE: CPT

## 2024-05-30 PROCEDURE — G0378 HOSPITAL OBSERVATION PER HR: HCPCS

## 2024-05-30 PROCEDURE — 70450 CT HEAD/BRAIN W/O DYE: CPT

## 2024-05-30 PROCEDURE — 83735 ASSAY OF MAGNESIUM: CPT | Performed by: STUDENT IN AN ORGANIZED HEALTH CARE EDUCATION/TRAINING PROGRAM

## 2024-05-30 PROCEDURE — 87040 BLOOD CULTURE FOR BACTERIA: CPT | Performed by: STUDENT IN AN ORGANIZED HEALTH CARE EDUCATION/TRAINING PROGRAM

## 2024-05-30 PROCEDURE — 84443 ASSAY THYROID STIM HORMONE: CPT | Performed by: STUDENT IN AN ORGANIZED HEALTH CARE EDUCATION/TRAINING PROGRAM

## 2024-05-30 PROCEDURE — 25810000003 LACTATED RINGERS SOLUTION: Performed by: STUDENT IN AN ORGANIZED HEALTH CARE EDUCATION/TRAINING PROGRAM

## 2024-05-30 PROCEDURE — 94640 AIRWAY INHALATION TREATMENT: CPT

## 2024-05-30 PROCEDURE — 84484 ASSAY OF TROPONIN QUANT: CPT | Performed by: STUDENT IN AN ORGANIZED HEALTH CARE EDUCATION/TRAINING PROGRAM

## 2024-05-30 PROCEDURE — 25810000003 LACTATED RINGERS PER 1000 ML: Performed by: STUDENT IN AN ORGANIZED HEALTH CARE EDUCATION/TRAINING PROGRAM

## 2024-05-30 PROCEDURE — 84145 PROCALCITONIN (PCT): CPT | Performed by: STUDENT IN AN ORGANIZED HEALTH CARE EDUCATION/TRAINING PROGRAM

## 2024-05-30 PROCEDURE — 63710000001 QUETIAPINE 25 MG TABLET: Performed by: STUDENT IN AN ORGANIZED HEALTH CARE EDUCATION/TRAINING PROGRAM

## 2024-05-30 PROCEDURE — 85610 PROTHROMBIN TIME: CPT | Performed by: STUDENT IN AN ORGANIZED HEALTH CARE EDUCATION/TRAINING PROGRAM

## 2024-05-30 PROCEDURE — A9270 NON-COVERED ITEM OR SERVICE: HCPCS | Performed by: STUDENT IN AN ORGANIZED HEALTH CARE EDUCATION/TRAINING PROGRAM

## 2024-05-30 PROCEDURE — 85730 THROMBOPLASTIN TIME PARTIAL: CPT | Performed by: STUDENT IN AN ORGANIZED HEALTH CARE EDUCATION/TRAINING PROGRAM

## 2024-05-30 PROCEDURE — 71045 X-RAY EXAM CHEST 1 VIEW: CPT

## 2024-05-30 PROCEDURE — 87086 URINE CULTURE/COLONY COUNT: CPT | Performed by: STUDENT IN AN ORGANIZED HEALTH CARE EDUCATION/TRAINING PROGRAM

## 2024-05-30 PROCEDURE — 87637 SARSCOV2&INF A&B&RSV AMP PRB: CPT | Performed by: STUDENT IN AN ORGANIZED HEALTH CARE EDUCATION/TRAINING PROGRAM

## 2024-05-30 PROCEDURE — 81001 URINALYSIS AUTO W/SCOPE: CPT | Performed by: STUDENT IN AN ORGANIZED HEALTH CARE EDUCATION/TRAINING PROGRAM

## 2024-05-30 PROCEDURE — 83880 ASSAY OF NATRIURETIC PEPTIDE: CPT | Performed by: STUDENT IN AN ORGANIZED HEALTH CARE EDUCATION/TRAINING PROGRAM

## 2024-05-30 PROCEDURE — 80053 COMPREHEN METABOLIC PANEL: CPT | Performed by: STUDENT IN AN ORGANIZED HEALTH CARE EDUCATION/TRAINING PROGRAM

## 2024-05-30 PROCEDURE — 25010000002 CEFTRIAXONE PER 250 MG: Performed by: STUDENT IN AN ORGANIZED HEALTH CARE EDUCATION/TRAINING PROGRAM

## 2024-05-30 PROCEDURE — 0 DEXTROSE 5 % SOLUTION 500 ML FLEX CONT: Performed by: STUDENT IN AN ORGANIZED HEALTH CARE EDUCATION/TRAINING PROGRAM

## 2024-05-30 RX ORDER — SODIUM CHLORIDE, SODIUM LACTATE, POTASSIUM CHLORIDE, CALCIUM CHLORIDE 600; 310; 30; 20 MG/100ML; MG/100ML; MG/100ML; MG/100ML
75 INJECTION, SOLUTION INTRAVENOUS CONTINUOUS
Status: DISCONTINUED | OUTPATIENT
Start: 2024-05-30 | End: 2024-06-01

## 2024-05-30 RX ORDER — IPRATROPIUM BROMIDE AND ALBUTEROL SULFATE 2.5; .5 MG/3ML; MG/3ML
6 SOLUTION RESPIRATORY (INHALATION) ONCE
Status: COMPLETED | OUTPATIENT
Start: 2024-05-30 | End: 2024-05-30

## 2024-05-30 RX ORDER — ATORVASTATIN CALCIUM 20 MG/1
20 TABLET, FILM COATED ORAL NIGHTLY
Status: DISCONTINUED | OUTPATIENT
Start: 2024-05-30 | End: 2024-06-06 | Stop reason: HOSPADM

## 2024-05-30 RX ORDER — DONEPEZIL HYDROCHLORIDE 10 MG/1
10 TABLET, FILM COATED ORAL NIGHTLY
Status: DISCONTINUED | OUTPATIENT
Start: 2024-05-30 | End: 2024-06-06 | Stop reason: HOSPADM

## 2024-05-30 RX ORDER — CITALOPRAM HYDROBROMIDE 10 MG/1
10 TABLET ORAL DAILY
Status: DISCONTINUED | OUTPATIENT
Start: 2024-05-31 | End: 2024-06-06 | Stop reason: HOSPADM

## 2024-05-30 RX ORDER — POLYETHYLENE GLYCOL 3350 17 G/17G
17 POWDER, FOR SOLUTION ORAL DAILY PRN
Status: DISCONTINUED | OUTPATIENT
Start: 2024-05-30 | End: 2024-06-06 | Stop reason: HOSPADM

## 2024-05-30 RX ORDER — SODIUM CHLORIDE 0.9 % (FLUSH) 0.9 %
10 SYRINGE (ML) INJECTION AS NEEDED
Status: DISCONTINUED | OUTPATIENT
Start: 2024-05-30 | End: 2024-06-06 | Stop reason: HOSPADM

## 2024-05-30 RX ORDER — QUETIAPINE FUMARATE 25 MG/1
25 TABLET, FILM COATED ORAL ONCE AS NEEDED
Status: COMPLETED | OUTPATIENT
Start: 2024-05-30 | End: 2024-05-30

## 2024-05-30 RX ORDER — SODIUM CHLORIDE 9 MG/ML
40 INJECTION, SOLUTION INTRAVENOUS AS NEEDED
Status: DISCONTINUED | OUTPATIENT
Start: 2024-05-30 | End: 2024-06-06 | Stop reason: HOSPADM

## 2024-05-30 RX ORDER — BISACODYL 5 MG/1
5 TABLET, DELAYED RELEASE ORAL DAILY PRN
Status: DISCONTINUED | OUTPATIENT
Start: 2024-05-30 | End: 2024-06-06 | Stop reason: HOSPADM

## 2024-05-30 RX ORDER — SODIUM CHLORIDE 0.9 % (FLUSH) 0.9 %
10 SYRINGE (ML) INJECTION EVERY 12 HOURS SCHEDULED
Status: DISCONTINUED | OUTPATIENT
Start: 2024-05-30 | End: 2024-06-06 | Stop reason: HOSPADM

## 2024-05-30 RX ORDER — MELATONIN
5000 DAILY
Status: DISCONTINUED | OUTPATIENT
Start: 2024-05-31 | End: 2024-06-06 | Stop reason: HOSPADM

## 2024-05-30 RX ORDER — ACETAMINOPHEN 325 MG/1
650 TABLET ORAL EVERY 6 HOURS PRN
Status: DISCONTINUED | OUTPATIENT
Start: 2024-05-30 | End: 2024-06-06 | Stop reason: HOSPADM

## 2024-05-30 RX ORDER — PANTOPRAZOLE SODIUM 40 MG/1
40 TABLET, DELAYED RELEASE ORAL
Status: DISCONTINUED | OUTPATIENT
Start: 2024-05-31 | End: 2024-06-06 | Stop reason: HOSPADM

## 2024-05-30 RX ORDER — MEMANTINE HYDROCHLORIDE 10 MG/1
5 TABLET ORAL EVERY 12 HOURS SCHEDULED
Status: DISCONTINUED | OUTPATIENT
Start: 2024-05-30 | End: 2024-06-06 | Stop reason: HOSPADM

## 2024-05-30 RX ORDER — AMOXICILLIN 250 MG
2 CAPSULE ORAL 2 TIMES DAILY PRN
Status: DISCONTINUED | OUTPATIENT
Start: 2024-05-30 | End: 2024-06-06 | Stop reason: HOSPADM

## 2024-05-30 RX ORDER — BISACODYL 10 MG
10 SUPPOSITORY, RECTAL RECTAL DAILY PRN
Status: DISCONTINUED | OUTPATIENT
Start: 2024-05-30 | End: 2024-06-06 | Stop reason: HOSPADM

## 2024-05-30 RX ORDER — MONTELUKAST SODIUM 10 MG/1
10 TABLET ORAL NIGHTLY
Status: DISCONTINUED | OUTPATIENT
Start: 2024-05-30 | End: 2024-06-06 | Stop reason: HOSPADM

## 2024-05-30 RX ORDER — LEVOTHYROXINE SODIUM 0.05 MG/1
75 TABLET ORAL
Status: DISCONTINUED | OUTPATIENT
Start: 2024-05-31 | End: 2024-06-06 | Stop reason: HOSPADM

## 2024-05-30 RX ORDER — TAMSULOSIN HYDROCHLORIDE 0.4 MG/1
0.4 CAPSULE ORAL DAILY
Status: DISCONTINUED | OUTPATIENT
Start: 2024-05-31 | End: 2024-06-03

## 2024-05-30 RX ORDER — IPRATROPIUM BROMIDE AND ALBUTEROL SULFATE 2.5; .5 MG/3ML; MG/3ML
3 SOLUTION RESPIRATORY (INHALATION) EVERY 4 HOURS PRN
Status: DISCONTINUED | OUTPATIENT
Start: 2024-05-30 | End: 2024-06-06 | Stop reason: HOSPADM

## 2024-05-30 RX ADMIN — Medication 10 ML: at 22:45

## 2024-05-30 RX ADMIN — CEFTRIAXONE SODIUM 2000 MG: 2 INJECTION, POWDER, FOR SOLUTION INTRAMUSCULAR; INTRAVENOUS at 18:08

## 2024-05-30 RX ADMIN — SULFAMETHOXAZOLE AND TRIMETHOPRIM 275 MG OF TRIMETHOPRIM: 80; 16 INJECTION, SOLUTION, CONCENTRATE INTRAVENOUS at 17:03

## 2024-05-30 RX ADMIN — IPRATROPIUM BROMIDE AND ALBUTEROL SULFATE 6 ML: 2.5; .5 SOLUTION RESPIRATORY (INHALATION) at 18:43

## 2024-05-30 RX ADMIN — SODIUM CHLORIDE, POTASSIUM CHLORIDE, SODIUM LACTATE AND CALCIUM CHLORIDE 75 ML/HR: 600; 310; 30; 20 INJECTION, SOLUTION INTRAVENOUS at 22:44

## 2024-05-30 RX ADMIN — SODIUM CHLORIDE, POTASSIUM CHLORIDE, SODIUM LACTATE AND CALCIUM CHLORIDE 2205 ML: 600; 310; 30; 20 INJECTION, SOLUTION INTRAVENOUS at 17:15

## 2024-05-30 RX ADMIN — QUETIAPINE FUMARATE 25 MG: 25 TABLET ORAL at 21:43

## 2024-05-30 RX ADMIN — SODIUM CHLORIDE, POTASSIUM CHLORIDE, SODIUM LACTATE AND CALCIUM CHLORIDE 500 ML: 600; 310; 30; 20 INJECTION, SOLUTION INTRAVENOUS at 16:43

## 2024-05-30 NOTE — ED NOTES
Dorota Membreno    Nursing Report ED to Floor:  Mental status: AOX1  Ambulatory status: not ambulatory at this time  Oxygen Therapy:  RA  Cardiac Rhythm: NS  Admitted from: [facility]  Safety Concerns:  falls  Social Issues: unkown  ED Room #:  19    ED Nurse Phone Extension [phone]* or may call [phone].      HPI:   Chief Complaint   Patient presents with    Altered Mental Status       Past Medical History:  Past Medical History:   Diagnosis Date    Acid reflux 03/14/2016    stable    Allergic rhinitis     Alzheimer's disease 12/2017    Arteritis     A. Giant cell arteritis, diagnosed around 2000 when she lost sight in her eye. B. Improved with prednisone therapy    Asthma     Cancer     colon. Status post resection at age 37    Cognitive impairment, mild, so stated     A. Patient has been forgetting names continue med. Has f/u with neuro. She is having some times forgetting directions. She has moved into a new house and this has not helped with directions    Dyslipidemia     lipids and cmp    Hypertension     lipids, cmp, urine micro    Hypothyroidism     A. On replacement therapy    Mammogram abnormal     A. 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    Panic attacks     Pneumonia     Postmenopausal osteoporosis     Shingles 03/2018        Past Surgical History:  Past Surgical History:   Procedure Laterality Date    CATARACT EXTRACTION      COLECTOMY PARTIAL / TOTAL      Patient diagnosed with colon cancer at age 37    HYSTERECTOMY          Admitting Doctor:   No admitting provider for patient encounter.    Consulting Provider(s):  Consults       No orders found from 5/1/2024 to 5/31/2024.             Admitting Diagnosis:   The primary encounter diagnosis was Sepsis, due to unspecified organism, unspecified whether acute organ dysfunction present. A diagnosis of Urinary tract infection with hematuria, site unspecified was also pertinent to this visit.    Most Recent Vitals:  "  Vitals:    05/30/24 1404   BP: 138/77   Pulse: 85   Resp: 28   Temp: 98.1 °F (36.7 °C)   TempSrc: Oral   SpO2: 94%   Weight: 73.5 kg (162 lb 0.6 oz)   Height: 157.5 cm (62\")       Active LDAs/IV Access:   Lines, Drains & Airways       Active LDAs       Name Placement date Placement time Site Days    Peripheral IV 05/30/24 1600 Left Antecubital 05/30/24  1600  Antecubital  less than 1    Urethral Catheter 16 Fr. 05/30/24  1550  -- less than 1                    Labs (abnormal labs have a star):   Labs Reviewed   COMPREHENSIVE METABOLIC PANEL - Abnormal; Notable for the following components:       Result Value    Glucose 116 (*)     BUN 36 (*)     Creatinine 1.21 (*)     Calcium 9.7 (*)     Alkaline Phosphatase 145 (*)     BUN/Creatinine Ratio 29.8 (*)     eGFR 42.1 (*)     All other components within normal limits    Narrative:     GFR Normal >60  Chronic Kidney Disease <60  Kidney Failure <15    The GFR formula is only valid for adults with stable renal function between ages 18 and 70.   APTT - Abnormal; Notable for the following components:    PTT 33.4 (*)     All other components within normal limits    Narrative:     PTT = The equivalent PTT values for the therapeutic range of heparin levels at 0.3 to 0.5 U/ml are 60 to 70 seconds.   PROTIME-INR - Abnormal; Notable for the following components:    Protime 15.7 (*)     INR 1.19 (*)     All other components within normal limits   URINALYSIS W/ MICROSCOPIC IF INDICATED (NO CULTURE) - Abnormal; Notable for the following components:    Appearance, UA Cloudy (*)     pH, UA 8.5 (*)     Blood, UA Moderate (2+) (*)     Protein,  mg/dL (2+) (*)     Leuk Esterase, UA Moderate (2+) (*)     All other components within normal limits   LACTIC ACID, PLASMA - Abnormal; Notable for the following components:    Lactate 3.1 (*)     All other components within normal limits   AMMONIA - Abnormal; Notable for the following components:    Ammonia 132 (*)     All other components " within normal limits   CBC WITH AUTO DIFFERENTIAL - Abnormal; Notable for the following components:    WBC 18.41 (*)     Neutrophil % 83.6 (*)     Lymphocyte % 10.5 (*)     Eosinophil % 0.2 (*)     Neutrophils, Absolute 15.40 (*)     Monocytes, Absolute 0.97 (*)     All other components within normal limits   URINALYSIS, MICROSCOPIC ONLY - Abnormal; Notable for the following components:    RBC, UA 11-20 (*)     WBC, UA Too Numerous to Count (*)     Bacteria, UA 3+ (*)     All other components within normal limits   LACTIC ACID, REFLEX - Abnormal; Notable for the following components:    Lactate 3.5 (*)     All other components within normal limits   COVID-19/FLUA&B/RSV, NP SWAB IN TRANSPORT MEDIA 1 HR TAT - Normal    Narrative:     Fact sheet for providers: https://www.fda.gov/media/084497/download    Fact sheet for patients: https://www.fda.gov/media/639791/download    Test performed by PCR.   BNP (IN-HOUSE) - Normal    Narrative:     This assay is used as an aid in the diagnosis of individuals suspected of having heart failure. It can be used as an aid in the diagnosis of acute decompensated heart failure (ADHF) in patients presenting with signs and symptoms of ADHF to the emergency department (ED). In addition, NT-proBNP of <300 pg/mL indicates ADHF is not likely.    Age Range Result Interpretation  NT-proBNP Concentration (pg/mL:      <50             Positive            >450                   Gray                 300-450                    Negative             <300    50-75           Positive            >900                  Gray                300-900                  Negative            <300      >75             Positive            >1800                  Gray                300-1800                  Negative            <300   SINGLE HS TROPONIN T - Normal   PROCALCITONIN - Normal   MAGNESIUM - Normal   TSH - Normal   T4, FREE - Normal   COVID PRE-OP / PRE-PROCEDURE SCREENING ORDER (NO ISOLATION)    Narrative:      The following orders were created for panel order COVID PRE-OP / PRE-PROCEDURE SCREENING ORDER (NO ISOLATION) - Swab, Nasopharynx.  Procedure                               Abnormality         Status                     ---------                               -----------         ------                     COVID-19, FLU A/B, RSV P...[[id number]]  Normal              Final result                 Please view results for these tests on the individual orders.   BLOOD CULTURE   BLOOD CULTURE   URINE CULTURE   LACTIC ACID, REFLEX   CBC AND DIFFERENTIAL    Narrative:     The following orders were created for panel order CBC & Differential.  Procedure                               Abnormality         Status                     ---------                               -----------         ------                     CBC Auto Differential[[id number]]        Abnormal            Final result                 Please view results for these tests on the individual orders.       Meds Given in ED:   Medications   sepsis fluid LR bolus 2,205 mL (2,205 mL Intravenous New Bag 5/30/24 1715)   cefTRIAXone (ROCEPHIN) 2,000 mg in sodium chloride 0.9 % 100 mL MBP (has no administration in time range)   lactated ringers bolus 500 mL (0 mL Intravenous Stopped 5/30/24 1715)           Last NIH score:                                                          Dysphagia screening results:        Bronson Coma Scale:  No data recorded     CIWA:        Restraint Type:            Isolation Status:  No active isolations

## 2024-05-30 NOTE — LETTER
EMS Transport Request  For use at [facility], [place], [place], [place], and [place] only   Patient Name: Dorota Membreno : 10/31/1931   Weight:72.3 kg (159 lb 4.8 oz) Pick-up Location: [facility] BLS/ALS: No CPR   Insurance: MEDICARE Auth End Date:    Pre-Cert #: D/C Summary complete:    [facility] [place] [facility]   Contact Precautions: None   Equipment (O2, Fluids, etc.): None   Arrive By Date/Time: 24 Stretcher/WC: Stretcher   CM Requesting: Demetra Senior RN Ext: [phone]   Notes/Medical Necessity: Impaired functional mobility, endurance, and gait.      ______________________________________________________________________    *Only 2 patient bags OR 1 carry-on size bag are permitted.  Wheelchairs and walkers CANNOT transported with the patient. Acknowledge: Yes

## 2024-05-30 NOTE — [facility] PROVIDER NOTE
Subjective  History of Present Illness:    89 year old female hx of dementia who presents with altered mental status and vag bleeding. History obtained from RN. Patient was apparently found on the toilet with blood in her hands. Sent to ED for evaluation       Nurses Notes reviewed and agree, including vitals, allergies, social history and prior medical history.     REVIEW OF SYSTEMS: All systems reviewed and not pertinent unless noted.  Review of Systems   All other systems reviewed and are negative.      Past Medical History:   Diagnosis Date    Acid reflux 03/14/2016    stable    Allergic rhinitis     Alzheimer's disease 12/2017    Arteritis     A. Giant cell arteritis, diagnosed around 2000 when she lost sight in her eye. B. Improved with prednisone therapy    Asthma     Cancer     colon. Status post resection at age 37    Cognitive impairment, mild, so stated     A. Patient has been forgetting names continue med. Has f/u with neuro. She is having some times forgetting directions. She has moved into a new house and this has not helped with directions    Dyslipidemia     lipids and cmp    Hypertension     lipids, cmp, urine micro    Hypothyroidism     A. On replacement therapy    Mammogram abnormal     A. 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    Panic attacks     Pneumonia     Postmenopausal osteoporosis     Shingles 03/2018       Allergies:    Codeine, Dyazide [hydrochlorothiazide w-triamterene], Levofloxacin, and Penicillins      Past Surgical History:   Procedure Laterality Date    CATARACT EXTRACTION      COLECTOMY PARTIAL / TOTAL      Patient diagnosed with colon cancer at age 37    HYSTERECTOMY           Social History     Socioeconomic History    Marital status:    Tobacco Use    Smoking status: Never    Smokeless tobacco: Never   Vaping Use    Vaping status: Never Used   Substance and Sexual Activity    Alcohol use: No    Drug use: No    Sexual activity: Defer  "        Family History   Problem Relation Age of Onset    Other Mother         medical problems    Allergies Son     Alcohol abuse Other        Objective  Physical Exam:  /77   Pulse 85   Temp 98.1 °F (36.7 °C) (Oral)   Resp 28   Ht 157.5 cm (62\")   Wt 73.5 kg (162 lb 0.6 oz)   SpO2 94%   BMI 29.64 kg/m²      Physical Exam  Constitutional:       Appearance: Normal appearance.      Comments: Anxious    HENT:      Head: Normocephalic and atraumatic.      Nose: Nose normal.      Mouth/Throat:      Mouth: Mucous membranes are moist.   Eyes:      Extraocular Movements: Extraocular movements intact.      Conjunctiva/sclera: Conjunctivae normal.   Cardiovascular:      Rate and Rhythm: Normal rate and regular rhythm.   Pulmonary:      Comments: Tachypneic, course rhonchi bilaterally   Abdominal:      General: There is no distension.      Comments: Small amount of dried blood on pad    Musculoskeletal:         General: Normal range of motion.      Cervical back: Normal range of motion and neck supple.   Skin:     General: Skin is warm and dry.   Neurological:      General: No focal deficit present.      Comments: Awake, alert, does not engage in meaningful conversation, keeps repeating \"pee pee\"  No focal neurological deficits appreciated    Psychiatric:         Mood and Affect: Mood normal.         Behavior: Behavior normal.         Critical Care    Performed by: Quoc Dangelo MD  Authorized by: Quoc Dangelo MD    Critical care provider statement:     Critical care time (minutes):  45    Critical care time was exclusive of:  Separately billable procedures and treating other patients    Critical care was necessary to treat or prevent imminent or life-threatening deterioration of the following conditions:  Cardiac failure, renal failure, respiratory failure, circulatory failure, sepsis, shock, metabolic crisis and dehydration    Critical care was time spent personally by me on the following " activities:  Ordering and performing treatments and interventions, ordering and review of laboratory studies, ordering and review of radiographic studies, pulse oximetry, re-evaluation of patient's condition, review of old charts, obtaining history from patient or surrogate, examination of patient, evaluation of patient's response to treatment and development of treatment plan with patient or surrogate      ED Course:         Lab Results (last 24 hours)       Procedure Component Value Units Date/Time    CBC & Differential [[id number]]  (Abnormal) Collected: 05/30/24 1448    Specimen: Blood Updated: 05/30/24 1501    Narrative:      The following orders were created for panel order CBC & Differential.  Procedure                               Abnormality         Status                     ---------                               -----------         ------                     CBC Auto Differential[[id number]]        Abnormal            Final result                 Please view results for these tests on the individual orders.    aPTT [[id number]]  (Abnormal) Collected: 05/30/24 1448    Specimen: Blood Updated: 05/30/24 1514     PTT 33.4 seconds     Narrative:      PTT = The equivalent PTT values for the therapeutic range of heparin levels at 0.3 to 0.5 U/ml are 60 to 70 seconds.    Protime-INR [[id number]]  (Abnormal) Collected: 05/30/24 1448    Specimen: Blood Updated: 05/30/24 1514     Protime 15.7 Seconds      INR 1.19    Lactic Acid, Plasma [[id number]]  (Abnormal) Collected: 05/30/24 1448    Specimen: Blood Updated: 05/30/24 1524     Lactate 3.1 mmol/L     Ammonia [[id number]]  (Abnormal) Collected: 05/30/24 1448    Specimen: Blood Updated: 05/30/24 1519     Ammonia 132 umol/L     CBC Auto Differential [[id number]]  (Abnormal) Collected: 05/30/24 1448    Specimen: Blood Updated: 05/30/24 1501     WBC 18.41 10*3/mm3      RBC 4.84 10*6/mm3      Hemoglobin 14.4 g/dL      Hematocrit 42.4 %      MCV 87.6 fL      MCH 29.8 pg       MCHC 34.0 g/dL      RDW 14.4 %      RDW-SD 46.1 fl      MPV 10.3 fL      Platelets 314 10*3/mm3      Neutrophil % 83.6 %      Lymphocyte % 10.5 %      Monocyte % 5.3 %      Eosinophil % 0.2 %      Basophil % 0.2 %      Immature Grans % 0.2 %      Neutrophils, Absolute 15.40 10*3/mm3      Lymphocytes, Absolute 1.94 10*3/mm3      Monocytes, Absolute 0.97 10*3/mm3      Eosinophils, Absolute 0.03 10*3/mm3      Basophils, Absolute 0.03 10*3/mm3      Immature Grans, Absolute 0.04 10*3/mm3     Urinalysis With Microscopic If Indicated (No Culture) - Urine, Catheter [[id number]]  (Abnormal) Collected: 05/30/24 1452    Specimen: Urine, Catheter Updated: 05/30/24 1503     Color, UA Yellow     Appearance, UA Cloudy     pH, UA 8.5     Specific Gravity, UA 1.015     Glucose, UA Negative     Ketones, UA Negative     Bilirubin, UA Negative     Blood, UA Moderate (2+)     Protein,  mg/dL (2+)     Leuk Esterase, UA Moderate (2+)     Nitrite, UA Negative     Urobilinogen, UA 0.2 E.U./dL    COVID PRE-OP / PRE-PROCEDURE SCREENING ORDER (NO ISOLATION) - Swab, Nasopharynx [[id number]]  (Normal) Collected: 05/30/24 1452    Specimen: Swab from Nasopharynx Updated: 05/30/24 1617    Narrative:      The following orders were created for panel order COVID PRE-OP / PRE-PROCEDURE SCREENING ORDER (NO ISOLATION) - Swab, Nasopharynx.  Procedure                               Abnormality         Status                     ---------                               -----------         ------                     COVID-19, FLU A/B, RSV P...[[id number]]  Normal              Final result                 Please view results for these tests on the individual orders.    COVID-19, FLU A/B, RSV PCR 1 HR TAT - Swab, Nasopharynx [[id number]]  (Normal) Collected: 05/30/24 1452    Specimen: Swab from Nasopharynx Updated: 05/30/24 1617     COVID19 Not Detected     Influenza A PCR Not Detected     Influenza B PCR Not Detected     RSV, PCR Not Detected    Narrative:       Fact sheet for providers: https://www.fda.gov/media/753367/download    Fact sheet for patients: https://www.fda.gov/media/394604/download    Test performed by PCR.    Urinalysis, Microscopic Only - Urine, Catheter [[id number]]  (Abnormal) Collected: 05/30/24 1452    Specimen: Urine, Catheter Updated: 05/30/24 1516     RBC, UA 11-20 /HPF      WBC, UA Too Numerous to Count /HPF      Bacteria, UA 3+ /HPF      Squamous Epithelial Cells, UA 0-2 /HPF      Hyaline Casts, UA 0-2 /LPF      Methodology Manual Light Microscopy    Urine Culture - Urine, Urine, Catheter [[id number]] Collected: 05/30/24 1452    Specimen: Urine, Catheter Updated: 05/30/24 1537    Comprehensive Metabolic Panel [[id number]]  (Abnormal) Collected: 05/30/24 1532    Specimen: Blood Updated: 05/30/24 1630     Glucose 116 mg/dL      BUN 36 mg/dL      Creatinine 1.21 mg/dL      Sodium 141 mmol/L      Potassium 4.7 mmol/L      Chloride 100 mmol/L      CO2 26.8 mmol/L      Calcium 9.7 mg/dL      Total Protein 7.4 g/dL      Albumin 3.9 g/dL      ALT (SGPT) 7 U/L      AST (SGOT) 25 U/L      Alkaline Phosphatase 145 U/L      Total Bilirubin 0.5 mg/dL      Globulin 3.5 gm/dL      A/G Ratio 1.1 g/dL      BUN/Creatinine Ratio 29.8     Anion Gap 14.2 mmol/L      eGFR 42.1 mL/min/1.73     Narrative:      GFR Normal >60  Chronic Kidney Disease <60  Kidney Failure <15    The GFR formula is only valid for adults with stable renal function between ages 18 and 70.    BNP [[id number]]  (Normal) Collected: 05/30/24 1532    Specimen: Blood Updated: 05/30/24 1613     proBNP 494.0 pg/mL     Narrative:      This assay is used as an aid in the diagnosis of individuals suspected of having heart failure. It can be used as an aid in the diagnosis of acute decompensated heart failure (ADHF) in patients presenting with signs and symptoms of ADHF to the emergency department (ED). In addition, NT-proBNP of <300 pg/mL indicates ADHF is not likely.    Age Range Result Interpretation   NT-proBNP Concentration (pg/mL:      <50             Positive            >450                   Gray                 300-450                    Negative             <300    50-75           Positive            >900                  Gray                300-900                  Negative            <300      >75             Positive            >1800                  Gray                300-1800                  Negative            <300    Single High Sensitivity Troponin T [[id number]]  (Normal) Collected: 05/30/24 1532    Specimen: Blood Updated: 05/30/24 1613     HS Troponin T 13 ng/L     Procalcitonin [[id number]]  (Normal) Collected: 05/30/24 1532    Specimen: Blood Updated: 05/30/24 1613     Procalcitonin 0.05 ng/mL     Magnesium [[id number]]  (Normal) Collected: 05/30/24 1532    Specimen: Blood Updated: 05/30/24 1630     Magnesium 1.9 mg/dL     TSH [[id number]]  (Normal) Collected: 05/30/24 1532    Specimen: Blood Updated: 05/30/24 1729     TSH 2.040 uIU/mL     T4, Free [[id number]]  (Normal) Collected: 05/30/24 1532    Specimen: Blood Updated: 05/30/24 1729     Free T4 1.37 ng/dL     STAT Lactic Acid, Reflex [[id number]]  (Abnormal) Collected: 05/30/24 1532    Specimen: Blood Updated: 05/30/24 1613     Lactate 3.5 mmol/L              CT Head Without Contrast    Result Date: 5/30/2024  CT HEAD WO CONTRAST Date of Exam: 5/30/2024 4:18 PM EDT Indication: ams. Comparison: 4/7/2018 Technique: Axial CT images were obtained of the head without contrast administration.  Automated exposure control and iterative construction methods were used. Findings: No intracranial hemorrhage. Gray-white matter differentiation is maintained without evidence of an acute infarction. Multiple foci of decreased attenuation are present within the subcortical, deep cerebral, and periventricular white matter consistent with chronic small vessel/microangiopathic ischemic changes. No extra-axial mass or collection. The ventricles and sulci are  prominent commensurate with involutional changes. The posterior fossa appears grossly normal. Sellar and suprasellar structures are normal. Lens replacements. The paranasal sinuses, ethmoid air cells, and mastoid air cells are aerated. The bony calvarium is intact.     Impression: Impression: No acute intracranial pathology. Electronically Signed: Mina Larkin MD  5/30/2024 4:30 PM EDT  Workstation ID: [id number]    XR Chest 1 View    Result Date: 5/30/2024  XR CHEST 1 VW Date of Exam: 5/30/2024 2:14 PM EDT Indication: ams Comparison 7/11/2023 Findings: There is unchanged moderately severe elevation of the right diaphragm. Mildly prominent interstitial pattern appears stable and chronic. Heart and pulmonary vessels appear within normal limits. There are no acute infiltrates or effusions.     Impression: Impression: Chronic findings. No acute process. Electronically Signed: Rachel Hood MD  5/30/2024 2:33 PM EDT  Workstation ID: [id number]        MDM          DDX: includes but is not limited to: intracranial hemorrhage, sepsis, metabolic encephalopathy, anemia     Pertinent features from physical exam: altered mentation without focal neuro deficits, mild vag bleeding present.    Initial diagnostic plan: labs, ct head, EKG, cxr    Results from initial plan were reviewed and interpreted by me revealing lactic acidosis with positive UTI. Concern for urosepsis brenton with change in mentation. Ammonia elevated. Ct head does not show acute changes      Diagnostic information from other sources: echo from 2023 shows EF of 60-65%. Patient can tolerate fluid resuscitation     Interventions / Re-evaluation: fluids (initially only 500 cc however recent echo shows good EF so full sepsis bolus ordered). Bactrim ordered due to patient having allergic rxn to penicillin/flouroquinolone however patient apparently received rocephin last year per Dr. Brandt so she requested we change to rocephin. Patient has remained HDS at this time.  Stable for tele admission     Notified by RN after admission placed that patient is more wheezy and requiring 2 L O2. She does have course rhonchi on her exam. Will provide her with duoneb treatment.     Of note patient is easily agitated but calms down very well when tech sits by her side and holds her hand. She is no longer tachypneic and does not appear in distress     Medications   sepsis fluid LR bolus 2,205 mL (2,205 mL Intravenous New Bag 5/30/24 1715)   cefTRIAXone (ROCEPHIN) 2,000 mg in sodium chloride 0.9 % 100 mL MBP (has no administration in time range)   lactated ringers bolus 500 mL (0 mL Intravenous Stopped 5/30/24 1715)         Consultations/Discussion of results with other physicians: Dr. Brandt hospitalist     Data interpreted: Nursing notes reviewed, vital signs reviewed.  Labs independently interpreted by me .  Imaging independently interpreted by me.  EKG independently interpreted by me.      Counseling: Discussed the results above with the patient regarding need for admission or discharge.  Patient understands and agrees plan of care.      -----  ED Disposition       ED Disposition   Decision to Admit    Condition   --    Comment   Level of Care: Telemetry [5]   Accepting Provider:: JONI BRANDT [[id number]]               Final diagnoses:   Sepsis, due to unspecified organism, unspecified whether acute organ dysfunction present   Urinary tract infection with hematuria, site unspecified     Your Follow-Up Providers    Follow-up information has not been specified.       Contact information for after-discharge care    Follow-up information has not been specified.

## 2024-05-31 LAB
ANION GAP SERPL CALCULATED.3IONS-SCNC: 14 MMOL/L (ref 5–15)
BASOPHILS # BLD AUTO: 0.03 10*3/MM3 (ref 0–0.2)
BASOPHILS NFR BLD AUTO: 0.2 % (ref 0–1.5)
BUN SERPL-MCNC: 25 MG/DL (ref 8–23)
BUN/CREAT SERPL: 25 (ref 7–25)
CALCIUM SPEC-SCNC: 9.1 MG/DL (ref 8.2–9.6)
CHLORIDE SERPL-SCNC: 100 MMOL/L (ref 98–107)
CO2 SERPL-SCNC: 26 MMOL/L (ref 22–29)
CREAT SERPL-MCNC: 1 MG/DL (ref 0.57–1)
D-LACTATE SERPL-SCNC: 1.8 MMOL/L (ref 0.5–2)
D-LACTATE SERPL-SCNC: 2.7 MMOL/L (ref 0.5–2)
DEPRECATED RDW RBC AUTO: 46.1 FL (ref 37–54)
EGFRCR SERPLBLD CKD-EPI 2021: 53 ML/MIN/1.73
EOSINOPHIL # BLD AUTO: 0.01 10*3/MM3 (ref 0–0.4)
EOSINOPHIL NFR BLD AUTO: 0.1 % (ref 0.3–6.2)
ERYTHROCYTE [DISTWIDTH] IN BLOOD BY AUTOMATED COUNT: 14.2 % (ref 12.3–15.4)
GLUCOSE SERPL-MCNC: 89 MG/DL (ref 65–99)
HCT VFR BLD AUTO: 38.7 % (ref 34–46.6)
HGB BLD-MCNC: 12.7 G/DL (ref 12–15.9)
IMM GRANULOCYTES # BLD AUTO: 0.09 10*3/MM3 (ref 0–0.05)
IMM GRANULOCYTES NFR BLD AUTO: 0.5 % (ref 0–0.5)
LYMPHOCYTES # BLD AUTO: 1.77 10*3/MM3 (ref 0.7–3.1)
LYMPHOCYTES NFR BLD AUTO: 9.1 % (ref 19.6–45.3)
MCH RBC QN AUTO: 28.8 PG (ref 26.6–33)
MCHC RBC AUTO-ENTMCNC: 32.8 G/DL (ref 31.5–35.7)
MCV RBC AUTO: 87.8 FL (ref 79–97)
MONOCYTES # BLD AUTO: 1.41 10*3/MM3 (ref 0.1–0.9)
MONOCYTES NFR BLD AUTO: 7.3 % (ref 5–12)
NEUTROPHILS NFR BLD AUTO: 16.09 10*3/MM3 (ref 1.7–7)
NEUTROPHILS NFR BLD AUTO: 82.8 % (ref 42.7–76)
NRBC BLD AUTO-RTO: 0 /100 WBC (ref 0–0.2)
PLATELET # BLD AUTO: 291 10*3/MM3 (ref 140–450)
PMV BLD AUTO: 10 FL (ref 6–12)
POTASSIUM SERPL-SCNC: 3.5 MMOL/L (ref 3.5–5.2)
RBC # BLD AUTO: 4.41 10*6/MM3 (ref 3.77–5.28)
SODIUM SERPL-SCNC: 140 MMOL/L (ref 136–145)
WBC NRBC COR # BLD AUTO: 19.4 10*3/MM3 (ref 3.4–10.8)

## 2024-05-31 PROCEDURE — 83605 ASSAY OF LACTIC ACID: CPT | Performed by: STUDENT IN AN ORGANIZED HEALTH CARE EDUCATION/TRAINING PROGRAM

## 2024-05-31 PROCEDURE — 92610 EVALUATE SWALLOWING FUNCTION: CPT

## 2024-05-31 PROCEDURE — 25810000003 LACTATED RINGERS SOLUTION: Performed by: NURSE PRACTITIONER

## 2024-05-31 PROCEDURE — 25010000002 CEFTRIAXONE PER 250 MG: Performed by: STUDENT IN AN ORGANIZED HEALTH CARE EDUCATION/TRAINING PROGRAM

## 2024-05-31 PROCEDURE — 99232 SBSQ HOSP IP/OBS MODERATE 35: CPT | Performed by: INTERNAL MEDICINE

## 2024-05-31 PROCEDURE — 25010000002 HALOPERIDOL LACTATE PER 5 MG: Performed by: INTERNAL MEDICINE

## 2024-05-31 PROCEDURE — 85025 COMPLETE CBC W/AUTO DIFF WBC: CPT | Performed by: STUDENT IN AN ORGANIZED HEALTH CARE EDUCATION/TRAINING PROGRAM

## 2024-05-31 PROCEDURE — 80048 BASIC METABOLIC PNL TOTAL CA: CPT | Performed by: STUDENT IN AN ORGANIZED HEALTH CARE EDUCATION/TRAINING PROGRAM

## 2024-05-31 RX ORDER — QUETIAPINE FUMARATE 25 MG/1
50 TABLET, FILM COATED ORAL EVERY 12 HOURS SCHEDULED
Status: DISCONTINUED | OUTPATIENT
Start: 2024-05-31 | End: 2024-06-01

## 2024-05-31 RX ORDER — BUSPIRONE HYDROCHLORIDE 10 MG/1
10 TABLET ORAL 2 TIMES DAILY
COMMUNITY

## 2024-05-31 RX ORDER — HALOPERIDOL 5 MG/ML
2 INJECTION INTRAMUSCULAR ONCE
Status: COMPLETED | OUTPATIENT
Start: 2024-05-31 | End: 2024-05-31

## 2024-05-31 RX ORDER — AMLODIPINE BESYLATE 2.5 MG/1
2.5 TABLET ORAL DAILY
COMMUNITY

## 2024-05-31 RX ORDER — QUETIAPINE FUMARATE 25 MG/1
25 TABLET, FILM COATED ORAL EVERY 12 HOURS SCHEDULED
Status: DISCONTINUED | OUTPATIENT
Start: 2024-05-31 | End: 2024-05-31

## 2024-05-31 RX ORDER — LORAZEPAM 0.5 MG/1
0.5 TABLET ORAL ONCE
Status: COMPLETED | OUTPATIENT
Start: 2024-05-31 | End: 2024-05-31

## 2024-05-31 RX ADMIN — QUETIAPINE FUMARATE 50 MG: 25 TABLET ORAL at 20:03

## 2024-05-31 RX ADMIN — Medication 10 ML: at 08:35

## 2024-05-31 RX ADMIN — ATORVASTATIN CALCIUM 20 MG: 20 TABLET, FILM COATED ORAL at 20:03

## 2024-05-31 RX ADMIN — HALOPERIDOL LACTATE 2 MG: 5 INJECTION, SOLUTION INTRAMUSCULAR at 16:54

## 2024-05-31 RX ADMIN — MEMANTINE 5 MG: 10 TABLET ORAL at 20:03

## 2024-05-31 RX ADMIN — SODIUM CHLORIDE 1000 MG: 900 INJECTION INTRAVENOUS at 16:54

## 2024-05-31 RX ADMIN — LORAZEPAM 0.5 MG: 0.5 TABLET ORAL at 20:49

## 2024-05-31 RX ADMIN — ACETAMINOPHEN 650 MG: 325 TABLET ORAL at 20:03

## 2024-05-31 RX ADMIN — APIXABAN 5 MG: 5 TABLET, FILM COATED ORAL at 08:35

## 2024-05-31 RX ADMIN — DONEPEZIL HYDROCHLORIDE 10 MG: 10 TABLET, FILM COATED ORAL at 20:03

## 2024-05-31 RX ADMIN — QUETIAPINE FUMARATE 50 MG: 25 TABLET ORAL at 11:40

## 2024-05-31 RX ADMIN — CITALOPRAM HYDROBROMIDE 10 MG: 10 TABLET ORAL at 08:34

## 2024-05-31 RX ADMIN — Medication 10 ML: at 20:03

## 2024-05-31 RX ADMIN — APIXABAN 5 MG: 5 TABLET, FILM COATED ORAL at 20:03

## 2024-05-31 RX ADMIN — MONTELUKAST 10 MG: 10 TABLET, FILM COATED ORAL at 20:03

## 2024-05-31 RX ADMIN — TAMSULOSIN HYDROCHLORIDE 0.4 MG: 0.4 CAPSULE ORAL at 08:34

## 2024-05-31 RX ADMIN — MEMANTINE 5 MG: 10 TABLET ORAL at 08:35

## 2024-05-31 RX ADMIN — SODIUM CHLORIDE, POTASSIUM CHLORIDE, SODIUM LACTATE AND CALCIUM CHLORIDE 500 ML: 600; 310; 30; 20 INJECTION, SOLUTION INTRAVENOUS at 00:07

## 2024-05-31 NOTE — PLAN OF CARE
Problem: Adult Inpatient Plan of Care  Goal: Plan of Care Review  Outcome: Ongoing, Progressing  Goal: Patient-Specific Goal (Individualized)  Outcome: Ongoing, Progressing  Goal: Absence of Hospital-Acquired Illness or Injury  Outcome: Ongoing, Progressing  Intervention: Identify and Manage Fall Risk  Recent Flowsheet Documentation  Taken 5/31/2024 1400 by Yuridia Vaughn RN  Safety Promotion/Fall Prevention:   activity supervised   assistive device/personal items within reach   clutter free environment maintained   toileting scheduled   safety round/check completed   room organization consistent  Taken 5/31/2024 1200 by Yuridia Vaughn RN  Safety Promotion/Fall Prevention:   activity supervised   assistive device/personal items within reach   clutter free environment maintained   toileting scheduled   safety round/check completed   room organization consistent  Taken 5/31/2024 1000 by Yuridia Vaughn RN  Safety Promotion/Fall Prevention:   activity supervised   assistive device/personal items within reach   clutter free environment maintained   toileting scheduled   room organization consistent   safety round/check completed  Taken 5/31/2024 0800 by Yuridia Vaughn RN  Safety Promotion/Fall Prevention:   activity supervised   assistive device/personal items within reach   clutter free environment maintained   toileting scheduled   safety round/check completed   room organization consistent  Intervention: Prevent Skin Injury  Recent Flowsheet Documentation  Taken 5/31/2024 1400 by Yuridia Vaughn RN  Body Position:   weight shifting   sitting up in bed  Skin Protection:   adhesive use limited   tubing/devices free from skin contact   transparent dressing maintained   skin-to-skin areas padded   skin-to-device areas padded  Taken 5/31/2024 1200 by Yuridia Vaughn RN  Body Position:   weight shifting   supine, legs elevated  Skin Protection:   adhesive use limited   tubing/devices free from skin  contact   transparent dressing maintained   skin-to-skin areas padded   skin-to-device areas padded  Taken 5/31/2024 1000 by Yuridia Vaughn RN  Body Position:   weight shifting   supine  Skin Protection:   adhesive use limited   tubing/devices free from skin contact   transparent dressing maintained   skin-to-skin areas padded   skin-to-device areas padded  Taken 5/31/2024 0800 by Yuridia Vaughn RN  Body Position:   weight shifting   sitting up in bed   position changed independently  Skin Protection:   adhesive use limited   tubing/devices free from skin contact   transparent dressing maintained   skin-to-skin areas padded   skin-to-device areas padded  Intervention: Prevent and Manage VTE (Venous Thromboembolism) Risk  Recent Flowsheet Documentation  Taken 5/31/2024 1400 by Yuridia Vaughn RN  Activity Management: activity encouraged  Taken 5/31/2024 1200 by Yuridia Vaughn RN  Activity Management: activity encouraged  Taken 5/31/2024 1000 by Yuridia Vaughn RN  Activity Management: activity encouraged  Taken 5/31/2024 0800 by Yuridia Vaughn RN  Activity Management: activity encouraged  Range of Motion: ROM (range of motion) performed  Goal: Optimal Comfort and Wellbeing  Outcome: Ongoing, Progressing  Intervention: Provide Person-Centered Care  Recent Flowsheet Documentation  Taken 5/31/2024 0800 by Yuridia Vaughn RN  Trust Relationship/Rapport: care explained  Goal: Readiness for Transition of Care  Outcome: Ongoing, Progressing  Intervention: Mutually Develop Transition Plan  Recent Flowsheet Documentation  Taken 5/31/2024 0855 by Yuridia Vaughn RN  Equipment Currently Used at Home: none     Problem: Fall Injury Risk  Goal: Absence of Fall and Fall-Related Injury  Outcome: Ongoing, Progressing  Intervention: Promote Injury-Free Environment  Recent Flowsheet Documentation  Taken 5/31/2024 1400 by Yuridia Vaughn RN  Safety Promotion/Fall Prevention:   activity supervised   assistive  device/personal items within reach   clutter free environment maintained   toileting scheduled   safety round/check completed   room organization consistent  Taken 5/31/2024 1200 by Yuridia Vaughn RN  Safety Promotion/Fall Prevention:   activity supervised   assistive device/personal items within reach   clutter free environment maintained   toileting scheduled   safety round/check completed   room organization consistent  Taken 5/31/2024 1000 by Yuridia Vaughn RN  Safety Promotion/Fall Prevention:   activity supervised   assistive device/personal items within reach   clutter free environment maintained   toileting scheduled   room organization consistent   safety round/check completed  Taken 5/31/2024 0800 by Yuridia Vaughn RN  Safety Promotion/Fall Prevention:   activity supervised   assistive device/personal items within reach   clutter free environment maintained   toileting scheduled   safety round/check completed   room organization consistent     Problem: Skin Injury Risk Increased  Goal: Skin Health and Integrity  Outcome: Ongoing, Progressing  Intervention: Optimize Skin Protection  Recent Flowsheet Documentation  Taken 5/31/2024 1400 by Yuridia Vaughn RN  Pressure Reduction Techniques:   frequent weight shift encouraged   heels elevated off bed   pressure points protected   weight shift assistance provided  Head of Bed (HOB) Positioning: HOB elevated  Pressure Reduction Devices:   pressure-redistributing mattress utilized   positioning supports utilized   heel offloading device utilized   foam padding utilized  Skin Protection:   adhesive use limited   tubing/devices free from skin contact   transparent dressing maintained   skin-to-skin areas padded   skin-to-device areas padded  Taken 5/31/2024 1200 by Yuridia Vaughn RN  Pressure Reduction Techniques:   frequent weight shift encouraged   heels elevated off bed   pressure points protected   weight shift assistance provided  Head of Bed  ([facility]) Positioning: [facility] elevated  Pressure Reduction Devices:   pressure-redistributing mattress utilized   positioning supports utilized   heel offloading device utilized   foam padding utilized  Skin Protection:   adhesive use limited   tubing/devices free from skin contact   transparent dressing maintained   skin-to-skin areas padded   skin-to-device areas padded  Taken 5/31/2024 1000 by Yuridia Vaughn RN  Pressure Reduction Techniques:   frequent weight shift encouraged   heels elevated off bed   pressure points protected   weight shift assistance provided  Head of Bed ([facility]) Positioning: [facility] elevated  Pressure Reduction Devices:   pressure-redistributing mattress utilized   positioning supports utilized   heel offloading device utilized   foam padding utilized  Skin Protection:   adhesive use limited   tubing/devices free from skin contact   transparent dressing maintained   skin-to-skin areas padded   skin-to-device areas padded  Taken 5/31/2024 0800 by Yuridia Vaughn RN  Pressure Reduction Techniques:   frequent weight shift encouraged   heels elevated off bed   pressure points protected   weight shift assistance provided  Head of Bed ([facility]) Positioning: [facility] elevated  Pressure Reduction Devices: (alyvin pads applied to heels and coccyx)   pressure-redistributing mattress utilized   positioning supports utilized   heel offloading device utilized   foam padding utilized   other (see comments)  Skin Protection:   adhesive use limited   tubing/devices free from skin contact   transparent dressing maintained   skin-to-skin areas padded   skin-to-device areas padded     Problem: Restraint, Nonviolent  Goal: Absence of Harm or Injury  Outcome: Ongoing, Progressing  Intervention: Implement Least Restrictive Safety Strategies  Recent Flowsheet Documentation  Taken 5/31/2024 1400 by Yuridia Vaughn RN  Medical Device Protection:   torso covered   tubing secured  Taken 5/31/2024 1200 by Yuridia Vaughn  RN  Medical Device Protection:   tubing secured   torso covered  Less Restrictive Alternative:   1:1 observation maintained   calming techniques promoted   emotional support provided   safety enhancements provided  De-Escalation Techniques:   1:1 observation initiated   appropriate behavior reinforced   medication administered   reoriented   stimulation decreased  Diversional Activities: television  Taken 5/31/2024 1000 by Yuridia Vaughn RN  Medical Device Protection:   tubing secured   torso covered  Less Restrictive Alternative:   1:1 observation maintained   calming techniques promoted   emotional support provided   safety enhancements provided  De-Escalation Techniques:   1:1 observation initiated   appropriate behavior reinforced   family involvement requested   reoriented   stimulation decreased  Diversional Activities: television  Taken 5/31/2024 0800 by Yuridia Vaughn RN  Medical Device Protection:   tubing secured   torso covered  Less Restrictive Alternative:   1:1 observation maintained   calming techniques promoted   emotional support provided   safety enhancements provided  De-Escalation Techniques:   1:1 observation initiated   appropriate behavior reinforced   diversional activity encouraged   reoriented   stimulation decreased  Diversional Activities: television  Intervention: Protect Dignity, Rights, and Personal Wellbeing  Recent Flowsheet Documentation  Taken 5/31/2024 0800 by Yuridia Vaughn RN  Trust Relationship/Rapport: care explained  Intervention: Protect Skin and Joint Integrity  Recent Flowsheet Documentation  Taken 5/31/2024 1400 by Yuridia Vaughn RN  Body Position:   weight shifting   sitting up in bed  Taken 5/31/2024 1200 by Yuridia Vaughn RN  Body Position:   weight shifting   supine, legs elevated  Taken 5/31/2024 1000 by Yuridia Vaughn RN  Body Position:   weight shifting   supine  Taken 5/31/2024 0800 by Yuridia Vaughn RN  Body Position:   weight shifting    sitting up in bed   position changed independently  Range of Motion: ROM (range of motion) performed   Goal Outcome Evaluation:

## 2024-05-31 NOTE — H&P
[facility] Medicine Services  HISTORY AND PHYSICAL    Patient Name: Dorota Membreno  : 10/31/1931  MRN: [id number]  Primary Care Physician: Anthony Medrano MD  Date of admission: 2024      Subjective   Subjective     Chief Complaint:  AMS    HPI:  Dorota Membreno is a 89 y.o. female with history of dementia who was found altered and presented to [facility] ED.  Presentation at that time was notable for a likely UTI and she was started on antibiotics and fluids.  She has baseline dementia and unable to provide further information.  She appears anxious.  She states she is uncomfortable but unable to tell me exactly why      Personal History     Past Medical History:   Diagnosis Date    Acid reflux 2016    stable    Allergic rhinitis     Alzheimer's disease 2017    Arteritis     A. 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 B. Improved with prednisone therapy    Asthma     Cancer     colon. Status post resection at age 37    Cognitive impairment, mild, so stated     A. Patient has been forgetting names continue med. Has f/u with neuro. She is having some times forgetting directions. She has moved into a new house and this has not helped with directions    Dyslipidemia     lipids and cmp    Hypertension     lipids, cmp, urine micro    Hypothyroidism     A. On replacement therapy    Mammogram abnormal     A. 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    Panic attacks     Pneumonia     Postmenopausal osteoporosis     Shingles 2018           Past Surgical History:   Procedure Laterality Date    CATARACT EXTRACTION      COLECTOMY PARTIAL / TOTAL      Patient diagnosed with colon cancer at age 37    HYSTERECTOMY         Family History: family history includes Alcohol abuse in an other family member; Allergies in her son; Other in her mother.     Social History:  reports that she has never smoked. She has never used  smokeless tobacco. She reports that she does not drink alcohol and does not use drugs.  Social History     Social History Narrative    Not on file       Medications:  Available home medication information reviewed.  Magnesium Oxide -Mg Supplement, QUEtiapine, Vitamin D3, acetaminophen, apixaban, atorvastatin, citalopram, donepezil, hydrOXYzine, ipratropium-albuterol, levothyroxine, memantine, metoprolol tartrate, montelukast, ondansetron, pantoprazole, polyethylene glycol, sennosides-docusate, tamsulosin, and vitamin D3    Allergies   Allergen Reactions    Codeine     Dyazide [Hydrochlorothiazide W-Triamterene] Unknown - Low Severity    Levofloxacin     Penicillins        Objective   Objective     Vital Signs:   Temp:  [98.1 °F (36.7 °C)-98.4 °F (36.9 °C)] 98.4 °F (36.9 °C)  Heart Rate:  [80-85] 80  Resp:  [20-28] 22  BP: ()/(49-77) 140/70  Flow (L/min):  [2] 2       Physical Exam   Constitutional: anxious, elderly, frail  HENT: NCAT, mucous membranes moist  Respiratory: Clear to auscultation bilaterally, respiratory effort normal   Cardiovascular: RRR, no murmurs, rubs, or gallops  Gastrointestinal: Positive bowel sounds, soft, nontender, nondistended  Musculoskeletal: No bilateral ankle edema  Psychiatric: Anxious  Neurologic: Oriented to person only, generally follows commands, no focal deficits, globally weak  Skin: No rashes      Result Review:  I have personally reviewed the results from the time of this admission to 5/30/2024 22:11 EDT and agree with these findings:  [x]  Laboratory list / accordion  [x]  Microbiology  [x]  Radiology  []  EKG/Telemetry   []  Cardiology/Vascular   []  Pathology  []  Old records  []  Other:  Most notable findings include:   CT head negative  Elevated lactate  Elevated creatinine  Leukocytosis  UA appears consistent with UTI  LAB RESULTS:      Lab 05/30/24  1532 05/30/24  1448   WBC  --  18.41*   HEMOGLOBIN  --  14.4   HEMATOCRIT  --  42.4   PLATELETS  --  314   NEUTROS  ABS  --  15.40*   IMMATURE GRANS (ABS)  --  0.04   LYMPHS ABS  --  1.94   MONOS ABS  --  0.97*   EOS ABS  --  0.03   MCV  --  87.6   PROCALCITONIN 0.05  --    LACTATE 3.5* 3.1*   PROTIME  --  15.7*   INR  --  1.19*   APTT  --  33.4*         Lab 05/30/24  1532   SODIUM 141   POTASSIUM 4.7   CHLORIDE 100   CO2 26.8   ANION GAP 14.2   BUN 36*   CREATININE 1.21*   EGFR 42.1*   GLUCOSE 116*   CALCIUM 9.7*   MAGNESIUM 1.9   TSH 2.040         Lab 05/30/24  1532   TOTAL PROTEIN 7.4   ALBUMIN 3.9   GLOBULIN 3.5   ALT (SGPT) 7   AST (SGOT) 25   BILIRUBIN 0.5   ALK PHOS 145*         Lab 05/30/24  1532   PROBNP 494.0   HSTROP T 13                 UA          5/30/2024    14:52   Urinalysis   Squamous Epithelial Cells, UA 0-2    Specific [place], UA 1.015    Ketones, UA Negative    Blood, UA Moderate (2+)    Leukocytes, UA Moderate (2+)    Nitrite, UA Negative    RBC, UA 11-20    WBC, UA Too Numerous to Count    Bacteria, UA 3+        Microbiology Results (last 10 days)       Procedure Component Value - Date/Time    COVID PRE-OP / PRE-PROCEDURE SCREENING ORDER (NO ISOLATION) - Swab, Nasopharynx [[id number]]  (Normal) Collected: 05/30/24 1452    Lab Status: Final result Specimen: Swab from Nasopharynx Updated: 05/30/24 1617    Narrative:      The following orders were created for panel order COVID PRE-OP / PRE-PROCEDURE SCREENING ORDER (NO ISOLATION) - Swab, Nasopharynx.  Procedure                               Abnormality         Status                     ---------                               -----------         ------                     COVID-19, FLU A/B, RSV P...[[id number]]  Normal              Final result                 Please view results for these tests on the individual orders.    COVID-19, FLU A/B, RSV PCR 1 HR TAT - Swab, Nasopharynx [[id number]]  (Normal) Collected: 05/30/24 1452    Lab Status: Final result Specimen: Swab from Nasopharynx Updated: 05/30/24 1617     COVID19 Not Detected     Influenza A PCR Not  Detected     Influenza B PCR Not Detected     RSV, PCR Not Detected    Narrative:      Fact sheet for providers: https://www.fda.gov/media/301767/download    Fact sheet for patients: https://www.fda.gov/media/094413/download    Test performed by PCR.            CT Head Without Contrast    Result Date: 5/30/2024  CT HEAD WO CONTRAST Date of Exam: 5/30/2024 4:18 PM EDT Indication: ams. Comparison: 4/7/2018 Technique: Axial CT images were obtained of the head without contrast administration.  Automated exposure control and iterative construction methods were used. Findings: No intracranial hemorrhage. Gray-white matter differentiation is maintained without evidence of an acute infarction. Multiple foci of decreased attenuation are present within the subcortical, deep cerebral, and periventricular white matter consistent with chronic small vessel/microangiopathic ischemic changes. No extra-axial mass or collection. The ventricles and sulci are prominent commensurate with involutional changes. The posterior fossa appears grossly normal. Sellar and suprasellar structures are normal. Lens replacements. The paranasal sinuses, ethmoid air cells, and mastoid air cells are aerated. The bony calvarium is intact.     Impression: Impression: No acute intracranial pathology. Electronically Signed: Mina Larkin MD  5/30/2024 4:30 PM EDT  Workstation ID: [id number]    XR Chest 1 View    Result Date: 5/30/2024  XR CHEST 1 VW Date of Exam: 5/30/2024 2:14 PM EDT Indication: ams Comparison 7/11/2023 Findings: There is unchanged moderately severe elevation of the right diaphragm. Mildly prominent interstitial pattern appears stable and chronic. Heart and pulmonary vessels appear within normal limits. There are no acute infiltrates or effusions.     Impression: Impression: Chronic findings. No acute process. Electronically Signed: Rachel Hood MD  5/30/2024 2:33 PM EDT  Workstation ID: [id number]     Results for orders placed during the  hospital encounter of 02/26/23    Adult Transthoracic Echo Complete w/ Color, Spectral and Contrast if Necessary Per Protocol    Interpretation Summary    Left ventricular ejection fraction appears to be 61 - 65%.    Left ventricular diastolic function was normal.    Left atrial volume is moderately increased.    Moderate tricuspid valve regurgitation is present.    Estimated right ventricular systolic pressure from tricuspid regurgitation is moderately elevated (45-55 mmHg). Calculated right ventricular systolic pressure from tricuspid regurgitation is 47 mmHg.      Assessment & Plan   Assessment & Plan       Complicated UTI (urinary tract infection)    Late onset Alzheimer's disease with behavioral disturbance    Hypothyroidism    Atrial fibrillation    Leukocytosis        Dorota Membreno is a 89-year-old female with a history of dementia who is presenting with altered mental status and found to be septic from likely source of UTI    Altered mental status  Sepsis  UTI  - CT head as above, negative for any acute pathology.  Suspect infectious etiology  - Started on ceftriaxone, follow blood and urine culture  - Continue fluids.  Failed bedside nursing dysphagia screen.  Fluids overnight with speech consult in AM.    KRYSTEN  --secondary to above  --fluids  --repeat in AM  --avoid in nephrotoxins    Hypothyroid  --cont home synthroid  --TSH and T4 ok    Dementia  --cont home meds  --complicates all aspects of care    Afib  --cont home eliquis  --rate controlled  --holding on home BB secondary to above          DVT prophylaxis:  Medical and mechanical DVT prophylaxis orders are present.          CODE STATUS:  neither family member answering phone call and she is not able to currently make decisions. Previously documented DNR/DNI  There are no questions and answers to display.       Expected Discharge   Expected Discharge Date: 6/3/2024; Expected Discharge Time:      Zina Brandt MD  05/30/24

## 2024-05-31 NOTE — PLAN OF CARE
Problem: Adult Inpatient Plan of Care  Goal: Plan of Care Review  Outcome: Ongoing, Progressing  Flowsheets (Taken 5/30/2024 2128)  Progress: no change  Plan of Care Reviewed With: patient  Goal: Patient-Specific Goal (Individualized)  Outcome: Ongoing, Progressing  Goal: Absence of Hospital-Acquired Illness or Injury  Outcome: Ongoing, Progressing  Goal: Optimal Comfort and Wellbeing  Outcome: Ongoing, Progressing  Goal: Readiness for Transition of Care  Outcome: Ongoing, Progressing  Intervention: Mutually Develop Transition Plan  Recent Flowsheet Documentation  Taken 5/30/2024 2122 by Christopher Aguirre, RN  Transportation Anticipated: agency  Patient/Family Anticipated Services at Transition: skilled nursing  Patient/Family Anticipates Transition to: long-term care facility     Problem: Fall Injury Risk  Goal: Absence of Fall and Fall-Related Injury  Outcome: Ongoing, Progressing     Problem: Skin Injury Risk Increased  Goal: Skin Health and Integrity  Outcome: Ongoing, Progressing   Goal Outcome Evaluation:  Plan of Care Reviewed With: patient        Progress: no change

## 2024-05-31 NOTE — THERAPY EVALUATION
Acute Care - Speech Language Pathology   Swallow Initial Evaluation   [facility]  Clinical Swallow Evaluation       Patient Name: Dorota Membreno  : 10/31/1931  MRN: [id number]  Today's Date: 2024               Admit Date: 2024    Visit Dx:     ICD-10-CM ICD-9-CM   1. Sepsis, due to unspecified organism, unspecified whether acute organ dysfunction present  A41.9 038.9     995.91   2. Urinary tract infection with hematuria, site unspecified  N39.0 599.0    R31.9 599.70     Patient Active Problem List   Diagnosis    Gastroesophageal reflux disease    Atopic rhinitis    Asthma    Late onset Alzheimer's disease with behavioral disturbance    Eczema    Dyslipidemia    Hypothyroidism    Essential hypertension    Post-menopausal osteoporosis    Arteritis    Dizziness    Edema    Fatigue    Shingles    Syncope and collapse    Atrial fibrillation    Leukocytosis    Hypomagnesemia    Elevated brain natriuretic peptide (BNP) level    Proteinuria    Bilateral Pleural effusions    Pneumonia    Respiratory failure    Elevated alkaline phosphatase level    Complicated UTI (urinary tract infection)     Past Medical History:   Diagnosis Date    Acid reflux 2016    stable    Allergic rhinitis     Alzheimer's disease 2017    Arteritis     A. 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 B. Improved with prednisone therapy    Asthma     Cancer     colon. Status post resection at age 37    Cognitive impairment, mild, so stated     A. Patient has been forgetting names continue med. Has f/u with neuro. She is having some times forgetting directions. She has moved into a new house and this has not helped with directions    Dyslipidemia     lipids and cmp    Hypertension     lipids, cmp, urine micro    Hypothyroidism     A. On replacement therapy    Mammogram abnormal     A. 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    Panic attacks     Pneumonia      Postmenopausal osteoporosis     Shingles 03/2018     Past Surgical History:   Procedure Laterality Date    CATARACT EXTRACTION      COLECTOMY PARTIAL / TOTAL      Patient diagnosed with colon cancer at age 37    HYSTERECTOMY         SLP Recommendation and Plan  SLP Swallowing Diagnosis: functional oral phase, R/O pharyngeal dysphagia, other (see comments) (no suspected pharyngeal dysphagia) (05/31/24 0900)  SLP Diet Recommendation: regular textures, thin liquids (05/31/24 0900)  Recommended Precautions and Strategies: upright posture during/after eating, general aspiration precautions, assist with feeding (05/31/24 0900)  SLP Rec. for Method of Medication Administration: meds whole, with thin liquids, with puree, as tolerated (05/31/24 0900)     Monitor for Signs of Aspiration: notify SLP if any concerns (05/31/24 0900)  Recommended Diagnostics: other (see comments) (diet tolerance) (05/31/24 0900)  Swallow Criteria for Skilled Therapeutic Interventions Met: demonstrates skilled criteria (05/31/24 0900)  Anticipated Discharge Disposition (SLP): skilled nursing facility (05/31/24 0900)  Rehab Potential/Prognosis, Swallowing: good, to achieve stated therapy goals (05/31/24 0900)  Therapy Frequency (Swallow): PRN (05/31/24 0900)  Predicted Duration Therapy Intervention (Days): 1 week (05/31/24 0900)  Oral Care Recommendations: Oral Care BID/PRN, Toothbrush (05/31/24 0900)                                               SWALLOW EVALUATION (Last 72 Hours)       SLP Adult Swallow Evaluation       Row Name 05/31/24 0900                   Rehab Evaluation    Document Type evaluation  -CH        Subjective Information no complaints  -CH        Patient Observations alert;cooperative  In 2 point soft restraints, mildly agitated, easily redirected during evaluation. Staff/sitter present  -CH        Patient/Family/Caregiver Comments/Observations none present  -CH        Patient Effort adequate  -CH        Symptoms Noted  "During/After Treatment none  -           General Information    Patient Profile Reviewed yes  -CH        Pertinent History Of Current Problem Admitted with sepsis, UTI, AMS, dementia at baseline. CXR: no acute findings.  -        Current Method of Nutrition regular textures;thin liquids  -        Precautions/Limitations, Vision WFL;for purposes of eval  -CH        Precautions/Limitations, Hearing WFL;for purposes of eval  -CH        Prior Level of Function-Communication unknown  -        Prior Level of Function-Swallowing no diet consistency restrictions;other (see comments)  per SNF staff, no difficulty swallowing, just ate very slow.  -        Plans/Goals Discussed with patient  -        Barriers to Rehab cognitive status  -        Patient's Goals for Discharge patient did not state  -           Pain    Additional Documentation Pain Scale: FACES Pre/Post-Treatment (Group)  -           Pain Scale: FACES Pre/Post-Treatment    Pain: FACES Scale, Pretreatment 2-->hurts little bit  -        Posttreatment Pain Rating 2-->hurts little bit  -        Pre/Posttreatment Pain Comment Patient did not indicate pain or location when asked. Slight grimace with movement. Possibly d/t agitation with bilateral soft wrist restraints. Patient stating \"get me up\"  -           Oral Motor Structure and Function    Dentition Assessment natural, present and adequate  -        Secretion Management WNL/WFL  -        Mucosal Quality moist, healthy  -           Oral Musculature and Cranial Nerve Assessment    Oral Motor General Assessment unable to assess  -           General Eating/Swallowing Observations    Respiratory Support Currently in Use nasal cannula  -        O2 Liters 2L  -        Eating/Swallowing Skills fed by SLP  -        Positioning During Eating upright 90 degree;upright in bed  -        Utensils Used spoon;cup;straw  -        Consistencies Trialed thin liquids;pureed;regular textures  " -        Pre SpO2 (%) 93  -CH        Post SpO2 (%) 95  -CH           Respiratory    Respiratory Status wheezing, stridor;other (see comments)  prior to and throughout evaluation  -           Clinical Swallow Eval    Oral Prep Phase WFL  -CH        Oral Transit WFL  -CH        Oral Residue WFL  -CH        Pharyngeal Phase no overt signs/symptoms of pharyngeal impairment  -        Clinical Swallow Evaluation Summary Wheezing noted prior to and throughout evaluation , which did not worsen following trials. CXR: no acute findings.  Patient required verbal cues to take sips from a straw. No overt clinical s/s of aspiration with any consistency or presentation style. Recommend continuation of regular diet and thin liquids. Staff to assist with intake. Meds as tolerated.  -           Swallowing Quality of Life Assessment    Education and counseling provided Oral care recommendations and rationale;Feeding assistance and techniques  -           SLP Evaluation Clinical Impression    SLP Swallowing Diagnosis functional oral phase;R/O pharyngeal dysphagia;other (see comments)  no suspected pharyngeal dysphagia  -        Functional Impact risk of aspiration/pneumonia  -        Rehab Potential/Prognosis, Swallowing good, to achieve stated therapy goals  -        Swallow Criteria for Skilled Therapeutic Interventions Met demonstrates skilled criteria  -           Recommendations    Therapy Frequency (Swallow) PRN  -        Predicted Duration Therapy Intervention (Days) 1 week  -        SLP Diet Recommendation regular textures;thin liquids  -        Recommended Diagnostics other (see comments)  diet tolerance  -        Recommended Precautions and Strategies upright posture during/after eating;general aspiration precautions;assist with feeding  -        Oral Care Recommendations Oral Care BID/PRN;Toothbrush  -        SLP Rec. for Method of Medication Administration meds whole;with thin liquids;with  puree;as tolerated  -CH        Monitor for Signs of Aspiration notify SLP if any concerns  -CH        Anticipated Discharge Disposition (SLP) skilled nursing facility  -CH                  User Key  (r) = Recorded By, (t) = Taken By, (c) = Cosigned By      Initials Name Effective Dates    Isabela Pate MS CCC-SLP 06/16/21 -                     EDUCATION  The patient has been educated in the following areas:   Dysphagia (Swallowing Impairment) Oral Care/Hydration Modified Diet Instruction.        SLP GOALS       Row Name 05/31/24 0900             (LTG) Patient will demonstrate functional swallow for    Diet Texture (Demonstrate functional swallow) regular textures  -CH      Liquid viscosity (Demonstrate functional swallow) thin liquids  -CH      [place] (Demonstrate functional swallow) with minimal cues (75-90% accuracy)  -CH      Time Frame (Demonstrate functional swallow) by discharge  -CH         ([facility]) Patient will tolerate trials of    Consistencies Trialed (Tolerate trials) regular textures;thin liquids  -CH      Desired Outcome (Tolerate trials) without signs/symptoms of aspiration;with adequate oral prep/transit/clearance  -CH      [place] (Tolerate trials) with minimal cues (75-90% accuracy)  -CH      Time Frame (Tolerate trials) 1 week  -CH                User Key  (r) = Recorded By, (t) = Taken By, (c) = Cosigned By      Initials Name Provider Type    Isabela Pate MS CCC-SLP Speech and Language Pathologist                         Time Calculation:    Time Calculation- SLP       Row Name 05/31/24 0957             Time Calculation- SLP    SLP Start Time 0900  -      SLP Received On 05/31/24  -CH         Untimed Charges    SLP Eval/Re-eval  ST Eval Oral Pharyng Swallow - 17141  -CH      29633-EU Eval Oral Pharyng Swallow Minutes 53  -CH         Total Minutes    Untimed Charges Total Minutes 53  -CH       Total Minutes 53  -CH                User Key  (r) = Recorded By, (t) = Taken  By, (c) = Cosigned By      Initials Name Provider Type    Isabela Pate, MS CCC-SLP Speech and Language Pathologist                    Therapy Charges for Today       Code Description Service Date Service Provider Modifiers Qty    [id number]  ST EVAL ORAL PHARYNG SWALLOW 4 5/31/2024 Isabela Zhu MS CCC-SLP GN 1                 Isabela Zhu MS CCC-SLP  5/31/2024

## 2024-05-31 NOTE — PROGRESS NOTES
[facility] Medicine Services  PROGRESS NOTE    Patient Name: Dorota Membreno  : 10/31/1931  MRN: [id number]    Date of Admission: 2024  Primary Care Physician: Anthony Medrano MD    Subjective   Subjective     CC:  UTI    HPI:  Confused.  Awake, answers questions briefly.        Objective   Objective     Vital Signs:   Temp:  [98 °F (36.7 °C)-99.6 °F (37.6 °C)] 99.6 °F (37.6 °C)  Heart Rate:  [] 113  Resp:  [20-28] 24  BP: ()/(49-83) 146/73  Flow (L/min):  [2] 2     Physical Exam:  NAD, in bed  MM moist  RRR  Breath sounds grossly clear, poor effort  Abd soft, NT  Trace edema  Confused, cannot say name or place, but speech clear  Moves all extremities  Buck    Results Reviewed:  LAB RESULTS:      Lab 24  0617 24  0048 24  2222 24  1532 24  1448   WBC  --  19.40*  --   --  18.41*   HEMOGLOBIN  --  12.7  --   --  14.4   HEMATOCRIT  --  38.7  --   --  42.4   PLATELETS  --  291  --   --  314   NEUTROS ABS  --  16.09*  --   --  15.40*   IMMATURE GRANS (ABS)  --  0.09*  --   --  0.04   LYMPHS ABS  --  1.77  --   --  1.94   MONOS ABS  --  1.41*  --   --  0.97*   EOS ABS  --  0.01  --   --  0.03   MCV  --  87.8  --   --  87.6   PROCALCITONIN  --   --   --  0.05  --    LACTATE 1.8 2.7* 3.5* 3.5* 3.1*   PROTIME  --   --   --   --  15.7*   APTT  --   --   --   --  33.4*         Lab 24  0048 24  1532   SODIUM 140 141   POTASSIUM 3.5 4.7   CHLORIDE 100 100   CO2 26.0 26.8   ANION GAP 14.0 14.2   BUN 25* 36*   CREATININE 1.00 1.21*   EGFR 53.0* 42.1*   GLUCOSE 89 116*   CALCIUM 9.1 9.7*   MAGNESIUM  --  1.9   TSH  --  2.040         Lab 24  1532   TOTAL PROTEIN 7.4   ALBUMIN 3.9   GLOBULIN 3.5   ALT (SGPT) 7   AST (SGOT) 25   BILIRUBIN 0.5   ALK PHOS 145*         Lab 24  1532 24  1448   PROBNP 494.0  --    HSTROP T 13  --    PROTIME  --  15.7*   INR  --  1.19*                 Brief Urine Lab Results  (Last result in the  What Type Of Note Output Would You Prefer (Optional)?: Standard Output How Severe Is Your Skin Lesion?: moderate past 365 days)        Color   Clarity   Blood   Leuk Est   Nitrite   Protein   CREAT   Urine HCG        05/30/24 1452 Yellow   Cloudy   Moderate (2+)   Moderate (2+)   Negative   100 mg/dL (2+)                   Microbiology Results Abnormal       Procedure Component Value - Date/Time    Urine Culture - Urine, Urine, Catheter [[id number]]  (Normal) Collected: 05/30/24 1452    Lab Status: Preliminary result Specimen: Urine, Catheter Updated: 05/31/24 1010     Urine Culture Culture in progress    COVID PRE-OP / PRE-PROCEDURE SCREENING ORDER (NO ISOLATION) - Swab, Nasopharynx [[id number]]  (Normal) Collected: 05/30/24 1452    Lab Status: Final result Specimen: Swab from Nasopharynx Updated: 05/30/24 1617    Narrative:      The following orders were created for panel order COVID PRE-OP / PRE-PROCEDURE SCREENING ORDER (NO ISOLATION) - Swab, Nasopharynx.  Procedure                               Abnormality         Status                     ---------                               -----------         ------                     COVID-19, FLU A/B, RSV P...[[id number]]  Normal              Final result                 Please view results for these tests on the individual orders.    COVID-19, FLU A/B, RSV PCR 1 HR TAT - Swab, Nasopharynx [[id number]]  (Normal) Collected: 05/30/24 1452    Lab Status: Final result Specimen: Swab from Nasopharynx Updated: 05/30/24 1617     COVID19 Not Detected     Influenza A PCR Not Detected     Influenza B PCR Not Detected     RSV, PCR Not Detected    Narrative:      Fact sheet for providers: https://www.fda.gov/media/188170/download    Fact sheet for patients: https://www.fda.gov/media/799405/download    Test performed by PCR.            CT Head Without Contrast    Result Date: 5/30/2024  CT HEAD WO CONTRAST Date of Exam: 5/30/2024 4:18 PM EDT Indication: ams. Comparison: 4/7/2018 Technique: Axial CT images were obtained of the head without contrast administration.  Automated exposure control and  Is This A New Presentation, Or A Follow-Up?: Skin Lesion iterative construction methods were used. Findings: No intracranial hemorrhage. Gray-white matter differentiation is maintained without evidence of an acute infarction. Multiple foci of decreased attenuation are present within the subcortical, deep cerebral, and periventricular white matter consistent with chronic small vessel/microangiopathic ischemic changes. No extra-axial mass or collection. The ventricles and sulci are prominent commensurate with involutional changes. The posterior fossa appears grossly normal. Sellar and suprasellar structures are normal. Lens replacements. The paranasal sinuses, ethmoid air cells, and mastoid air cells are aerated. The bony calvarium is intact.     Impression: Impression: No acute intracranial pathology. Electronically Signed: Mina Larkin MD  5/30/2024 4:30 PM EDT  Workstation ID: [id number]    XR Chest 1 View    Result Date: 5/30/2024  XR CHEST 1 VW Date of Exam: 5/30/2024 2:14 PM EDT Indication: ams Comparison 7/11/2023 Findings: There is unchanged moderately severe elevation of the right diaphragm. Mildly prominent interstitial pattern appears stable and chronic. Heart and pulmonary vessels appear within normal limits. There are no acute infiltrates or effusions.     Impression: Impression: Chronic findings. No acute process. Electronically Signed: Rachel Hood MD  5/30/2024 2:33 PM EDT  Workstation ID: [id number]     Results for orders placed during the hospital encounter of 02/26/23    Adult Transthoracic Echo Complete w/ Color, Spectral and Contrast if Necessary Per Protocol    Interpretation Summary    Left ventricular ejection fraction appears to be 61 - 65%.    Left ventricular diastolic function was normal.    Left atrial volume is moderately increased.    Moderate tricuspid valve regurgitation is present.    Estimated right ventricular systolic pressure from tricuspid regurgitation is moderately elevated (45-55 mmHg). Calculated right ventricular systolic pressure  Additional History: Using neosporin as advised by pcp. Patient had ears pierced 1 month ago. from tricuspid regurgitation is 47 mmHg.      Current medications:  Scheduled Meds:apixaban, 5 mg, Oral, Q12H  atorvastatin, 20 mg, Oral, Nightly  cefTRIAXone, 1,000 mg, Intravenous, Q24H  vitamin D3, 5,000 Units, Oral, Daily  citalopram, 10 mg, Oral, Daily  donepezil, 10 mg, Oral, Nightly  levothyroxine, 75 mcg, Oral, Q AM  memantine, 5 mg, Oral, Q12H  montelukast, 10 mg, Oral, Nightly  pantoprazole, 40 mg, Oral, Q AM  QUEtiapine, 50 mg, Oral, Q12H  sodium chloride, 10 mL, Intravenous, Q12H  tamsulosin, 0.4 mg, Oral, Daily      Continuous Infusions:lactated ringers, 75 mL/hr, Last Rate: 75 mL/hr (05/30/24 2244)      PRN Meds:.  acetaminophen    senna-docusate sodium **AND** polyethylene glycol **AND** bisacodyl **AND** bisacodyl    ipratropium-albuterol    sodium chloride    sodium chloride    Assessment & Plan   Assessment & Plan     Active Hospital Problems    Diagnosis  POA    **Complicated UTI (urinary tract infection) [N39.0]  Yes    Atrial fibrillation [I48.91]  Yes    Leukocytosis [D72.829]  Yes    Hypothyroidism [E03.9]  Yes    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        Brief Hospital Course to date:  Dorota Membreno is a 89 y.o. female with history of GERD, Alzheimer's disease, GCA, asthma, colon cancer, HTN, HLD, hypothyroid and anxiety presents with AMS and a UTI    UTI  - rocephin, follow cultures    AMS  Dementia  - CT head negative for acute changes, suspect UTI contributing to AMS  - continue home meds including seroquel and buspar    KRYSTEN  - IV fluids  - voiding trial, discussed with patient's nurse    Hypothyroid    Atrial fibrillation  - eliquis  - bb held at admission, will resume          Expected Discharge Location and Transportation: [facility]  Expected Discharge   Expected Discharge Date: 6/3/2024; Expected Discharge Time:      DVT prophylaxis:  Medical and mechanical DVT  prophylaxis orders are present.         AM-PAC 6 Clicks Score (PT): 16 (05/31/24 0800)    CODE STATUS: DNR/DNI, discussed with daughter  There are no questions and answers to display.       Francisco Apodaca MD  05/31/24

## 2024-05-31 NOTE — CASE MANAGEMENT/SOCIAL WORK
Discharge Planning Assessment  [facility]     Patient Name: Dorota Membreno  MRN: [id number]  Today's Date: 5/31/2024    Admit Date: 5/30/2024    Plan: home   Discharge Needs Assessment       Row Name 05/31/24 1506       Living Environment    People in Home facility resident    Current Living Arrangements extended care facility  [facility]    Potentially Unsafe Housing Conditions none    In the past 12 months has the electric, gas, oil, or water company threatened to shut off services in your home? No    Primary Care Provided by other (see comments)  facility resident    Provides Primary Care For no one, unable/limited ability to care for self    Family Caregiver if Needed none    Quality of Family Relationships supportive    Able to Return to Prior Arrangements yes       Resource/Environmental Concerns    Resource/Environmental Concerns none    Transportation Concerns none       Transportation Needs    In the past 12 months, has lack of transportation kept you from medical appointments or from getting medications? no    In the past 12 months, has lack of transportation kept you from meetings, work, or from getting things needed for daily living? No       Food Insecurity    Within the past 12 months, you worried that your food would run out before you got the money to buy more. Never true    Within the past 12 months, the food you bought just didn't last and you didn't have money to get more. Never true       Transition Planning    Patient/Family Anticipates Transition to long-term care facility    Patient/Family Anticipated Services at Transition none    Transportation Anticipated health plan transportation       Discharge Needs Assessment    Readmission Within the Last 30 Days no previous admission in last 30 days    Equipment Currently Used at Home none    Concerns to be Addressed discharge planning    Anticipated Changes Related to Illness none    Equipment Needed After Discharge none     Discharge Facility/Level of Care Needs nursing facility, basic                   Discharge Plan       Row Name 05/31/24 1508       Plan    Plan home    Patient/Family in Agreement with Plan yes    Plan Comments CM called Ms. Membreno's daughter, Jovana, for initial discharge planning. Jovana reported that the patient is a resident at [facility] unit. She states that Ms. Membreno is independent with mobility and does not use any DME. She is independent with basic self care but requires assistance with activities of daily living. Her primary care provider is Dr. Anthony Culver. Daughter reported that the discharge plan is back to [facility].  will continue to follow plan of care and assist with discharge planning needs as indicated.    Final Discharge Disposition Code 04 - intermediate care facility                  Continued Care and Services - Admitted Since 5/30/2024    No active coordination exists for this encounter.       Expected Discharge Date and Time       Expected Discharge Date Expected Discharge Time    Eliel 3, 2024            Demographic Summary       Row Name 05/31/24 1504       General Information    Admission Type inpatient    Arrived From subacute/long-term acute care    Referral Source admission list    Reason for Consult discharge planning    Preferred Language English       Contact Information    Permission Granted to Share Info With     Contact Information Obtained for     Contact Information Comments Jovana Membreno Daughter [phone]  [phone]  Carlos Membreno Son [phone]  [phone]                   Functional Status       Row Name 05/31/24 1505       Functional Status    Usual Activity Tolerance good    Current Activity Tolerance other (see comments)  can       Physical Activity    On average, how many days per week do you engage in moderate to strenuous exercise (like a brisk walk)? 0 days    On average, how many minutes do you  engage in exercise at this level? 0 min    Number of minutes of exercise per week 0       Assessment of Health Literacy    How often do you have someone help you read hospital materials? Often    How often do you have a problem understanding what is told to you about your medical condition? Often    How confident are you filling out medical forms by yourself? Not at all    Health Literacy Low       Functional Status, IADL    Medications assistive person    Meal Preparation assistive person    Housekeeping assistive person    Laundry assistive person    Shopping assistive person       Mental Status Summary    Recent Changes in Mental Status/Cognitive Functioning unable to assess                   Psychosocial    No documentation.                  Abuse/Neglect    No documentation.                  Legal    No documentation.                  Substance Abuse    No documentation.                  Patient Forms    No documentation.                     Demetra Senior RN

## 2024-05-31 NOTE — PLAN OF CARE
Goal Outcome Evaluation:                     Anticipated Discharge Disposition (SLP): skilled nursing facility          SLP Swallowing Diagnosis: functional oral phase, R/O pharyngeal dysphagia, other (see comments) (no suspected pharyngeal dysphagia) (05/31/24 0900)

## 2024-05-31 NOTE — PLAN OF CARE
Problem: Adult Inpatient Plan of Care  Goal: Plan of Care Review  5/31/2024 1626 by Yuridia Vaughn RN  Outcome: Ongoing, Progressing  5/31/2024 1404 by Yuridia Vaughn RN  Outcome: Ongoing, Progressing  Goal: Patient-Specific Goal (Individualized)  5/31/2024 1626 by Yuridia Vaughn RN  Outcome: Ongoing, Progressing  5/31/2024 1404 by Yuridia Vaughn RN  Outcome: Ongoing, Progressing  Goal: Absence of Hospital-Acquired Illness or Injury  5/31/2024 1626 by Yuridia Vaughn RN  Outcome: Ongoing, Progressing  5/31/2024 1404 by Yuridia Vaughn RN  Outcome: Ongoing, Progressing  Intervention: Identify and Manage Fall Risk  Recent Flowsheet Documentation  Taken 5/31/2024 1600 by Yuridia Vaughn RN  Safety Promotion/Fall Prevention:   activity supervised   assistive device/personal items within reach   clutter free environment maintained   toileting scheduled   safety round/check completed   room organization consistent  Taken 5/31/2024 1400 by Yuridia Vaughn RN  Safety Promotion/Fall Prevention:   activity supervised   assistive device/personal items within reach   clutter free environment maintained   toileting scheduled   safety round/check completed   room organization consistent  Taken 5/31/2024 1200 by Yuridia Vaughn RN  Safety Promotion/Fall Prevention:   activity supervised   assistive device/personal items within reach   clutter free environment maintained   toileting scheduled   safety round/check completed   room organization consistent  Taken 5/31/2024 1000 by Yuridia Vaughn RN  Safety Promotion/Fall Prevention:   activity supervised   assistive device/personal items within reach   clutter free environment maintained   toileting scheduled   room organization consistent   safety round/check completed  Taken 5/31/2024 0800 by Yuridia Vaughn RN  Safety Promotion/Fall Prevention:   activity supervised   assistive device/personal items within reach   clutter free environment maintained    toileting scheduled   safety round/check completed   room organization consistent  Intervention: Prevent Skin Injury  Recent Flowsheet Documentation  Taken 5/31/2024 1600 by Yuridia Vaughn RN  Body Position:   weight shifting   supine  Skin Protection:   adhesive use limited   tubing/devices free from skin contact   transparent dressing maintained   skin-to-skin areas padded   skin-to-device areas padded  Taken 5/31/2024 1400 by Yuridia Vaughn RN  Body Position:   weight shifting   sitting up in bed  Skin Protection:   adhesive use limited   tubing/devices free from skin contact   transparent dressing maintained   skin-to-skin areas padded   skin-to-device areas padded  Taken 5/31/2024 1200 by Yuridia Vaughn RN  Body Position:   weight shifting   supine, legs elevated  Skin Protection:   adhesive use limited   tubing/devices free from skin contact   transparent dressing maintained   skin-to-skin areas padded   skin-to-device areas padded  Taken 5/31/2024 1000 by Yuridia Vaughn RN  Body Position:   weight shifting   supine  Skin Protection:   adhesive use limited   tubing/devices free from skin contact   transparent dressing maintained   skin-to-skin areas padded   skin-to-device areas padded  Taken 5/31/2024 0800 by Yuridia Vaughn RN  Body Position:   weight shifting   sitting up in bed   position changed independently  Skin Protection:   adhesive use limited   tubing/devices free from skin contact   transparent dressing maintained   skin-to-skin areas padded   skin-to-device areas padded  Intervention: Prevent and Manage VTE (Venous Thromboembolism) Risk  Recent Flowsheet Documentation  Taken 5/31/2024 1600 by Yuridia Vaughn, RN  Activity Management: activity encouraged  Taken 5/31/2024 1400 by Yuridia Vaughn RN  Activity Management: activity encouraged  Taken 5/31/2024 1200 by Yuridia Vaughn, RN  Activity Management: activity encouraged  Taken 5/31/2024 1000 by Yuridia Vaughn, RN  Activity  Management: activity encouraged  Taken 5/31/2024 0800 by Yuridia Vaughn RN  Activity Management: activity encouraged  Range of Motion: ROM (range of motion) performed  Goal: Optimal Comfort and Wellbeing  5/31/2024 1626 by Yuridia Vaughn RN  Outcome: Ongoing, Progressing  5/31/2024 1404 by Yuridia Vaughn RN  Outcome: Ongoing, Progressing  Intervention: Provide Person-Centered Care  Recent Flowsheet Documentation  Taken 5/31/2024 0800 by Yuridia Vaughn RN  Trust Relationship/Rapport: care explained  Goal: Readiness for Transition of Care  5/31/2024 1626 by Yuridia Vaughn RN  Outcome: Ongoing, Progressing  5/31/2024 1404 by Yuridia Vaughn RN  Outcome: Ongoing, Progressing  Intervention: Mutually Develop Transition Plan  Recent Flowsheet Documentation  Taken 5/31/2024 0855 by Yuridia Vaughn RN  Equipment Currently Used at Home: none     Problem: Fall Injury Risk  Goal: Absence of Fall and Fall-Related Injury  5/31/2024 1626 by Yuridia Vaughn RN  Outcome: Ongoing, Progressing  5/31/2024 1404 by Yuridia Vaughn RN  Outcome: Ongoing, Progressing  Intervention: Promote Injury-Free Environment  Recent Flowsheet Documentation  Taken 5/31/2024 1600 by Yuridia Vaughn RN  Safety Promotion/Fall Prevention:   activity supervised   assistive device/personal items within reach   clutter free environment maintained   toileting scheduled   safety round/check completed   room organization consistent  Taken 5/31/2024 1400 by Yuridia Vaughn RN  Safety Promotion/Fall Prevention:   activity supervised   assistive device/personal items within reach   clutter free environment maintained   toileting scheduled   safety round/check completed   room organization consistent  Taken 5/31/2024 1200 by Yuridia Vaughn RN  Safety Promotion/Fall Prevention:   activity supervised   assistive device/personal items within reach   clutter free environment maintained   toileting scheduled   safety round/check completed    room organization consistent  Taken 5/31/2024 1000 by Yuridia Vaughn RN  Safety Promotion/Fall Prevention:   activity supervised   assistive device/personal items within reach   clutter free environment maintained   toileting scheduled   room organization consistent   safety round/check completed  Taken 5/31/2024 0800 by Yuridia Vaughn RN  Safety Promotion/Fall Prevention:   activity supervised   assistive device/personal items within reach   clutter free environment maintained   toileting scheduled   safety round/check completed   room organization consistent     Problem: Skin Injury Risk Increased  Goal: Skin Health and Integrity  5/31/2024 1626 by Yuridia Vaughn RN  Outcome: Ongoing, Progressing  5/31/2024 1404 by Yuridia Vaughn RN  Outcome: Ongoing, Progressing  Intervention: Optimize Skin Protection  Recent Flowsheet Documentation  Taken 5/31/2024 1600 by Yuridia Vaughn RN  Pressure Reduction Techniques:   frequent weight shift encouraged   heels elevated off bed   pressure points protected   weight shift assistance provided  Head of Bed (HOB) Positioning: HOB elevated  Pressure Reduction Devices:   pressure-redistributing mattress utilized   positioning supports utilized   heel offloading device utilized   foam padding utilized  Skin Protection:   adhesive use limited   tubing/devices free from skin contact   transparent dressing maintained   skin-to-skin areas padded   skin-to-device areas padded  Taken 5/31/2024 1400 by Yuridia Vaughn RN  Pressure Reduction Techniques:   frequent weight shift encouraged   heels elevated off bed   pressure points protected   weight shift assistance provided  Head of Bed (HOB) Positioning: HOB elevated  Pressure Reduction Devices:   pressure-redistributing mattress utilized   positioning supports utilized   heel offloading device utilized   foam padding utilized  Skin Protection:   adhesive use limited   tubing/devices free from skin contact   transparent  dressing maintained   skin-to-skin areas padded   skin-to-device areas padded  Taken 5/31/2024 1200 by Yuridia Vaughn RN  Pressure Reduction Techniques:   frequent weight shift encouraged   heels elevated off bed   pressure points protected   weight shift assistance provided  Head of Bed ([facility] [place]) Positioning: [facility] [place] elevated  Pressure Reduction Devices:   pressure-redistributing mattress utilized   positioning supports utilized   heel offloading device utilized   foam padding utilized  Skin Protection:   adhesive use limited   tubing/devices free from skin contact   transparent dressing maintained   skin-to-skin areas padded   skin-to-device areas padded  Taken 5/31/2024 1000 by Yuridia Vaughn RN  Pressure Reduction Techniques:   frequent weight shift encouraged   heels elevated off bed   pressure points protected   weight shift assistance provided  Head of Bed ([facility] [place]) Positioning: [facility] [place] elevated  Pressure Reduction Devices:   pressure-redistributing mattress utilized   positioning supports utilized   heel offloading device utilized   foam padding utilized  Skin Protection:   adhesive use limited   tubing/devices free from skin contact   transparent dressing maintained   skin-to-skin areas padded   skin-to-device areas padded  Taken 5/31/2024 0800 by Yuridia Vaughn RN  Pressure Reduction Techniques:   frequent weight shift encouraged   heels elevated off bed   pressure points protected   weight shift assistance provided  Head of Bed ([facility] [place]) Positioning: [facility] [place] elevated  Pressure Reduction Devices: (alyvin pads applied to heels and coccyx)   pressure-redistributing mattress utilized   positioning supports utilized   heel offloading device utilized   foam padding utilized   other (see comments)  Skin Protection:   adhesive use limited   tubing/devices free from skin contact   transparent dressing maintained   skin-to-skin areas padded   skin-to-device areas padded     Problem: Restraint, Nonviolent  Goal: Absence of Harm or  Injury  5/31/2024 1626 by Yuridia Vaughn RN  Outcome: Ongoing, Progressing  5/31/2024 1404 by Yuridia Vaughn RN  Outcome: Ongoing, Progressing  Intervention: Implement Least Restrictive Safety Strategies  Recent Flowsheet Documentation  Taken 5/31/2024 1600 by Yuridia Vaughn RN  Medical Device Protection:   torso covered   tubing secured  Less Restrictive Alternative:   1:1 observation maintained   calming techniques promoted   emotional support provided   safety enhancements provided  De-Escalation Techniques:   1:1 observation initiated   appropriate behavior reinforced   family involvement requested   medication administered   reoriented   stimulation decreased  Diversional Activities: television  Taken 5/31/2024 1400 by Yuridia Vaughn RN  Medical Device Protection:   torso covered   tubing secured  Less Restrictive Alternative:   1:1 observation maintained   calming techniques promoted   emotional support provided   safety enhancements provided  De-Escalation Techniques:   1:1 observation initiated   appropriate behavior reinforced   family involvement requested   reoriented   stimulation decreased  Diversional Activities: television  Taken 5/31/2024 1200 by Yuridia Vaughn RN  Medical Device Protection:   tubing secured   torso covered  Less Restrictive Alternative:   1:1 observation maintained   calming techniques promoted   emotional support provided   safety enhancements provided  De-Escalation Techniques:   1:1 observation initiated   appropriate behavior reinforced   medication administered   reoriented   stimulation decreased  Diversional Activities: television  Taken 5/31/2024 1000 by Yuridia Vaughn RN  Medical Device Protection:   tubing secured   torso covered  Less Restrictive Alternative:   1:1 observation maintained   calming techniques promoted   emotional support provided   safety enhancements provided  De-Escalation Techniques:   1:1 observation initiated   appropriate behavior  reinforced   family involvement requested   reoriented   stimulation decreased  Diversional Activities: television  Taken 5/31/2024 0800 by Yuridia Vaughn RN  Medical Device Protection:   tubing secured   torso covered  Less Restrictive Alternative:   1:1 observation maintained   calming techniques promoted   emotional support provided   safety enhancements provided  De-Escalation Techniques:   1:1 observation initiated   appropriate behavior reinforced   diversional activity encouraged   reoriented   stimulation decreased  Diversional Activities: television  Intervention: Protect Dignity, Rights, and Personal Wellbeing  Recent Flowsheet Documentation  Taken 5/31/2024 0800 by Yuridia Vaughn RN  Trust Relationship/Rapport: care explained  Intervention: Protect Skin and Joint Integrity  Recent Flowsheet Documentation  Taken 5/31/2024 1600 by Yuridia Vaughn RN  Body Position:   weight shifting   supine  Taken 5/31/2024 1400 by Yuridia Vaughn RN  Body Position:   weight shifting   sitting up in bed  Taken 5/31/2024 1200 by Yuridia Vaughn RN  Body Position:   weight shifting   supine, legs elevated  Taken 5/31/2024 1000 by Yuridia Vaughn RN  Body Position:   weight shifting   supine  Taken 5/31/2024 0800 by Yuridia Vaughn RN  Body Position:   weight shifting   sitting up in bed   position changed independently  Range of Motion: ROM (range of motion) performed  Goal: Absence of Harm or Injury  Outcome: Ongoing, Progressing  Intervention: Implement Least Restrictive Safety Strategies  Recent Flowsheet Documentation  Taken 5/31/2024 1600 by Yuridia Vaughn RN  Medical Device Protection:   torso covered   tubing secured  Less Restrictive Alternative:   1:1 observation maintained   calming techniques promoted   emotional support provided   safety enhancements provided  De-Escalation Techniques:   1:1 observation initiated   appropriate behavior reinforced   family involvement requested   medication  administered   reoriented   stimulation decreased  Diversional Activities: television  Intervention: Protect Skin and Joint Integrity  Recent Flowsheet Documentation  Taken 5/31/2024 1600 by Yuridia Vaughn RN  Body Position:   weight shifting   supine   Goal Outcome Evaluation:

## 2024-06-01 LAB
ANION GAP SERPL CALCULATED.3IONS-SCNC: 10 MMOL/L (ref 5–15)
BACTERIA SPEC AEROBE CULT: NORMAL
BASOPHILS # BLD AUTO: 0.03 10*3/MM3 (ref 0–0.2)
BASOPHILS NFR BLD AUTO: 0.2 % (ref 0–1.5)
BUN SERPL-MCNC: 17 MG/DL (ref 8–23)
BUN/CREAT SERPL: 20.7 (ref 7–25)
CALCIUM SPEC-SCNC: 8 MG/DL (ref 8.2–9.6)
CHLORIDE SERPL-SCNC: 101 MMOL/L (ref 98–107)
CO2 SERPL-SCNC: 28 MMOL/L (ref 22–29)
CREAT SERPL-MCNC: 0.82 MG/DL (ref 0.57–1)
DEPRECATED RDW RBC AUTO: 46.2 FL (ref 37–54)
EGFRCR SERPLBLD CKD-EPI 2021: 67.2 ML/MIN/1.73
EOSINOPHIL # BLD AUTO: 0.28 10*3/MM3 (ref 0–0.4)
EOSINOPHIL NFR BLD AUTO: 2.2 % (ref 0.3–6.2)
ERYTHROCYTE [DISTWIDTH] IN BLOOD BY AUTOMATED COUNT: 14.1 % (ref 12.3–15.4)
GLUCOSE SERPL-MCNC: 91 MG/DL (ref 65–99)
HCT VFR BLD AUTO: 32.3 % (ref 34–46.6)
HGB BLD-MCNC: 10.5 G/DL (ref 12–15.9)
IMM GRANULOCYTES # BLD AUTO: 0.05 10*3/MM3 (ref 0–0.05)
IMM GRANULOCYTES NFR BLD AUTO: 0.4 % (ref 0–0.5)
LYMPHOCYTES # BLD AUTO: 1.87 10*3/MM3 (ref 0.7–3.1)
LYMPHOCYTES NFR BLD AUTO: 14.5 % (ref 19.6–45.3)
MCH RBC QN AUTO: 29.2 PG (ref 26.6–33)
MCHC RBC AUTO-ENTMCNC: 32.5 G/DL (ref 31.5–35.7)
MCV RBC AUTO: 89.7 FL (ref 79–97)
MONOCYTES # BLD AUTO: 1.45 10*3/MM3 (ref 0.1–0.9)
MONOCYTES NFR BLD AUTO: 11.3 % (ref 5–12)
NEUTROPHILS NFR BLD AUTO: 71.4 % (ref 42.7–76)
NEUTROPHILS NFR BLD AUTO: 9.19 10*3/MM3 (ref 1.7–7)
NRBC BLD AUTO-RTO: 0 /100 WBC (ref 0–0.2)
PLATELET # BLD AUTO: 192 10*3/MM3 (ref 140–450)
PMV BLD AUTO: 10 FL (ref 6–12)
POTASSIUM SERPL-SCNC: 3.2 MMOL/L (ref 3.5–5.2)
RBC # BLD AUTO: 3.6 10*6/MM3 (ref 3.77–5.28)
SODIUM SERPL-SCNC: 139 MMOL/L (ref 136–145)
WBC NRBC COR # BLD AUTO: 12.87 10*3/MM3 (ref 3.4–10.8)

## 2024-06-01 PROCEDURE — 25010000002 CEFTRIAXONE PER 250 MG: Performed by: STUDENT IN AN ORGANIZED HEALTH CARE EDUCATION/TRAINING PROGRAM

## 2024-06-01 PROCEDURE — 80048 BASIC METABOLIC PNL TOTAL CA: CPT | Performed by: INTERNAL MEDICINE

## 2024-06-01 PROCEDURE — 97162 PT EVAL MOD COMPLEX 30 MIN: CPT | Performed by: PHYSICAL THERAPIST

## 2024-06-01 PROCEDURE — 94761 N-INVAS EAR/PLS OXIMETRY MLT: CPT

## 2024-06-01 PROCEDURE — 85025 COMPLETE CBC W/AUTO DIFF WBC: CPT | Performed by: INTERNAL MEDICINE

## 2024-06-01 PROCEDURE — 97166 OT EVAL MOD COMPLEX 45 MIN: CPT

## 2024-06-01 PROCEDURE — 25810000003 LACTATED RINGERS PER 1000 ML: Performed by: STUDENT IN AN ORGANIZED HEALTH CARE EDUCATION/TRAINING PROGRAM

## 2024-06-01 PROCEDURE — 99232 SBSQ HOSP IP/OBS MODERATE 35: CPT | Performed by: INTERNAL MEDICINE

## 2024-06-01 PROCEDURE — 25010000002 POTASSIUM CHLORIDE 10 MEQ/100ML SOLUTION: Performed by: INTERNAL MEDICINE

## 2024-06-01 PROCEDURE — 25010000002 ZIPRASIDONE MESYLATE PER 10 MG: Performed by: INTERNAL MEDICINE

## 2024-06-01 PROCEDURE — 94799 UNLISTED PULMONARY SVC/PX: CPT

## 2024-06-01 RX ORDER — BUSPIRONE HYDROCHLORIDE 10 MG/1
10 TABLET ORAL 2 TIMES DAILY
Status: DISCONTINUED | OUTPATIENT
Start: 2024-06-01 | End: 2024-06-01

## 2024-06-01 RX ORDER — POTASSIUM CHLORIDE 750 MG/1
40 CAPSULE, EXTENDED RELEASE ORAL EVERY 4 HOURS
Qty: 8 CAPSULE | Refills: 0 | Status: COMPLETED | OUTPATIENT
Start: 2024-06-01 | End: 2024-06-01

## 2024-06-01 RX ORDER — QUETIAPINE FUMARATE 25 MG/1
75 TABLET, FILM COATED ORAL EVERY 12 HOURS SCHEDULED
Status: DISCONTINUED | OUTPATIENT
Start: 2024-06-01 | End: 2024-06-04

## 2024-06-01 RX ORDER — UREA 10 %
5 LOTION (ML) TOPICAL NIGHTLY PRN
Status: DISCONTINUED | OUTPATIENT
Start: 2024-06-01 | End: 2024-06-06 | Stop reason: HOSPADM

## 2024-06-01 RX ORDER — ZIPRASIDONE MESYLATE 20 MG/ML
10 INJECTION, POWDER, LYOPHILIZED, FOR SOLUTION INTRAMUSCULAR EVERY 6 HOURS PRN
Status: DISCONTINUED | OUTPATIENT
Start: 2024-06-01 | End: 2024-06-06 | Stop reason: HOSPADM

## 2024-06-01 RX ORDER — QUETIAPINE FUMARATE 25 MG/1
25 TABLET, FILM COATED ORAL ONCE
Status: COMPLETED | OUTPATIENT
Start: 2024-06-01 | End: 2024-06-01

## 2024-06-01 RX ORDER — POTASSIUM CHLORIDE 7.45 MG/ML
10 INJECTION INTRAVENOUS
Status: DISCONTINUED | OUTPATIENT
Start: 2024-06-01 | End: 2024-06-01

## 2024-06-01 RX ORDER — BUSPIRONE HYDROCHLORIDE 10 MG/1
10 TABLET ORAL 2 TIMES DAILY
Status: DISCONTINUED | OUTPATIENT
Start: 2024-06-01 | End: 2024-06-06 | Stop reason: HOSPADM

## 2024-06-01 RX ADMIN — PANTOPRAZOLE SODIUM 40 MG: 40 TABLET, DELAYED RELEASE ORAL at 05:14

## 2024-06-01 RX ADMIN — Medication 5 MG: at 00:26

## 2024-06-01 RX ADMIN — ZIPRASIDONE MESYLATE 10 MG: 20 INJECTION, POWDER, LYOPHILIZED, FOR SOLUTION INTRAMUSCULAR at 20:22

## 2024-06-01 RX ADMIN — ZIPRASIDONE MESYLATE 10 MG: 20 INJECTION, POWDER, LYOPHILIZED, FOR SOLUTION INTRAMUSCULAR at 10:47

## 2024-06-01 RX ADMIN — Medication 10 ML: at 20:26

## 2024-06-01 RX ADMIN — LEVOTHYROXINE SODIUM 75 MCG: 0.05 TABLET ORAL at 05:14

## 2024-06-01 RX ADMIN — MONTELUKAST 10 MG: 10 TABLET, FILM COATED ORAL at 20:26

## 2024-06-01 RX ADMIN — CITALOPRAM HYDROBROMIDE 10 MG: 10 TABLET ORAL at 09:08

## 2024-06-01 RX ADMIN — ATORVASTATIN CALCIUM 20 MG: 20 TABLET, FILM COATED ORAL at 20:27

## 2024-06-01 RX ADMIN — APIXABAN 5 MG: 5 TABLET, FILM COATED ORAL at 09:08

## 2024-06-01 RX ADMIN — ZIPRASIDONE MESYLATE 10 MG: 20 INJECTION, POWDER, LYOPHILIZED, FOR SOLUTION INTRAMUSCULAR at 01:51

## 2024-06-01 RX ADMIN — IPRATROPIUM BROMIDE AND ALBUTEROL SULFATE 3 ML: 2.5; .5 SOLUTION RESPIRATORY (INHALATION) at 20:13

## 2024-06-01 RX ADMIN — QUETIAPINE FUMARATE 50 MG: 25 TABLET ORAL at 09:08

## 2024-06-01 RX ADMIN — DONEPEZIL HYDROCHLORIDE 10 MG: 10 TABLET, FILM COATED ORAL at 20:27

## 2024-06-01 RX ADMIN — TAMSULOSIN HYDROCHLORIDE 0.4 MG: 0.4 CAPSULE ORAL at 09:08

## 2024-06-01 RX ADMIN — POTASSIUM CHLORIDE 40 MEQ: 750 CAPSULE, EXTENDED RELEASE ORAL at 15:54

## 2024-06-01 RX ADMIN — SODIUM CHLORIDE, POTASSIUM CHLORIDE, SODIUM LACTATE AND CALCIUM CHLORIDE 75 ML/HR: 600; 310; 30; 20 INJECTION, SOLUTION INTRAVENOUS at 01:56

## 2024-06-01 RX ADMIN — METOPROLOL TARTRATE 25 MG: 25 TABLET, FILM COATED ORAL at 20:34

## 2024-06-01 RX ADMIN — BUSPIRONE HYDROCHLORIDE 10 MG: 10 TABLET ORAL at 13:15

## 2024-06-01 RX ADMIN — IPRATROPIUM BROMIDE AND ALBUTEROL SULFATE 3 ML: 2.5; .5 SOLUTION RESPIRATORY (INHALATION) at 07:51

## 2024-06-01 RX ADMIN — IPRATROPIUM BROMIDE AND ALBUTEROL SULFATE 3 ML: 2.5; .5 SOLUTION RESPIRATORY (INHALATION) at 15:10

## 2024-06-01 RX ADMIN — ACETAMINOPHEN 650 MG: 325 TABLET ORAL at 12:42

## 2024-06-01 RX ADMIN — Medication 5000 UNITS: at 09:08

## 2024-06-01 RX ADMIN — BUSPIRONE HYDROCHLORIDE 10 MG: 10 TABLET ORAL at 20:34

## 2024-06-01 RX ADMIN — SODIUM CHLORIDE 1000 MG: 900 INJECTION INTRAVENOUS at 17:17

## 2024-06-01 RX ADMIN — POTASSIUM CHLORIDE 40 MEQ: 750 CAPSULE, EXTENDED RELEASE ORAL at 10:00

## 2024-06-01 RX ADMIN — QUETIAPINE FUMARATE 25 MG: 25 TABLET ORAL at 13:14

## 2024-06-01 RX ADMIN — APIXABAN 5 MG: 5 TABLET, FILM COATED ORAL at 20:26

## 2024-06-01 RX ADMIN — QUETIAPINE FUMARATE 75 MG: 25 TABLET ORAL at 20:26

## 2024-06-01 RX ADMIN — MEMANTINE 5 MG: 10 TABLET ORAL at 09:08

## 2024-06-01 RX ADMIN — Medication 5 MG: at 20:26

## 2024-06-01 RX ADMIN — Medication 10 ML: at 09:12

## 2024-06-01 RX ADMIN — METOPROLOL TARTRATE 25 MG: 25 TABLET, FILM COATED ORAL at 13:14

## 2024-06-01 RX ADMIN — MEMANTINE 5 MG: 10 TABLET ORAL at 20:27

## 2024-06-01 NOTE — THERAPY EVALUATION
Patient Name: Dorota Membreno  : 10/31/1931    MRN: [id number]                              Today's Date: 2024       Admit Date: 2024    Visit Dx:     ICD-10-CM ICD-9-CM   1. Sepsis, due to unspecified organism, unspecified whether acute organ dysfunction present  A41.9 038.9     995.91   2. Urinary tract infection with hematuria, site unspecified  N39.0 599.0    R31.9 599.70     Patient Active Problem List   Diagnosis    Gastroesophageal reflux disease    Atopic rhinitis    Asthma    Late onset Alzheimer's disease with behavioral disturbance    Eczema    Dyslipidemia    Hypothyroidism    Essential hypertension    Post-menopausal osteoporosis    Arteritis    Dizziness    Edema    Fatigue    Shingles    Syncope and collapse    Atrial fibrillation    Leukocytosis    Hypomagnesemia    Elevated brain natriuretic peptide (BNP) level    Proteinuria    Bilateral Pleural effusions    Pneumonia    Respiratory failure    Elevated alkaline phosphatase level    Complicated UTI (urinary tract infection)     Past Medical History:   Diagnosis Date    Acid reflux 2016    stable    Allergic rhinitis     Alzheimer's disease 2017    Arteritis     A. 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 B. Improved with prednisone therapy    Asthma     Cancer     colon. Status post resection at age 37    Cognitive impairment, mild, so stated     A. Patient has been forgetting names continue med. Has f/u with neuro. She is having some times forgetting directions. She has moved into a new house and this has not helped with directions    Dyslipidemia     lipids and cmp    Hypertension     lipids, cmp, urine micro    Hypothyroidism     A. On replacement therapy    Mammogram abnormal     A. 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    Panic attacks     Pneumonia     Postmenopausal osteoporosis     Shingles 2018     Past Surgical History:   Procedure Laterality Date     CATARACT EXTRACTION      COLECTOMY PARTIAL / TOTAL      Patient diagnosed with colon cancer at age 37    HYSTERECTOMY        General Information       Row Name 06/01/24 1004          Physical Therapy Time and Intention    Document Type evaluation  -LM     Mode of Treatment physical therapy  -       Row Name 06/01/24 1004          General Information    Patient Profile Reviewed yes  -LM     Prior Level of Function --  Unable to obtain PLOF d/t pt cognition and no family present  -LM     Existing Precautions/Restrictions fall;oxygen therapy device and L/min;other (see comments)  Dementia  -LM     Barriers to Rehab medically complex;previous functional deficit;cognitive status  -LM       Row Name 06/01/24 1004          Living Environment    People in Home facility resident  [facility]  -LM       Row Name 06/01/24 1004          Home Main Entrance    Number of Stairs, Main Entrance none  -LM       Row Name 06/01/24 1004          Stairs Within Home, Primary    Number of Stairs, Within Home, Primary none  -LM       Row Name 06/01/24 1004          Cognition    Orientation Status (Cognition) oriented to;person;disoriented to;place;situation;time  Oriented to first name only  -LM       Row Name 06/01/24 1004          Safety Issues, Functional Mobility    Safety Issues Affecting Function (Mobility) awareness of need for assistance;insight into deficits/self-awareness;judgment;problem-solving;safety precaution awareness;safety precautions follow-through/compliance;sequencing abilities  -LM     Impairments Affecting Function (Mobility) balance;cognition;endurance/activity tolerance;motor planning;postural/trunk control;shortness of breath;strength  -LM     Comment, Safety Issues/Impairments (Mobility) Increased processing time needed  -LM               User Key  (r) = Recorded By, (t) = Taken By, (c) = Cosigned By      Initials Name Provider Type    LM Narcisa Andrade, PT Physical Therapist                    Mobility       Row Name 06/01/24 1007          Bed Mobility    Bed Mobility supine-sit;sit-supine  -LM     Supine-Sit [place] (Bed Mobility) maximum assist (25% patient effort);1 person assist;verbal cues  -LM     Sit-Supine [place] (Bed Mobility) maximum assist (25% patient effort);1 person assist;verbal cues  -LM     Assistive Device (Bed Mobility) bed rails;head of bed elevated  -LM     Comment, (Bed Mobility) Vc's for sequencing.  Pt needs cues and initiation of movement from therapist, but once she understands what you are wanting she is very helpful.  -LM       Row Name 06/01/24 1007          Bed-Chair Transfer    Bed-Chair [place] (Transfers) minimum assist (75% patient effort);1 person assist  -LM     Assistive Device (Bed-Chair Transfers) walker, front-wheeled  -LM     Comment, (Bed-Chair Transfer) Pt completed a stand step transfer from bed-->C.  -LM       Row Name 06/01/24 1007          Sit-Stand Transfer    Sit-Stand [place] (Transfers) minimum assist (75% patient effort);1 person assist;verbal cues  -LM     Assistive Device (Sit-Stand Transfers) walker, front-wheeled  -LM       Row Name 06/01/24 1007          Gait/Stairs (Locomotion)    [place] Level (Gait) minimum assist (75% patient effort);1 person assist  -LM     Assistive Device (Gait) walker, front-wheeled  -LM     Distance in Feet (Gait) 30  -LM     Deviations/Abnormal Patterns (Gait) annette decreased;gait speed decreased;stride length decreased  -LM     Bilateral Gait Deviations forward flexed posture;knee buckling bilaterally  -LM     Comment, (Gait/Stairs) Vc's for upright posture and forward head posture.  Pt would demonstrate bilateral knee buckling at times with Todd to correct.  -LM               User Key  (r) = Recorded By, (t) = Taken By, (c) = Cosigned By      Initials Name Provider Type    Narcisa Lopez, PT Physical Therapist                   Obj/Interventions       Row Name 06/01/24 1009          Range  of Motion Comprehensive    General Range of Motion bilateral lower extremity ROM WFL  -LM       Row Name 06/01/24 1009          Strength Comprehensive (MMT)    General Manual Muscle Testing (MMT) Assessment lower extremity strength deficits identified  -LM     Comment, General Manual Muscle Testing (MMT) Assessment BLEs grossly 4-/5 throughout  -LM       Row Name 06/01/24 1009          Balance    Static Sitting Balance contact guard  -LM     Position, Sitting Balance unsupported;sitting edge of bed  -LM     Static Standing Balance minimal assist;1-person assist;verbal cues  -LM     Dynamic Standing Balance minimal assist;1-person assist;verbal cues  -LM     Position/Device Used, Standing Balance supported;walker, front-wheeled  -LM       Row Name 06/01/24 1009          Sensory Assessment (Somatosensory)    Sensory Assessment (Somatosensory) unable/difficult to assess  -LM               User Key  (r) = Recorded By, (t) = Taken By, (c) = Cosigned By      Initials Name Provider Type    LM Narcisa Andrade, PT Physical Therapist                   Goals/Plan       Row Name 06/01/24 1013          Bed Mobility Goal 1 (PT)    Activity/Assistive Device (Bed Mobility Goal 1, PT) sit to supine/supine to sit  -LM     [place] Level/Cues Needed (Bed Mobility Goal 1, PT) minimum assist (75% or more patient effort)  -LM     Time Frame (Bed Mobility Goal 1, PT) short term goal (STG);3 days  -LM       Row Name 06/01/24 1013          Transfer Goal 1 (PT)    Activity/Assistive Device (Transfer Goal 1, PT) bed-to-chair/chair-to-bed  -LM     [place] Level/Cues Needed (Transfer Goal 1, PT) standby assist  -LM     Time Frame (Transfer Goal 1, PT) long term goal (LTG);10 days  -LM       Row Name 06/01/24 1013          Gait Training Goal 1 (PT)    Activity/Assistive Device (Gait Training Goal 1, PT) gait (walking locomotion);assistive device use  -LM     [place] Level (Gait Training Goal 1, PT) standby assist  -LM     Distance  "(Gait Training Goal 1, PT) 100 feet  -     Time Frame (Gait Training Goal 1, PT) long term goal (LTG);10 days  -       Row Name 06/01/24 1013          Therapy Assessment/Plan (PT)    Planned Therapy Interventions (PT) balance training;bed mobility training;gait training;home exercise program;motor coordination training;neuromuscular re-education;patient/family education;postural re-education;ROM (range of motion);strengthening;stretching;transfer training  -               User Key  (r) = Recorded By, (t) = Taken By, (c) = Cosigned By      Initials Name Provider Type     Narcisa Andrade, PT Physical Therapist                   Clinical Impression       Row Name 06/01/24 1009          Pain    Pain Intervention(s) Repositioned;Ambulation/increased activity  -       Row Name 06/01/24 1009          Pain Scale: FACES Pre/Post-Treatment    Pain: FACES Scale, Pretreatment 2-->hurts little bit  -LM     Posttreatment Pain Rating 2-->hurts little bit  -LM     Pre/Posttreatment Pain Comment Pt unable to specify pain location.  Pt would just say \"the front\"  -       Row Name 06/01/24 1009          Plan of Care Review    Plan of Care Reviewed With patient  -     Outcome Evaluation PT evaluation completed.  Pt stood with MinAx1 and ambulated 30 feet using rw with MinAx1.  Pt presents below baseline function d/t increased confusion (more than normal), weakness, decreased activity tolerance, and gait instability.  Recommend return to Memory Care with  PT.  -       Row Name 06/01/24 1009          Therapy Assessment/Plan (PT)    Patient/Family Therapy Goals Statement (PT) Unable to state goal d/t cognition  -LM     Rehab Potential (PT) fair, will monitor progress closely  -LM     Criteria for Skilled Interventions Met (PT) yes;meets criteria;skilled treatment is necessary  -     Therapy Frequency (PT) daily  -     Predicted Duration of Therapy Intervention (PT) 10 days  -       Row Name 06/01/24 1009          " Vital Signs    Pre Systolic BP Rehab 158  -LM     Pre Treatment Diastolic BP 70  -LM     Pretreatment Heart Rate (beats/min) 97  -LM     Posttreatment Heart Rate (beats/min) 92  -LM     Pre SpO2 (%) 96  -LM     O2 Delivery Pre Treatment supplemental O2  -LM     Intra SpO2 (%) 87   -LM     O2 Delivery Intra Treatment supplemental O2  -LM     Post SpO2 (%) 96  -LM     O2 Delivery Post Treatment supplemental O2  -LM     Pre Patient Position Supine  -LM     Intra Patient Position Sitting  -LM     Post Patient Position Supine  -LM       Row Name 06/01/24 1009          Positioning and Restraints    Pre-Treatment Position in bed  -LM     Post Treatment Position bed  -LM     In Bed fowlers;heels elevated;with nsg  -LM     Restraints released:;soft limb  RN to assist pt with breakfast and reapply restraints when appropriate  -LM               User Key  (r) = Recorded By, (t) = Taken By, (c) = Cosigned By      Initials Name Provider Type    LM Narcisa Andrade, PT Physical Therapist                   Outcome Measures       Row Name 06/01/24 1014          How much help from another person do you currently need...    Turning from your back to your side while in flat bed without using bedrails? 3  -LM     Moving from lying on back to sitting on the side of a flat bed without bedrails? 2  -LM     Moving to and from a bed to a chair (including a wheelchair)? 2  -LM     Standing up from a chair using your arms (e.g., wheelchair, bedside chair)? 3  -LM     Climbing 3-5 steps with a railing? 2  -LM     To walk in hospital room? 3  -LM     AM-PAC 6 Clicks Score (PT) 15  -LM     Highest Level of Mobility Goal 4 --> Transfer to chair/commode  -LM       Row Name 06/01/24 1014 06/01/24 1002       Functional Assessment    Outcome Measure Options AM-PAC 6 Clicks Basic Mobility (PT)  -LM AM-PAC 6 Clicks Daily Activity (OT)  -AKASH              User Key  (r) = Recorded By, (t) = Taken By, (c) = Cosigned By      Initials Name Provider Type    LM  Narcisa Andrade, PT Physical Therapist    Deborah Castillo OT Occupational Therapist                                 Physical Therapy Education       Title: PT OT SLP Therapies (In Progress)       Topic: Physical Therapy (In Progress)       Point: Mobility training (In Progress)       Learning Progress Summary             Patient Acceptance, E, NL by  at 6/1/2024 1014                         Point: Home exercise program (Not Started)       Learner Progress:  Not documented in this visit.              Point: Body mechanics (Not Started)       Learner Progress:  Not documented in this visit.              Point: Precautions (In Progress)       Learning Progress Summary             Patient Acceptance, E, NL by CARMELINA at 6/1/2024 1014                                         User Key       Initials Effective Dates Name Provider Type Discipline    CARMELINA 07/11/23 -  Narcisa Andrade, PT Physical Therapist PT                  PT Recommendation and Plan  Planned Therapy Interventions (PT): balance training, bed mobility training, gait training, home exercise program, motor coordination training, neuromuscular re-education, patient/family education, postural re-education, ROM (range of motion), strengthening, stretching, transfer training  Plan of Care Reviewed With: patient  Outcome Evaluation: PT evaluation completed.  Pt stood with MinAx1 and ambulated 30 feet using rw with MinAx1.  Pt presents below baseline function d/t increased confusion (more than normal), weakness, decreased activity tolerance, and gait instability.  Recommend return to Memory Care with  PT.     Time Calculation:   PT Evaluation Complexity  History, PT Evaluation Complexity: 3 or more personal factors and/or comorbidities  Examination of Body Systems (PT Eval Complexity): total of 3 or more elements  Clinical Presentation (PT Evaluation Complexity): evolving  Clinical Decision Making (PT Evaluation Complexity): moderate complexity  Overall Complexity (PT  Evaluation Complexity): moderate complexity     PT Charges       Row Name 06/01/24 1015             Time Calculation    Start Time 0914  -LM      PT Received On 06/01/24  -LM      PT Goal Re-Cert Due Date 06/11/24  -LM         Untimed Charges    PT Eval/Re-eval Minutes 46  -LM         Total Minutes    Untimed Charges Total Minutes 46  -LM       Total Minutes 46  -LM                User Key  (r) = Recorded By, (t) = Taken By, (c) = Cosigned By      Initials Name Provider Type    LM Narcisa Andrade, PT Physical Therapist                  Therapy Charges for Today       Code Description Service Date Service Provider Modifiers Qty    [id number] HC PT EVAL MOD COMPLEXITY 4 6/1/2024 Narcisa Andrade, PT GP 1            PT G-Codes  Outcome Measure Options: AM-PAC 6 Clicks Basic Mobility (PT)  AM-PAC 6 Clicks Score (PT): 15  AM-PAC 6 Clicks Score (OT): 9  PT Discharge Summary  Anticipated Discharge Disposition (PT): home with home health (Back to Memory Care with  PT)    Narcisa Andrade PT  6/1/2024

## 2024-06-01 NOTE — PLAN OF CARE
Goal Outcome Evaluation:  Plan of Care Reviewed With: patient           Outcome Evaluation: PT evaluation completed.  Pt stood with MinAx1 and ambulated 30 feet using rw with MinAx1.  Pt presents below baseline function d/t increased confusion (more than normal), weakness, decreased activity tolerance, and gait instability.  Recommend return to Memory Care with  PT.      Anticipated Discharge Disposition (PT): home with home health (Back to Memory Care with HH PT)

## 2024-06-01 NOTE — THERAPY EVALUATION
Patient Name: Dorota Membreno  : 10/31/1931    MRN: [id number]                              Today's Date: 2024       Admit Date: 2024    Visit Dx:     ICD-10-CM ICD-9-CM   1. Sepsis, due to unspecified organism, unspecified whether acute organ dysfunction present  A41.9 038.9     995.91   2. Urinary tract infection with hematuria, site unspecified  N39.0 599.0    R31.9 599.70     Patient Active Problem List   Diagnosis    Gastroesophageal reflux disease    Atopic rhinitis    Asthma    Late onset Alzheimer's disease with behavioral disturbance    Eczema    Dyslipidemia    Hypothyroidism    Essential hypertension    Post-menopausal osteoporosis    Arteritis    Dizziness    Edema    Fatigue    Shingles    Syncope and collapse    Atrial fibrillation    Leukocytosis    Hypomagnesemia    Elevated brain natriuretic peptide (BNP) level    Proteinuria    Bilateral Pleural effusions    Pneumonia    Respiratory failure    Elevated alkaline phosphatase level    Complicated UTI (urinary tract infection)     Past Medical History:   Diagnosis Date    Acid reflux 2016    stable    Allergic rhinitis     Alzheimer's disease 2017    Arteritis     A. 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 B. Improved with prednisone therapy    Asthma     Cancer     colon. Status post resection at age 37    Cognitive impairment, mild, so stated     A. Patient has been forgetting names continue med. Has f/u with neuro. She is having some times forgetting directions. She has moved into a new house and this has not helped with directions    Dyslipidemia     lipids and cmp    Hypertension     lipids, cmp, urine micro    Hypothyroidism     A. On replacement therapy    Mammogram abnormal     A. 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    Panic attacks     Pneumonia     Postmenopausal osteoporosis     Shingles 2018     Past Surgical History:   Procedure Laterality Date     CATARACT EXTRACTION      COLECTOMY PARTIAL / TOTAL      Patient diagnosed with colon cancer at age 37    HYSTERECTOMY        General Information       Row Name 06/01/24 0946          OT Time and Intention    Document Type evaluation  -     Mode of Treatment occupational therapy  -       Row Name 06/01/24 0946          General Information    Patient Profile Reviewed yes  -AKASH     Prior Level of Function mod assist:;ADL's  PLOF unknown; pt poor historian, lives in memory care unit at [facility]  -     Existing Precautions/Restrictions fall;other (see comments)  hx Alzheimer's  -     Barriers to Rehab medically complex;previous functional deficit;cognitive status  -       Row Name 06/01/24 0946          Occupational Profile    Environmental Supports and Barriers (Occupational Profile) Lives at [facility] memory care  -       Row Name 06/01/24 0946          Living Environment    People in Home facility resident  -       Row Name 06/01/24 0946          Home Main Entrance    Number of Stairs, Main Entrance none  -       Row Name 06/01/24 0946          Stairs Within Home, Primary    Number of Stairs, Within Home, Primary none  -       Row Name 06/01/24 0946          Cognition    Orientation Status (Cognition) oriented to;person;disoriented to;place;situation;time;other (see comments)  Oriented to first name only, pleasantly confused  -       Row Name 06/01/24 0946          Safety Issues, Functional Mobility    Safety Issues Affecting Function (Mobility) awareness of need for assistance;insight into deficits/self-awareness;judgment;problem-solving;safety precaution awareness;safety precautions follow-through/compliance;sequencing abilities  -AKASH     Impairments Affecting Function (Mobility) balance;cognition;endurance/activity tolerance;motor planning;postural/trunk control;shortness of breath;strength  -AKASH     Cognitive Impairments, Mobility Safety/Performance attention;awareness, need for  assistance;insight into deficits/self-awareness;judgment;problem-solving/reasoning;safety precaution awareness;safety precaution follow-through;sequencing abilities  -AKASH     Comment, Safety Issues/Impairments (Mobility) increased processing time needed; cues for motor planning tasks and transitions  -               User Key  (r) = Recorded By, (t) = Taken By, (c) = Cosigned By      Initials Name Provider Type    AKASH Deborah Jackson OT Occupational Therapist                     Mobility/ADL's       Row Name 06/01/24 0950          Bed Mobility    Bed Mobility supine-sit;sit-supine  -AKASH     Supine-Sit [place] (Bed Mobility) maximum assist (25% patient effort);1 person assist;verbal cues;nonverbal cues (demo/gesture)  -AKASH     Sit-Supine [place] (Bed Mobility) maximum assist (25% patient effort);1 person assist;verbal cues;nonverbal cues (demo/gesture)  -     Assistive Device (Bed Mobility) bed rails;head of bed elevated  -AKASH     Comment, (Bed Mobility) cues for sequencing, assist for trunk control and managing BLE's over bed surface  -AKASH       Row Name 06/01/24 0950          Transfers    Transfers sit-stand transfer;toilet transfer  -AKASH     Comment, (Transfers) v/t cues for HP and sequencing  -AKASH       Row Name 06/01/24 0950          Sit-Stand Transfer    Sit-Stand [place] (Transfers) minimum assist (75% patient effort);1 person assist;verbal cues;nonverbal cues (demo/gesture)  -     Assistive Device (Sit-Stand Transfers) walker, front-wheeled  -AKASH       Row Name 06/01/24 0950          Toilet Transfer    Type (Toilet Transfer) stand pivot/stand step;stand-sit;sit-stand  -AKASH     [place] Level (Toilet Transfer) minimum assist (75% patient effort);1 person assist;verbal cues;nonverbal cues (demo/gesture)  -     Assistive Device (Toilet Transfer) commode, bedside without drop arms;walker, front-wheeled  -AKASH     Comment, (Toilet Transfer) v/t cues for HP  -AKASH       Row Name 06/01/24 0946           Functional Mobility    Functional Mobility- Comment Defer to PT  -[facility] Name 06/01/24 0950          Activities of Daily Living    BADL Assessment/Intervention lower body dressing;grooming;feeding;toileting  -[facility] Name 06/01/24 0950          Lower Body Dressing Assessment/Training    [place] Level (Lower Body Dressing) don;socks;dependent (less than 25% patient effort)  -KAASH     Position (Lower Body Dressing) sitting up in bed  -[facility] Name 06/01/24 0950          Grooming Assessment/Training    [place] Level (Grooming) wash face, hands;minimum assist (75% patient effort);verbal cues;nonverbal cues (demo/gesture)  -     Oral Care other (see comments)  not attempted d/t meal tray arriving  -AKASH     Position (Grooming) sitting up in bed  -[facility] Name 06/01/24 0950          Self-Feeding Assessment/Training    [place] Level (Feeding) liquids to mouth;prepare tray/open items;scoop food and bring to mouth;maximum assist (25% patient effort);verbal cues;nonverbal cues (demo/gesture)  -AKASH     Position (Self-Feeding) sitting up in bed  -AKASH     Comment, (Feeding) assist to bring liquids to mouth and to load fork and bring to mouth  -       Row Name 06/01/24 0950          Toileting Assessment/Training    [place] Level (Toileting) adjust/manage clothing;perform perineal hygiene;dependent (less than 25% patient effort)  -     Assistive Devices (Toileting) commode, bedside without drop arms  -AKASH     Position (Toileting) supported standing  -               User Key  (r) = Recorded By, (t) = Taken By, (c) = Cosigned By      Initials Name Provider Type    Deborah Castillo OT Occupational Therapist                   Obj/Interventions       Row Name 06/01/24 0949          Sensory Assessment (Somatosensory)    Sensory Assessment (Somatosensory) unable/difficult to assess  -[facility] Name 06/01/24 0954          Vision Assessment/Intervention    Visual Impairment/Limitations  unable/difficult to assess  -AKASH       Row Name 06/01/24 0954          Range of Motion Comprehensive    General Range of Motion bilateral upper extremity ROM WFL  -AKASH       Row Name 06/01/24 0954          Strength Comprehensive (MMT)    Comment, General Manual Muscle Testing (MMT) Assessment BUE's grossly 3+/5  -AKASH       Row Name 06/01/24 0954          Balance    Balance Assessment sitting static balance;sitting dynamic balance;standing static balance;standing dynamic balance  -AKASH     Static Sitting Balance contact guard  -AKASH     Dynamic Sitting Balance minimal assist  -AKASH     Position, Sitting Balance unsupported;sitting edge of bed  -AKASH     Static Standing Balance minimal assist;1-person assist;verbal cues;non-verbal cues (demo/gesture)  -AKASH     Dynamic Standing Balance minimal assist;1-person assist;verbal cues;non-verbal cues (demo/gesture)  -AKASH     Position/Device Used, Standing Balance supported;walker, front-wheeled  -AKASH     Balance Interventions standing;occupation based/functional task  -AKASH     Comment, Balance Dep for toileting tasks in standing  -AKASH               User Key  (r) = Recorded By, (t) = Taken By, (c) = Cosigned By      Initials Name Provider Type    Deborah Castillo OT Occupational Therapist                   Goals/Plan       Row Name 06/01/24 1001          Transfer Goal 1 (OT)    Activity/Assistive Device (Transfer Goal 1, OT) sit-to-stand/stand-to-sit;toilet  -AKASH     [place] Level/Cues Needed (Transfer Goal 1, OT) contact guard required  -AKASH     Time Frame (Transfer Goal 1, OT) long term goal (LTG);10 days  -AKASH     Progress/Outcome (Transfer Goal 1, OT) new goal  -AKASH       Row Name 06/01/24 1001          Grooming Goal 1 (OT)    Activity/Device (Grooming Goal 1, OT) hair care;oral care;wash face, hands  -AKASH     [place] (Grooming Goal 1, OT) minimum assist (75% or more patient effort)  -AKASH     Time Frame (Grooming Goal 1, OT) short term goal (STG);1 week  -AKASH     Progress/Outcome  (Grooming Goal 1, OT) new goal  -       Row Name 06/01/24 1001          Therapy Assessment/Plan (OT)    Planned Therapy Interventions (OT) activity tolerance training;adaptive equipment training;functional balance retraining;occupation/activity based interventions;patient/caregiver education/training;ROM/therapeutic exercise;strengthening exercise;transfer/mobility retraining  -               User Key  (r) = Recorded By, (t) = Taken By, (c) = Cosigned By      Initials Name Provider Type    Deborah Castillo, MARIAN Occupational Therapist                   Clinical Impression       Row Name 06/01/24 0956          Pain Scale: FACES Pre/Post-Treatment    Pain: FACES Scale, Pretreatment 2-->hurts little bit  -AKASH     Posttreatment Pain Rating 2-->hurts little bit  -AKASH     Pre/Posttreatment Pain Comment Tolerated; pt unable to specify location of pain  -       Row Name 06/01/24 0956          Plan of Care Review    Plan of Care Reviewed With patient  -AKASH     Outcome Evaluation OT eval completed. Pt presents with SOA, significant weakness, and impaired balance limiting ADL's. Pt Todd for SPT to BSC, Dep for toileting tasks, Todd for grooming, increased time and cues needed for motor planning all tasks. IP OT services warranted. Recommend return to memory care at discharge with skilled OT services.  -       Row Name 06/01/24 0976          Therapy Assessment/Plan (OT)    Patient/Family Therapy Goal Statement (OT) To get stronger  -AKASH     Rehab Potential (OT) good, to achieve stated therapy goals  -AKASH     Criteria for Skilled Therapeutic Interventions Met (OT) yes;meets criteria;skilled treatment is necessary  -AKASH     Therapy Frequency (OT) daily  -AKASH     Predicted Duration of Therapy Intervention (OT) 10 days  -       Row Name 06/01/24 0956          Vital Signs    Pre SpO2 (%) 93  -AKASH     O2 Delivery Pre Treatment nasal cannula  -AKASH     Intra SpO2 (%) 87   -AKASH     O2 Delivery Intra Treatment nasal cannula  -AKASH     Post  SpO2 (%) 93  -AKASH     O2 Delivery Post Treatment nasal cannula  -AKASH     Pre Patient Position Supine  -AKASH     Intra Patient Position Standing  -AKASH     Post Patient Position Supine  -AKASH       Row Name 06/01/24 0956          Positioning and Restraints    Pre-Treatment Position in bed  -AKASH     Post Treatment Position bed  -AKASH     In Bed fowlers;exit alarm on;with nsg;heels elevated  -AKASH     Restraints released:;soft limb;other (comment)  1:1 present  -AKASH               User Key  (r) = Recorded By, (t) = Taken By, (c) = Cosigned By      Initials Name Provider Type    Deborah Castillo OT Occupational Therapist                   Outcome Measures       Row Name 06/01/24 1002          How much help from another is currently needed...    Putting on and taking off regular lower body clothing? 1  -AKASH     Bathing (including washing, rinsing, and drying) 1  -AKASH     Toileting (which includes using toilet bed pan or urinal) 1  -AKASH     Putting on and taking off regular upper body clothing 2  -AKASH     Taking care of personal grooming (such as brushing teeth) 2  -AKASH     Eating meals 2  -AKASH     AM-PAC 6 Clicks Score (OT) 9  -AKASH       Row Name 06/01/24 1002          Functional Assessment    Outcome Measure Options AM-PAC 6 Clicks Daily Activity (OT)  -AKASH               User Key  (r) = Recorded By, (t) = Taken By, (c) = Cosigned By      Initials Name Provider Type    Deborah Castillo OT Occupational Therapist                    Occupational Therapy Education       Title: PT OT SLP Therapies (In Progress)       Topic: Occupational Therapy (In Progress)       Point: ADL training (In Progress)       Description:   Instruct learner(s) on proper safety adaptation and remediation techniques during self care or transfers.   Instruct in proper use of assistive devices.                  Learning Progress Summary             Patient Acceptance, E, NL,NR by AKASH at 6/1/2024 1002    Comment: OT POC; BADL's; transfer training                          Point: Home exercise program (Not Started)       Description:   Instruct learner(s) on appropriate technique for monitoring, assisting and/or progressing therapeutic exercises/activities.                  Learner Progress:  Not documented in this visit.              Point: Precautions (In Progress)       Description:   Instruct learner(s) on prescribed precautions during self-care and functional transfers.                  Learning Progress Summary             Patient Acceptance, E, NL,NR by AKASH at 6/1/2024 1002    Comment: OT POC; BADL's; transfer training                         Point: Body mechanics (Not Started)       Description:   Instruct learner(s) on proper positioning and spine alignment during self-care, functional mobility activities and/or exercises.                  Learner Progress:  Not documented in this visit.                              User Key       Initials Effective Dates Name Provider Type Discipline    AKASH 06/16/21 -  Deborah Jackson, OT Occupational Therapist OT                  OT Recommendation and Plan  Planned Therapy Interventions (OT): activity tolerance training, adaptive equipment training, functional balance retraining, occupation/activity based interventions, patient/caregiver education/training, ROM/therapeutic exercise, strengthening exercise, transfer/mobility retraining  Therapy Frequency (OT): daily  Plan of Care Review  Plan of Care Reviewed With: patient  Outcome Evaluation: OT eval completed. Pt presents with SOA, significant weakness, and impaired balance limiting ADL's. Pt Todd for SPT to BSC, Dep for toileting tasks, Todd for grooming, increased time and cues needed for motor planning all tasks. IP OT services warranted. Recommend return to memory care at discharge with skilled OT services.     Time Calculation:   Evaluation Complexity (OT)  Review Occupational Profile/Medical/Therapy History Complexity: expanded/moderate complexity  Assessment, Occupational  Performance/Identification of Deficit Complexity: 3-5 performance deficits  Clinical Decision Making Complexity (OT): detailed assessment/moderate complexity  Overall Complexity of Evaluation (OT): moderate complexity     Time Calculation- OT       Row Name 06/01/24 0925             Time Calculation- OT    OT Start Time 0925  -AKASH      OT Received On 06/01/24  -AKASH      OT Goal Re-Cert Due Date 06/11/24  -AKASH         Untimed Charges    OT Eval/Re-eval Minutes 54  -AKASH         Total Minutes    Untimed Charges Total Minutes 54  -AKASH       Total Minutes 54  -AKASH                User Key  (r) = Recorded By, (t) = Taken By, (c) = Cosigned By      Initials Name Provider Type    Deborah Castillo OT Occupational Therapist                  Therapy Charges for Today       Code Description Service Date Service Provider Modifiers Qty    [id number] HC OT EVAL MOD COMPLEXITY 4 6/1/2024 Deborah Jackson OT GO 1                 Deborah Jackson OT  6/1/2024

## 2024-06-01 NOTE — PROGRESS NOTES
[facility] Medicine Services  PROGRESS NOTE    Patient Name: Dorota Membreno  : 10/31/1931  MRN: [id number]    Date of Admission: 2024  Primary Care Physician: Anthony Medrano MD    Subjective   Subjective     CC:  UTI    HPI:  More oriented today, working with therapy, eating breakfast.  Denies fevers.      Staff reports Ms Membreno required geodon last night.      Objective   Objective     Vital Signs:   Temp:  [98 °F (36.7 °C)-99.3 °F (37.4 °C)] 98.2 °F (36.8 °C)  Heart Rate:  [71-99] 99  Resp:  [18-24] 24  BP: (113-158)/(63-76) 158/70  Flow (L/min):  [2] 2     NAD, in bed   MM moist  RRR  Breath sounds clear bilaterally  Abd soft, NT, ND  Normal affect  Awake, some confusion, but able to answer questions generally appropriately with short sentences.  Does not know where she is or the name of her daughter.  ARABELLA    Results Reviewed:  LAB RESULTS:      Lab 24  0510 24  0617 24  0048 24  2222 24  1532 24  1448   WBC 12.87*  --  19.40*  --   --  18.41*   HEMOGLOBIN 10.5*  --  12.7  --   --  14.4   HEMATOCRIT 32.3*  --  38.7  --   --  42.4   PLATELETS 192  --  291  --   --  314   NEUTROS ABS 9.19*  --  16.09*  --   --  15.40*   IMMATURE GRANS (ABS) 0.05  --  0.09*  --   --  0.04   LYMPHS ABS 1.87  --  1.77  --   --  1.94   MONOS ABS 1.45*  --  1.41*  --   --  0.97*   EOS ABS 0.28  --  0.01  --   --  0.03   MCV 89.7  --  87.8  --   --  87.6   PROCALCITONIN  --   --   --   --  0.05  --    LACTATE  --  1.8 2.7* 3.5* 3.5* 3.1*   PROTIME  --   --   --   --   --  15.7*   APTT  --   --   --   --   --  33.4*         Lab 24  0510 24  0048 24  1532   SODIUM 139 140 141   POTASSIUM 3.2* 3.5 4.7   CHLORIDE 101 100 100   CO2 28.0 26.0 26.8   ANION GAP 10.0 14.0 14.2   BUN 17 25* 36*   CREATININE 0.82 1.00 1.21*   EGFR 67.2 53.0* 42.1*   GLUCOSE 91 89 116*   CALCIUM 8.0* 9.1 9.7*   MAGNESIUM  --   --  1.9   TSH  --   --  2.040         Lab  05/30/24  1532   TOTAL PROTEIN 7.4   ALBUMIN 3.9   GLOBULIN 3.5   ALT (SGPT) 7   AST (SGOT) 25   BILIRUBIN 0.5   ALK PHOS 145*         Lab 05/30/24  1532 05/30/24  1448   PROBNP 494.0  --    HSTROP T 13  --    PROTIME  --  15.7*   INR  --  1.19*                 Brief Urine Lab Results  (Last result in the past 365 days)        Color   Clarity   Blood   Leuk Est   Nitrite   Protein   CREAT   Urine HCG        05/30/24 1452 Yellow   Cloudy   Moderate (2+)   Moderate (2+)   Negative   100 mg/dL (2+)                   Microbiology Results Abnormal       Procedure Component Value - Date/Time    Blood Culture - Blood, Arm, Left [[id number]]  (Normal) Collected: 05/30/24 1609    Lab Status: Preliminary result Specimen: Blood from Arm, Left Updated: 05/31/24 1917     Blood Culture No growth at 24 hours    Narrative:      Pediatric Bottle Only        Blood Culture - Blood, Arm, Right [[id number]]  (Normal) Collected: 05/30/24 1609    Lab Status: Preliminary result Specimen: Blood from Arm, Right Updated: 05/31/24 1901     Blood Culture No growth at 24 hours    Narrative:      Pediatric Bottle Only        Urine Culture - Urine, Urine, Catheter [[id number]]  (Normal) Collected: 05/30/24 1452    Lab Status: Preliminary result Specimen: Urine, Catheter Updated: 05/31/24 1010     Urine Culture Culture in progress    COVID PRE-OP / PRE-PROCEDURE SCREENING ORDER (NO ISOLATION) - Swab, Nasopharynx [[id number]]  (Normal) Collected: 05/30/24 1452    Lab Status: Final result Specimen: Swab from Nasopharynx Updated: 05/30/24 1617    Narrative:      The following orders were created for panel order COVID PRE-OP / PRE-PROCEDURE SCREENING ORDER (NO ISOLATION) - Swab, Nasopharynx.  Procedure                               Abnormality         Status                     ---------                               -----------         ------                     COVID-19, FLU A/B, RSV P...[[id number]]  Normal              Final result                  Please view results for these tests on the individual orders.    COVID-19, FLU A/B, RSV PCR 1 HR TAT - Swab, Nasopharynx [[id number]]  (Normal) Collected: 05/30/24 1452    Lab Status: Final result Specimen: Swab from Nasopharynx Updated: 05/30/24 1617     COVID19 Not Detected     Influenza A PCR Not Detected     Influenza B PCR Not Detected     RSV, PCR Not Detected    Narrative:      Fact sheet for providers: https://www.fda.gov/media/282834/download    Fact sheet for patients: https://www.fda.gov/media/912907/download    Test performed by PCR.            CT Head Without Contrast    Result Date: 5/30/2024  CT HEAD WO CONTRAST Date of Exam: 5/30/2024 4:18 PM EDT Indication: ams. Comparison: 4/7/2018 Technique: Axial CT images were obtained of the head without contrast administration.  Automated exposure control and iterative construction methods were used. Findings: No intracranial hemorrhage. Gray-white matter differentiation is maintained without evidence of an acute infarction. Multiple foci of decreased attenuation are present within the subcortical, deep cerebral, and periventricular white matter consistent with chronic small vessel/microangiopathic ischemic changes. No extra-axial mass or collection. The ventricles and sulci are prominent commensurate with involutional changes. The posterior fossa appears grossly normal. Sellar and suprasellar structures are normal. Lens replacements. The paranasal sinuses, ethmoid air cells, and mastoid air cells are aerated. The bony calvarium is intact.     Impression: Impression: No acute intracranial pathology. Electronically Signed: Mina Larkin MD  5/30/2024 4:30 PM EDT  Workstation ID: [id number]    XR Chest 1 View    Result Date: 5/30/2024  XR CHEST 1 VW Date of Exam: 5/30/2024 2:14 PM EDT Indication: ams Comparison 7/11/2023 Findings: There is unchanged moderately severe elevation of the right diaphragm. Mildly prominent interstitial pattern appears stable and  chronic. Heart and pulmonary vessels appear within normal limits. There are no acute infiltrates or effusions.     Impression: Impression: Chronic findings. No acute process. Electronically Signed: Rachel Hood MD  5/30/2024 2:33 PM EDT  Workstation ID: [id number]     Results for orders placed during the hospital encounter of 02/26/23    Adult Transthoracic Echo Complete w/ Color, Spectral and Contrast if Necessary Per Protocol    Interpretation Summary    Left ventricular ejection fraction appears to be 61 - 65%.    Left ventricular diastolic function was normal.    Left atrial volume is moderately increased.    Moderate tricuspid valve regurgitation is present.    Estimated right ventricular systolic pressure from tricuspid regurgitation is moderately elevated (45-55 mmHg). Calculated right ventricular systolic pressure from tricuspid regurgitation is 47 mmHg.      Current medications:  Scheduled Meds:apixaban, 5 mg, Oral, Q12H  atorvastatin, 20 mg, Oral, Nightly  busPIRone, 10 mg, Oral, BID  cefTRIAXone, 1,000 mg, Intravenous, Q24H  vitamin D3, 5,000 Units, Oral, Daily  citalopram, 10 mg, Oral, Daily  donepezil, 10 mg, Oral, Nightly  levothyroxine, 75 mcg, Oral, Q AM  memantine, 5 mg, Oral, Q12H  metoprolol tartrate, 25 mg, Oral, Q12H  montelukast, 10 mg, Oral, Nightly  pantoprazole, 40 mg, Oral, Q AM  potassium chloride, 40 mEq, Oral, Q4H  QUEtiapine, 25 mg, Oral, Once  QUEtiapine, 75 mg, Oral, Q12H  sodium chloride, 10 mL, Intravenous, Q12H  tamsulosin, 0.4 mg, Oral, Daily      Continuous Infusions:     PRN Meds:.  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        Brief Hospital Course to date:  Dorota Membreno is a 89 y.o. female with history of GERD, Alzheimer's disease, GCA, asthma, colon cancer, HTN, HLD, hypothyroid and anxiety presents with AMS and a UTI    UTI  - rocephin, urine culture pending    AMS  Dementia  - CT head negative for acute changes, suspect UTI contributing to AMS  - required geodon last night, will resume home buspar and titrate up seroquel dose from 50 mg to 75 mg PO BID    KRYSTEN  Urinary retention  - viera discontinued 6/1/24  - continue flomax  - creatinine improved    Hypothyroid    Atrial fibrillation  - eliquis  - resume home metoprolol          Expected Discharge Location and Transportation: [facility]  Expected Discharge   Expected Discharge Date: 6/3/2024; Expected Discharge Time:      DVT prophylaxis:  Medical and mechanical DVT prophylaxis orders are present.         AM-PAC 6 Clicks Score (PT): 15 (06/01/24 1014)    CODE STATUS: DNR/DNI, discussed with daughter  Code Status and Medical Interventions:   Ordered at: 05/31/24 1404     Medical Intervention Limits:    NO intubation (DNI)     Level Of Support Discussed With:    Next of Kin (If No Surrogate)    Health Care Surrogate     Code Status (Patient has no pulse and is not breathing):    No CPR (Do Not Attempt to Resuscitate)     Medical Interventions (Patient has pulse or is breathing):    Limited Support       Francisco Apodaca MD  06/01/24

## 2024-06-01 NOTE — PLAN OF CARE
Problem: Adult Inpatient Plan of Care  Goal: Plan of Care Review  6/1/2024 0318 by Vianey Stapleton, RN  Outcome: Ongoing, Not Progressing  Flowsheets (Taken 6/1/2024 0318)  Progress: no change  Plan of Care Reviewed With: patient  Outcome Evaluation: VSS on 2L NC, unable to wean off of O2, weak, dry cough noted, pt tachycardic, provider notified. PRN melatonin, geodon, and tylenol given. One time dose of ativan also given. Pt resting intermittently between care, freuqently agitated, restless, and unable to console. Not tolreating RUE and LUE soft restraints, restraints removed at beginning of shift, pt frequently attempt to remove lines/equipment. A&O to self only. No c/o pain, n/v, or soa. Purewick in place, no bladder distention, pt voices no urge to void, bladder scan remains <500mL, awaiting spontaneous void. Maintenance IVF infusing. Sitter remains at bedside.   Goal Outcome Evaluation:  Plan of Care Reviewed With: patient        Progress: no change  Outcome Evaluation: VSS on 2L NC, unable to wean off of O2, weak, dry cough noted, pt tachycardic, provider notified. PRN melatonin, geodon, and tylenol given. One time dose of ativan also given. Pt resting intermittently between care, freuqently agitated, restless, and unable to console. Not tolreating RUE and LUE soft restraints, restraints removed at beginning of shift, pt frequently attempt to remove lines/equipment. A&O to self only. No c/o pain, n/v, or soa. Purewick in place, no bladder distention, pt voices no urge to void, bladder scan remains <500mL, awaiting spontaneous void. Maintenance IVF infusing. Sitter remains at bedside.

## 2024-06-01 NOTE — PLAN OF CARE
Goal Outcome Evaluation:  Plan of Care Reviewed With: patient           Outcome Evaluation: OT eval completed. Pt presents with SOA, significant weakness, and impaired balance limiting ADL's. Pt Todd for SPT to BSC, Dep for toileting tasks, Todd for grooming, increased time and cues needed for motor planning all tasks. IP OT services warranted. Recommend return to memory care at discharge with skilled OT services.

## 2024-06-02 LAB
ANION GAP SERPL CALCULATED.3IONS-SCNC: 7 MMOL/L (ref 5–15)
BUN SERPL-MCNC: 18 MG/DL (ref 8–23)
BUN/CREAT SERPL: 25.7 (ref 7–25)
CALCIUM SPEC-SCNC: 8.1 MG/DL (ref 8.2–9.6)
CHLORIDE SERPL-SCNC: 106 MMOL/L (ref 98–107)
CO2 SERPL-SCNC: 27 MMOL/L (ref 22–29)
CREAT SERPL-MCNC: 0.7 MG/DL (ref 0.57–1)
DEPRECATED RDW RBC AUTO: 47.5 FL (ref 37–54)
EGFRCR SERPLBLD CKD-EPI 2021: 81.3 ML/MIN/1.73
ERYTHROCYTE [DISTWIDTH] IN BLOOD BY AUTOMATED COUNT: 14.6 % (ref 12.3–15.4)
GLUCOSE SERPL-MCNC: 98 MG/DL (ref 65–99)
HCT VFR BLD AUTO: 33.6 % (ref 34–46.6)
HGB BLD-MCNC: 10.6 G/DL (ref 12–15.9)
MCH RBC QN AUTO: 28.3 PG (ref 26.6–33)
MCHC RBC AUTO-ENTMCNC: 31.5 G/DL (ref 31.5–35.7)
MCV RBC AUTO: 89.6 FL (ref 79–97)
PLATELET # BLD AUTO: 174 10*3/MM3 (ref 140–450)
PMV BLD AUTO: 10.8 FL (ref 6–12)
POTASSIUM SERPL-SCNC: 4.4 MMOL/L (ref 3.5–5.2)
POTASSIUM SERPL-SCNC: 4.5 MMOL/L (ref 3.5–5.2)
QT INTERVAL: 388 MS
QTC INTERVAL: 427 MS
RBC # BLD AUTO: 3.75 10*6/MM3 (ref 3.77–5.28)
SODIUM SERPL-SCNC: 140 MMOL/L (ref 136–145)
WBC NRBC COR # BLD AUTO: 9.88 10*3/MM3 (ref 3.4–10.8)

## 2024-06-02 PROCEDURE — 85027 COMPLETE CBC AUTOMATED: CPT | Performed by: INTERNAL MEDICINE

## 2024-06-02 PROCEDURE — 84132 ASSAY OF SERUM POTASSIUM: CPT | Performed by: INTERNAL MEDICINE

## 2024-06-02 PROCEDURE — 99232 SBSQ HOSP IP/OBS MODERATE 35: CPT | Performed by: INTERNAL MEDICINE

## 2024-06-02 PROCEDURE — 80048 BASIC METABOLIC PNL TOTAL CA: CPT | Performed by: INTERNAL MEDICINE

## 2024-06-02 PROCEDURE — 93010 ELECTROCARDIOGRAM REPORT: CPT | Performed by: INTERNAL MEDICINE

## 2024-06-02 PROCEDURE — 93005 ELECTROCARDIOGRAM TRACING: CPT | Performed by: INTERNAL MEDICINE

## 2024-06-02 PROCEDURE — 25010000002 CEFTRIAXONE PER 250 MG: Performed by: INTERNAL MEDICINE

## 2024-06-02 PROCEDURE — 92526 ORAL FUNCTION THERAPY: CPT

## 2024-06-02 RX ADMIN — ATORVASTATIN CALCIUM 20 MG: 20 TABLET, FILM COATED ORAL at 20:00

## 2024-06-02 RX ADMIN — Medication 10 ML: at 08:48

## 2024-06-02 RX ADMIN — MEMANTINE 5 MG: 10 TABLET ORAL at 20:00

## 2024-06-02 RX ADMIN — DONEPEZIL HYDROCHLORIDE 10 MG: 10 TABLET, FILM COATED ORAL at 20:00

## 2024-06-02 RX ADMIN — CITALOPRAM HYDROBROMIDE 10 MG: 10 TABLET ORAL at 08:39

## 2024-06-02 RX ADMIN — QUETIAPINE FUMARATE 75 MG: 25 TABLET ORAL at 20:00

## 2024-06-02 RX ADMIN — MONTELUKAST 10 MG: 10 TABLET, FILM COATED ORAL at 20:00

## 2024-06-02 RX ADMIN — METOPROLOL TARTRATE 25 MG: 25 TABLET, FILM COATED ORAL at 08:39

## 2024-06-02 RX ADMIN — BUSPIRONE HYDROCHLORIDE 10 MG: 10 TABLET ORAL at 20:00

## 2024-06-02 RX ADMIN — Medication 5000 UNITS: at 12:59

## 2024-06-02 RX ADMIN — QUETIAPINE FUMARATE 75 MG: 25 TABLET ORAL at 08:39

## 2024-06-02 RX ADMIN — APIXABAN 5 MG: 5 TABLET, FILM COATED ORAL at 08:39

## 2024-06-02 RX ADMIN — BUSPIRONE HYDROCHLORIDE 10 MG: 10 TABLET ORAL at 08:39

## 2024-06-02 RX ADMIN — MEMANTINE 5 MG: 10 TABLET ORAL at 08:39

## 2024-06-02 RX ADMIN — PANTOPRAZOLE SODIUM 40 MG: 40 TABLET, DELAYED RELEASE ORAL at 06:06

## 2024-06-02 RX ADMIN — TAMSULOSIN HYDROCHLORIDE 0.4 MG: 0.4 CAPSULE ORAL at 08:39

## 2024-06-02 RX ADMIN — Medication 5 MG: at 20:02

## 2024-06-02 RX ADMIN — Medication 10 ML: at 20:01

## 2024-06-02 RX ADMIN — METOPROLOL TARTRATE 25 MG: 25 TABLET, FILM COATED ORAL at 20:00

## 2024-06-02 RX ADMIN — SODIUM CHLORIDE 1000 MG: 900 INJECTION INTRAVENOUS at 17:52

## 2024-06-02 RX ADMIN — APIXABAN 5 MG: 5 TABLET, FILM COATED ORAL at 20:00

## 2024-06-02 RX ADMIN — LEVOTHYROXINE SODIUM 75 MCG: 0.05 TABLET ORAL at 06:06

## 2024-06-02 NOTE — PLAN OF CARE
Problem: Adult Inpatient Plan of Care  Goal: Plan of Care Review  Outcome: Ongoing, Progressing  Flowsheets (Taken 6/2/2024 6663)  Progress: no change  Plan of Care Reviewed With: patient   Goal Outcome Evaluation:  Plan of Care Reviewed With: patient        Progress: no change                       Treatment Assessment (SLP): continued, toleration of diet (06/02/24 1500)  Treatment Assessment Comments (SLP): pt tolerating diet well, tech states ate grilled cheese for lunch. No s/s with any consistency. Obs with tea and did fine. (06/02/24 1500)  Plan for Continued Treatment (SLP): treatment no longer indicated as all goals met (06/02/24 1500)  SLP treatment completed. Will sign-off dysphagia - tolerating diet without issue. Please see note for further details and recommendations.

## 2024-06-02 NOTE — PLAN OF CARE
Goal Outcome Evaluation:  Plan of Care Reviewed With: patient        Progress: no change  Outcome Evaluation: VSS, no c/o pain. UOP/WNL via straight cath.Geodon given once PRN for restlessness and dehydration.  Waiting for spontanious void. Bladder scanning PRN for signs of discomfort and retention. Pt slept well. Sitter at bedside. Will continue to monitor.

## 2024-06-02 NOTE — PROGRESS NOTES
[facility] Medicine Services  PROGRESS NOTE    Patient Name: Dorota Membreno  : 10/31/1931  MRN: [id number]    Date of Admission: 2024  Primary Care Physician: Anthony Medrano MD    Subjective   Subjective     CC:  UTI    HPI:  Required geodon last night, somnolent this morning.  Sitter says patient woke up this morning for medications.      Objective   Objective     Vital Signs:   Temp:  [97.6 °F (36.4 °C)-98.7 °F (37.1 °C)] 98.1 °F (36.7 °C)  Heart Rate:  [] 85  Resp:  [17-24] 17  BP: (126-166)/(48-93) 166/75  Flow (L/min):  [2] 2     NAD, in bed, frail  MM moist  RRR  CTAB  Abd soft, NT  No edema  Somnolent, in bed  Flat affect    Results Reviewed:  LAB RESULTS:      Lab 24  0558 24  0510 24  0617 24  0048 24  2222 24  1532 24  1448   WBC 9.88 12.87*  --  19.40*  --   --  18.41*   HEMOGLOBIN 10.6* 10.5*  --  12.7  --   --  14.4   HEMATOCRIT 33.6* 32.3*  --  38.7  --   --  42.4   PLATELETS 174 192  --  291  --   --  314   NEUTROS ABS  --  9.19*  --  16.09*  --   --  15.40*   IMMATURE GRANS (ABS)  --  0.05  --  0.09*  --   --  0.04   LYMPHS ABS  --  1.87  --  1.77  --   --  1.94   MONOS ABS  --  1.45*  --  1.41*  --   --  0.97*   EOS ABS  --  0.28  --  0.01  --   --  0.03   MCV 89.6 89.7  --  87.8  --   --  87.6   PROCALCITONIN  --   --   --   --   --  0.05  --    LACTATE  --   --  1.8 2.7* 3.5* 3.5* 3.1*   PROTIME  --   --   --   --   --   --  15.7*   APTT  --   --   --   --   --   --  33.4*         Lab 24  0002 24  0510 24  0048 24  1532   SODIUM  --  139 140 141   POTASSIUM 4.5 3.2* 3.5 4.7   CHLORIDE  --  101 100 100   CO2  --  28.0 26.0 26.8   ANION GAP  --  10.0 14.0 14.2   BUN  --  17 25* 36*   CREATININE  --  0.82 1.00 1.21*   EGFR  --  67.2 53.0* 42.1*   GLUCOSE  --  91 89 116*   CALCIUM  --  8.0* 9.1 9.7*   MAGNESIUM  --   --   --  1.9   TSH  --   --   --  2.040         Lab 24  1532   TOTAL  PROTEIN 7.4   ALBUMIN 3.9   GLOBULIN 3.5   ALT (SGPT) 7   AST (SGOT) 25   BILIRUBIN 0.5   ALK PHOS 145*         Lab 05/30/24  1532 05/30/24  1448   PROBNP 494.0  --    HSTROP T 13  --    PROTIME  --  15.7*   INR  --  1.19*                 Brief Urine Lab Results  (Last result in the past 365 days)        Color   Clarity   Blood   Leuk Est   Nitrite   Protein   CREAT   Urine HCG        05/30/24 1452 Yellow   Cloudy   Moderate (2+)   Moderate (2+)   Negative   100 mg/dL (2+)                   Microbiology Results Abnormal       Procedure Component Value - Date/Time    Blood Culture - Blood, Arm, Left [[id number]]  (Normal) Collected: 05/30/24 1609    Lab Status: Preliminary result Specimen: Blood from Arm, Left Updated: 06/01/24 1916     Blood Culture No growth at 2 days    Narrative:      Pediatric Bottle Only        Blood Culture - Blood, Arm, Right [[id number]]  (Normal) Collected: 05/30/24 1609    Lab Status: Preliminary result Specimen: Blood from Arm, Right Updated: 06/01/24 1900     Blood Culture No growth at 2 days    Narrative:      Pediatric Bottle Only        Urine Culture - Urine, Urine, Catheter [[id number]] Collected: 05/30/24 1452    Lab Status: Final result Specimen: Urine, Catheter Updated: 06/01/24 1459     Urine Culture >100,000 CFU/mL Mixed Kimberli Isolated    Narrative:      Specimen contains mixed organisms of questionable pathogenicity suggestive of contamination. If symptoms persist, suggest recollection.  Colonization of the urinary tract without infection is common. Treatment is discouraged unless the patient is symptomatic, pregnant, or undergoing an invasive urologic procedure.    COVID PRE-OP / PRE-PROCEDURE SCREENING ORDER (NO ISOLATION) - Swab, Nasopharynx [[id number]]  (Normal) Collected: 05/30/24 1452    Lab Status: Final result Specimen: Swab from Nasopharynx Updated: 05/30/24 1617    Narrative:      The following orders were created for panel order COVID PRE-OP / PRE-PROCEDURE SCREENING  ORDER (NO ISOLATION) - Swab, Nasopharynx.  Procedure                               Abnormality         Status                     ---------                               -----------         ------                     COVID-19, FLU A/B, RSV P...[[id number]]  Normal              Final result                 Please view results for these tests on the individual orders.    COVID-19, FLU A/B, RSV PCR 1 HR TAT - Swab, Nasopharynx [[id number]]  (Normal) Collected: 05/30/24 1452    Lab Status: Final result Specimen: Swab from Nasopharynx Updated: 05/30/24 1617     COVID19 Not Detected     Influenza A PCR Not Detected     Influenza B PCR Not Detected     RSV, PCR Not Detected    Narrative:      Fact sheet for providers: https://www.fda.gov/media/942647/download    Fact sheet for patients: https://www.fda.gov/media/620423/download    Test performed by PCR.            No radiology results from the last 24 hrs    Results for orders placed during the hospital encounter of 02/26/23    Adult Transthoracic Echo Complete w/ Color, Spectral and Contrast if Necessary Per Protocol    Interpretation Summary    Left ventricular ejection fraction appears to be 61 - 65%.    Left ventricular diastolic function was normal.    Left atrial volume is moderately increased.    Moderate tricuspid valve regurgitation is present.    Estimated right ventricular systolic pressure from tricuspid regurgitation is moderately elevated (45-55 mmHg). Calculated right ventricular systolic pressure from tricuspid regurgitation is 47 mmHg.      Current medications:  Scheduled Meds:apixaban, 5 mg, Oral, Q12H  atorvastatin, 20 mg, Oral, Nightly  busPIRone, 10 mg, Oral, BID  cefTRIAXone, 1,000 mg, Intravenous, Q24H  vitamin D3, 5,000 Units, Oral, Daily  citalopram, 10 mg, Oral, Daily  donepezil, 10 mg, Oral, Nightly  levothyroxine, 75 mcg, Oral, Q AM  memantine, 5 mg, Oral, Q12H  metoprolol tartrate, 25 mg, Oral, Q12H  montelukast, 10 mg, Oral,  Nightly  pantoprazole, 40 mg, Oral, Q AM  QUEtiapine, 75 mg, Oral, Q12H  sodium chloride, 10 mL, Intravenous, Q12H  tamsulosin, 0.4 mg, Oral, Daily      Continuous Infusions:     PRN Meds:.  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        Brief Hospital Course to date:  Dorota Membreno is a 89 y.o. female with history of GERD, Alzheimer's disease, GCA, asthma, colon cancer, HTN, HLD, hypothyroid and anxiety presents with AMS and a UTI    UTI,  - 100K mixed hunter  - showing clinical improvement on rocephin despite culture results above, will complete short course.    AMS  Dementia  - CT head negative for acute changes, suspect UTI contributing to AMS  - again required geodon last night, continue home buspar and seroquel (yesterday increase dose from 50 mg to 75 mg PO BID)  - if continued picking at lines, etc, consider referral to [facility] for further adjustment of medications    KRYSTEN  Urinary retention  - viera discontinued 6/1/24  - continue flomax  - creatinine improved    Hypothyroid    Atrial fibrillation  - eliquis  - continue metoprolol          Expected Discharge Location and Transportation: [facility]  Expected Discharge   Expected Discharge Date: 6/3/2024; Expected Discharge Time:      DVT prophylaxis:  Medical and  mechanical DVT prophylaxis orders are present.         AM-PAC 6 Clicks Score (PT): 14 (06/02/24 0840)    CODE STATUS: DNR/DNI, discussed with daughter  Code Status and Medical Interventions:   Ordered at: 05/31/24 1404     Medical Intervention Limits:    NO intubation (DNI)     Level Of Support Discussed With:    Next of Kin (If No Surrogate)    Health Care Surrogate     Code Status (Patient has no pulse and is not breathing):    No CPR (Do Not Attempt to Resuscitate)     Medical Interventions (Patient has pulse or is breathing):    Limited Support       Francisco Apodaca MD  06/02/24

## 2024-06-02 NOTE — THERAPY TREATMENT NOTE
Acute Care - Speech Language Pathology   Swallow Treatment Note [facility]     Patient Name: Dorota Membreno  : 10/31/1931  MRN: [id number]  Today's Date: 2024               Admit Date: 2024    Visit Dx:     ICD-10-CM ICD-9-CM   1. Sepsis, due to unspecified organism, unspecified whether acute organ dysfunction present  A41.9 038.9     995.91   2. Urinary tract infection with hematuria, site unspecified  N39.0 599.0    R31.9 599.70     Patient Active Problem List   Diagnosis    Gastroesophageal reflux disease    Atopic rhinitis    Asthma    Late onset Alzheimer's disease with behavioral disturbance    Eczema    Dyslipidemia    Hypothyroidism    Essential hypertension    Post-menopausal osteoporosis    Arteritis    Dizziness    Edema    Fatigue    Shingles    Syncope and collapse    Atrial fibrillation    Leukocytosis    Hypomagnesemia    Elevated brain natriuretic peptide (BNP) level    Proteinuria    Bilateral Pleural effusions    Pneumonia    Respiratory failure    Elevated alkaline phosphatase level    Complicated UTI (urinary tract infection)     Past Medical History:   Diagnosis Date    Acid reflux 2016    stable    Allergic rhinitis     Alzheimer's disease 2017    Arteritis     A. 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 B. Improved with prednisone therapy    Asthma     Cancer     colon. Status post resection at age 37    Cognitive impairment, mild, so stated     A. Patient has been forgetting names continue med. Has f/u with neuro. She is having some times forgetting directions. She has moved into a new house and this has not helped with directions    Dyslipidemia     lipids and cmp    Hypertension     lipids, cmp, urine micro    Hypothyroidism     A. On replacement therapy    Mammogram abnormal     A. 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    Panic attacks     Pneumonia     Postmenopausal osteoporosis     Shingles 2018      Past Surgical History:   Procedure Laterality Date    CATARACT EXTRACTION      COLECTOMY PARTIAL / TOTAL      Patient diagnosed with colon cancer at age 37    HYSTERECTOMY         SLP Recommendation and Plan                                               Daily Summary of Progress (SLP): progress toward functional goals is good (06/02/24 1500)               Treatment Assessment (SLP): continued, toleration of diet (06/02/24 1500)  Treatment Assessment Comments (SLP): pt tolerating diet well, tech states ate grilled cheese for lunch. No s/s with any consistency. Obs with tea and did fine. (06/02/24 1500)  Plan for Continued Treatment (SLP): treatment no longer indicated as all goals met (06/02/24 1500)         Plan of Care Reviewed With: patient  Progress: no change      SWALLOW EVALUATION (Last 72 Hours)       SLP Adult Swallow Evaluation       Row Name 06/02/24 1500 05/31/24 0900                Rehab Evaluation    Document Type therapy note (daily note)  -AW evaluation  -CH       Subjective Information no complaints  -AW no complaints  -CH       Patient Observations alert;cooperative  -AW alert;cooperative  In 2 point soft restraints, mildly agitated, easily redirected during evaluation. Staff/sitter present  -CH       Patient/Family/Caregiver Comments/Observations no family present, but sitter at bedside  -AW none present  -CH       Patient Effort good  -AW adequate  -CH       Symptoms Noted During/After Treatment none  -AW none  -CH          General Information    Patient Profile Reviewed -- yes  -CH       Pertinent History Of Current Problem -- Admitted with sepsis, UTI, AMS, dementia at baseline. CXR: no acute findings.  -CH       Current Method of Nutrition -- regular textures;thin liquids  -CH       Precautions/Limitations, Vision -- WFL;for purposes of eval  -CH       Precautions/Limitations, Hearing -- WFL;for purposes of eval  -CH       Prior Level of Function-Communication -- unknown  -CH       Prior  "Level of Function-Swallowing -- no diet consistency restrictions;other (see comments)  per SNF staff, no difficulty swallowing, just ate very slow.  -       Plans/Goals Discussed with -- patient  -       Barriers to Rehab -- cognitive status  -       Patient's Goals for Discharge -- patient did not state  -          Pain    Additional Documentation Pain Scale: FACES Pre/Post-Treatment (Group)  -AW Pain Scale: FACES Pre/Post-Treatment (Group)  -          Pain Scale: FACES Pre/Post-Treatment    Pain: FACES Scale, Pretreatment 0-->no hurt  -AW 2-->hurts little bit  -CH       Posttreatment Pain Rating 0-->no hurt  -AW 2-->hurts little bit  -       Pre/Posttreatment Pain Comment -- Patient did not indicate pain or location when asked. Slight grimace with movement. Possibly d/t agitation with bilateral soft wrist restraints. Patient stating \"get me up\"  -          Oral Motor Structure and Function    Dentition Assessment -- natural, present and adequate  -       Secretion Management -- WNL/WFL  -CH       Mucosal Quality -- moist, healthy  -          Oral Musculature and Cranial Nerve Assessment    Oral Motor General Assessment -- unable to assess  -          General Eating/Swallowing Observations    Respiratory Support Currently in Use -- nasal cannula  -       O2 Liters -- 2L  -CH       Eating/Swallowing Skills -- fed by SLP  -       Positioning During Eating -- upright 90 degree;upright in bed  -       Utensils Used -- spoon;cup;straw  -       Consistencies Trialed -- thin liquids;pureed;regular textures  -       Pre SpO2 (%) -- 93  -CH       Post SpO2 (%) -- 95  -CH          Respiratory    Respiratory Status -- wheezing, stridor;other (see comments)  prior to and throughout evaluation  -          Clinical Swallow Eval    Oral Prep Phase -- WFL  -CH       Oral Transit -- WFL  -CH       Oral Residue -- WFL  -CH       Pharyngeal Phase -- no overt signs/symptoms of pharyngeal impairment  - "       Clinical Swallow Evaluation Summary -- Wheezing noted prior to and throughout evaluation , which did not worsen following trials. CXR: no acute findings.  Patient required verbal cues to take sips from a straw. No overt clinical s/s of aspiration with any consistency or presentation style. Recommend continuation of regular diet and thin liquids. Staff to assist with intake. Meds as tolerated.  -          Swallowing Quality of Life Assessment    Education and counseling provided -- Oral care recommendations and rationale;Feeding assistance and techniques  -          SLP Evaluation Clinical Impression    SLP Swallowing Diagnosis -- functional oral phase;R/O pharyngeal dysphagia;other (see comments)  no suspected pharyngeal dysphagia  -       Functional Impact -- risk of aspiration/pneumonia  -       Rehab Potential/Prognosis, Swallowing -- good, to achieve stated therapy goals  -       Swallow Criteria for Skilled Therapeutic Interventions Met -- demonstrates skilled criteria  -          SLP Treatment Clinical Impressions    Treatment Assessment (SLP) continued;toleration of diet  -AW --       Treatment Assessment Comments (SLP) pt tolerating diet well, tech states ate grilled cheese for lunch. No s/s with any consistency. Obs with tea and did fine.  -AW --       Daily Summary of Progress (SLP) progress toward functional goals is good  -AW --       Plan for Continued Treatment (SLP) treatment no longer indicated as all goals met  -AW --       Care Plan Review care plan/treatment goals reviewed  -AW --          Recommendations    Therapy Frequency (Swallow) -- PRN  -       Predicted Duration Therapy Intervention (Days) -- 1 week  -       SLP Diet Recommendation -- regular textures;thin liquids  -       Recommended Diagnostics -- other (see comments)  diet tolerance  -       Recommended Precautions and Strategies -- upright posture during/after eating;general aspiration precautions;assist with  feeding  -CH       Oral Care Recommendations -- Oral Care BID/PRN;Toothbrush  -CH       SLP Rec. for Method of Medication Administration -- meds whole;with thin liquids;with puree;as tolerated  -CH       Monitor for Signs of Aspiration -- notify SLP if any concerns  -CH       Anticipated Discharge Disposition (SLP) -- skilled nursing facility  -                 User Key  (r) = Recorded By, (t) = Taken By, (c) = Cosigned By      Initials Name Effective Dates    AW Karen Jeff, MS CCC-SLP 02/03/23 -     CH Isabela Zhu MS CCC-SLP 06/16/21 -                     EDUCATION  The patient has been educated in the following areas:   Dysphagia (Swallowing Impairment).        SLP GOALS       Row Name 06/01/24 1013 05/31/24 0900          (LTG) Patient will demonstrate functional swallow for    Diet Texture (Demonstrate functional swallow) regular textures  -AW regular textures  -CH     Liquid viscosity (Demonstrate functional swallow) thin liquids  -AW thin liquids  -CH     [place] (Demonstrate functional swallow) with minimal cues (75-90% accuracy)  -AW with minimal cues (75-90% accuracy)  -     Time Frame (Demonstrate functional swallow) by discharge  -AW by discharge  -CH     Progress/Outcomes (Demonstrate functional swallow) goal met  -AW --        (STG) Patient will tolerate trials of    Consistencies Trialed (Tolerate trials) regular textures;thin liquids  -AW regular textures;thin liquids  -CH     Desired Outcome (Tolerate trials) without signs/symptoms of aspiration;with adequate oral prep/transit/clearance  -AW without signs/symptoms of aspiration;with adequate oral prep/transit/clearance  -CH     [place]ulf (Tolerate trials) with minimal cues (75-90% accuracy)  -AW with minimal cues (75-90% accuracy)  -     Time Frame (Tolerate trials) 1 week  -AW 1 week  -CH     Progress/Outcomes (Tolerate trials) goal met  -AW --               User Key  (r) = Recorded By, (t) = Taken By, (c) = Cosigned By       Initials Name Provider Type    Karen Perez MS CCC-SLP Speech and Language Pathologist    Isabela Pate MS CCC-SLP Speech and Language Pathologist                         Time Calculation:    Time Calculation- SLP       Row Name 06/02/24 1558             Time Calculation- SLP    SLP Start Time 1530  -AW      SLP Stop Time 1558  -      SLP Time Calculation (min) 28 min  -      SLP Received On 06/02/24  -                User Key  (r) = Recorded By, (t) = Taken By, (c) = Cosigned By      Initials Name Provider Type    Karen Perez, MS CCC-SLP Speech and Language Pathologist                             Karen Jeff MS CCC-SLP  6/2/2024

## 2024-06-03 PROCEDURE — 99232 SBSQ HOSP IP/OBS MODERATE 35: CPT | Performed by: INTERNAL MEDICINE

## 2024-06-03 PROCEDURE — 25010000002 CEFTRIAXONE PER 250 MG: Performed by: INTERNAL MEDICINE

## 2024-06-03 RX ORDER — AMLODIPINE BESYLATE 2.5 MG/1
2.5 TABLET ORAL
Status: DISCONTINUED | OUTPATIENT
Start: 2024-06-03 | End: 2024-06-06 | Stop reason: HOSPADM

## 2024-06-03 RX ORDER — TAMSULOSIN HYDROCHLORIDE 0.4 MG/1
0.8 CAPSULE ORAL DAILY
Status: DISCONTINUED | OUTPATIENT
Start: 2024-06-04 | End: 2024-06-06 | Stop reason: HOSPADM

## 2024-06-03 RX ORDER — HYDRALAZINE HYDROCHLORIDE 10 MG/1
10 TABLET, FILM COATED ORAL EVERY 6 HOURS PRN
Status: DISCONTINUED | OUTPATIENT
Start: 2024-06-03 | End: 2024-06-06 | Stop reason: HOSPADM

## 2024-06-03 RX ADMIN — CITALOPRAM HYDROBROMIDE 10 MG: 10 TABLET ORAL at 09:54

## 2024-06-03 RX ADMIN — APIXABAN 5 MG: 5 TABLET, FILM COATED ORAL at 21:46

## 2024-06-03 RX ADMIN — DONEPEZIL HYDROCHLORIDE 10 MG: 10 TABLET, FILM COATED ORAL at 21:00

## 2024-06-03 RX ADMIN — APIXABAN 5 MG: 5 TABLET, FILM COATED ORAL at 09:54

## 2024-06-03 RX ADMIN — MEMANTINE 5 MG: 10 TABLET ORAL at 09:54

## 2024-06-03 RX ADMIN — MEMANTINE 5 MG: 10 TABLET ORAL at 21:46

## 2024-06-03 RX ADMIN — Medication 5000 UNITS: at 09:59

## 2024-06-03 RX ADMIN — BUSPIRONE HYDROCHLORIDE 10 MG: 10 TABLET ORAL at 21:46

## 2024-06-03 RX ADMIN — LEVOTHYROXINE SODIUM 75 MCG: 0.05 TABLET ORAL at 05:14

## 2024-06-03 RX ADMIN — QUETIAPINE FUMARATE 75 MG: 25 TABLET ORAL at 21:45

## 2024-06-03 RX ADMIN — AMLODIPINE BESYLATE 2.5 MG: 2.5 TABLET ORAL at 17:30

## 2024-06-03 RX ADMIN — ATORVASTATIN CALCIUM 20 MG: 20 TABLET, FILM COATED ORAL at 21:45

## 2024-06-03 RX ADMIN — BUSPIRONE HYDROCHLORIDE 10 MG: 10 TABLET ORAL at 09:54

## 2024-06-03 RX ADMIN — METOPROLOL TARTRATE 25 MG: 25 TABLET, FILM COATED ORAL at 09:55

## 2024-06-03 RX ADMIN — PANTOPRAZOLE SODIUM 40 MG: 40 TABLET, DELAYED RELEASE ORAL at 05:14

## 2024-06-03 RX ADMIN — Medication 10 ML: at 23:14

## 2024-06-03 RX ADMIN — SODIUM CHLORIDE 1000 MG: 900 INJECTION INTRAVENOUS at 17:30

## 2024-06-03 RX ADMIN — Medication 10 ML: at 09:55

## 2024-06-03 RX ADMIN — QUETIAPINE FUMARATE 75 MG: 25 TABLET ORAL at 09:54

## 2024-06-03 RX ADMIN — TAMSULOSIN HYDROCHLORIDE 0.4 MG: 0.4 CAPSULE ORAL at 09:54

## 2024-06-03 RX ADMIN — METOPROLOL TARTRATE 25 MG: 25 TABLET, FILM COATED ORAL at 21:46

## 2024-06-03 RX ADMIN — MONTELUKAST 10 MG: 10 TABLET, FILM COATED ORAL at 21:45

## 2024-06-03 NOTE — PLAN OF CARE
Goal Outcome Evaluation:   Pt remains A&Ox1 needing frequent reorientation while awake. Pt has slept well since 0030. 2200 I&O cath performed dt pt c/o bladder discomfort and inability to void. 400ml output at that time. Bladder scan at 0500 only showed 150ml. Holding off on I&O for now.

## 2024-06-03 NOTE — PLAN OF CARE
Problem: Adult Inpatient Plan of Care  Goal: Plan of Care Review  Outcome: Ongoing, Progressing  Goal: Patient-Specific Goal (Individualized)  Outcome: Ongoing, Progressing  Goal: Absence of Hospital-Acquired Illness or Injury  Outcome: Ongoing, Progressing  Intervention: Identify and Manage Fall Risk  Recent Flowsheet Documentation  Taken 6/3/2024 1400 by Yuridia Vaughn RN  Safety Promotion/Fall Prevention:   activity supervised   assistive device/personal items within reach   clutter free environment maintained   toileting scheduled   safety round/check completed   room organization consistent  Taken 6/3/2024 1200 by Yuridia Vaughn RN  Safety Promotion/Fall Prevention:   activity supervised   assistive device/personal items within reach   clutter free environment maintained   toileting scheduled   safety round/check completed   room organization consistent  Taken 6/3/2024 1000 by Yuridia Vaughn RN  Safety Promotion/Fall Prevention:   activity supervised   assistive device/personal items within reach   clutter free environment maintained   toileting scheduled   safety round/check completed   room organization consistent  Taken 6/3/2024 0800 by Yuridia Vaughn RN  Safety Promotion/Fall Prevention:   activity supervised   assistive device/personal items within reach   clutter free environment maintained   toileting scheduled   safety round/check completed   room organization consistent  Intervention: Prevent Skin Injury  Recent Flowsheet Documentation  Taken 6/3/2024 1400 by Yuridia Vaughn RN  Body Position:   weight shifting   supine  Skin Protection:   adhesive use limited   tubing/devices free from skin contact   transparent dressing maintained   skin-to-skin areas padded   skin-to-device areas padded  Taken 6/3/2024 1200 by Yuridia Vaughn RN  Body Position:   weight shifting   tilted   right  Skin Protection:   adhesive use limited   tubing/devices free from skin contact   transparent dressing  maintained   skin-to-skin areas padded   skin-to-device areas padded  Taken 6/3/2024 1000 by Yuridia Vaughn RN  Body Position:   weight shifting   sitting up in bed  Skin Protection:   adhesive use limited   tubing/devices free from skin contact   transparent dressing maintained   skin-to-skin areas padded   skin-to-device areas padded  Taken 6/3/2024 0800 by Yuridia Vaughn RN  Body Position:   weight shifting   supine  Skin Protection:   adhesive use limited   tubing/devices free from skin contact   skin-to-skin areas padded   transparent dressing maintained   skin-to-device areas padded  Intervention: Prevent and Manage VTE (Venous Thromboembolism) Risk  Recent Flowsheet Documentation  Taken 6/3/2024 1400 by Yuridia Vaughn RN  Activity Management: activity encouraged  Taken 6/3/2024 1200 by Yuridia Vaughn RN  Activity Management:   up to bedside commode   back to bed  Taken 6/3/2024 1000 by Yuridia Vaughn RN  Activity Management:   up to bedside commode   back to bed  Taken 6/3/2024 0800 by Yuridia Vaughn RN  Activity Management: activity encouraged  Range of Motion: active ROM (range of motion) encouraged  Goal: Optimal Comfort and Wellbeing  Outcome: Ongoing, Progressing  Goal: Readiness for Transition of Care  Outcome: Ongoing, Progressing     Problem: Fall Injury Risk  Goal: Absence of Fall and Fall-Related Injury  Outcome: Ongoing, Progressing  Intervention: Promote Injury-Free Environment  Recent Flowsheet Documentation  Taken 6/3/2024 1400 by Yuridia Vaughn RN  Safety Promotion/Fall Prevention:   activity supervised   assistive device/personal items within reach   clutter free environment maintained   toileting scheduled   safety round/check completed   room organization consistent  Taken 6/3/2024 1200 by Yuridia Vaughn RN  Safety Promotion/Fall Prevention:   activity supervised   assistive device/personal items within reach   clutter free environment maintained   toileting  scheduled   safety round/check completed   room organization consistent  Taken 6/3/2024 1000 by Yuridia Vaughn RN  Safety Promotion/Fall Prevention:   activity supervised   assistive device/personal items within reach   clutter free environment maintained   toileting scheduled   safety round/check completed   room organization consistent  Taken 6/3/2024 0800 by Yuridia Vaughn RN  Safety Promotion/Fall Prevention:   activity supervised   assistive device/personal items within reach   clutter free environment maintained   toileting scheduled   safety round/check completed   room organization consistent     Problem: Skin Injury Risk Increased  Goal: Skin Health and Integrity  Outcome: Ongoing, Progressing  Intervention: Optimize Skin Protection  Recent Flowsheet Documentation  Taken 6/3/2024 1400 by Yuridia Vaughn RN  Pressure Reduction Techniques:   frequent weight shift encouraged   heels elevated off bed   pressure points protected   weight shift assistance provided  Head of Bed (HOB) Positioning: [facility] elevated  Pressure Reduction Devices:   pressure-redistributing mattress utilized   heel offloading device utilized   positioning supports utilized   foam padding utilized  Skin Protection:   adhesive use limited   tubing/devices free from skin contact   transparent dressing maintained   skin-to-skin areas padded   skin-to-device areas padded  Taken 6/3/2024 1200 by Yuridia Vaughn RN  Pressure Reduction Techniques:   frequent weight shift encouraged   heels elevated off bed   pressure points protected   weight shift assistance provided  Head of Bed (HOB) Positioning: HOB elevated  Pressure Reduction Devices:   pressure-redistributing mattress utilized   positioning supports utilized   heel offloading device utilized   foam padding utilized  Skin Protection:   adhesive use limited   tubing/devices free from skin contact   transparent dressing maintained   skin-to-skin areas padded   skin-to-device areas  padded  Taken 6/3/2024 1000 by Yuridia Vaughn RN  Pressure Reduction Techniques:   frequent weight shift encouraged   heels elevated off bed   pressure points protected   weight shift assistance provided  Head of Bed (HOB) Positioning: HOB elevated  Pressure Reduction Devices:   pressure-redistributing mattress utilized   positioning supports utilized   foam padding utilized   heel offloading device utilized  Skin Protection:   adhesive use limited   tubing/devices free from skin contact   transparent dressing maintained   skin-to-skin areas padded   skin-to-device areas padded  Taken 6/3/2024 0800 by Yuridia Vaughn RN  Pressure Reduction Techniques:   frequent weight shift encouraged   heels elevated off bed   pressure points protected   weight shift assistance provided  Head of Bed (HOB) Positioning: HOB elevated  Pressure Reduction Devices:   pressure-redistributing mattress utilized   positioning supports utilized   heel offloading device utilized   foam padding utilized  Skin Protection:   adhesive use limited   tubing/devices free from skin contact   skin-to-skin areas padded   transparent dressing maintained   skin-to-device areas padded     Problem: Restraint, Nonviolent  Goal: Absence of Harm or Injury  Outcome: Ongoing, Progressing  Intervention: Implement Least Restrictive Safety Strategies  Recent Flowsheet Documentation  Taken 6/3/2024 1200 by Yuridia Vaughn RN  Medical Device Protection:   tubing secured   torso covered  Less Restrictive Alternative:   1:1 observation maintained   appropriate expression promoted   bed alarm in use   calming techniques promoted   emotional support provided   physical activity promoted   positive reinforcement provided   safety enhancements provided   security enhancements provided   surveillance provided   therapeutic music  De-Escalation Techniques:   reoriented   stimulation decreased   1:1 observation initiated  Diversional Activities: television  Taken  6/3/2024 1000 by Yuridia Vaughn RN  Medical Device Protection:   torso covered   tubing secured  Less Restrictive Alternative:   1:1 observation maintained   appropriate expression promoted   bed alarm in use   calming techniques promoted   emotional support provided   physical activity promoted   positive reinforcement provided   safety enhancements provided   security enhancements provided   surveillance provided   therapeutic music  De-Escalation Techniques:   reoriented   stimulation decreased   1:1 observation initiated  Diversional Activities: television  Taken 6/3/2024 0800 by Yuridia Vaughn RN  Medical Device Protection:   torso covered   tubing secured  Less Restrictive Alternative:   1:1 observation maintained   appropriate expression promoted   bed alarm in use   calming techniques promoted   emotional support provided   physical activity promoted   positive reinforcement provided   safety enhancements provided   security enhancements provided   surveillance provided   therapeutic music  De-Escalation Techniques:   reoriented   stimulation decreased   1:1 observation initiated  Diversional Activities: television  Intervention: Protect Skin and Joint Integrity  Recent Flowsheet Documentation  Taken 6/3/2024 1400 by Yuridia Vaughn RN  Body Position:   weight shifting   supine  Taken 6/3/2024 1200 by Yuridia Vaughn RN  Body Position:   weight shifting   tilted   right  Taken 6/3/2024 1000 by Yuridia Vaughn RN  Body Position:   weight shifting   sitting up in bed  Taken 6/3/2024 0800 by Yuridia Vaughn RN  Body Position:   weight shifting   supine  Range of Motion: active ROM (range of motion) encouraged  Goal: Absence of Harm or Injury  Outcome: Ongoing, Progressing  Intervention: Implement Least Restrictive Safety Strategies  Recent Flowsheet Documentation  Taken 6/3/2024 1200 by Yuridia Vaughn RN  Medical Device Protection:   tubing secured   torso covered  Less Restrictive  Alternative:   1:1 observation maintained   appropriate expression promoted   bed alarm in use   calming techniques promoted   emotional support provided   physical activity promoted   positive reinforcement provided   safety enhancements provided   security enhancements provided   surveillance provided   therapeutic music  De-Escalation Techniques:   reoriented   stimulation decreased   1:1 observation initiated  Diversional Activities: television  Taken 6/3/2024 1000 by Yuridia Vaughn RN  Medical Device Protection:   torso covered   tubing secured  Less Restrictive Alternative:   1:1 observation maintained   appropriate expression promoted   bed alarm in use   calming techniques promoted   emotional support provided   physical activity promoted   positive reinforcement provided   safety enhancements provided   security enhancements provided   surveillance provided   therapeutic music  De-Escalation Techniques:   reoriented   stimulation decreased   1:1 observation initiated  Diversional Activities: television  Taken 6/3/2024 0800 by Yuridia Vaughn RN  Medical Device Protection:   torso covered   tubing secured  Less Restrictive Alternative:   1:1 observation maintained   appropriate expression promoted   bed alarm in use   calming techniques promoted   emotional support provided   physical activity promoted   positive reinforcement provided   safety enhancements provided   security enhancements provided   surveillance provided   therapeutic music  De-Escalation Techniques:   reoriented   stimulation decreased   1:1 observation initiated  Diversional Activities: television  Intervention: Protect Skin and Joint Integrity  Recent Flowsheet Documentation  Taken 6/3/2024 1400 by Yuridia Vaughn RN  Body Position:   weight shifting   supine  Taken 6/3/2024 1200 by Yuridia Vaughn RN  Body Position:   weight shifting   tilted   right  Taken 6/3/2024 1000 by Yuridia Vaughn RN  Body Position:   weight  shifting   sitting up in bed  Taken 6/3/2024 0800 by Yuridia Vaughn RN  Body Position:   weight shifting   supine  Range of Motion: active ROM (range of motion) encouraged     Problem: Adjustment to Illness (Sepsis/Septic Shock)  Goal: Optimal Coping  Outcome: Ongoing, Progressing     Problem: Bleeding (Sepsis/Septic Shock)  Goal: Absence of Bleeding  Outcome: Ongoing, Progressing     Problem: Glycemic Control Impaired (Sepsis/Septic Shock)  Goal: Blood Glucose Level Within Desired Range  Outcome: Ongoing, Progressing     Problem: Infection Progression (Sepsis/Septic Shock)  Goal: Absence of Infection Signs and Symptoms  Outcome: Ongoing, Progressing  Intervention: Promote Recovery  Recent Flowsheet Documentation  Taken 6/3/2024 1400 by Yuridia Vaughn, RN  Activity Management: activity encouraged  Taken 6/3/2024 1200 by Yuridia Vaughn, RN  Activity Management:   up to bedside commode   back to bed  Taken 6/3/2024 1000 by Yuridia Vaughn RN  Activity Management:   up to bedside commode   back to bed  Taken 6/3/2024 0800 by Yuridia Vaughn, RN  Activity Management: activity encouraged     Problem: Nutrition Impaired (Sepsis/Septic Shock)  Goal: Optimal Nutrition Intake  Outcome: Ongoing, Progressing   Goal Outcome Evaluation:

## 2024-06-03 NOTE — PROGRESS NOTES
[facility] Medicine Services  PROGRESS NOTE    Patient Name: Dorota Membreno  : 10/31/1931  MRN: [id number]    Date of Admission: 2024  Primary Care Physician: Anthony Medrano MD    Subjective   Subjective     CC:  UTI    HPI:  Didn't receive geodon but in restraints last night      Objective   Objective     Vital Signs:   Temp:  [96.9 °F (36.1 °C)-98.3 °F (36.8 °C)] 97.6 °F (36.4 °C)  Heart Rate:  [63-86] 66  Resp:  [16-18] 16  BP: (155-178)/(64-75) 178/75  Flow (L/min):  [2] 2     NAD, in bed, frail; no distress  Restraints in place  MM moist  RRR  CTAB  Abd soft, NT  No edema  Somnolent, in bed  Flat affect    Results Reviewed:  LAB RESULTS:      Lab 24  0558 24  0510 24  0617 24  0048 24  2222 24  1532 24  1448   WBC 9.88 12.87*  --  19.40*  --   --  18.41*   HEMOGLOBIN 10.6* 10.5*  --  12.7  --   --  14.4   HEMATOCRIT 33.6* 32.3*  --  38.7  --   --  42.4   PLATELETS 174 192  --  291  --   --  314   NEUTROS ABS  --  9.19*  --  16.09*  --   --  15.40*   IMMATURE GRANS (ABS)  --  0.05  --  0.09*  --   --  0.04   LYMPHS ABS  --  1.87  --  1.77  --   --  1.94   MONOS ABS  --  1.45*  --  1.41*  --   --  0.97*   EOS ABS  --  0.28  --  0.01  --   --  0.03   MCV 89.6 89.7  --  87.8  --   --  87.6   PROCALCITONIN  --   --   --   --   --  0.05  --    LACTATE  --   --  1.8 2.7* 3.5* 3.5* 3.1*   PROTIME  --   --   --   --   --   --  15.7*   APTT  --   --   --   --   --   --  33.4*         Lab 24  1158 24  0002 24  0510 24  0048 24  1532   SODIUM 140  --  139 140 141   POTASSIUM 4.4 4.5 3.2* 3.5 4.7   CHLORIDE 106  --  101 100 100   CO2 27.0  --  28.0 26.0 26.8   ANION GAP 7.0  --  10.0 14.0 14.2   BUN 18  --  17 25* 36*   CREATININE 0.70  --  0.82 1.00 1.21*   EGFR 81.3  --  67.2 53.0* 42.1*   GLUCOSE 98  --  91 89 116*   CALCIUM 8.1*  --  8.0* 9.1 9.7*   MAGNESIUM  --   --   --   --  1.9   TSH  --   --   --   --   2.040         Lab 05/30/24  1532   TOTAL PROTEIN 7.4   ALBUMIN 3.9   GLOBULIN 3.5   ALT (SGPT) 7   AST (SGOT) 25   BILIRUBIN 0.5   ALK PHOS 145*         Lab 05/30/24  1532 05/30/24  1448   PROBNP 494.0  --    HSTROP T 13  --    PROTIME  --  15.7*   INR  --  1.19*                 Brief Urine Lab Results  (Last result in the past 365 days)        Color   Clarity   Blood   Leuk Est   Nitrite   Protein   CREAT   Urine HCG        05/30/24 1452 Yellow   Cloudy   Moderate (2+)   Moderate (2+)   Negative   100 mg/dL (2+)                   Microbiology Results Abnormal       Procedure Component Value - Date/Time    Blood Culture - Blood, Arm, Left [[id number]]  (Normal) Collected: 05/30/24 1609    Lab Status: Preliminary result Specimen: Blood from Arm, Left Updated: 06/02/24 1916     Blood Culture No growth at 3 days    Narrative:      Pediatric Bottle Only        Blood Culture - Blood, Arm, Right [[id number]]  (Normal) Collected: 05/30/24 1609    Lab Status: Preliminary result Specimen: Blood from Arm, Right Updated: 06/02/24 1900     Blood Culture No growth at 3 days    Narrative:      Pediatric Bottle Only        Urine Culture - Urine, Urine, Catheter [[id number]] Collected: 05/30/24 1452    Lab Status: Final result Specimen: Urine, Catheter Updated: 06/01/24 1459     Urine Culture >100,000 CFU/mL Mixed Kimberli Isolated    Narrative:      Specimen contains mixed organisms of questionable pathogenicity suggestive of contamination. If symptoms persist, suggest recollection.  Colonization of the urinary tract without infection is common. Treatment is discouraged unless the patient is symptomatic, pregnant, or undergoing an invasive urologic procedure.    COVID PRE-OP / PRE-PROCEDURE SCREENING ORDER (NO ISOLATION) - Swab, Nasopharynx [[id number]]  (Normal) Collected: 05/30/24 1452    Lab Status: Final result Specimen: Swab from Nasopharynx Updated: 05/30/24 1617    Narrative:      The following orders were created for panel order  COVID PRE-OP / PRE-PROCEDURE SCREENING ORDER (NO ISOLATION) - Swab, Nasopharynx.  Procedure                               Abnormality         Status                     ---------                               -----------         ------                     COVID-19, FLU A/B, RSV P...[[id number]]  Normal              Final result                 Please view results for these tests on the individual orders.    COVID-19, FLU A/B, RSV PCR 1 HR TAT - Swab, Nasopharynx [[id number]]  (Normal) Collected: 05/30/24 1452    Lab Status: Final result Specimen: Swab from Nasopharynx Updated: 05/30/24 1617     COVID19 Not Detected     Influenza A PCR Not Detected     Influenza B PCR Not Detected     RSV, PCR Not Detected    Narrative:      Fact sheet for providers: https://www.fda.gov/media/574616/download    Fact sheet for patients: https://www.fda.gov/media/216219/download    Test performed by PCR.            No radiology results from the last 24 hrs    Results for orders placed during the hospital encounter of 02/26/23    Adult Transthoracic Echo Complete w/ Color, Spectral and Contrast if Necessary Per Protocol    Interpretation Summary    Left ventricular ejection fraction appears to be 61 - 65%.    Left ventricular diastolic function was normal.    Left atrial volume is moderately increased.    Moderate tricuspid valve regurgitation is present.    Estimated right ventricular systolic pressure from tricuspid regurgitation is moderately elevated (45-55 mmHg). Calculated right ventricular systolic pressure from tricuspid regurgitation is 47 mmHg.      Current medications:  Scheduled Meds:apixaban, 5 mg, Oral, Q12H  atorvastatin, 20 mg, Oral, Nightly  busPIRone, 10 mg, Oral, BID  cefTRIAXone, 1,000 mg, Intravenous, Q24H  vitamin D3, 5,000 Units, Oral, Daily  citalopram, 10 mg, Oral, Daily  donepezil, 10 mg, Oral, Nightly  levothyroxine, 75 mcg, Oral, Q AM  memantine, 5 mg, Oral, Q12H  metoprolol tartrate, 25 mg, Oral,  Q12H  montelukast, 10 mg, Oral, Nightly  pantoprazole, 40 mg, Oral, Q AM  QUEtiapine, 75 mg, Oral, Q12H  sodium chloride, 10 mL, Intravenous, Q12H  tamsulosin, 0.4 mg, Oral, Daily      Continuous Infusions:     PRN Meds:.  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        Brief Hospital Course to date:  Dorota Membreno is a 89 y.o. female with history of GERD, Alzheimer's disease, GCA, asthma, colon cancer, HTN, HLD, hypothyroid and anxiety presents with AMS and a UTI    UTI,  - 100K mixed hunter  - showing clinical improvement on rocephin despite culture results above, will complete short course.day #5/5 empiric rocephin    AMS (due to uti and sleep deprivation)  Dementia  - CT head negative for acute changes, suspect UTI contributing to AMS  - didn't require geodon last night (did evening of 6/1); but in restraints  - continue home buspar and seroquel (on 6/1/24 increased dose from 50 mg to 75 mg PO BID). Try to d/c restraints today; hopefully back to memory care [facility] soon   - if continued picking at lines, etc, consider referral to [facility] for further adjustment of medications    KRYSTEN  Urinary retention  - viera discontinued 6/1/24  - continue flomax; in/out caths prn    Hypothyroid  -continue synthroid; tsh ok    Atrial  fibrillation  - eliquis  - continue metoprolol    -updated daughter in law Jovana on 6/3/24    Am labs: cbc,bmp,mag      Expected Discharge Location and Transportation: [facility] [place]  Expected Discharge   Expected Discharge Date: 6/3/2024; Expected Discharge Time:      DVT prophylaxis:  Medical and mechanical DVT prophylaxis orders are present.         AM-PAC 6 Clicks Score (PT): 14 (06/02/24 1907)    CODE STATUS: DNR/DNI, discussed with daughter  Code Status and Medical Interventions:   Ordered at: 05/31/24 1404     Medical Intervention Limits:    NO intubation (DNI)     Level Of Support Discussed With:    Next of Kin (If No Surrogate)    Health Care Surrogate     Code Status (Patient has no pulse and is not breathing):    No CPR (Do Not Attempt to Resuscitate)     Medical Interventions (Patient has pulse or is breathing):    Limited Support       Russell Guerin MD  06/03/24

## 2024-06-03 NOTE — CASE MANAGEMENT/SOCIAL WORK
Continued Stay Note  [facility]     Patient Name: Dorota Membreno  MRN: [id number]  Today's Date: 6/3/2024    Admit Date: 5/30/2024    Plan: Back to Memory Care Unit at [facility]   Discharge Plan       Row Name 06/03/24 1213       Plan    Plan Back to Memory Care Unit at [facility]    Patient/Family in Agreement with Plan yes    Plan Comments Patient was discussed in MDR. She currently has a viera but did not have one at admission.  called [facility] Memory Care Unit to see if they will take Ms. Membreno back with a viera. CM was told that they typically do not allow foleys but stated they will allow in some circumstances. They will need to evaluate prior to her coming back and reported they will see her tomrrow or Wednesday.  will continue to follow plan of care and assist with discharge planning needs as indicated.    12:35 EDT  Correction: Patient does not have a viera cath currently. Nursing has been in and out cathing.    Final Discharge Disposition Code 04 - intermediate care facility                   Discharge Codes    No documentation.                 Expected Discharge Date and Time       Expected Discharge Date Expected Discharge Time    Eliel 3, 2024               Demetra Senior RN

## 2024-06-04 LAB
ANION GAP SERPL CALCULATED.3IONS-SCNC: 12 MMOL/L (ref 5–15)
BACTERIA SPEC AEROBE CULT: NORMAL
BACTERIA SPEC AEROBE CULT: NORMAL
BUN SERPL-MCNC: 11 MG/DL (ref 8–23)
BUN/CREAT SERPL: 16.7 (ref 7–25)
CALCIUM SPEC-SCNC: 8.8 MG/DL (ref 8.2–9.6)
CHLORIDE SERPL-SCNC: 98 MMOL/L (ref 98–107)
CO2 SERPL-SCNC: 27 MMOL/L (ref 22–29)
CREAT SERPL-MCNC: 0.66 MG/DL (ref 0.57–1)
DEPRECATED RDW RBC AUTO: 42.6 FL (ref 37–54)
EGFRCR SERPLBLD CKD-EPI 2021: 82.4 ML/MIN/1.73
ERYTHROCYTE [DISTWIDTH] IN BLOOD BY AUTOMATED COUNT: 13.6 % (ref 12.3–15.4)
GLUCOSE SERPL-MCNC: 82 MG/DL (ref 65–99)
HCT VFR BLD AUTO: 36.4 % (ref 34–46.6)
HGB BLD-MCNC: 12 G/DL (ref 12–15.9)
MAGNESIUM SERPL-MCNC: 1.3 MG/DL (ref 1.7–2.3)
MCH RBC QN AUTO: 28.4 PG (ref 26.6–33)
MCHC RBC AUTO-ENTMCNC: 33 G/DL (ref 31.5–35.7)
MCV RBC AUTO: 86.1 FL (ref 79–97)
PLATELET # BLD AUTO: 252 10*3/MM3 (ref 140–450)
PMV BLD AUTO: 9.9 FL (ref 6–12)
POTASSIUM SERPL-SCNC: 3.7 MMOL/L (ref 3.5–5.2)
RBC # BLD AUTO: 4.23 10*6/MM3 (ref 3.77–5.28)
SODIUM SERPL-SCNC: 137 MMOL/L (ref 136–145)
WBC NRBC COR # BLD AUTO: 8.39 10*3/MM3 (ref 3.4–10.8)

## 2024-06-04 PROCEDURE — 83735 ASSAY OF MAGNESIUM: CPT | Performed by: INTERNAL MEDICINE

## 2024-06-04 PROCEDURE — 99233 SBSQ HOSP IP/OBS HIGH 50: CPT | Performed by: INTERNAL MEDICINE

## 2024-06-04 PROCEDURE — 94664 DEMO&/EVAL PT USE INHALER: CPT

## 2024-06-04 PROCEDURE — 94799 UNLISTED PULMONARY SVC/PX: CPT

## 2024-06-04 PROCEDURE — 85027 COMPLETE CBC AUTOMATED: CPT | Performed by: INTERNAL MEDICINE

## 2024-06-04 PROCEDURE — 80048 BASIC METABOLIC PNL TOTAL CA: CPT | Performed by: INTERNAL MEDICINE

## 2024-06-04 PROCEDURE — 25010000002 MAGNESIUM SULFATE 2 GM/50ML SOLUTION: Performed by: INTERNAL MEDICINE

## 2024-06-04 RX ORDER — CEFUROXIME AXETIL 250 MG/1
250 TABLET ORAL EVERY 12 HOURS SCHEDULED
Status: COMPLETED | OUTPATIENT
Start: 2024-06-04 | End: 2024-06-05

## 2024-06-04 RX ORDER — QUETIAPINE FUMARATE 25 MG/1
100 TABLET, FILM COATED ORAL NIGHTLY
Status: DISCONTINUED | OUTPATIENT
Start: 2024-06-04 | End: 2024-06-06 | Stop reason: HOSPADM

## 2024-06-04 RX ORDER — QUETIAPINE FUMARATE 25 MG/1
75 TABLET, FILM COATED ORAL EVERY MORNING
Status: DISCONTINUED | OUTPATIENT
Start: 2024-06-05 | End: 2024-06-06 | Stop reason: HOSPADM

## 2024-06-04 RX ORDER — MAGNESIUM SULFATE HEPTAHYDRATE 40 MG/ML
2 INJECTION, SOLUTION INTRAVENOUS
Status: DISCONTINUED | OUTPATIENT
Start: 2024-06-04 | End: 2024-06-04

## 2024-06-04 RX ADMIN — AMLODIPINE BESYLATE 2.5 MG: 2.5 TABLET ORAL at 08:43

## 2024-06-04 RX ADMIN — QUETIAPINE FUMARATE 75 MG: 25 TABLET ORAL at 08:42

## 2024-06-04 RX ADMIN — IPRATROPIUM BROMIDE AND ALBUTEROL SULFATE 3 ML: 2.5; .5 SOLUTION RESPIRATORY (INHALATION) at 10:45

## 2024-06-04 RX ADMIN — MAGNESIUM OXIDE TAB 400 MG (241.3 MG ELEMENTAL MG) 400 MG: 400 (241.3 MG) TAB at 20:17

## 2024-06-04 RX ADMIN — Medication 5000 UNITS: at 08:42

## 2024-06-04 RX ADMIN — CEFUROXIME AXETIL 250 MG: 250 TABLET, FILM COATED ORAL at 20:17

## 2024-06-04 RX ADMIN — Medication 5 MG: at 20:08

## 2024-06-04 RX ADMIN — BUSPIRONE HYDROCHLORIDE 10 MG: 10 TABLET ORAL at 20:10

## 2024-06-04 RX ADMIN — CEFUROXIME AXETIL 250 MG: 250 TABLET, FILM COATED ORAL at 13:16

## 2024-06-04 RX ADMIN — MONTELUKAST 10 MG: 10 TABLET, FILM COATED ORAL at 20:13

## 2024-06-04 RX ADMIN — MAGNESIUM OXIDE TAB 400 MG (241.3 MG ELEMENTAL MG) 400 MG: 400 (241.3 MG) TAB at 13:15

## 2024-06-04 RX ADMIN — PANTOPRAZOLE SODIUM 40 MG: 40 TABLET, DELAYED RELEASE ORAL at 06:34

## 2024-06-04 RX ADMIN — MEMANTINE 5 MG: 10 TABLET ORAL at 08:43

## 2024-06-04 RX ADMIN — METOPROLOL TARTRATE 25 MG: 25 TABLET, FILM COATED ORAL at 08:42

## 2024-06-04 RX ADMIN — METOPROLOL TARTRATE 25 MG: 25 TABLET, FILM COATED ORAL at 20:10

## 2024-06-04 RX ADMIN — CITALOPRAM HYDROBROMIDE 10 MG: 10 TABLET ORAL at 08:42

## 2024-06-04 RX ADMIN — BUSPIRONE HYDROCHLORIDE 10 MG: 10 TABLET ORAL at 08:42

## 2024-06-04 RX ADMIN — APIXABAN 5 MG: 5 TABLET, FILM COATED ORAL at 08:42

## 2024-06-04 RX ADMIN — DONEPEZIL HYDROCHLORIDE 10 MG: 10 TABLET, FILM COATED ORAL at 20:13

## 2024-06-04 RX ADMIN — APIXABAN 5 MG: 5 TABLET, FILM COATED ORAL at 20:13

## 2024-06-04 RX ADMIN — LEVOTHYROXINE SODIUM 75 MCG: 0.05 TABLET ORAL at 06:33

## 2024-06-04 RX ADMIN — MEMANTINE 5 MG: 10 TABLET ORAL at 20:11

## 2024-06-04 RX ADMIN — Medication 10 ML: at 08:43

## 2024-06-04 RX ADMIN — MAGNESIUM SULFATE HEPTAHYDRATE 2 G: 2 INJECTION, SOLUTION INTRAVENOUS at 09:27

## 2024-06-04 RX ADMIN — TAMSULOSIN HYDROCHLORIDE 0.8 MG: 0.4 CAPSULE ORAL at 08:42

## 2024-06-04 RX ADMIN — ATORVASTATIN CALCIUM 20 MG: 20 TABLET, FILM COATED ORAL at 20:10

## 2024-06-04 RX ADMIN — QUETIAPINE FUMARATE 100 MG: 25 TABLET ORAL at 20:07

## 2024-06-04 NOTE — PROGRESS NOTES
"          Clinical Nutrition Assessment     Patient Name: Dorota Membreno  YOB: 1931  MRN: [id number]  Date of Encounter: 06/04/24 11:29 EDT  Admission date: 5/30/2024  Reason for Visit: Identified at risk by screening criteria, MST score 2+    Assessment   Nutrition Assessment   Admission Diagnosis:  Complicated UTI (urinary tract infection) [N39.0]    Problem List:    Complicated UTI (urinary tract infection)    Late onset Alzheimer's disease with behavioral disturbance    Hypothyroidism    Atrial fibrillation    Leukocytosis      PMH:   She  has a past medical history of Acid reflux (03/14/2016), Allergic rhinitis, Alzheimer's disease (12/2017), Arteritis, Asthma, Cancer, Cognitive impairment, mild, so stated, Dyslipidemia, Hypertension, Hypothyroidism, Mammogram abnormal, Panic attacks, Pneumonia, Postmenopausal osteoporosis, and Shingles (03/2018).    PSH:  She  has a past surgical history that includes Cataract extraction; Hysterectomy; and Colectomy partial / total.    Applicable Nutrition History:       Anthropometrics     Height: Height: 157.5 cm (62\")  Last Filed Weight: Weight: 72.3 kg (159 lb 4.8 oz) (05/30/24 2206)  Method:    BMI: BMI (Calculated): 29.1    UBW: 150-160lb   Weight change: unchanged bed weight obtained = 157lb    Nutrition Focused Physical Exam    Date: 6/4    Pt does not meet criteria for malnutrition diagnosis, at this time.  Some muscle wasting noted, however most like due to advanced age and sarcopenia vs protein calorie malnutrition. Patient weight stable per documented weight in EMR x 1 year     Subjective   Reported/Observed/Food/Nutrition Related History:   6/4  Confused and not able to provide much information.  Reports good appetite. Eating about 25% of meals per documented intake. Not able to provide any food prefs. No weight changes per documented weight in EMR.     Current Nutrition Prescription   PO: Diet: Cardiac; Healthy Heart (2-3 Na+); Fluid Consistency: " Thin (IDDSI 0)  Oral Nutrition Supplement:   Intake: 3 Days: 21% x 7 meals     Assessment & Plan   Nutrition Diagnosis   Date:  6/4            Updated:    Problem Inadequate oral intake    Etiology Per documented intake    Signs/Symptoms 21% x 7 meals    Status: New    Goal / Objectives:   Nutrition to support treatment and Increase intake      Nutrition Intervention      Follow treatment progress, Care plan reviewed, Interview for preferences, Supplement provided    Boost plus daily     Monitoring/Evaluation:   Per protocol, I&O, PO intake, Supplement intake, Pertinent labs    Edyta Holland RD  Time Spent: 20min

## 2024-06-04 NOTE — NURSING NOTE
Bladder scanned at 2300. 65 ml showed on scanner. Had multiple BM and urine episodes throughout the night. Did not sleep at all it seemed.

## 2024-06-04 NOTE — CASE MANAGEMENT/SOCIAL WORK
Continued Stay Note  [facility]     Patient Name: Dorota Membreno  MRN: [id number]  Today's Date: 6/4/2024    Admit Date: 5/30/2024    Plan: Back to Memory Care at [facility]   Discharge Plan       Row Name 06/04/24 0850       Plan    Plan Back to [facility] at [facility]    Patient/Family in Agreement with Plan yes    Plan Comments  spoke to bedside nurse and patient to discuss discharge plan. Nurse reported that patient is still being in and out cathed but stated that night shift nurse reported that she voided some on her own last night. Patient is saying that she wants to go home.  left message for April at [facility] requesting a call back to schedule time for her to evaluate the patient. Updated Dr. Guerin regarding plan, and he is in agreement with evaluation today.  will continue to follow plan of care and assist with discharge planning needs as indicated.    09:56 EDT  Received call back from April with [facility]. She will come evaluate Ms. Membreno today. She requested  fax H&P and recent notes.  faxed to [phone].     Final Discharge Disposition Code 04 - intermediate care facility                   Discharge Codes    No documentation.                 Expected Discharge Date and Time       Expected Discharge Date Expected Discharge Time    Eliel 3, 2024               Demetra Senior RN

## 2024-06-04 NOTE — PROGRESS NOTES
[facility]  PROGRESS NOTE    Patient Name: Dorota Membreno  : 10/31/1931  MRN: [id number]    Date of Admission: 2024  Primary Care Physician: Anthony Medrano MD    Subjective   Subjective     CC:  UTI    HPI:  Slept a bit overnight, but not through the night  Out of restraints since yesterday afternoon  Required in/out cath again last evening  Denies pain, dyspnea, or nausea currently      Objective   Objective     Vital Signs:   Temp:  [97 °F (36.1 °C)-97.8 °F (36.6 °C)] 97.7 °F (36.5 °C)  Heart Rate:  [] 69  Resp:  [14-16] 14  BP: (155-189)/(47-90) 155/76  Flow (L/min):  [2] 2     NAD, in bed, frail; no distress, up in chair comfortable appearing  ncat  RRR  CTAB  Abd soft, NT to palpation  No edema  Flat affect but calm/follows commands    Results Reviewed:  LAB RESULTS:      Lab 24  0543 24  0558 24  0510 24  0617 24  0048 24  2222 24  1532 24  1448   WBC 8.39 9.88 12.87*  --  19.40*  --   --  18.41*   HEMOGLOBIN 12.0 10.6* 10.5*  --  12.7  --   --  14.4   HEMATOCRIT 36.4 33.6* 32.3*  --  38.7  --   --  42.4   PLATELETS 252 174 192  --  291  --   --  314   NEUTROS ABS  --   --  9.19*  --  16.09*  --   --  15.40*   IMMATURE GRANS (ABS)  --   --  0.05  --  0.09*  --   --  0.04   LYMPHS ABS  --   --  1.87  --  1.77  --   --  1.94   MONOS ABS  --   --  1.45*  --  1.41*  --   --  0.97*   EOS ABS  --   --  0.28  --  0.01  --   --  0.03   MCV 86.1 89.6 89.7  --  87.8  --   --  87.6   PROCALCITONIN  --   --   --   --   --   --  0.05  --    LACTATE  --   --   --  1.8 2.7* 3.5* 3.5* 3.1*   PROTIME  --   --   --   --   --   --   --  15.7*   APTT  --   --   --   --   --   --   --  33.4*         Lab 24  0543 24  1158 24  0002 24  0510 24  0048 24  1532   SODIUM 137 140  --  139 140 141   POTASSIUM 3.7 4.4 4.5 3.2* 3.5 4.7   CHLORIDE 98 106  --  101 100 100   CO2 27.0 27.0  --  28.0  26.0 26.8   ANION GAP 12.0 7.0  --  10.0 14.0 14.2   BUN 11 18  --  17 25* 36*   CREATININE 0.66 0.70  --  0.82 1.00 1.21*   EGFR 82.4 81.3  --  67.2 53.0* 42.1*   GLUCOSE 82 98  --  91 89 116*   CALCIUM 8.8 8.1*  --  8.0* 9.1 9.7*   MAGNESIUM 1.3*  --   --   --   --  1.9   TSH  --   --   --   --   --  2.040         Lab 05/30/24  1532   TOTAL PROTEIN 7.4   ALBUMIN 3.9   GLOBULIN 3.5   ALT (SGPT) 7   AST (SGOT) 25   BILIRUBIN 0.5   ALK PHOS 145*         Lab 05/30/24  1532 05/30/24  1448   PROBNP 494.0  --    HSTROP T 13  --    PROTIME  --  15.7*   INR  --  1.19*                 Brief Urine Lab Results  (Last result in the past 365 days)        Color   Clarity   Blood   Leuk Est   Nitrite   Protein   CREAT   Urine HCG        05/30/24 1452 Yellow   Cloudy   Moderate (2+)   Moderate (2+)   Negative   100 mg/dL (2+)                   Microbiology Results Abnormal       Procedure Component Value - Date/Time    Blood Culture - Blood, Arm, Left [[id number]]  (Normal) Collected: 05/30/24 1609    Lab Status: Preliminary result Specimen: Blood from Arm, Left Updated: 06/03/24 1915     Blood Culture No growth at 4 days    Narrative:      Pediatric Bottle Only        Blood Culture - Blood, Arm, Right [[id number]]  (Normal) Collected: 05/30/24 1609    Lab Status: Preliminary result Specimen: Blood from Arm, Right Updated: 06/03/24 1900     Blood Culture No growth at 4 days    Narrative:      Pediatric Bottle Only        Urine Culture - Urine, Urine, Catheter [[id number]] Collected: 05/30/24 1452    Lab Status: Final result Specimen: Urine, Catheter Updated: 06/01/24 1459     Urine Culture >100,000 CFU/mL Mixed Kimberli Isolated    Narrative:      Specimen contains mixed organisms of questionable pathogenicity suggestive of contamination. If symptoms persist, suggest recollection.  Colonization of the urinary tract without infection is common. Treatment is discouraged unless the patient is symptomatic, pregnant, or undergoing an  invasive urologic procedure.    COVID PRE-OP / PRE-PROCEDURE SCREENING ORDER (NO ISOLATION) - Swab, Nasopharynx [[id number]]  (Normal) Collected: 05/30/24 1452    Lab Status: Final result Specimen: Swab from Nasopharynx Updated: 05/30/24 1617    Narrative:      The following orders were created for panel order COVID PRE-OP / PRE-PROCEDURE SCREENING ORDER (NO ISOLATION) - Swab, Nasopharynx.  Procedure                               Abnormality         Status                     ---------                               -----------         ------                     COVID-19, FLU A/B, RSV P...[[id number]]  Normal              Final result                 Please view results for these tests on the individual orders.    COVID-19, FLU A/B, RSV PCR 1 HR TAT - Swab, Nasopharynx [[id number]]  (Normal) Collected: 05/30/24 1452    Lab Status: Final result Specimen: Swab from Nasopharynx Updated: 05/30/24 1617     COVID19 Not Detected     Influenza A PCR Not Detected     Influenza B PCR Not Detected     RSV, PCR Not Detected    Narrative:      Fact sheet for providers: https://www.fda.gov/media/570880/download    Fact sheet for patients: https://www.fda.gov/media/664952/download    Test performed by PCR.            No radiology results from the last 24 hrs    Results for orders placed during the hospital encounter of 02/26/23    Adult Transthoracic Echo Complete w/ Color, Spectral and Contrast if Necessary Per Protocol    Interpretation Summary    Left ventricular ejection fraction appears to be 61 - 65%.    Left ventricular diastolic function was normal.    Left atrial volume is moderately increased.    Moderate tricuspid valve regurgitation is present.    Estimated right ventricular systolic pressure from tricuspid regurgitation is moderately elevated (45-55 mmHg). Calculated right ventricular systolic pressure from tricuspid regurgitation is 47 mmHg.      Current medications:  Scheduled Meds:amLODIPine, 2.5 mg, Oral,  Q24H  apixaban, 5 mg, Oral, Q12H  atorvastatin, 20 mg, Oral, Nightly  busPIRone, 10 mg, Oral, BID  cefTRIAXone, 1,000 mg, Intravenous, Q24H  vitamin D3, 5,000 Units, Oral, Daily  citalopram, 10 mg, Oral, Daily  donepezil, 10 mg, Oral, Nightly  levothyroxine, 75 mcg, Oral, Q AM  magnesium sulfate, 2 g, Intravenous, Q2H  memantine, 5 mg, Oral, Q12H  metoprolol tartrate, 25 mg, Oral, Q12H  montelukast, 10 mg, Oral, Nightly  pantoprazole, 40 mg, Oral, Q AM  QUEtiapine, 100 mg, Oral, Nightly  [START ON 6/5/2024] QUEtiapine, 75 mg, Oral, QAM  sodium chloride, 10 mL, Intravenous, Q12H  tamsulosin, 0.8 mg, Oral, Daily      Continuous Infusions:     PRN Meds:.  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        Brief Hospital Course to date:  Dorota Membreno is a 89 y.o. female with history of GERD, Alzheimer's disease, GCA, asthma, colon cancer, HTN, HLD, hypothyroid and anxiety presents with AMS and a UTI    UTI  Urinary retention  - 100K mixed hunter  - showing clinical improvement on rocephin despite culture results above, will complete short course.day #5/7 empiric rocephin (could change to po ceftin upon discharge)  -has required intermittent in/out caths at least daily. Increased flomax to 0.8mg daily. Instructed nurse to make sure  get up to bedside commode each time prior to bladder scanning; if has any further retention we will anchor a viera (case management is informing her memory care facility of status and asking whether they could take her back with a viera or not)    AMS (due to uti and sleep deprivation)  Dementia  - CT head negative for acute changes, suspect UTI contributing to AMS  - didn't require geodon last night (did evening of 6/1); but in restraints  -doing better, out of restraints since 6/3 afternoon; still not sleeping through the night; will increase night seroquel to 100mg (continue 75mg qam)      KRYSTEN, resolved  - viera discontinued 6/1/24  - continue flomax; in/out caths prn    Hypothyroid  -continue synthroid; tsh ok    Atrial fibrillation  - eliquis  - continue metoprolol    -updated daughter in law Jovana on 6/3/24    Am labs: bmp,mag    -updated daughter 6/4/24    Expected Discharge Location and Transportation: [facility] (depends if patient requires a viera at  discharge, and whether the facility can take her with a viera.. C.M. contacting facility to inquire)  Expected Discharge  possibly by 6/5/24 if facility can take her back and if otherwise stable and remains out of restraints  Expected Discharge Date: 6/5/2024; Expected Discharge Time:      DVT prophylaxis:  Medical and mechanical DVT prophylaxis orders are present.         AM-PAC 6 Clicks Score (PT): 15 (06/03/24 0800)    CODE STATUS: DNR/DNI, discussed with daughter  Code Status and Medical Interventions:   Ordered at: 05/31/24 1404     Medical Intervention Limits:    NO intubation (DNI)     Level Of Support Discussed With:    Next of Kin (If No Surrogate)    Health Care Surrogate     Code Status (Patient has no pulse and is not breathing):    No CPR (Do Not Attempt to Resuscitate)     Medical Interventions (Patient has pulse or is breathing):    Limited Support       Rsusell Guerin MD  06/04/24

## 2024-06-04 NOTE — DISCHARGE PLACEMENT REQUEST
"To: April at [facility]  From: Demetra Senior,   [phone]      Dorota Vogt (89 y.o. Female)       Date of Birth   10/31/1931    Social Security Number       Address   [address] [place] [address]    Home Phone   [phone]    MRN   [id number]       Mosque   None    Marital Status                               Admission Date   5/30/24    Admission Type   Emergency    Admitting Provider   Russell Guerin MD    Attending Provider   Russell Guerin MD    Department, Room/Bed   [facility] 5H, S572/1       Discharge Date       Discharge Disposition       Discharge Destination                                 Attending Provider: Russell Guerin MD    Allergies: Codeine, Dyazide [Hydrochlorothiazide W-triamterene], Levofloxacin, Penicillins    Isolation: None   Infection: None   Code Status: No CPR    Ht: 157.5 cm (62\")   Wt: 72.3 kg (159 lb 4.8 oz)    Admission Cmt: None   Principal Problem: Complicated UTI (urinary tract infection) [N39.0]                   Active Insurance as of 5/30/2024       Primary Coverage       Payor Plan Insurance Group Employer/Plan Group    MEDICARE MEDICARE A & B        Payor Plan Address Payor Plan Phone Number Payor Plan Fax Number Effective Dates    [address] [phone]  10/1/1996 - None Entered    [facility] [id number]         Subscriber Name Subscriber Birth Date Member ID       DOROTA VOGT 10/31/1931 [id number]               Secondary Coverage       Payor Plan Insurance Group Employer/Plan Group    [facility] [id number]       Payor Plan Address Payor Plan Phone Number Payor Plan Fax Number Effective Dates    [address] [address]   1/1/2016 - None Entered    [facility] [id number]         Subscriber Name Subscriber Birth Date Member ID       DOROTA VOGT 10/31/1931 [id number]                     Emergency Contacts        (Rel.) Home Phone Work Phone " Mobile Phone    Jovana Membreno (Daughter) [phone] -- [phone]    Carlso Membreno (Son) [phone] -- [phone]                 History & Physical        Zina Brandt MD at 24 2151              [facility] Services  HISTORY AND PHYSICAL    Patient Name: Dorota Membreno  : 10/31/1931  MRN: [id number]  Primary Care Physician: Anthony Medrano MD  Date of admission: 2024      Subjective  Subjective     Chief Complaint:  AMS    HPI:  Dorota Membreno is a 89 y.o. female with history of dementia who was found altered and presented to [facility] ED.  Presentation at that time was notable for a likely UTI and she was started on antibiotics and fluids.  She has baseline dementia and unable to provide further information.  She appears anxious.  She states she is uncomfortable but unable to tell me exactly why      Personal History     Past Medical History:   Diagnosis Date    Acid reflux 2016    stable    Allergic rhinitis     Alzheimer's disease 2017    Arteritis     A. 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 B. Improved with prednisone therapy    Asthma     Cancer     colon. Status post resection at age 37    Cognitive impairment, mild, so stated     A. Patient has been forgetting names continue med. Has f/u with neuro. She is having some times forgetting directions. She has moved into a new house and this has not helped with directions    Dyslipidemia     lipids and cmp    Hypertension     lipids, cmp, urine micro    Hypothyroidism     A. On replacement therapy    Mammogram abnormal     A. 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    Panic attacks     Pneumonia     Postmenopausal osteoporosis     Shingles 2018           Past Surgical History:   Procedure Laterality Date    CATARACT EXTRACTION      COLECTOMY PARTIAL / TOTAL      Patient diagnosed with colon cancer at age 37    HYSTERECTOMY          Family History: family history includes Alcohol abuse in an other family member; Allergies in her son; Other in her mother.     Social History:  reports that she has never smoked. She has never used smokeless tobacco. She reports that she does not drink alcohol and does not use drugs.  Social History     Social History Narrative    Not on file       Medications:  Available home medication information reviewed.  Magnesium Oxide -Mg Supplement, QUEtiapine, Vitamin D3, acetaminophen, apixaban, atorvastatin, citalopram, donepezil, hydrOXYzine, ipratropium-albuterol, levothyroxine, memantine, metoprolol tartrate, montelukast, ondansetron, pantoprazole, polyethylene glycol, sennosides-docusate, tamsulosin, and vitamin D3    Allergies   Allergen Reactions    Codeine     Dyazide [Hydrochlorothiazide W-Triamterene] Unknown - Low Severity    Levofloxacin     Penicillins        Objective  Objective     Vital Signs:   Temp:  [98.1 °F (36.7 °C)-98.4 °F (36.9 °C)] 98.4 °F (36.9 °C)  Heart Rate:  [80-85] 80  Resp:  [20-28] 22  BP: ()/(49-77) 140/70  Flow (L/min):  [2] 2       Physical Exam   Constitutional: anxious, elderly, frail  HENT: NCAT, mucous membranes moist  Respiratory: Clear to auscultation bilaterally, respiratory effort normal   Cardiovascular: RRR, no murmurs, rubs, or gallops  Gastrointestinal: Positive bowel sounds, soft, nontender, nondistended  Musculoskeletal: No bilateral ankle edema  Psychiatric: Anxious  Neurologic: Oriented to person only, generally follows commands, no focal deficits, globally weak  Skin: No rashes      Result Review:  I have personally reviewed the results from the time of this admission to 5/30/2024 22:11 EDT and agree with these findings:  [x]  Laboratory list / accordion  [x]  Microbiology  [x]  Radiology  []  EKG/Telemetry   []  Cardiology/Vascular   []  Pathology  []  Old records  []  Other:  Most notable findings include:   CT head negative  Elevated lactate  Elevated  creatinine  Leukocytosis  UA appears consistent with UTI  LAB RESULTS:      Lab 05/30/24  1532 05/30/24  1448   WBC  --  18.41*   HEMOGLOBIN  --  14.4   HEMATOCRIT  --  42.4   PLATELETS  --  314   NEUTROS ABS  --  15.40*   IMMATURE GRANS (ABS)  --  0.04   LYMPHS ABS  --  1.94   MONOS ABS  --  0.97*   EOS ABS  --  0.03   MCV  --  87.6   PROCALCITONIN 0.05  --    LACTATE 3.5* 3.1*   PROTIME  --  15.7*   INR  --  1.19*   APTT  --  33.4*         Lab 05/30/24  1532   SODIUM 141   POTASSIUM 4.7   CHLORIDE 100   CO2 26.8   ANION GAP 14.2   BUN 36*   CREATININE 1.21*   EGFR 42.1*   GLUCOSE 116*   CALCIUM 9.7*   MAGNESIUM 1.9   TSH 2.040         Lab 05/30/24  1532   TOTAL PROTEIN 7.4   ALBUMIN 3.9   GLOBULIN 3.5   ALT (SGPT) 7   AST (SGOT) 25   BILIRUBIN 0.5   ALK PHOS 145*         Lab 05/30/24  1532   PROBNP 494.0   HSTROP T 13                 UA          5/30/2024    14:52   Urinalysis   Squamous Epithelial Cells, UA 0-2    Specific [place], UA 1.015    Ketones, UA Negative    Blood, UA Moderate (2+)    Leukocytes, UA Moderate (2+)    Nitrite, UA Negative    RBC, UA 11-20    WBC, UA Too Numerous to Count    Bacteria, UA 3+        Microbiology Results (last 10 days)       Procedure Component Value - Date/Time    COVID PRE-OP / PRE-PROCEDURE SCREENING ORDER (NO ISOLATION) - Swab, Nasopharynx [[id number]]  (Normal) Collected: 05/30/24 1452    Lab Status: Final result Specimen: Swab from Nasopharynx Updated: 05/30/24 1617    Narrative:      The following orders were created for panel order COVID PRE-OP / PRE-PROCEDURE SCREENING ORDER (NO ISOLATION) - Swab, Nasopharynx.  Procedure                               Abnormality         Status                     ---------                               -----------         ------                     COVID-19, FLU A/B, RSV P...[[id number]]  Normal              Final result                 Please view results for these tests on the individual orders.    COVID-19, FLU A/B, RSV PCR 1 HR TAT  - Swab, Nasopharynx [[id number]]  (Normal) Collected: 05/30/24 1452    Lab Status: Final result Specimen: Swab from Nasopharynx Updated: 05/30/24 1617     COVID19 Not Detected     Influenza A PCR Not Detected     Influenza B PCR Not Detected     RSV, PCR Not Detected    Narrative:      Fact sheet for providers: https://www.fda.gov/media/280665/download    Fact sheet for patients: https://www.fda.gov/media/089934/download    Test performed by PCR.            CT Head Without Contrast    Result Date: 5/30/2024  CT HEAD WO CONTRAST Date of Exam: 5/30/2024 4:18 PM EDT Indication: ams. Comparison: 4/7/2018 Technique: Axial CT images were obtained of the head without contrast administration.  Automated exposure control and iterative construction methods were used. Findings: No intracranial hemorrhage. Gray-white matter differentiation is maintained without evidence of an acute infarction. Multiple foci of decreased attenuation are present within the subcortical, deep cerebral, and periventricular white matter consistent with chronic small vessel/microangiopathic ischemic changes. No extra-axial mass or collection. The ventricles and sulci are prominent commensurate with involutional changes. The posterior fossa appears grossly normal. Sellar and suprasellar structures are normal. Lens replacements. The paranasal sinuses, ethmoid air cells, and mastoid air cells are aerated. The bony calvarium is intact.     Impression: Impression: No acute intracranial pathology. Electronically Signed: Mina Larkin MD  5/30/2024 4:30 PM EDT  Workstation ID: [id number]    XR Chest 1 View    Result Date: 5/30/2024  XR CHEST 1 VW Date of Exam: 5/30/2024 2:14 PM EDT Indication: ams Comparison 7/11/2023 Findings: There is unchanged moderately severe elevation of the right diaphragm. Mildly prominent interstitial pattern appears stable and chronic. Heart and pulmonary vessels appear within normal limits. There are no acute infiltrates or effusions.      Impression: Impression: Chronic findings. No acute process. Electronically Signed: Rachel Hood MD  5/30/2024 2:33 PM EDT  Workstation ID: [id number]     Results for orders placed during the hospital encounter of 02/26/23    Adult Transthoracic Echo Complete w/ Color, Spectral and Contrast if Necessary Per Protocol    Interpretation Summary    Left ventricular ejection fraction appears to be 61 - 65%.    Left ventricular diastolic function was normal.    Left atrial volume is moderately increased.    Moderate tricuspid valve regurgitation is present.    Estimated right ventricular systolic pressure from tricuspid regurgitation is moderately elevated (45-55 mmHg). Calculated right ventricular systolic pressure from tricuspid regurgitation is 47 mmHg.      Assessment & Plan  Assessment & Plan       Complicated UTI (urinary tract infection)    Late onset Alzheimer's disease with behavioral disturbance    Hypothyroidism    Atrial fibrillation    Leukocytosis        Dorota Membreno is a 89-year-old female with a history of dementia who is presenting with altered mental status and found to be septic from likely source of UTI    Altered mental status  Sepsis  UTI  - CT head as above, negative for any acute pathology.  Suspect infectious etiology  - Started on ceftriaxone, follow blood and urine culture  - Continue fluids.  Failed bedside nursing dysphagia screen.  Fluids overnight with speech consult in AM.    KRYSTEN  --secondary to above  --fluids  --repeat in AM  --avoid in nephrotoxins    Hypothyroid  --cont home synthroid  --TSH and T4 ok    Dementia  --cont home meds  --complicates all aspects of care    Afib  --cont home eliquis  --rate controlled  --holding on home BB secondary to above          DVT prophylaxis:  Medical and mechanical DVT prophylaxis orders are present.          CODE STATUS:  neither family member answering phone call and she is not able to currently make decisions. Previously documented  DNR/DNI  There are no questions and answers to display.       Expected Discharge   Expected Discharge Date: 6/3/2024; Expected Discharge Time:      Zina Brandt MD  24      Electronically signed by Zina Brandt MD at 24 2211          Physician Progress Notes (most recent note)        Russell Guerin MD at 24 0900              [facility]  PROGRESS NOTE    Patient Name: Dorota Membreno  : 10/31/1931  MRN: [id number]    Date of Admission: 2024  Primary Care Physician: Anthony Medrano MD    Subjective   Subjective     CC:  UTI    HPI:  Slept a bit overnight, but not through the night  Out of restraints since yesterday afternoon  Required in/out cath again last evening  Denies pain, dyspnea, or nausea currently      Objective   Objective     Vital Signs:   Temp:  [97 °F (36.1 °C)-97.8 °F (36.6 °C)] 97.7 °F (36.5 °C)  Heart Rate:  [] 69  Resp:  [14-16] 14  BP: (155-189)/(47-90) 155/76  Flow (L/min):  [2] 2     NAD, in bed, frail; no distress, up in chair comfortable appearing  ncat  RRR  CTAB  Abd soft, NT to palpation  No edema  Flat affect but calm/follows commands    Results Reviewed:  LAB RESULTS:      Lab 24  0543 24  0558 24  0510 24  0617 24  0048 24  2222 24  1532 24  1448   WBC 8.39 9.88 12.87*  --  19.40*  --   --  18.41*   HEMOGLOBIN 12.0 10.6* 10.5*  --  12.7  --   --  14.4   HEMATOCRIT 36.4 33.6* 32.3*  --  38.7  --   --  42.4   PLATELETS 252 174 192  --  291  --   --  314   NEUTROS ABS  --   --  9.19*  --  16.09*  --   --  15.40*   IMMATURE GRANS (ABS)  --   --  0.05  --  0.09*  --   --  0.04   LYMPHS ABS  --   --  1.87  --  1.77  --   --  1.94   MONOS ABS  --   --  1.45*  --  1.41*  --   --  0.97*   EOS ABS  --   --  0.28  --  0.01  --   --  0.03   MCV 86.1 89.6 89.7  --  87.8  --   --  87.6   PROCALCITONIN  --   --   --   --   --   --  0.05  --    LACTATE  --   --   --  1.8  2.7* 3.5* 3.5* 3.1*   PROTIME  --   --   --   --   --   --   --  15.7*   APTT  --   --   --   --   --   --   --  33.4*         Lab 06/04/24  0543 06/02/24  1158 06/02/24  0002 06/01/24  0510 05/31/24  0048 05/30/24  1532   SODIUM 137 140  --  139 140 141   POTASSIUM 3.7 4.4 4.5 3.2* 3.5 4.7   CHLORIDE 98 106  --  101 100 100   CO2 27.0 27.0  --  28.0 26.0 26.8   ANION GAP 12.0 7.0  --  10.0 14.0 14.2   BUN 11 18  --  17 25* 36*   CREATININE 0.66 0.70  --  0.82 1.00 1.21*   EGFR 82.4 81.3  --  67.2 53.0* 42.1*   GLUCOSE 82 98  --  91 89 116*   CALCIUM 8.8 8.1*  --  8.0* 9.1 9.7*   MAGNESIUM 1.3*  --   --   --   --  1.9   TSH  --   --   --   --   --  2.040         Lab 05/30/24  1532   TOTAL PROTEIN 7.4   ALBUMIN 3.9   GLOBULIN 3.5   ALT (SGPT) 7   AST (SGOT) 25   BILIRUBIN 0.5   ALK PHOS 145*         Lab 05/30/24  1532 05/30/24  1448   PROBNP 494.0  --    HSTROP T 13  --    PROTIME  --  15.7*   INR  --  1.19*                 Brief Urine Lab Results  (Last result in the past 365 days)        Color   Clarity   Blood   Leuk Est   Nitrite   Protein   CREAT   Urine HCG        05/30/24 1452 Yellow   Cloudy   Moderate (2+)   Moderate (2+)   Negative   100 mg/dL (2+)                   Microbiology Results Abnormal       Procedure Component Value - Date/Time    Blood Culture - Blood, Arm, Left [[id number]]  (Normal) Collected: 05/30/24 1609    Lab Status: Preliminary result Specimen: Blood from Arm, Left Updated: 06/03/24 1915     Blood Culture No growth at 4 days    Narrative:      Pediatric Bottle Only        Blood Culture - Blood, Arm, Right [[id number]]  (Normal) Collected: 05/30/24 1609    Lab Status: Preliminary result Specimen: Blood from Arm, Right Updated: 06/03/24 1900     Blood Culture No growth at 4 days    Narrative:      Pediatric Bottle Only        Urine Culture - Urine, Urine, Catheter [[id number]] Collected: 05/30/24 1452    Lab Status: Final result Specimen: Urine, Catheter Updated: 06/01/24 1459     Urine  Culture >100,000 CFU/mL Mixed Kimberli Isolated    Narrative:      Specimen contains mixed organisms of questionable pathogenicity suggestive of contamination. If symptoms persist, suggest recollection.  Colonization of the urinary tract without infection is common. Treatment is discouraged unless the patient is symptomatic, pregnant, or undergoing an invasive urologic procedure.    COVID PRE-OP / PRE-PROCEDURE SCREENING ORDER (NO ISOLATION) - Swab, Nasopharynx [[id number]]  (Normal) Collected: 05/30/24 1452    Lab Status: Final result Specimen: Swab from Nasopharynx Updated: 05/30/24 1617    Narrative:      The following orders were created for panel order COVID PRE-OP / PRE-PROCEDURE SCREENING ORDER (NO ISOLATION) - Swab, Nasopharynx.  Procedure                               Abnormality         Status                     ---------                               -----------         ------                     COVID-19, FLU A/B, RSV P...[[id number]]  Normal              Final result                 Please view results for these tests on the individual orders.    COVID-19, FLU A/B, RSV PCR 1 HR TAT - Swab, Nasopharynx [[id number]]  (Normal) Collected: 05/30/24 1452    Lab Status: Final result Specimen: Swab from Nasopharynx Updated: 05/30/24 1617     COVID19 Not Detected     Influenza A PCR Not Detected     Influenza B PCR Not Detected     RSV, PCR Not Detected    Narrative:      Fact sheet for providers: https://www.fda.gov/media/696630/download    Fact sheet for patients: https://www.fda.gov/media/809334/download    Test performed by PCR.            No radiology results from the last 24 hrs    Results for orders placed during the hospital encounter of 02/26/23    Adult Transthoracic Echo Complete w/ Color, Spectral and Contrast if Necessary Per Protocol    Interpretation Summary    Left ventricular ejection fraction appears to be 61 - 65%.    Left ventricular diastolic function was normal.    Left atrial volume is  moderately increased.    Moderate tricuspid valve regurgitation is present.    Estimated right ventricular systolic pressure from tricuspid regurgitation is moderately elevated (45-55 mmHg). Calculated right ventricular systolic pressure from tricuspid regurgitation is 47 mmHg.      Current medications:  Scheduled Meds:amLODIPine, 2.5 mg, Oral, Q24H  apixaban, 5 mg, Oral, Q12H  atorvastatin, 20 mg, Oral, Nightly  busPIRone, 10 mg, Oral, BID  cefTRIAXone, 1,000 mg, Intravenous, Q24H  vitamin D3, 5,000 Units, Oral, Daily  citalopram, 10 mg, Oral, Daily  donepezil, 10 mg, Oral, Nightly  levothyroxine, 75 mcg, Oral, Q AM  magnesium sulfate, 2 g, Intravenous, Q2H  memantine, 5 mg, Oral, Q12H  metoprolol tartrate, 25 mg, Oral, Q12H  montelukast, 10 mg, Oral, Nightly  pantoprazole, 40 mg, Oral, Q AM  QUEtiapine, 100 mg, Oral, Nightly  [START ON 6/5/2024] QUEtiapine, 75 mg, Oral, QAM  sodium chloride, 10 mL, Intravenous, Q12H  tamsulosin, 0.8 mg, Oral, Daily      Continuous Infusions:     PRN Meds:.  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        Brief Hospital Course to date:  Dorota Membreno is a 89 y.o. female with history of GERD, Alzheimer's disease, GCA, asthma, colon cancer, HTN, HLD, hypothyroid and anxiety presents with AMS  and a UTI    UTI  Urinary retention  - 100K mixed hunter  - showing clinical improvement on rocephin despite culture results above, will complete short course.day #5/7 empiric rocephin (could change to po ceftin upon discharge)  -has required intermittent in/out caths at least daily. Increased flomax to 0.8mg daily. Instructed nurse to make sure get up to bedside commode each time prior to bladder scanning; if has any further retention we will anchor a viera (case management is informing her [facility] care facility of status and asking whether they could take her back with a viera or not)    AMS (due to uti and sleep deprivation)  Dementia  - CT head negative for acute changes, suspect UTI contributing to AMS  - didn't require geodon last night (did evening of 6/1); but in restraints  -doing better, out of restraints since 6/3 afternoon; still not sleeping through the night; will increase night seroquel to 100mg (continue 75mg qam)      KRYSTEN, resolved  - ivera discontinued 6/1/24  - continue flomax; in/out caths prn    Hypothyroid  -continue synthroid; tsh ok    Atrial fibrillation  - eliquis  - continue metoprolol    -updated daughter in law Jovana on 6/3/24    Am labs: bmp,mag    -updated daughter 6/4/24    Expected Discharge Location and Transportation: [facility] (depends if patient requires a viera at  discharge, and whether the facility can take her with a viera.. C.M. contacting facility to inquire)  Expected Discharge  possibly by 6/5/24 if facility can take her back and if otherwise stable and remains out of restraints  Expected Discharge Date: 6/5/2024; Expected Discharge Time:      DVT prophylaxis:  Medical and mechanical DVT prophylaxis orders are present.         AM-PAC 6 Clicks Score (PT): 15 (06/03/24 0800)    CODE STATUS: DNR/DNI, discussed with daughter  Code Status and Medical Interventions:   Ordered at: 05/31/24 1404     Medical Intervention Limits:    NO intubation (DNI)     Level Of  Support Discussed With:    Next of Kin (If No Surrogate)    Health Care Surrogate     Code Status (Patient has no pulse and is not breathing):    No CPR (Do Not Attempt to Resuscitate)     Medical Interventions (Patient has pulse or is breathing):    Limited Support       Russell Guerin MD  24       Electronically signed by Russell Guerin MD at 24 0910          Physical Therapy Notes (most recent note)        Narcisa Andrade, PT at 24 0914  Version 1 of 1         Patient Name: Dorota Membreno  : 10/31/1931    MRN: [id number]                              Today's Date: 2024       Admit Date: 2024    Visit Dx:     ICD-10-CM ICD-9-CM   1. Sepsis, due to unspecified organism, unspecified whether acute organ dysfunction present  A41.9 038.9     995.91   2. Urinary tract infection with hematuria, site unspecified  N39.0 599.0    R31.9 599.70     Patient Active Problem List   Diagnosis    Gastroesophageal reflux disease    Atopic rhinitis    Asthma    Late onset Alzheimer's disease with behavioral disturbance    Eczema    Dyslipidemia    Hypothyroidism    Essential hypertension    Post-menopausal osteoporosis    Arteritis    Dizziness    Edema    Fatigue    Shingles    Syncope and collapse    Atrial fibrillation    Leukocytosis    Hypomagnesemia    Elevated brain natriuretic peptide (BNP) level    Proteinuria    Bilateral Pleural effusions    Pneumonia    Respiratory failure    Elevated alkaline phosphatase level    Complicated UTI (urinary tract infection)     Past Medical History:   Diagnosis Date    Acid reflux 2016    stable    Allergic rhinitis     Alzheimer's disease 2017    Arteritis     A. 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 B. Improved with prednisone therapy    Asthma     Cancer     colon. Status post resection at age 37    Cognitive impairment, mild, so stated     A. Patient has been forgetting names continue med. Has f/u  with neuro. She is having some times forgetting directions. She has moved into a new house and this has not helped with directions    Dyslipidemia     lipids and cmp    Hypertension     lipids, cmp, urine micro    Hypothyroidism     A. On replacement therapy    Mammogram abnormal     A. 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    Panic attacks     Pneumonia     Postmenopausal osteoporosis     Shingles 03/2018     Past Surgical History:   Procedure Laterality Date    CATARACT EXTRACTION      COLECTOMY PARTIAL / TOTAL      Patient diagnosed with colon cancer at age 37    HYSTERECTOMY        General Information       [facility] Name 06/01/24 1004          Physical Therapy Time and Intention    Document Type evaluation  -     Mode of Treatment physical therapy  -       Row Name 06/01/24 1004          General Information    Patient Profile Reviewed yes  -LM     Prior Level of Function --  Unable to obtain PLOF d/t pt cognition and no family present  -     Existing Precautions/Restrictions fall;oxygen therapy device and L/min;other (see comments)  Dementia  -     Barriers to Rehab medically complex;previous functional deficit;cognitive status  -       Row Name 06/01/24 1004          Living Environment    People in Home facility resident  [facility]  -       Row Name 06/01/24 1004          Home Main Entrance    Number of Stairs, Main Entrance none  -       Row Name 06/01/24 1004          Stairs Within Home, Primary    Number of Stairs, Within Home, Primary none  -       Row Name 06/01/24 1004          Cognition    Orientation Status (Cognition) oriented to;person;disoriented to;place;situation;time  Oriented to first name only  -       Row Name 06/01/24 1004          Safety Issues, Functional Mobility    Safety Issues Affecting Function (Mobility) awareness of need for assistance;insight into deficits/self-awareness;judgment;problem-solving;safety precaution  awareness;safety precautions follow-through/compliance;sequencing abilities  -LM     Impairments Affecting Function (Mobility) balance;cognition;endurance/activity tolerance;motor planning;postural/trunk control;shortness of breath;strength  -LM     Comment, Safety Issues/Impairments (Mobility) Increased processing time needed  -LM               User Key  (r) = Recorded By, (t) = Taken By, (c) = Cosigned By      Initials Name Provider Type    LM Narcisa Andrade, PT Physical Therapist                   Mobility       Row Name 06/01/24 1007          Bed Mobility    Bed Mobility supine-sit;sit-supine  -LM     Supine-Sit [place] (Bed Mobility) maximum assist (25% patient effort);1 person assist;verbal cues  -LM     Sit-Supine [place] (Bed Mobility) maximum assist (25% patient effort);1 person assist;verbal cues  -LM     Assistive Device (Bed Mobility) bed rails;head of bed elevated  -LM     Comment, (Bed Mobility) Vc's for sequencing.  Pt needs cues and initiation of movement from therapist, but once she understands what you are wanting she is very helpful.  -LM       Row Name 06/01/24 1007          Bed-Chair Transfer    Bed-Chair [place] (Transfers) minimum assist (75% patient effort);1 person assist  -LM     Assistive Device (Bed-Chair Transfers) walker, front-wheeled  -LM     Comment, (Bed-Chair Transfer) Pt completed a stand step transfer from bed-->[facility].  -LM       Row Name 06/01/24 1007          Sit-Stand Transfer    Sit-Stand [place] (Transfers) minimum assist (75% patient effort);1 person assist;verbal cues  -LM     Assistive Device (Sit-Stand Transfers) walker, front-wheeled  -LM       Row Name 06/01/24 1007          Gait/Stairs (Locomotion)    [place] Level (Gait) minimum assist (75% patient effort);1 person assist  -LM     Assistive Device (Gait) walker, front-wheeled  -LM     Distance in Feet (Gait) 30  -LM     Deviations/Abnormal Patterns (Gait) annette decreased;gait speed  decreased;stride length decreased  -LM     Bilateral Gait Deviations forward flexed posture;knee buckling bilaterally  -LM     Comment, (Gait/Stairs) Vc's for upright posture and forward head posture.  Pt would demonstrate bilateral knee buckling at times with Todd to correct.  -LM               User Key  (r) = Recorded By, (t) = Taken By, (c) = Cosigned By      Initials Name Provider Type    LM Narcisa Andrade PT Physical Therapist                   Obj/Interventions       Row Name 06/01/24 1009          Range of Motion Comprehensive    General Range of Motion bilateral lower extremity ROM WFL  -LM       Row Name 06/01/24 1009          Strength Comprehensive (MMT)    General Manual Muscle Testing (MMT) Assessment lower extremity strength deficits identified  -LM     Comment, General Manual Muscle Testing (MMT) Assessment BLEs grossly 4-/5 throughout  -LM       Row Name 06/01/24 1009          Balance    Static Sitting Balance contact guard  -LM     Position, Sitting Balance unsupported;sitting edge of bed  -LM     Static Standing Balance minimal assist;1-person assist;verbal cues  -LM     Dynamic Standing Balance minimal assist;1-person assist;verbal cues  -LM     Position/Device Used, Standing Balance supported;walker, front-wheeled  -LM       Row Name 06/01/24 1009          Sensory Assessment (Somatosensory)    Sensory Assessment (Somatosensory) unable/difficult to assess  -LM               User Key  (r) = Recorded By, (t) = Taken By, (c) = Cosigned By      Initials Name Provider Type    LM Narcisa Andrade PT Physical Therapist                   Goals/Plan       Row Name 06/01/24 1013          Bed Mobility Goal 1 (PT)    Activity/Assistive Device (Bed Mobility Goal 1, PT) sit to supine/supine to sit  -LM     [place] Level/Cues Needed (Bed Mobility Goal 1, PT) minimum assist (75% or more patient effort)  -LM     Time Frame (Bed Mobility Goal 1, PT) short term goal (STG);3 days  -LM       Row Name 06/01/24 1013  "         Transfer Goal 1 (PT)    Activity/Assistive Device (Transfer Goal 1, PT) bed-to-chair/chair-to-bed  -LM     [place] Level/Cues Needed (Transfer Goal 1, PT) standby assist  -LM     Time Frame (Transfer Goal 1, PT) long term goal (LTG);10 days  -LM       Row Name 06/01/24 1013          Gait Training Goal 1 (PT)    Activity/Assistive Device (Gait Training Goal 1, PT) gait (walking locomotion);assistive device use  -LM     [place] Level (Gait Training Goal 1, PT) standby assist  -LM     Distance (Gait Training Goal 1, PT) 100 feet  -LM     Time Frame (Gait Training Goal 1, PT) long term goal (LTG);10 days  -LM       Row Name 06/01/24 1013          Therapy Assessment/Plan (PT)    Planned Therapy Interventions (PT) balance training;bed mobility training;gait training;home exercise program;motor coordination training;neuromuscular re-education;patient/family education;postural re-education;ROM (range of motion);strengthening;stretching;transfer training  -LM               User Key  (r) = Recorded By, (t) = Taken By, (c) = Cosigned By      Initials Name Provider Type    LM Narcisa Andrade, PT Physical Therapist                   Clinical Impression       Row Name 06/01/24 1009          Pain    Pain Intervention(s) Repositioned;Ambulation/increased activity  -       Row Name 06/01/24 1009          Pain Scale: FACES Pre/Post-Treatment    Pain: FACES Scale, Pretreatment 2-->hurts little bit  -LM     Posttreatment Pain Rating 2-->hurts little bit  -LM     Pre/Posttreatment Pain Comment Pt unable to specify pain location.  Pt would just say \"the front\"  -       Row Name 06/01/24 1009          Plan of Care Review    Plan of Care Reviewed With patient  -LM     Outcome Evaluation PT evaluation completed.  Pt stood with MinAx1 and ambulated 30 feet using rw with MinAx1.  Pt presents below baseline function d/t increased confusion (more than normal), weakness, decreased activity tolerance, and gait instability.  " Recommend return to Memory Care with  PT.  -LM       Row Name 06/01/24 1009          Therapy Assessment/Plan (PT)    Patient/Family Therapy Goals Statement (PT) Unable to state goal d/t cognition  -LM     Rehab Potential (PT) fair, will monitor progress closely  -LM     Criteria for Skilled Interventions Met (PT) yes;meets criteria;skilled treatment is necessary  -LM     Therapy Frequency (PT) daily  -LM     Predicted Duration of Therapy Intervention (PT) 10 days  -LM       Row Name 06/01/24 1009          Vital Signs    Pre Systolic BP Rehab 158  -LM     Pre Treatment Diastolic BP 70  -LM     Pretreatment Heart Rate (beats/min) 97  -LM     Posttreatment Heart Rate (beats/min) 92  -LM     Pre SpO2 (%) 96  -LM     O2 Delivery Pre Treatment supplemental O2  -LM     Intra SpO2 (%) 87   -LM     O2 Delivery Intra Treatment supplemental O2  -LM     Post SpO2 (%) 96  -LM     O2 Delivery Post Treatment supplemental O2  -LM     Pre Patient Position Supine  -LM     Intra Patient Position Sitting  -LM     Post Patient Position Supine  -LM       Row Name 06/01/24 1009          Positioning and Restraints    Pre-Treatment Position in bed  -LM     Post Treatment Position bed  -LM     In Bed fowlers;heels elevated;with nsg  -LM     Restraints released:;soft limb  RN to assist pt with breakfast and reapply restraints when appropriate  -LM               User Key  (r) = Recorded By, (t) = Taken By, (c) = Cosigned By      Initials Name Provider Type    LM Narcisa Andrade, PT Physical Therapist                   Outcome Measures       Row Name 06/01/24 1014          How much help from another person do you currently need...    Turning from your back to your side while in flat bed without using bedrails? 3  -LM     Moving from lying on back to sitting on the side of a flat bed without bedrails? 2  -LM     Moving to and from a bed to a chair (including a wheelchair)? 2  -LM     Standing up from a chair using your arms (e.g., wheelchair,  bedside chair)? 3  -LM     Climbing 3-5 steps with a railing? 2  -LM     To walk in hospital room? 3  -LM     AM-PAC 6 Clicks Score (PT) 15  -LM     Highest Level of Mobility Goal 4 --> Transfer to chair/commode  -       Row Name 06/01/24 1014 06/01/24 1002       Functional Assessment    Outcome Measure Options AM-PAC 6 Clicks Basic Mobility (PT)  -LM AM-PAC 6 Clicks Daily Activity (OT)  -AKASH              User Key  (r) = Recorded By, (t) = Taken By, (c) = Cosigned By      Initials Name Provider Type    LM Narcisa Andrade, PT Physical Therapist    Deborah Castillo, OT Occupational Therapist                                 Physical Therapy Education       Title: PT OT SLP Therapies (In Progress)       Topic: Physical Therapy (In Progress)       Point: Mobility training (In Progress)       Learning Progress Summary             Patient Acceptance, E, NL by  at 6/1/2024 1014                         Point: Home exercise program (Not Started)       Learner Progress:  Not documented in this visit.              Point: Body mechanics (Not Started)       Learner Progress:  Not documented in this visit.              Point: Precautions (In Progress)       Learning Progress Summary             Patient Acceptance, E, NL by  at 6/1/2024 1014                                         User Key       Initials Effective Dates Name Provider Type Discipline     07/11/23 -  Narcisa Andrade, PT Physical Therapist PT                  PT Recommendation and Plan  Planned Therapy Interventions (PT): balance training, bed mobility training, gait training, home exercise program, motor coordination training, neuromuscular re-education, patient/family education, postural re-education, ROM (range of motion), strengthening, stretching, transfer training  Plan of Care Reviewed With: patient  Outcome Evaluation: PT evaluation completed.  Pt stood with MinAx1 and ambulated 30 feet using rw with MinAx1.  Pt presents below baseline function d/t  increased confusion (more than normal), weakness, decreased activity tolerance, and gait instability.  Recommend return to Memory Care with HH PT.     Time Calculation:   PT Evaluation Complexity  History, PT Evaluation Complexity: 3 or more personal factors and/or comorbidities  Examination of Body Systems (PT Eval Complexity): total of 3 or more elements  Clinical Presentation (PT Evaluation Complexity): evolving  Clinical Decision Making (PT Evaluation Complexity): moderate complexity  Overall Complexity (PT Evaluation Complexity): moderate complexity     PT Charges       Row Name 24 1015             Time Calculation    Start Time 0914  -LM      PT Received On 24  -LM      PT Goal Re-Cert Due Date 24  -LM         Untimed Charges    PT Eval/Re-eval Minutes 46  -LM         Total Minutes    Untimed Charges Total Minutes 46  -LM       Total Minutes 46  -LM                User Key  (r) = Recorded By, (t) = Taken By, (c) = Cosigned By      Initials Name Provider Type    LM Narcisa Andrade, PT Physical Therapist                  Therapy Charges for Today       Code Description Service Date Service Provider Modifiers Qty    [id number]  PT EVAL MOD COMPLEXITY 4 2024 Narcisa Andrade, PT GP 1            PT G-Codes  Outcome Measure Options: AM-PAC 6 Clicks Basic Mobility (PT)  AM-PAC 6 Clicks Score (PT): 15  AM-PAC 6 Clicks Score (OT): 9  PT Discharge Summary  Anticipated Discharge Disposition (PT): home with home health (Back to Memory Care with HH PT)    Narcisa Andrade PT  2024      Electronically signed by Narcisa Andrade, PT at 24 1016          Occupational Therapy Notes (most recent note)        Deborah Jackson, OT at 24 0908          Patient Name: Dorota Membreno  : 10/31/1931    MRN: [id number]                              Today's Date: 2024       Admit Date: 2024    Visit Dx:     ICD-10-CM ICD-9-CM   1. Sepsis, due to unspecified organism, unspecified whether acute organ  dysfunction present  A41.9 038.9     995.91   2. Urinary tract infection with hematuria, site unspecified  N39.0 599.0    R31.9 599.70     Patient Active Problem List   Diagnosis    Gastroesophageal reflux disease    Atopic rhinitis    Asthma    Late onset Alzheimer's disease with behavioral disturbance    Eczema    Dyslipidemia    Hypothyroidism    Essential hypertension    Post-menopausal osteoporosis    Arteritis    Dizziness    Edema    Fatigue    Shingles    Syncope and collapse    Atrial fibrillation    Leukocytosis    Hypomagnesemia    Elevated brain natriuretic peptide (BNP) level    Proteinuria    Bilateral Pleural effusions    Pneumonia    Respiratory failure    Elevated alkaline phosphatase level    Complicated UTI (urinary tract infection)     Past Medical History:   Diagnosis Date    Acid reflux 03/14/2016    stable    Allergic rhinitis     Alzheimer's disease 12/2017    Arteritis     A. Giant cell arteritis, diagnosed around 2000 when she lost sight in her eye. B. Improved with prednisone therapy    Asthma     Cancer     colon. Status post resection at age 37    Cognitive impairment, mild, so stated     A. Patient has been forgetting names continue med. Has f/u with neuro. She is having some times forgetting directions. She has moved into a new house and this has not helped with directions    Dyslipidemia     lipids and cmp    Hypertension     lipids, cmp, urine micro    Hypothyroidism     A. On replacement therapy    Mammogram abnormal     A. 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    Panic attacks     Pneumonia     Postmenopausal osteoporosis     Shingles 03/2018     Past Surgical History:   Procedure Laterality Date    CATARACT EXTRACTION      COLECTOMY PARTIAL / TOTAL      Patient diagnosed with colon cancer at age 37    HYSTERECTOMY        General Information       Row Name 06/01/24 0946          OT Time and Intention    Document Type evaluation  -AKASH     Mode of Treatment  occupational therapy  -       Row Name 06/01/24 0946          General Information    Patient Profile Reviewed yes  -AKASH     Prior Level of Function mod assist:;ADL's  PLOF unknown; pt poor historian, lives in memory care unit at [facility]  -     Existing Precautions/Restrictions fall;other (see comments)  hx Alzheimer's  -     Barriers to Rehab medically complex;previous functional deficit;cognitive status  -       Row Name 06/01/24 0946          Occupational Profile    Environmental Supports and Barriers (Occupational Profile) Lives at [facility] memory care  -       Row Name 06/01/24 0946          Living Environment    People in Home facility resident  -       Row Name 06/01/24 0946          Home Main Entrance    Number of Stairs, Main Entrance none  -       Row Name 06/01/24 0946          Stairs Within Home, Primary    Number of Stairs, Within Home, Primary none  -       Row Name 06/01/24 0946          Cognition    Orientation Status (Cognition) oriented to;person;disoriented to;place;situation;time;other (see comments)  Oriented to first name only, pleasantly confused  -       Row Name 06/01/24 0946          Safety Issues, Functional Mobility    Safety Issues Affecting Function (Mobility) awareness of need for assistance;insight into deficits/self-awareness;judgment;problem-solving;safety precaution awareness;safety precautions follow-through/compliance;sequencing abilities  -AKASH     Impairments Affecting Function (Mobility) balance;cognition;endurance/activity tolerance;motor planning;postural/trunk control;shortness of breath;strength  -AKASH     Cognitive Impairments, Mobility Safety/Performance attention;awareness, need for assistance;insight into deficits/self-awareness;judgment;problem-solving/reasoning;safety precaution awareness;safety precaution follow-through;sequencing abilities  -     Comment, Safety Issues/Impairments (Mobility) increased processing time needed; cues for  motor planning tasks and transitions  -               User Key  (r) = Recorded By, (t) = Taken By, (c) = Cosigned By      Initials Name Provider Type    Deborah Castillo OT Occupational Therapist                     Mobility/ADL's       Row Name 06/01/24 0950          Bed Mobility    Bed Mobility supine-sit;sit-supine  -AKASH     Supine-Sit [place] (Bed Mobility) maximum assist (25% patient effort);1 person assist;verbal cues;nonverbal cues (demo/gesture)  -AKASH     Sit-Supine [place] (Bed Mobility) maximum assist (25% patient effort);1 person assist;verbal cues;nonverbal cues (demo/gesture)  -     Assistive Device (Bed Mobility) bed rails;head of bed elevated  -AKASH     Comment, (Bed Mobility) cues for sequencing, assist for trunk control and managing BLE's over bed surface  -AKASH       Row Name 06/01/24 0950          Transfers    Transfers sit-stand transfer;toilet transfer  -AKASH     Comment, (Transfers) v/t cues for HP and sequencing  -AKASH       Row Name 06/01/24 0950          Sit-Stand Transfer    Sit-Stand [place] (Transfers) minimum assist (75% patient effort);1 person assist;verbal cues;nonverbal cues (demo/gesture)  -     Assistive Device (Sit-Stand Transfers) walker, front-wheeled  -AKASH       Row Name 06/01/24 0950          Toilet Transfer    Type (Toilet Transfer) stand pivot/stand step;stand-sit;sit-stand  -AKASH     [place] Level (Toilet Transfer) minimum assist (75% patient effort);1 person assist;verbal cues;nonverbal cues (demo/gesture)  -     Assistive Device (Toilet Transfer) commode, bedside without drop arms;walker, front-wheeled  -KAASH     Comment, (Toilet Transfer) v/t cues for HP  -AKASH       Row Name 06/01/24 0950          Functional Mobility    Functional Mobility- Comment Defer to PT  -AKASH       Row Name 06/01/24 0950          Activities of Daily Living    BADL Assessment/Intervention lower body dressing;grooming;feeding;toileting  -AKASH       Row Name 06/01/24 0950          Lower  Body Dressing Assessment/Training    [place] Level (Lower Body Dressing) don;socks;dependent (less than 25% patient effort)  -AKASH     Position (Lower Body Dressing) sitting up in bed  -AKASH       Row Name 06/01/24 0950          Grooming Assessment/Training    [place] Level (Grooming) wash face, hands;minimum assist (75% patient effort);verbal cues;nonverbal cues (demo/gesture)  -AKASH     Oral Care other (see comments)  not attempted d/t meal tray arriving  -AKASH     Position (Grooming) sitting up in bed  -AKASH       Row Name 06/01/24 0950          Self-Feeding Assessment/Training    [place] Level (Feeding) liquids to mouth;prepare tray/open items;scoop food and bring to mouth;maximum assist (25% patient effort);verbal cues;nonverbal cues (demo/gesture)  -AKASH     Position (Self-Feeding) sitting up in bed  -AKASH     Comment, (Feeding) assist to bring liquids to mouth and to load fork and bring to mouth  -       Row Name 06/01/24 0950          Toileting Assessment/Training    [place] Level (Toileting) adjust/manage clothing;perform perineal hygiene;dependent (less than 25% patient effort)  -     Assistive Devices (Toileting) commode, bedside without drop arms  -AKASH     Position (Toileting) supported standing  -               User Key  (r) = Recorded By, (t) = Taken By, (c) = Cosigned By      Initials Name Provider Type    Deborah Castillo OT Occupational Therapist                   Obj/Interventions       Row Name 06/01/24 0954          Sensory Assessment (Somatosensory)    Sensory Assessment (Somatosensory) unable/difficult to assess  -[facility] Name 06/01/24 0954          Vision Assessment/Intervention    Visual Impairment/Limitations unable/difficult to assess  -[facility] Name 06/01/24 0954          Range of Motion Comprehensive    General Range of Motion bilateral upper extremity ROM WFL  -[facility] Name 06/01/24 0954          Strength Comprehensive (MMT)    Comment, General Manual Muscle  Testing (MMT) Assessment BUE's grossly 3+/5  -AKASH       Row Name 06/01/24 0954          Balance    Balance Assessment sitting static balance;sitting dynamic balance;standing static balance;standing dynamic balance  -AKASH     Static Sitting Balance contact guard  -AKAHS     Dynamic Sitting Balance minimal assist  -AKASH     Position, Sitting Balance unsupported;sitting edge of bed  -AKASH     Static Standing Balance minimal assist;1-person assist;verbal cues;non-verbal cues (demo/gesture)  -AKASH     Dynamic Standing Balance minimal assist;1-person assist;verbal cues;non-verbal cues (demo/gesture)  -AKASH     Position/Device Used, Standing Balance supported;walker, front-wheeled  -AKASH     Balance Interventions standing;occupation based/functional task  -AKASH     Comment, Balance Dep for toileting tasks in standing  -AKASH               User Key  (r) = Recorded By, (t) = Taken By, (c) = Cosigned By      Initials Name Provider Type    Deborah Castillo, OT Occupational Therapist                   Goals/Plan       Row Name 06/01/24 1001          Transfer Goal 1 (OT)    Activity/Assistive Device (Transfer Goal 1, OT) sit-to-stand/stand-to-sit;toilet  -AKASH     [place] Level/Cues Needed (Transfer Goal 1, OT) contact guard required  -AKASH     Time Frame (Transfer Goal 1, OT) long term goal (LTG);10 days  -AKASH     Progress/Outcome (Transfer Goal 1, OT) new goal  -AKASH       Row Name 06/01/24 1001          Grooming Goal 1 (OT)    Activity/Device (Grooming Goal 1, OT) hair care;oral care;wash face, hands  -AKASH     [place] (Grooming Goal 1, OT) minimum assist (75% or more patient effort)  -AKASH     Time Frame (Grooming Goal 1, OT) short term goal (STG);1 week  -AKASH     Progress/Outcome (Grooming Goal 1, OT) new goal  -AKASH       Row Name 06/01/24 1001          Therapy Assessment/Plan (OT)    Planned Therapy Interventions (OT) activity tolerance training;adaptive equipment training;functional balance retraining;occupation/activity based  interventions;patient/caregiver education/training;ROM/therapeutic exercise;strengthening exercise;transfer/mobility retraining  -               User Key  (r) = Recorded By, (t) = Taken By, (c) = Cosigned By      Initials Name Provider Type    Deborah Castillo, OT Occupational Therapist                   Clinical Impression       Row Name 06/01/24 0956          Pain Scale: FACES Pre/Post-Treatment    Pain: FACES Scale, Pretreatment 2-->hurts little bit  -AKASH     Posttreatment Pain Rating 2-->hurts little bit  -AKASH     Pre/Posttreatment Pain Comment Tolerated; pt unable to specify location of pain  -AKASH       Row Name 06/01/24 0956          Plan of Care Review    Plan of Care Reviewed With patient  -AKASH     Outcome Evaluation OT eval completed. Pt presents with SOA, significant weakness, and impaired balance limiting ADL's. Pt Todd for SPT to BSC, Dep for toileting tasks, Todd for grooming, increased time and cues needed for motor planning all tasks. IP OT services warranted. Recommend return to memory care at discharge with skilled OT services.  -AKASH       Row Name 06/01/24 0956          Therapy Assessment/Plan (OT)    Patient/Family Therapy Goal Statement (OT) To get stronger  -AKASH     Rehab Potential (OT) good, to achieve stated therapy goals  -     Criteria for Skilled Therapeutic Interventions Met (OT) yes;meets criteria;skilled treatment is necessary  -     Therapy Frequency (OT) daily  -AKASH     Predicted Duration of Therapy Intervention (OT) 10 days  -AKASH       Row Name 06/01/24 0956          Vital Signs    Pre SpO2 (%) 93  -AKASH     O2 Delivery Pre Treatment nasal cannula  -AKASH     Intra SpO2 (%) 87   -AKASH     O2 Delivery Intra Treatment nasal cannula  -AKASH     Post SpO2 (%) 93  -AKASH     O2 Delivery Post Treatment nasal cannula  -AKASH     Pre Patient Position Supine  -AKASH     Intra Patient Position Standing  -AKASH     Post Patient Position Supine  -AKASH       Row Name 06/01/24 0956          Positioning and Restraints     Pre-Treatment Position in bed  -AKASH     Post Treatment Position bed  -AKASH     In Bed fowlers;exit alarm on;with nsg;heels elevated  -AKASH     Restraints released:;soft limb;other (comment)  1:1 present  -AKASH               User Key  (r) = Recorded By, (t) = Taken By, (c) = Cosigned By      Initials Name Provider Type    Deborah Castillo OT Occupational Therapist                   Outcome Measures       Row Name 06/01/24 1002          How much help from another is currently needed...    Putting on and taking off regular lower body clothing? 1  -AKASH     Bathing (including washing, rinsing, and drying) 1  -AKASH     Toileting (which includes using toilet bed pan or urinal) 1  -AKASH     Putting on and taking off regular upper body clothing 2  -AKASH     Taking care of personal grooming (such as brushing teeth) 2  -AKASH     Eating meals 2  -AKASH     AM-PAC 6 Clicks Score (OT) 9  -AKASH       Row Name 06/01/24 1002          Functional Assessment    Outcome Measure Options AM-PAC 6 Clicks Daily Activity (OT)  -AKASH               User Key  (r) = Recorded By, (t) = Taken By, (c) = Cosigned By      Initials Name Provider Type    Deborah Castillo OT Occupational Therapist                    Occupational Therapy Education       Title: PT OT SLP Therapies (In Progress)       Topic: Occupational Therapy (In Progress)       Point: ADL training (In Progress)       Description:   Instruct learner(s) on proper safety adaptation and remediation techniques during self care or transfers.   Instruct in proper use of assistive devices.                  Learning Progress Summary             Patient Acceptance, E, NL,NR by AKASH at 6/1/2024 1002    Comment: OT POC; BADL's; transfer training                         Point: Home exercise program (Not Started)       Description:   Instruct learner(s) on appropriate technique for monitoring, assisting and/or progressing therapeutic exercises/activities.                  Learner Progress:  Not documented in this visit.               Point: Precautions (In Progress)       Description:   Instruct learner(s) on prescribed precautions during self-care and functional transfers.                  Learning Progress Summary             Patient Acceptance, E, NL,NR by AKASH at 6/1/2024 1002    Comment: OT POC; BADL's; transfer training                         Point: Body mechanics (Not Started)       Description:   Instruct learner(s) on proper positioning and spine alignment during self-care, functional mobility activities and/or exercises.                  Learner Progress:  Not documented in this visit.                              User Key       Initials Effective Dates Name Provider Type Discipline    AKASH 06/16/21 -  Deborah Jackson, OT Occupational Therapist OT                  OT Recommendation and Plan  Planned Therapy Interventions (OT): activity tolerance training, adaptive equipment training, functional balance retraining, occupation/activity based interventions, patient/caregiver education/training, ROM/therapeutic exercise, strengthening exercise, transfer/mobility retraining  Therapy Frequency (OT): daily  Plan of Care Review  Plan of Care Reviewed With: patient  Outcome Evaluation: OT eval completed. Pt presents with SOA, significant weakness, and impaired balance limiting ADL's. Pt Todd for SPT to BSC, Dep for toileting tasks, Todd for grooming, increased time and cues needed for motor planning all tasks. IP OT services warranted. Recommend return to memory care at discharge with skilled OT services.     Time Calculation:   Evaluation Complexity (OT)  Review Occupational Profile/Medical/Therapy History Complexity: expanded/moderate complexity  Assessment, Occupational Performance/Identification of Deficit Complexity: 3-5 performance deficits  Clinical Decision Making Complexity (OT): detailed assessment/moderate complexity  Overall Complexity of Evaluation (OT): moderate complexity     Time Calculation- OT       Row Name 06/01/24  0925             Time Calculation- OT    OT Start Time 0925  -AKASH      OT Received On 06/01/24  -AKASH      OT Goal Re-Cert Due Date 06/11/24  -AKASH         Untimed Charges    OT Eval/Re-eval Minutes 54  -AKASH         Total Minutes    Untimed Charges Total Minutes 54  -AKASH       Total Minutes 54  -AKASH                User Key  (r) = Recorded By, (t) = Taken By, (c) = Cosigned By      Initials Name Provider Type    Deborah Castillo OT Occupational Therapist                  Therapy Charges for Today       Code Description Service Date Service Provider Modifiers Qty    [id number] HC OT EVAL MOD COMPLEXITY 4 6/1/2024 Deborah Jackson OT GO 1                 Deborah Jackson OT  6/1/2024    Electronically signed by Deborah Jackson OT at 06/01/24 1004

## 2024-06-05 LAB
ANION GAP SERPL CALCULATED.3IONS-SCNC: 13 MMOL/L (ref 5–15)
BUN SERPL-MCNC: 11 MG/DL (ref 8–23)
BUN/CREAT SERPL: 17.2 (ref 7–25)
CALCIUM SPEC-SCNC: 8.3 MG/DL (ref 8.2–9.6)
CHLORIDE SERPL-SCNC: 96 MMOL/L (ref 98–107)
CO2 SERPL-SCNC: 27 MMOL/L (ref 22–29)
CREAT SERPL-MCNC: 0.64 MG/DL (ref 0.57–1)
EGFRCR SERPLBLD CKD-EPI 2021: 83 ML/MIN/1.73
GLUCOSE SERPL-MCNC: 86 MG/DL (ref 65–99)
MAGNESIUM SERPL-MCNC: 1.9 MG/DL (ref 1.7–2.3)
POTASSIUM SERPL-SCNC: 3.7 MMOL/L (ref 3.5–5.2)
SODIUM SERPL-SCNC: 136 MMOL/L (ref 136–145)

## 2024-06-05 PROCEDURE — 97110 THERAPEUTIC EXERCISES: CPT

## 2024-06-05 PROCEDURE — 97116 GAIT TRAINING THERAPY: CPT

## 2024-06-05 PROCEDURE — 25010000002 ZIPRASIDONE MESYLATE PER 10 MG: Performed by: INTERNAL MEDICINE

## 2024-06-05 PROCEDURE — 99232 SBSQ HOSP IP/OBS MODERATE 35: CPT | Performed by: NURSE PRACTITIONER

## 2024-06-05 PROCEDURE — 80048 BASIC METABOLIC PNL TOTAL CA: CPT | Performed by: INTERNAL MEDICINE

## 2024-06-05 PROCEDURE — 83735 ASSAY OF MAGNESIUM: CPT | Performed by: INTERNAL MEDICINE

## 2024-06-05 RX ADMIN — APIXABAN 5 MG: 5 TABLET, FILM COATED ORAL at 08:42

## 2024-06-05 RX ADMIN — QUETIAPINE FUMARATE 75 MG: 25 TABLET ORAL at 08:42

## 2024-06-05 RX ADMIN — MEMANTINE 5 MG: 10 TABLET ORAL at 20:07

## 2024-06-05 RX ADMIN — MEMANTINE 5 MG: 10 TABLET ORAL at 08:41

## 2024-06-05 RX ADMIN — ZIPRASIDONE MESYLATE 10 MG: 20 INJECTION, POWDER, LYOPHILIZED, FOR SOLUTION INTRAMUSCULAR at 15:48

## 2024-06-05 RX ADMIN — AMLODIPINE BESYLATE 2.5 MG: 2.5 TABLET ORAL at 08:41

## 2024-06-05 RX ADMIN — CITALOPRAM HYDROBROMIDE 10 MG: 10 TABLET ORAL at 08:42

## 2024-06-05 RX ADMIN — Medication 5000 UNITS: at 08:42

## 2024-06-05 RX ADMIN — MAGNESIUM OXIDE TAB 400 MG (241.3 MG ELEMENTAL MG) 400 MG: 400 (241.3 MG) TAB at 08:42

## 2024-06-05 RX ADMIN — ATORVASTATIN CALCIUM 20 MG: 20 TABLET, FILM COATED ORAL at 20:09

## 2024-06-05 RX ADMIN — BUSPIRONE HYDROCHLORIDE 10 MG: 10 TABLET ORAL at 08:42

## 2024-06-05 RX ADMIN — TAMSULOSIN HYDROCHLORIDE 0.8 MG: 0.4 CAPSULE ORAL at 08:41

## 2024-06-05 RX ADMIN — BUSPIRONE HYDROCHLORIDE 10 MG: 10 TABLET ORAL at 20:09

## 2024-06-05 RX ADMIN — DONEPEZIL HYDROCHLORIDE 10 MG: 10 TABLET, FILM COATED ORAL at 20:08

## 2024-06-05 RX ADMIN — CEFUROXIME AXETIL 250 MG: 250 TABLET, FILM COATED ORAL at 08:41

## 2024-06-05 RX ADMIN — QUETIAPINE FUMARATE 100 MG: 25 TABLET ORAL at 20:05

## 2024-06-05 RX ADMIN — CEFUROXIME AXETIL 250 MG: 250 TABLET, FILM COATED ORAL at 20:07

## 2024-06-05 RX ADMIN — MONTELUKAST 10 MG: 10 TABLET, FILM COATED ORAL at 20:09

## 2024-06-05 RX ADMIN — APIXABAN 5 MG: 5 TABLET, FILM COATED ORAL at 20:08

## 2024-06-05 RX ADMIN — METOPROLOL TARTRATE 25 MG: 25 TABLET, FILM COATED ORAL at 08:42

## 2024-06-05 NOTE — CASE MANAGEMENT/SOCIAL WORK
Continued Stay Note  [facility]     Patient Name: Dorota Membreno  MRN: [id number]  Today's Date: 6/5/2024    Admit Date: 5/30/2024    Plan: SNF   Discharge Plan       Row Name 06/05/24 1607       Plan    Plan SNF    Patient/Family in Agreement with Plan yes    Plan Comments I have spoken to April with [facility] today regarding their face to face eval completed yesterday.  She states that they request Ms. Membreno to complete inpatient rehab therapy before they will consider her return to her memory care accomodations at [facility].  I have spoken to Ms. Membreno's daughter who requests a referral to [facility].  Ms Membreno has received rehab there 2 years ago and the family was pleased with the care she received at that time.  I have faxed this referral to [facility] and left a message with Keely.  CM will f/u with this referral in the morning and cont to assist with discharge planning as recommendations become available.    Final Discharge Disposition Code 03 - skilled nursing facility (SNF)                   Discharge Codes    No documentation.                 Expected Discharge Date and Time       Expected Discharge Date Expected Discharge Time    Jun 6, 2024               Brenda Arnold RN

## 2024-06-05 NOTE — PROGRESS NOTES
[facility] Medicine Services  PROGRESS NOTE    Patient Name: Dorota Membreno  : 10/31/1931  MRN: [id number]    Date of Admission: 2024  Primary Care Physician: Anthony Medrano MD    Subjective   Subjective     CC:  UTI    HPI:  She was seen resting up in bed in no acute distress.  Awake and alert.  Sitter at bedside.  Intermittent agitation and persistent confusion.  Alzheimer's at baseline.  Due to increased agitation from her baseline awaiting facility eval before can return.  Currently calm and cooperative.      Objective   Objective     Vital Signs:   Temp:  [97 °F (36.1 °C)-98.3 °F (36.8 °C)] 97.9 °F (36.6 °C)  Heart Rate:  [67-90] 67  Resp:  [16-18] 18  BP: (152-166)/(61-72) 162/61  Flow (L/min):  [2] 2     Physical exam:  Constitutional: No acute distress, awake, alert.  Resting back in bed.  Chronically ill and debilitated appearing elderly female.  Sitter at bedside.  HENT: NCAT, mucous membranes moist  Respiratory: Clear to auscultation bilaterally but slightly decreased at bases, respiratory effort normal on 2 LNC with sats 95%.  Cardiovascular: RRR, no murmurs, rubs, or gallops  Gastrointestinal: Positive bowel sounds, soft, nontender, nondistended  Musculoskeletal: No bilateral ankle edema  Psychiatric: Slightly flat affect, cooperative  Neurologic: Oriented to self only.  Pleasantly confused at this time otherwise, strength symmetric in all extremities, Cranial Nerves grossly intact to confrontation, speech clear.  Follows a few simple commands.  Skin: No rashes.  Mild pallor.      Results Reviewed:  LAB RESULTS:      Lab 24  0543 24  0558 24  0510 24  0617 24  0048 24  2222 24  1532 24  1448   WBC 8.39 9.88 12.87*  --  19.40*  --   --  18.41*   HEMOGLOBIN 12.0 10.6* 10.5*  --  12.7  --   --  14.4   HEMATOCRIT 36.4 33.6* 32.3*  --  38.7  --   --  42.4   PLATELETS 252 174 192  --  291  --   --  314   NEUTROS ABS  --    --  9.19*  --  16.09*  --   --  15.40*   IMMATURE GRANS (ABS)  --   --  0.05  --  0.09*  --   --  0.04   LYMPHS ABS  --   --  1.87  --  1.77  --   --  1.94   MONOS ABS  --   --  1.45*  --  1.41*  --   --  0.97*   EOS ABS  --   --  0.28  --  0.01  --   --  0.03   MCV 86.1 89.6 89.7  --  87.8  --   --  87.6   PROCALCITONIN  --   --   --   --   --   --  0.05  --    LACTATE  --   --   --  1.8 2.7* 3.5* 3.5* 3.1*   PROTIME  --   --   --   --   --   --   --  15.7*   APTT  --   --   --   --   --   --   --  33.4*         Lab 06/05/24  0429 06/04/24  0543 06/02/24  1158 06/02/24  0002 06/01/24  0510 05/31/24  0048 05/30/24  1532   SODIUM 136 137 140  --  139 140 141   POTASSIUM 3.7 3.7 4.4 4.5 3.2* 3.5 4.7   CHLORIDE 96* 98 106  --  101 100 100   CO2 27.0 27.0 27.0  --  28.0 26.0 26.8   ANION GAP 13.0 12.0 7.0  --  10.0 14.0 14.2   BUN 11 11 18  --  17 25* 36*   CREATININE 0.64 0.66 0.70  --  0.82 1.00 1.21*   EGFR 83.0 82.4 81.3  --  67.2 53.0* 42.1*   GLUCOSE 86 82 98  --  91 89 116*   CALCIUM 8.3 8.8 8.1*  --  8.0* 9.1 9.7*   MAGNESIUM 1.9 1.3*  --   --   --   --  1.9   TSH  --   --   --   --   --   --  2.040         Lab 05/30/24  1532   TOTAL PROTEIN 7.4   ALBUMIN 3.9   GLOBULIN 3.5   ALT (SGPT) 7   AST (SGOT) 25   BILIRUBIN 0.5   ALK PHOS 145*         Lab 05/30/24  1532 05/30/24  1448   PROBNP 494.0  --    HSTROP T 13  --    PROTIME  --  15.7*   INR  --  1.19*                 Brief Urine Lab Results  (Last result in the past 365 days)        Color   Clarity   Blood   Leuk Est   Nitrite   Protein   CREAT   Urine HCG        05/30/24 1452 Yellow   Cloudy   Moderate (2+)   Moderate (2+)   Negative   100 mg/dL (2+)                   Microbiology Results Abnormal       Procedure Component Value - Date/Time    Blood Culture - Blood, Arm, Left [[id number]]  (Normal) Collected: 05/30/24 1609    Lab Status: Final result Specimen: Blood from Arm, Left Updated: 06/04/24 1915     Blood Culture No growth at 5 days    Narrative:       Pediatric Bottle Only        Blood Culture - Blood, Arm, Right [[id number]]  (Normal) Collected: 05/30/24 1609    Lab Status: Final result Specimen: Blood from Arm, Right Updated: 06/04/24 1900     Blood Culture No growth at 5 days    Narrative:      Pediatric Bottle Only        Urine Culture - Urine, Urine, Catheter [[id number]] Collected: 05/30/24 1452    Lab Status: Final result Specimen: Urine, Catheter Updated: 06/01/24 1459     Urine Culture >100,000 CFU/mL Mixed Kimberli Isolated    Narrative:      Specimen contains mixed organisms of questionable pathogenicity suggestive of contamination. If symptoms persist, suggest recollection.  Colonization of the urinary tract without infection is common. Treatment is discouraged unless the patient is symptomatic, pregnant, or undergoing an invasive urologic procedure.    COVID PRE-OP / PRE-PROCEDURE SCREENING ORDER (NO ISOLATION) - Swab, Nasopharynx [[id number]]  (Normal) Collected: 05/30/24 1452    Lab Status: Final result Specimen: Swab from Nasopharynx Updated: 05/30/24 1617    Narrative:      The following orders were created for panel order COVID PRE-OP / PRE-PROCEDURE SCREENING ORDER (NO ISOLATION) - Swab, Nasopharynx.  Procedure                               Abnormality         Status                     ---------                               -----------         ------                     COVID-19, FLU A/B, RSV P...[[id number]]  Normal              Final result                 Please view results for these tests on the individual orders.    COVID-19, FLU A/B, RSV PCR 1 HR TAT - Swab, Nasopharynx [[id number]]  (Normal) Collected: 05/30/24 1452    Lab Status: Final result Specimen: Swab from Nasopharynx Updated: 05/30/24 1617     COVID19 Not Detected     Influenza A PCR Not Detected     Influenza B PCR Not Detected     RSV, PCR Not Detected    Narrative:      Fact sheet for providers: https://www.fda.gov/media/152592/download    Fact sheet for patients:  https://www.fda.gov/media/041601/download    Test performed by PCR.            No radiology results from the last 24 hrs    Results for orders placed during the hospital encounter of 02/26/23    Adult Transthoracic Echo Complete w/ Color, Spectral and Contrast if Necessary Per Protocol    Interpretation Summary    Left ventricular ejection fraction appears to be 61 - 65%.    Left ventricular diastolic function was normal.    Left atrial volume is moderately increased.    Moderate tricuspid valve regurgitation is present.    Estimated right ventricular systolic pressure from tricuspid regurgitation is moderately elevated (45-55 mmHg). Calculated right ventricular systolic pressure from tricuspid regurgitation is 47 mmHg.      Current medications:  Scheduled Meds:amLODIPine, 2.5 mg, Oral, Q24H  apixaban, 5 mg, Oral, Q12H  atorvastatin, 20 mg, Oral, Nightly  busPIRone, 10 mg, Oral, BID  cefuroxime, 250 mg, Oral, Q12H  vitamin D3, 5,000 Units, Oral, Daily  citalopram, 10 mg, Oral, Daily  donepezil, 10 mg, Oral, Nightly  levothyroxine, 75 mcg, Oral, Q AM  memantine, 5 mg, Oral, Q12H  metoprolol tartrate, 25 mg, Oral, Q12H  montelukast, 10 mg, Oral, Nightly  pantoprazole, 40 mg, Oral, Q AM  QUEtiapine, 100 mg, Oral, Nightly  QUEtiapine, 75 mg, Oral, QAM  sodium chloride, 10 mL, Intravenous, Q12H  tamsulosin, 0.8 mg, Oral, Daily      Continuous Infusions:     PRN Meds:.  acetaminophen    senna-docusate sodium **AND** polyethylene glycol **AND** bisacodyl **AND** bisacodyl    hydrALAZINE    ipratropium-albuterol    Magnesium Standard Dose Replacement - Follow Nurse / BPA Driven Protocol    melatonin    Potassium Replacement - Follow Nurse / BPA Driven Protocol    sodium chloride    sodium chloride    ziprasidone    Assessment & Plan   Assessment & Plan     Active Hospital Problems    Diagnosis  POA    **Complicated UTI (urinary tract infection) [N39.0]  Yes    Atrial fibrillation [I48.91]  Yes    Leukocytosis [D72.829]  Yes     Hypothyroidism [E03.9]  Yes    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        Brief Hospital Course to date:  Dorota Membreno is a 89 y.o. female with history of GERD, Alzheimer's disease, GCA, asthma, colon cancer, HTN, HLD, hypothyroid and anxiety presents with AMS and a UTI    This patient's problems and plans were partially entered by my partner and updated as appropriate by me 06/05/24.    Assessment/plan:  Pt is new to me today     UTI  Urinary retention  - 100K mixed hunter  - showing clinical improvement on rocephin despite culture results above, will complete short course.day #6/7 empiric rocephin (could change to po ceftin upon discharge)  -has required intermittent in/out caths at least daily. Increased flomax to 0.8mg daily per prior provider. Instructed nurse to make sure get up to bedside commode each time prior to bladder scanning; if has any further retention we will anchor a viera (case management is informing her memory care facility of status and asking whether they could take her back with a viera or not)    AMS (due to uti and sleep deprivation)  Dementia  - CT head negative for acute changes, suspect UTI contributing to AMS  - didn't require geodon last night (did evening of 6/1); currently out of restraints. Sitter remains at bs.   -doing better, out of restraints since 6/3 afternoon; still not sleeping through the night; prior partner increased night seroquel to 100mg (continue 75mg qam)      KRYSTEN, resolved  - viera discontinued 6/1/24  - continue flomax; in/out caths prn    Hypothyroid  -continue synthroid; tsh ok    Atrial fibrillation  - eliquis  - continue metoprolol      Expected Discharge Location and Transportation: [facility] (depends if patient requires a viera at  discharge, and whether the facility can take her with a viera. C.M. contacting facility to  inquire)  Expected Discharge  possibly by 6/6/24 if facility can take her back and if otherwise stable and remains out of restraints  Expected Discharge Date: 6/6/2024; Expected Discharge Time:      DVT prophylaxis:  Medical and mechanical DVT prophylaxis orders are present.         AM-PAC 6 Clicks Score (PT): 18 (06/05/24 1400)    CODE STATUS: DNR/DNI, discussed with daughter  Code Status and Medical Interventions:   Ordered at: 05/31/24 1404     Medical Intervention Limits:    NO intubation (DNI)     Level Of Support Discussed With:    Next of Kin (If No Surrogate)    Health Care Surrogate     Code Status (Patient has no pulse and is not breathing):    No CPR (Do Not Attempt to Resuscitate)     Medical Interventions (Patient has pulse or is breathing):    Limited Support       Isabel Pop, APRN  06/05/24

## 2024-06-05 NOTE — DISCHARGE PLACEMENT REQUEST
"Case Management  [phone]    Dorota Vogt (89 y.o. Female)       Date of Birth   10/31/1931    Social Security Number       Address   [address] [place] [address] [place] [address]    Home Phone   [phone]    MRN   [id number]       Hinduism   None    Marital Status                               Admission Date   5/30/24    Admission Type   Emergency    Admitting Provider   Lety Moreau MD    Attending Provider   Lety Moreau MD    Department, Room/Bed   [address], S572/1       Discharge Date       Discharge Disposition       Discharge Destination                                 Attending Provider: Lety Moreau MD    Allergies: Codeine, Dyazide [Hydrochlorothiazide W-triamterene], Levofloxacin, Penicillins    Isolation: None   Infection: None   Code Status: No CPR    Ht: 157.5 cm (62\")   Wt: 72.3 kg (159 lb 4.8 oz)    Admission Cmt: None   Principal Problem: Complicated UTI (urinary tract infection) [N39.0]                   Active Insurance as of 5/30/2024       Primary Coverage       Payor Plan Insurance Group Employer/Plan Group    MEDICARE MEDICARE A & B        Payor Plan Address Payor Plan Phone Number Payor Plan Fax Number Effective Dates    [address] [phone]  10/1/1996 - None Entered    [facility] [id number]         Subscriber Name Subscriber Birth Date Member ID       DOROTA VOGT 10/31/1931 [id number]               Secondary Coverage       Payor Plan Insurance Group Employer/Plan Group    [facility] [id number]       Payor Plan Address Payor Plan Phone Number Payor Plan Fax Number Effective Dates    [address] [address]   1/1/2016 - None Entered    [facility] [id number]         Subscriber Name Subscriber Birth Date Member ID       DOROTA VOGT 10/31/1931 [id number]                     Emergency Contacts        (Rel.) Home Phone Work Phone Mobile Phone    Jovana Vogt (Daughter) [phone] -- [phone]    " Multiple attempts to reach pt and messages left with no return call. Past TCM timeframe. Encounter closing. Carlos Membreno (Son) [phone] -- [phone]                 History & Physical        Zina Brandt MD at 24 2151              [facility] Medicine Services  HISTORY AND PHYSICAL    Patient Name: Dorota Membreno  : 10/31/1931  MRN: [id number]  Primary Care Physician: Anthony Medrano MD  Date of admission: 2024      Subjective  Subjective     Chief Complaint:  AMS    HPI:  Dorota Membreno is a 89 y.o. female with history of dementia who was found altered and presented to [facility] ED.  Presentation at that time was notable for a likely UTI and she was started on antibiotics and fluids.  She has baseline dementia and unable to provide further information.  She appears anxious.  She states she is uncomfortable but unable to tell me exactly why      Personal History     Past Medical History:   Diagnosis Date    Acid reflux 2016    stable    Allergic rhinitis     Alzheimer's disease 2017    Arteritis     A. 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 B. Improved with prednisone therapy    Asthma     Cancer     colon. Status post resection at age 37    Cognitive impairment, mild, so stated     A. Patient has been forgetting names continue med. Has f/u with neuro. She is having some times forgetting directions. She has moved into a new house and this has not helped with directions    Dyslipidemia     lipids and cmp    Hypertension     lipids, cmp, urine micro    Hypothyroidism     A. On replacement therapy    Mammogram abnormal     A. 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    Panic attacks     Pneumonia     Postmenopausal osteoporosis     Shingles 2018           Past Surgical History:   Procedure Laterality Date    CATARACT EXTRACTION      COLECTOMY PARTIAL / TOTAL      Patient diagnosed with colon cancer at age 37    HYSTERECTOMY         Family History: family history includes Alcohol abuse in an other family  member; Allergies in her son; Other in her mother.     Social History:  reports that she has never smoked. She has never used smokeless tobacco. She reports that she does not drink alcohol and does not use drugs.  Social History     Social History Narrative    Not on file       Medications:  Available home medication information reviewed.  Magnesium Oxide -Mg Supplement, QUEtiapine, Vitamin D3, acetaminophen, apixaban, atorvastatin, citalopram, donepezil, hydrOXYzine, ipratropium-albuterol, levothyroxine, memantine, metoprolol tartrate, montelukast, ondansetron, pantoprazole, polyethylene glycol, sennosides-docusate, tamsulosin, and vitamin D3    Allergies   Allergen Reactions    Codeine     Dyazide [Hydrochlorothiazide W-Triamterene] Unknown - Low Severity    Levofloxacin     Penicillins        Objective  Objective     Vital Signs:   Temp:  [98.1 °F (36.7 °C)-98.4 °F (36.9 °C)] 98.4 °F (36.9 °C)  Heart Rate:  [80-85] 80  Resp:  [20-28] 22  BP: ()/(49-77) 140/70  Flow (L/min):  [2] 2       Physical Exam   Constitutional: anxious, elderly, frail  HENT: NCAT, mucous membranes moist  Respiratory: Clear to auscultation bilaterally, respiratory effort normal   Cardiovascular: RRR, no murmurs, rubs, or gallops  Gastrointestinal: Positive bowel sounds, soft, nontender, nondistended  Musculoskeletal: No bilateral ankle edema  Psychiatric: Anxious  Neurologic: Oriented to person only, generally follows commands, no focal deficits, globally weak  Skin: No rashes      Result Review:  I have personally reviewed the results from the time of this admission to 5/30/2024 22:11 EDT and agree with these findings:  [x]  Laboratory list / accordion  [x]  Microbiology  [x]  Radiology  []  EKG/Telemetry   []  Cardiology/Vascular   []  Pathology  []  Old records  []  Other:  Most notable findings include:   CT head negative  Elevated lactate  Elevated creatinine  Leukocytosis  UA appears consistent with UTI  LAB RESULTS:      Lab  05/30/24  1532 05/30/24  1448   WBC  --  18.41*   HEMOGLOBIN  --  14.4   HEMATOCRIT  --  42.4   PLATELETS  --  314   NEUTROS ABS  --  15.40*   IMMATURE GRANS (ABS)  --  0.04   LYMPHS ABS  --  1.94   MONOS ABS  --  0.97*   EOS ABS  --  0.03   MCV  --  87.6   PROCALCITONIN 0.05  --    LACTATE 3.5* 3.1*   PROTIME  --  15.7*   INR  --  1.19*   APTT  --  33.4*         Lab 05/30/24  1532   SODIUM 141   POTASSIUM 4.7   CHLORIDE 100   CO2 26.8   ANION GAP 14.2   BUN 36*   CREATININE 1.21*   EGFR 42.1*   GLUCOSE 116*   CALCIUM 9.7*   MAGNESIUM 1.9   TSH 2.040         Lab 05/30/24  1532   TOTAL PROTEIN 7.4   ALBUMIN 3.9   GLOBULIN 3.5   ALT (SGPT) 7   AST (SGOT) 25   BILIRUBIN 0.5   ALK PHOS 145*         Lab 05/30/24  1532   PROBNP 494.0   HSTROP T 13                 UA          5/30/2024    14:52   Urinalysis   Squamous Epithelial Cells, UA 0-2    Specific [place], UA 1.015    Ketones, UA Negative    Blood, UA Moderate (2+)    Leukocytes, UA Moderate (2+)    Nitrite, UA Negative    RBC, UA 11-20    WBC, UA Too Numerous to Count    Bacteria, UA 3+        Microbiology Results (last 10 days)       Procedure Component Value - Date/Time    COVID PRE-OP / PRE-PROCEDURE SCREENING ORDER (NO ISOLATION) - Swab, Nasopharynx [[id number]]  (Normal) Collected: 05/30/24 1452    Lab Status: Final result Specimen: Swab from Nasopharynx Updated: 05/30/24 1617    Narrative:      The following orders were created for panel order COVID PRE-OP / PRE-PROCEDURE SCREENING ORDER (NO ISOLATION) - Swab, Nasopharynx.  Procedure                               Abnormality         Status                     ---------                               -----------         ------                     COVID-19, FLU A/B, RSV P...[[id number]]  Normal              Final result                 Please view results for these tests on the individual orders.    COVID-19, FLU A/B, RSV PCR 1 HR TAT - Swab, Nasopharynx [[id number]]  (Normal) Collected: 05/30/24 1452    Lab  Status: Final result Specimen: Swab from Nasopharynx Updated: 05/30/24 1617     COVID19 Not Detected     Influenza A PCR Not Detected     Influenza B PCR Not Detected     RSV, PCR Not Detected    Narrative:      Fact sheet for providers: https://www.fda.gov/media/815546/download    Fact sheet for patients: https://www.fda.gov/media/459612/download    Test performed by PCR.            CT Head Without Contrast    Result Date: 5/30/2024  CT HEAD WO CONTRAST Date of Exam: 5/30/2024 4:18 PM EDT Indication: ams. Comparison: 4/7/2018 Technique: Axial CT images were obtained of the head without contrast administration.  Automated exposure control and iterative construction methods were used. Findings: No intracranial hemorrhage. Gray-white matter differentiation is maintained without evidence of an acute infarction. Multiple foci of decreased attenuation are present within the subcortical, deep cerebral, and periventricular white matter consistent with chronic small vessel/microangiopathic ischemic changes. No extra-axial mass or collection. The ventricles and sulci are prominent commensurate with involutional changes. The posterior fossa appears grossly normal. Sellar and suprasellar structures are normal. Lens replacements. The paranasal sinuses, ethmoid air cells, and mastoid air cells are aerated. The bony calvarium is intact.     Impression: Impression: No acute intracranial pathology. Electronically Signed: Mina Larkin MD  5/30/2024 4:30 PM EDT  Workstation ID: [id number]    XR Chest 1 View    Result Date: 5/30/2024  XR CHEST 1 VW Date of Exam: 5/30/2024 2:14 PM EDT Indication: ams Comparison 7/11/2023 Findings: There is unchanged moderately severe elevation of the right diaphragm. Mildly prominent interstitial pattern appears stable and chronic. Heart and pulmonary vessels appear within normal limits. There are no acute infiltrates or effusions.     Impression: Impression: Chronic findings. No acute process.  Electronically Signed: Rachel Hood MD  5/30/2024 2:33 PM EDT  Workstation ID: [id number]     Results for orders placed during the hospital encounter of 02/26/23    Adult Transthoracic Echo Complete w/ Color, Spectral and Contrast if Necessary Per Protocol    Interpretation Summary    Left ventricular ejection fraction appears to be 61 - 65%.    Left ventricular diastolic function was normal.    Left atrial volume is moderately increased.    Moderate tricuspid valve regurgitation is present.    Estimated right ventricular systolic pressure from tricuspid regurgitation is moderately elevated (45-55 mmHg). Calculated right ventricular systolic pressure from tricuspid regurgitation is 47 mmHg.      Assessment & Plan  Assessment & Plan       Complicated UTI (urinary tract infection)    Late onset Alzheimer's disease with behavioral disturbance    Hypothyroidism    Atrial fibrillation    Leukocytosis        Dorota Membreno is a 89-year-old female with a history of dementia who is presenting with altered mental status and found to be septic from likely source of UTI    Altered mental status  Sepsis  UTI  - CT head as above, negative for any acute pathology.  Suspect infectious etiology  - Started on ceftriaxone, follow blood and urine culture  - Continue fluids.  Failed bedside nursing dysphagia screen.  Fluids overnight with speech consult in AM.    KRYSTEN  --secondary to above  --fluids  --repeat in AM  --avoid in nephrotoxins    Hypothyroid  --cont home synthroid  --TSH and T4 ok    Dementia  --cont home meds  --complicates all aspects of care    Afib  --cont home eliquis  --rate controlled  --holding on home BB secondary to above          DVT prophylaxis:  Medical and mechanical DVT prophylaxis orders are present.          CODE STATUS:  neither family member answering phone call and she is not able to currently make decisions. Previously documented DNR/DNI  There are no questions and answers to display.       Expected  Discharge   Expected Discharge Date: 6/3/2024; Expected Discharge Time:      Zina Brandt MD  24      Electronically signed by Zina Brandt MD at 24 2211          Physical Therapy Notes (most recent note)        Narcisa Andrade, PT at 24 0914  Version 1 of 1         Patient Name: Dorota Membreno  : 10/31/1931    MRN: [id number]                              Today's Date: 2024       Admit Date: 2024    Visit Dx:     ICD-10-CM ICD-9-CM   1. Sepsis, due to unspecified organism, unspecified whether acute organ dysfunction present  A41.9 038.9     995.91   2. Urinary tract infection with hematuria, site unspecified  N39.0 599.0    R31.9 599.70     Patient Active Problem List   Diagnosis    Gastroesophageal reflux disease    Atopic rhinitis    Asthma    Late onset Alzheimer's disease with behavioral disturbance    Eczema    Dyslipidemia    Hypothyroidism    Essential hypertension    Post-menopausal osteoporosis    Arteritis    Dizziness    Edema    Fatigue    Shingles    Syncope and collapse    Atrial fibrillation    Leukocytosis    Hypomagnesemia    Elevated brain natriuretic peptide (BNP) level    Proteinuria    Bilateral Pleural effusions    Pneumonia    Respiratory failure    Elevated alkaline phosphatase level    Complicated UTI (urinary tract infection)     Past Medical History:   Diagnosis Date    Acid reflux 2016    stable    Allergic rhinitis     Alzheimer's disease 2017    Arteritis     A. 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 B. Improved with prednisone therapy    Asthma     Cancer     colon. Status post resection at age 37    Cognitive impairment, mild, so stated     A. Patient has been forgetting names continue med. Has f/u with neuro. She is having some times forgetting directions. She has moved into a new house and this has not helped with directions    Dyslipidemia     lipids and cmp    Hypertension     lipids, cmp, urine micro     Hypothyroidism     A. On replacement therapy    Mammogram abnormal     A. 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    Panic attacks     Pneumonia     Postmenopausal osteoporosis     Shingles 03/2018     Past Surgical History:   Procedure Laterality Date    CATARACT EXTRACTION      COLECTOMY PARTIAL / TOTAL      Patient diagnosed with colon cancer at age 37    HYSTERECTOMY        General Information       Row Name 06/01/24 1004          Physical Therapy Time and Intention    Document Type evaluation  -     Mode of Treatment physical therapy  -       Row Name 06/01/24 1004          General Information    Patient Profile Reviewed yes  -LM     Prior Level of Function --  Unable to obtain PLOF d/t pt cognition and no family present  -     Existing Precautions/Restrictions fall;oxygen therapy device and L/min;other (see comments)  Dementia  -     Barriers to Rehab medically complex;previous functional deficit;cognitive status  -       Row Name 06/01/24 1004          Living Environment    People in Home facility resident  [facility] [place]  -       Row Name 06/01/24 1004          Home Main Entrance    Number of Stairs, Main Entrance none  -LM       Row Name 06/01/24 1004          Stairs Within Home, Primary    Number of Stairs, Within Home, Primary none  -LM       Row Name 06/01/24 1004          Cognition    Orientation Status (Cognition) oriented to;person;disoriented to;place;situation;time  Oriented to first name only  -LM       Row Name 06/01/24 1004          Safety Issues, Functional Mobility    Safety Issues Affecting Function (Mobility) awareness of need for assistance;insight into deficits/self-awareness;judgment;problem-solving;safety precaution awareness;safety precautions follow-through/compliance;sequencing abilities  -     Impairments Affecting Function (Mobility) balance;cognition;endurance/activity tolerance;motor planning;postural/trunk control;shortness of  breath;strength  -LM     Comment, Safety Issues/Impairments (Mobility) Increased processing time needed  -LM               User Key  (r) = Recorded By, (t) = Taken By, (c) = Cosigned By      Initials Name Provider Type    LM Narcisa Andrade, PT Physical Therapist                   Mobility       Row Name 06/01/24 1007          Bed Mobility    Bed Mobility supine-sit;sit-supine  -LM     Supine-Sit Premier (Bed Mobility) maximum assist (25% patient effort);1 person assist;verbal cues  -LM     Sit-Supine Premier (Bed Mobility) maximum assist (25% patient effort);1 person assist;verbal cues  -LM     Assistive Device (Bed Mobility) bed rails;head of bed elevated  -LM     Comment, (Bed Mobility) Vc's for sequencing.  Pt needs cues and initiation of movement from therapist, but once she understands what you are wanting she is very helpful.  -LM       Row Name 06/01/24 1007          Bed-Chair Transfer    Bed-Chair Premier (Transfers) minimum assist (75% patient effort);1 person assist  -LM     Assistive Device (Bed-Chair Transfers) walker, front-wheeled  -LM     Comment, (Bed-Chair Transfer) Pt completed a stand step transfer from bed-->[facility].  -LM       Row Name 06/01/24 1007          Sit-Stand Transfer    Sit-Stand Premier (Transfers) minimum assist (75% patient effort);1 person assist;verbal cues  -LM     Assistive Device (Sit-Stand Transfers) walker, front-wheeled  -LM       Row Name 06/01/24 1007          Gait/Stairs (Locomotion)    Premier Level (Gait) minimum assist (75% patient effort);1 person assist  -LM     Assistive Device (Gait) walker, front-wheeled  -LM     Distance in Feet (Gait) 30  -LM     Deviations/Abnormal Patterns (Gait) annette decreased;gait speed decreased;stride length decreased  -LM     Bilateral Gait Deviations forward flexed posture;knee buckling bilaterally  -LM     Comment, (Gait/Stairs) Vc's for upright posture and forward head posture.  Pt would demonstrate bilateral  knee buckling at times with Todd to correct.  -LM               User Key  (r) = Recorded By, (t) = Taken By, (c) = Cosigned By      Initials Name Provider Type    Narcisa Lopez PT Physical Therapist                   Obj/Interventions       Row Name 06/01/24 1009          Range of Motion Comprehensive    General Range of Motion bilateral lower extremity ROM WFL  -LM       Row Name 06/01/24 1009          Strength Comprehensive (MMT)    General Manual Muscle Testing (MMT) Assessment lower extremity strength deficits identified  -LM     Comment, General Manual Muscle Testing (MMT) Assessment BLEs grossly 4-/5 throughout  -LM       Row Name 06/01/24 1009          Balance    Static Sitting Balance contact guard  -LM     Position, Sitting Balance unsupported;sitting edge of bed  -LM     Static Standing Balance minimal assist;1-person assist;verbal cues  -LM     Dynamic Standing Balance minimal assist;1-person assist;verbal cues  -LM     Position/Device Used, Standing Balance supported;walker, front-wheeled  -LM       Row Name 06/01/24 1009          Sensory Assessment (Somatosensory)    Sensory Assessment (Somatosensory) unable/difficult to assess  -LM               User Key  (r) = Recorded By, (t) = Taken By, (c) = Cosigned By      Initials Name Provider Type    Narcisa Lopez PT Physical Therapist                   Goals/Plan       Row Name 06/01/24 1013          Bed Mobility Goal 1 (PT)    Activity/Assistive Device (Bed Mobility Goal 1, PT) sit to supine/supine to sit  -LM     [place] Level/Cues Needed (Bed Mobility Goal 1, PT) minimum assist (75% or more patient effort)  -LM     Time Frame (Bed Mobility Goal 1, PT) short term goal (STG);3 days  -LM       Row Name 06/01/24 1013          Transfer Goal 1 (PT)    Activity/Assistive Device (Transfer Goal 1, PT) bed-to-chair/chair-to-bed  -LM     [place] Level/Cues Needed (Transfer Goal 1, PT) standby assist  -LM     Time Frame (Transfer Goal 1, PT) long  "term goal (LTG);10 days  -       Row Name 06/01/24 1013          Gait Training Goal 1 (PT)    Activity/Assistive Device (Gait Training Goal 1, PT) gait (walking locomotion);assistive device use  -     [place] Level (Gait Training Goal 1, PT) standby assist  -LM     Distance (Gait Training Goal 1, PT) 100 feet  -     Time Frame (Gait Training Goal 1, PT) long term goal (LTG);10 days  -LM       Row Name 06/01/24 1013          Therapy Assessment/Plan (PT)    Planned Therapy Interventions (PT) balance training;bed mobility training;gait training;home exercise program;motor coordination training;neuromuscular re-education;patient/family education;postural re-education;ROM (range of motion);strengthening;stretching;transfer training  -               User Key  (r) = Recorded By, (t) = Taken By, (c) = Cosigned By      Initials Name Provider Type     Narcisa Andrade, PT Physical Therapist                   Clinical Impression       Row Name 06/01/24 1009          Pain    Pain Intervention(s) Repositioned;Ambulation/increased activity  -       Row Name 06/01/24 1009          Pain Scale: FACES Pre/Post-Treatment    Pain: FACES Scale, Pretreatment 2-->hurts little bit  -LM     Posttreatment Pain Rating 2-->hurts little bit  -LM     Pre/Posttreatment Pain Comment Pt unable to specify pain location.  Pt would just say \"the front\"  -       Row Name 06/01/24 1009          Plan of Care Review    Plan of Care Reviewed With patient  -     Outcome Evaluation PT evaluation completed.  Pt stood with MinAx1 and ambulated 30 feet using rw with MinAx1.  Pt presents below baseline function d/t increased confusion (more than normal), weakness, decreased activity tolerance, and gait instability.  Recommend return to Memory Care with  PT.  -       Row Name 06/01/24 1009          Therapy Assessment/Plan (PT)    Patient/Family Therapy Goals Statement (PT) Unable to state goal d/t cognition  -     Rehab Potential (PT) " stephani, will monitor progress closely  -LM     Criteria for Skilled Interventions Met (PT) yes;meets criteria;skilled treatment is necessary  -LM     Therapy Frequency (PT) daily  -LM     Predicted Duration of Therapy Intervention (PT) 10 days  -LM       Row Name 06/01/24 1009          Vital Signs    Pre Systolic BP Rehab 158  -LM     Pre Treatment Diastolic BP 70  -LM     Pretreatment Heart Rate (beats/min) 97  -LM     Posttreatment Heart Rate (beats/min) 92  -LM     Pre SpO2 (%) 96  -LM     O2 Delivery Pre Treatment supplemental O2  -LM     Intra SpO2 (%) 87   -LM     O2 Delivery Intra Treatment supplemental O2  -LM     Post SpO2 (%) 96  -LM     O2 Delivery Post Treatment supplemental O2  -LM     Pre Patient Position Supine  -LM     Intra Patient Position Sitting  -LM     Post Patient Position Supine  -LM       Row Name 06/01/24 1009          Positioning and Restraints    Pre-Treatment Position in bed  -LM     Post Treatment Position bed  -LM     In Bed fowlers;heels elevated;with nsg  -LM     Restraints released:;soft limb  RN to assist pt with breakfast and reapply restraints when appropriate  -LM               User Key  (r) = Recorded By, (t) = Taken By, (c) = Cosigned By      Initials Name Provider Type    LM Narcisa Andrade, PT Physical Therapist                   Outcome Measures       Row Name 06/01/24 1014          How much help from another person do you currently need...    Turning from your back to your side while in flat bed without using bedrails? 3  -LM     Moving from lying on back to sitting on the side of a flat bed without bedrails? 2  -LM     Moving to and from a bed to a chair (including a wheelchair)? 2  -LM     Standing up from a chair using your arms (e.g., wheelchair, bedside chair)? 3  -LM     Climbing 3-5 steps with a railing? 2  -LM     To walk in hospital room? 3  -LM     AM-PAC 6 Clicks Score (PT) 15  -LM     Highest Level of Mobility Goal 4 --> Transfer to chair/commode  -LM       Row  Name 06/01/24 1014 06/01/24 1002       Functional Assessment    Outcome Measure Options AM-PAC 6 Clicks Basic Mobility (PT)  -LM AM-PAC 6 Clicks Daily Activity (OT)  -AKASH              User Key  (r) = Recorded By, (t) = Taken By, (c) = Cosigned By      Initials Name Provider Type    LM Narcisa Andrade, PT Physical Therapist    Deborah Castillo, OT Occupational Therapist                                 Physical Therapy Education       Title: PT OT SLP Therapies (In Progress)       Topic: Physical Therapy (In Progress)       Point: Mobility training (In Progress)       Learning Progress Summary             Patient Acceptance, E, NL by  at 6/1/2024 1014                         Point: Home exercise program (Not Started)       Learner Progress:  Not documented in this visit.              Point: Body mechanics (Not Started)       Learner Progress:  Not documented in this visit.              Point: Precautions (In Progress)       Learning Progress Summary             Patient Acceptance, E, NL by  at 6/1/2024 1014                                         User Key       Initials Effective Dates Name Provider Type Discipline     07/11/23 -  Narcisa Andrade, PT Physical Therapist PT                  PT Recommendation and Plan  Planned Therapy Interventions (PT): balance training, bed mobility training, gait training, home exercise program, motor coordination training, neuromuscular re-education, patient/family education, postural re-education, ROM (range of motion), strengthening, stretching, transfer training  Plan of Care Reviewed With: patient  Outcome Evaluation: PT evaluation completed.  Pt stood with MinAx1 and ambulated 30 feet using rw with MinAx1.  Pt presents below baseline function d/t increased confusion (more than normal), weakness, decreased activity tolerance, and gait instability.  Recommend return to Memory Care with  PT.     Time Calculation:   PT Evaluation Complexity  History, PT Evaluation Complexity: 3  or more personal factors and/or comorbidities  Examination of Body Systems (PT Eval Complexity): total of 3 or more elements  Clinical Presentation (PT Evaluation Complexity): evolving  Clinical Decision Making (PT Evaluation Complexity): moderate complexity  Overall Complexity (PT Evaluation Complexity): moderate complexity     PT Charges       Row Name 24 1015             Time Calculation    Start Time 0914  -LM      PT Received On 24  -LM      PT Goal Re-Cert Due Date 24  -LM         Untimed Charges    PT Eval/Re-eval Minutes 46  -LM         Total Minutes    Untimed Charges Total Minutes 46  -LM       Total Minutes 46  -LM                User Key  (r) = Recorded By, (t) = Taken By, (c) = Cosigned By      Initials Name Provider Type    LM Narcisa Andrade, PT Physical Therapist                  Therapy Charges for Today       Code Description Service Date Service Provider Modifiers Qty    35701454494 HC PT EVAL MOD COMPLEXITY 4 2024 Narcisa Andrade, PT GP 1            PT G-Codes  Outcome Measure Options: AM-PAC 6 Clicks Basic Mobility (PT)  AM-PAC 6 Clicks Score (PT): 15  AM-PAC 6 Clicks Score (OT): 9  PT Discharge Summary  Anticipated Discharge Disposition (PT): home with home health (Back to Memory Care with  PT)    Narcisa Andrade PT  2024      Electronically signed by Narcisa Andrade, PT at 24 1016          Occupational Therapy Notes (most recent note)        Deborah Jackson, OT at 24 0995          Patient Name: Dorota Membreno  : 10/31/1931    MRN: [id number]                              Today's Date: 2024       Admit Date: 2024    Visit Dx:     ICD-10-CM ICD-9-CM   1. Sepsis, due to unspecified organism, unspecified whether acute organ dysfunction present  A41.9 038.9     995.91   2. Urinary tract infection with hematuria, site unspecified  N39.0 599.0    R31.9 599.70     Patient Active Problem List   Diagnosis    Gastroesophageal reflux disease    Atopic rhinitis     Asthma    Late onset Alzheimer's disease with behavioral disturbance    Eczema    Dyslipidemia    Hypothyroidism    Essential hypertension    Post-menopausal osteoporosis    Arteritis    Dizziness    Edema    Fatigue    Shingles    Syncope and collapse    Atrial fibrillation    Leukocytosis    Hypomagnesemia    Elevated brain natriuretic peptide (BNP) level    Proteinuria    Bilateral Pleural effusions    Pneumonia    Respiratory failure    Elevated alkaline phosphatase level    Complicated UTI (urinary tract infection)     Past Medical History:   Diagnosis Date    Acid reflux 03/14/2016    stable    Allergic rhinitis     Alzheimer's disease 12/2017    Arteritis     A. Giant cell arteritis, diagnosed around 2000 when she lost sight in her eye. B. Improved with prednisone therapy    Asthma     Cancer     colon. Status post resection at age 37    Cognitive impairment, mild, so stated     A. Patient has been forgetting names continue med. Has f/u with neuro. She is having some times forgetting directions. She has moved into a new house and this has not helped with directions    Dyslipidemia     lipids and cmp    Hypertension     lipids, cmp, urine micro    Hypothyroidism     A. On replacement therapy    Mammogram abnormal     A. 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    Panic attacks     Pneumonia     Postmenopausal osteoporosis     Shingles 03/2018     Past Surgical History:   Procedure Laterality Date    CATARACT EXTRACTION      COLECTOMY PARTIAL / TOTAL      Patient diagnosed with colon cancer at age 37    HYSTERECTOMY        General Information       Row Name 06/01/24 0946          OT Time and Intention    Document Type evaluation  -AKASH     Mode of Treatment occupational therapy  -AKASH       Row Name 06/01/24 0946          General Information    Patient Profile Reviewed yes  -AKASH     Prior Level of Function mod assist:;ADL's  PLOF unknown; pt poor historian, lives in memory care unit at  [facility] [place]  -AKASH     Existing Precautions/Restrictions fall;other (see comments)  hx Alzheimer's  -AKASH     Barriers to Rehab medically complex;previous functional deficit;cognitive status  -AKASH       Row Name 06/01/24 0946          Occupational Profile    Environmental Supports and Barriers (Occupational Profile) Lives at [facility] [place] care  -AKASH       Row Name 06/01/24 0946          Living Environment    People in Home facility resident  -AKASH       Row Name 06/01/24 0946          Home Main Entrance    Number of Stairs, Main Entrance none  -AKASH       Row Name 06/01/24 0946          Stairs Within Home, Primary    Number of Stairs, Within Home, Primary none  -AKASH       Row Name 06/01/24 0946          Cognition    Orientation Status (Cognition) oriented to;person;disoriented to;place;situation;time;other (see comments)  Oriented to first name only, pleasantly confused  -AKASH       Row Name 06/01/24 0946          Safety Issues, Functional Mobility    Safety Issues Affecting Function (Mobility) awareness of need for assistance;insight into deficits/self-awareness;judgment;problem-solving;safety precaution awareness;safety precautions follow-through/compliance;sequencing abilities  -AKASH     Impairments Affecting Function (Mobility) balance;cognition;endurance/activity tolerance;motor planning;postural/trunk control;shortness of breath;strength  -AKASH     Cognitive Impairments, Mobility Safety/Performance attention;awareness, need for assistance;insight into deficits/self-awareness;judgment;problem-solving/reasoning;safety precaution awareness;safety precaution follow-through;sequencing abilities  -AKASH     Comment, Safety Issues/Impairments (Mobility) increased processing time needed; cues for motor planning tasks and transitions  -AKASH               User Key  (r) = Recorded By, (t) = Taken By, (c) = Cosigned By      Initials Name Provider Type    Deborah Castillo OT Occupational Therapist                     Mobility/ADL's        Row Name 06/01/24 0950          Bed Mobility    Bed Mobility supine-sit;sit-supine  -AKASH     Supine-Sit [place] (Bed Mobility) maximum assist (25% patient effort);1 person assist;verbal cues;nonverbal cues (demo/gesture)  -AKASH     Sit-Supine [place] (Bed Mobility) maximum assist (25% patient effort);1 person assist;verbal cues;nonverbal cues (demo/gesture)  -AKASH     Assistive Device (Bed Mobility) bed rails;head of bed elevated  -AKASH     Comment, (Bed Mobility) cues for sequencing, assist for trunk control and managing BLE's over bed surface  -AKASH       Row Name 06/01/24 0950          Transfers    Transfers sit-stand transfer;toilet transfer  -AKASH     Comment, (Transfers) v/t cues for HP and sequencing  -AKASH       Row Name 06/01/24 0950          Sit-Stand Transfer    Sit-Stand [place] (Transfers) minimum assist (75% patient effort);1 person assist;verbal cues;nonverbal cues (demo/gesture)  -AKASH     Assistive Device (Sit-Stand Transfers) walker, front-wheeled  -AKASH       Row Name 06/01/24 0950          Toilet Transfer    Type (Toilet Transfer) stand pivot/stand step;stand-sit;sit-stand  -AKASH     [place] Level (Toilet Transfer) minimum assist (75% patient effort);1 person assist;verbal cues;nonverbal cues (demo/gesture)  -AKASH     Assistive Device (Toilet Transfer) commode, bedside without drop arms;walker, front-wheeled  -AKASH     Comment, (Toilet Transfer) v/t cues for HP  -AKASH       Row Name 06/01/24 0950          Functional Mobility    Functional Mobility- Comment Defer to PT  -AKASH       Row Name 06/01/24 0950          Activities of Daily Living    BADL Assessment/Intervention lower body dressing;grooming;feeding;toileting  -AKASH       Row Name 06/01/24 0950          Lower Body Dressing Assessment/Training    [place] Level (Lower Body Dressing) don;socks;dependent (less than 25% patient effort)  -AKASH     Position (Lower Body Dressing) sitting up in bed  -AKASH       Row Name 06/01/24 0950          Grooming  Assessment/Training    [place] Level (Grooming) wash face, hands;minimum assist (75% patient effort);verbal cues;nonverbal cues (demo/gesture)  -     Oral Care other (see comments)  not attempted d/t meal tray arriving  -     Position (Grooming) sitting up in bed  -       Row Name 06/01/24 0950          Self-Feeding Assessment/Training    [place] Level (Feeding) liquids to mouth;prepare tray/open items;scoop food and bring to mouth;maximum assist (25% patient effort);verbal cues;nonverbal cues (demo/gesture)  -     Position (Self-Feeding) sitting up in bed  -     Comment, (Feeding) assist to bring liquids to mouth and to load fork and bring to mouth  -       Row Name 06/01/24 0950          Toileting Assessment/Training    [place] Level (Toileting) adjust/manage clothing;perform perineal hygiene;dependent (less than 25% patient effort)  -     Assistive Devices (Toileting) commode, bedside without drop arms  -     Position (Toileting) supported standing  -               User Key  (r) = Recorded By, (t) = Taken By, (c) = Cosigned By      Initials Name Provider Type    AKASH Deborah Jackson OT Occupational Therapist                   Obj/Interventions       Row Name 06/01/24 0954          Sensory Assessment (Somatosensory)    Sensory Assessment (Somatosensory) unable/difficult to assess  -       Row Name 06/01/24 0954          Vision Assessment/Intervention    Visual Impairment/Limitations unable/difficult to assess  -       Row Name 06/01/24 0954          Range of Motion Comprehensive    General Range of Motion bilateral upper extremity ROM WFL  -       Row Name 06/01/24 0954          Strength Comprehensive (MMT)    Comment, General Manual Muscle Testing (MMT) Assessment BUE's grossly 3+/5  -       Row Name 06/01/24 0954          Balance    Balance Assessment sitting static balance;sitting dynamic balance;standing static balance;standing dynamic balance  -     Static Sitting  Balance contact guard  -AKASH     Dynamic Sitting Balance minimal assist  -AKASH     Position, Sitting Balance unsupported;sitting edge of bed  -AKASH     Static Standing Balance minimal assist;1-person assist;verbal cues;non-verbal cues (demo/gesture)  -AKASH     Dynamic Standing Balance minimal assist;1-person assist;verbal cues;non-verbal cues (demo/gesture)  -AKASH     Position/Device Used, Standing Balance supported;walker, front-wheeled  -AKASH     Balance Interventions standing;occupation based/functional task  -AKASH     Comment, Balance Dep for toileting tasks in standing  -AKASH               User Key  (r) = Recorded By, (t) = Taken By, (c) = Cosigned By      Initials Name Provider Type    Deborah Castillo, OT Occupational Therapist                   Goals/Plan       Row Name 06/01/24 1001          Transfer Goal 1 (OT)    Activity/Assistive Device (Transfer Goal 1, OT) sit-to-stand/stand-to-sit;toilet  -AKASH     [place] Level/Cues Needed (Transfer Goal 1, OT) contact guard required  -AKASH     Time Frame (Transfer Goal 1, OT) long term goal (LTG);10 days  -AKASH     Progress/Outcome (Transfer Goal 1, OT) new goal  -AKASH       Row Name 06/01/24 1001          Grooming Goal 1 (OT)    Activity/Device (Grooming Goal 1, OT) hair care;oral care;wash face, hands  -AKASH     [place] (Grooming Goal 1, OT) minimum assist (75% or more patient effort)  -AKASH     Time Frame (Grooming Goal 1, OT) short term goal (STG);1 week  -AKASH     Progress/Outcome (Grooming Goal 1, OT) new goal  -AKASH       Row Name 06/01/24 1001          Therapy Assessment/Plan (OT)    Planned Therapy Interventions (OT) activity tolerance training;adaptive equipment training;functional balance retraining;occupation/activity based interventions;patient/caregiver education/training;ROM/therapeutic exercise;strengthening exercise;transfer/mobility retraining  -AKASH               User Key  (r) = Recorded By, (t) = Taken By, (c) = Cosigned By      Initials Name Provider Type    AKASH Jackson  Deborah, MARIAN Occupational Therapist                   Clinical Impression       Row Name 06/01/24 0956          Pain Scale: FACES Pre/Post-Treatment    Pain: FACES Scale, Pretreatment 2-->hurts little bit  -AKASH     Posttreatment Pain Rating 2-->hurts little bit  -AKASH     Pre/Posttreatment Pain Comment Tolerated; pt unable to specify location of pain  -AKASH       Row Name 06/01/24 0956          Plan of Care Review    Plan of Care Reviewed With patient  -AKASH     Outcome Evaluation OT eval completed. Pt presents with SOA, significant weakness, and impaired balance limiting ADL's. Pt Todd for SPT to BSC, Dep for toileting tasks, Todd for grooming, increased time and cues needed for motor planning all tasks. IP OT services warranted. Recommend return to memory care at discharge with skilled OT services.  -       Row Name 06/01/24 0956          Therapy Assessment/Plan (OT)    Patient/Family Therapy Goal Statement (OT) To get stronger  -AKASH     Rehab Potential (OT) good, to achieve stated therapy goals  -AKASH     Criteria for Skilled Therapeutic Interventions Met (OT) yes;meets criteria;skilled treatment is necessary  -AKASH     Therapy Frequency (OT) daily  -AAKSH     Predicted Duration of Therapy Intervention (OT) 10 days  -AKASH       Row Name 06/01/24 0956          Vital Signs    Pre SpO2 (%) 93  -AKASH     O2 Delivery Pre Treatment nasal cannula  -AKASH     Intra SpO2 (%) 87   -AKASH     O2 Delivery Intra Treatment nasal cannula  -AKASH     Post SpO2 (%) 93  -AKASH     O2 Delivery Post Treatment nasal cannula  -AKASH     Pre Patient Position Supine  -AKASH     Intra Patient Position Standing  -AKASH     Post Patient Position Supine  -AKASH       Row Name 06/01/24 0956          Positioning and Restraints    Pre-Treatment Position in bed  -AKASH     Post Treatment Position bed  -AKASH     In Bed fowlers;exit alarm on;with nsg;heels elevated  -AKASH     Restraints released:;soft limb;other (comment)  1:1 present  -AKASH               User Key  (r) = Recorded By, (t) = Taken  By, (c) = Cosigned By      Initials Name Provider Type    Deborah Castillo OT Occupational Therapist                   Outcome Measures       Row Name 06/01/24 1002          How much help from another is currently needed...    Putting on and taking off regular lower body clothing? 1  -AKASH     Bathing (including washing, rinsing, and drying) 1  -AKASH     Toileting (which includes using toilet bed pan or urinal) 1  -AKASH     Putting on and taking off regular upper body clothing 2  -AKASH     Taking care of personal grooming (such as brushing teeth) 2  -AKASH     Eating meals 2  -AKASH     AM-PAC 6 Clicks Score (OT) 9  -AKASH       Row Name 06/01/24 1002          Functional Assessment    Outcome Measure Options AM-PAC 6 Clicks Daily Activity (OT)  -AKASH               User Key  (r) = Recorded By, (t) = Taken By, (c) = Cosigned By      Initials Name Provider Type    Deborah Castillo OT Occupational Therapist                    Occupational Therapy Education       Title: PT OT SLP Therapies (In Progress)       Topic: Occupational Therapy (In Progress)       Point: ADL training (In Progress)       Description:   Instruct learner(s) on proper safety adaptation and remediation techniques during self care or transfers.   Instruct in proper use of assistive devices.                  Learning Progress Summary             Patient Acceptance, E, NL,NR by AKASH at 6/1/2024 1002    Comment: OT POC; BADL's; transfer training                         Point: Home exercise program (Not Started)       Description:   Instruct learner(s) on appropriate technique for monitoring, assisting and/or progressing therapeutic exercises/activities.                  Learner Progress:  Not documented in this visit.              Point: Precautions (In Progress)       Description:   Instruct learner(s) on prescribed precautions during self-care and functional transfers.                  Learning Progress Summary             Patient Acceptance, E, NL,NR by AKASH at 6/1/2024  1002    Comment: OT POC; BADL's; transfer training                         Point: Body mechanics (Not Started)       Description:   Instruct learner(s) on proper positioning and spine alignment during self-care, functional mobility activities and/or exercises.                  Learner Progress:  Not documented in this visit.                              User Key       Initials Effective Dates Name Provider Type Discipline     06/16/21 -  Deborah Jackson OT Occupational Therapist OT                  OT Recommendation and Plan  Planned Therapy Interventions (OT): activity tolerance training, adaptive equipment training, functional balance retraining, occupation/activity based interventions, patient/caregiver education/training, ROM/therapeutic exercise, strengthening exercise, transfer/mobility retraining  Therapy Frequency (OT): daily  Plan of Care Review  Plan of Care Reviewed With: patient  Outcome Evaluation: OT eval completed. Pt presents with SOA, significant weakness, and impaired balance limiting ADL's. Pt Todd for SPT to BSC, Dep for toileting tasks, Todd for grooming, increased time and cues needed for motor planning all tasks. IP OT services warranted. Recommend return to memory care at discharge with skilled OT services.     Time Calculation:   Evaluation Complexity (OT)  Review Occupational Profile/Medical/Therapy History Complexity: expanded/moderate complexity  Assessment, Occupational Performance/Identification of Deficit Complexity: 3-5 performance deficits  Clinical Decision Making Complexity (OT): detailed assessment/moderate complexity  Overall Complexity of Evaluation (OT): moderate complexity     Time Calculation- OT       Row Name 06/01/24 0925             Time Calculation- OT    OT Start Time 0925  -AKASH      OT Received On 06/01/24  -AKASH      OT Goal Re-Cert Due Date 06/11/24  -AKASH         Untimed Charges    OT Eval/Re-eval Minutes 54  -AKASH         Total Minutes    Untimed Charges Total Minutes 54   -AKASH       Total Minutes 54  -AKASH                User Key  (r) = Recorded By, (t) = Taken By, (c) = Cosigned By      Initials Name Provider Type    Deborah Castillo OT Occupational Therapist                  Therapy Charges for Today       Code Description Service Date Service Provider Modifiers Qty    16867689619 HC OT EVAL MOD COMPLEXITY 4 6/1/2024 Deborah Jackson OT GO 1                 Deborah Jackson OT  6/1/2024    Electronically signed by Deborah Jackson OT at 06/01/24 1004

## 2024-06-05 NOTE — THERAPY TREATMENT NOTE
Patient Name: Dorota Membreno  : 10/31/1931    MRN: [id number]                              Today's Date: 2024       Admit Date: 2024    Visit Dx:     ICD-10-CM ICD-9-CM   1. Sepsis, due to unspecified organism, unspecified whether acute organ dysfunction present  A41.9 038.9     995.91   2. Urinary tract infection with hematuria, site unspecified  N39.0 599.0    R31.9 599.70     Patient Active Problem List   Diagnosis    Gastroesophageal reflux disease    Atopic rhinitis    Asthma    Late onset Alzheimer's disease with behavioral disturbance    Eczema    Dyslipidemia    Hypothyroidism    Essential hypertension    Post-menopausal osteoporosis    Arteritis    Dizziness    Edema    Fatigue    Shingles    Syncope and collapse    Atrial fibrillation    Leukocytosis    Hypomagnesemia    Elevated brain natriuretic peptide (BNP) level    Proteinuria    Bilateral Pleural effusions    Pneumonia    Respiratory failure    Elevated alkaline phosphatase level    Complicated UTI (urinary tract infection)     Past Medical History:   Diagnosis Date    Acid reflux 2016    stable    Allergic rhinitis     Alzheimer's disease 2017    Arteritis     A. 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 B. Improved with prednisone therapy    Asthma     Cancer     colon. Status post resection at age 37    Cognitive impairment, mild, so stated     A. Patient has been forgetting names continue med. Has f/u with neuro. She is having some times forgetting directions. She has moved into a new house and this has not helped with directions    Dyslipidemia     lipids and cmp    Hypertension     lipids, cmp, urine micro    Hypothyroidism     A. On replacement therapy    Mammogram abnormal     A. 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    Panic attacks     Pneumonia     Postmenopausal osteoporosis     Shingles 2018     Past Surgical History:   Procedure Laterality Date     CATARACT EXTRACTION      COLECTOMY PARTIAL / TOTAL      Patient diagnosed with colon cancer at age 37    HYSTERECTOMY        General Information       Row Name 06/05/24 1417          Physical Therapy Time and Intention    Document Type therapy note (daily note)  -SD     Mode of Treatment physical therapy  -SD       [facility] Name 06/05/24 1417          General Information    Existing Precautions/Restrictions fall;oxygen therapy device and L/min;other (see comments)  dementia  -SD       Row Name 06/05/24 1417          Cognition    Orientation Status (Cognition) oriented to;person;disoriented to;place;situation;time  -SD       [facility] Name 06/05/24 1417          Safety Issues, Functional Mobility    Safety Issues Affecting Function (Mobility) awareness of need for assistance;insight into deficits/self-awareness;judgment;positioning of assistive device;sequencing abilities;safety precautions follow-through/compliance;safety precaution awareness;problem-solving  -SD     Impairments Affecting Function (Mobility) balance;cognition;endurance/activity tolerance;motor planning;postural/trunk control;shortness of breath;strength  -SD               User Key  (r) = Recorded By, (t) = Taken By, (c) = Cosigned By      Initials Name Provider Type    SD Vero Hollis, PT Physical Therapist                   Mobility       Row Name 06/05/24 1417          Bed Mobility    Bed Mobility supine-sit;sit-supine  -SD     Supine-Sit [place] (Bed Mobility) moderate assist (50% patient effort);1 person assist;verbal cues  -SD     Sit-Supine [place] (Bed Mobility) moderate assist (50% patient effort);1 person assist;verbal cues  -SD     Assistive Device (Bed Mobility) head of bed elevated  -SD     Comment, (Bed Mobility) Improved independence this date  -SD       [facility] Name 06/05/24 1417          Transfers    Comment, (Transfers) cues for safety and sequencing  -SD       [facility] Name 06/05/24 1417          Sit-Stand Transfer    Sit-Stand  [place] (Transfers) 1 person assist;verbal cues;contact guard  -SD     Assistive Device (Sit-Stand Transfers) walker, front-wheeled  -SD       Row Name 06/05/24 1417          Gait/Stairs (Locomotion)    [place] Level (Gait) minimum assist (75% patient effort);1 person assist  -SD     Assistive Device (Gait) walker, front-wheeled  -SD     Distance in Feet (Gait) 40  -SD     Deviations/Abnormal Patterns (Gait) annette decreased;stride length decreased;base of support, narrow;bilateral deviations  -SD     Bilateral Gait Deviations forward flexed posture;knee buckling bilaterally  -SD     Comment, (Gait/Stairs) Pt amb in hallway with Rw and Hakeem due to varying gait speeds. Pt dem increased forward trunk flexion with fatigue and needed cues to keep RW close.  -SD               User Key  (r) = Recorded By, (t) = Taken By, (c) = Cosigned By      Initials Name Provider Type    SD Vero Hollis, PT Physical Therapist                   Obj/Interventions       Row Name 06/05/24 1447          Motor Skills    Therapeutic Exercise shoulder;hip;knee;ankle  -SD       Row Name 06/05/24 1447          Shoulder (Therapeutic Exercise)    Shoulder Strengthening (Therapeutic Exercise) bilateral;flexion;10 repetitions;supine  -SD       [facility] Name 06/05/24 1447          Knee (Therapeutic Exercise)    Knee (Therapeutic Exercise) strengthening exercise  -SD     Knee Strengthening (Therapeutic Exercise) bilateral;heel slides;10 repetitions;supine  -[facility] Name 06/05/24 1447          Ankle (Therapeutic Exercise)    Ankle (Therapeutic Exercise) AAROM (active assistive range of motion)  -SD     Ankle AAROM (Therapeutic Exercise) bilateral;dorsiflexion;plantarflexion;supine;10 repetitions  -SD       Row Name 06/05/24 1447          Balance    Balance Assessment sitting static balance;sitting dynamic balance;standing static balance  -SD     Static Sitting Balance standby assist  -SD     Dynamic Sitting Balance contact guard  -SD      Position, Sitting Balance unsupported;sitting edge of bed  -SD     Static Standing Balance contact guard  -SD     Position/Device Used, Standing Balance supported;walker, rolling  -SD     Balance Interventions sitting;static;dynamic;occupation based/functional task  -SD       Row Name 06/05/24 1447          Elbow/Forearm (Therapeutic Exercise)    Elbow/Forearm (Therapeutic Exercise) strengthening exercise  -SD     Elbow/Forearm Strengthening (Therapeutic Exercise) bilateral;flexion;extension;supine;10 repetitions  -SD       Row Name 06/05/24 1447          Wrist (Therapeutic Exercise)    Wrist (Therapeutic Exercise) AAROM (active assistive range of motion)  -SD     Wrist AAROM (Therapeutic Exercise) bilateral;flexion;extension;10 repetitions  -SD               User Key  (r) = Recorded By, (t) = Taken By, (c) = Cosigned By      Initials Name Provider Type    SD Vero Hollis, PT Physical Therapist                   Goals/Plan    No documentation.                  Clinical Impression       Row Name 06/05/24 1449          Pain    Pretreatment Pain Rating 0/10 - no pain  -SD     Posttreatment Pain Rating 0/10 - no pain  -SD       Row Name 06/05/24 1449          Plan of Care Review    Plan of Care Reviewed With patient  -SD     Progress improving  -SD     Outcome Evaluation Pt amb in hallway with Rw and Hakeem due to varying gait speeds. Pt dem increased forward trunk flexion with fatigue and needed cues to keep RW close. Pt justin BLE and SERA therex, reported feeling better after session. Pt remains below baseline level of function for mobility and IPPT services continue to be warranted at this time.  -SD       Row Name 06/05/24 1449          Positioning and Restraints    Pre-Treatment Position in bed  -SD     Post Treatment Position bed  -SD     In Bed fowlers;call light within reach;encouraged to call for assist;exit alarm on;with nsg;heels elevated  -SD               User Key  (r) = Recorded By, (t) = Taken By,  (c) = Cosigned By      Initials Name Provider Type    Vero Cardenas, PT Physical Therapist                   Outcome Measures       Row Name 06/05/24 1451 06/05/24 1400       How much help from another person do you currently need...    Turning from your back to your side while in flat bed without using bedrails? 3  -SD 3  -AM    Moving from lying on back to sitting on the side of a flat bed without bedrails? 3  -SD 3  -AM    Moving to and from a bed to a chair (including a wheelchair)? 3  -SD 3  -AM    Standing up from a chair using your arms (e.g., wheelchair, bedside chair)? 3  -SD 3  -AM    Climbing 3-5 steps with a railing? 3  -SD 3  -AM    To walk in hospital room? 3  -SD 3  -AM    AM-PAC 6 Clicks Score (PT) 18  -SD 18  -AM    Highest Level of Mobility Goal 6 --> Walk 10 steps or more  -SD 6 --> Walk 10 steps or more  -AM      Row Name 06/05/24 1230 06/05/24 1020       How much help from another person do you currently need...    Turning from your back to your side while in flat bed without using bedrails? 3  -AM 3  -AM    Moving from lying on back to sitting on the side of a flat bed without bedrails? 3  -AM 3  -AM    Moving to and from a bed to a chair (including a wheelchair)? 3  -AM 3  -AM    Standing up from a chair using your arms (e.g., wheelchair, bedside chair)? 3  -AM 3  -AM    Climbing 3-5 steps with a railing? 3  -AM 3  -AM    To walk in hospital room? 3  -AM 3  -AM    AM-PAC 6 Clicks Score (PT) 18  -AM 18  -AM    Highest Level of Mobility Goal 6 --> Walk 10 steps or more  -AM 6 --> Walk 10 steps or more  -AM      Row Name 06/05/24 0815          How much help from another person do you currently need...    Turning from your back to your side while in flat bed without using bedrails? 3  -AM     Moving from lying on back to sitting on the side of a flat bed without bedrails? 3  -AM     Moving to and from a bed to a chair (including a wheelchair)? 3  -AM     Standing up from a chair using  your arms (e.g., wheelchair, bedside chair)? 3  -AM     Climbing 3-5 steps with a railing? 3  -AM     To walk in hospital room? 3  -AM     AM-PAC 6 Clicks Score (PT) 18  -AM     Highest Level of Mobility Goal 6 --> Walk 10 steps or more  -AM       Row Name 06/05/24 1451          Functional Assessment    Outcome Measure Options AM-PAC 6 Clicks Basic Mobility (PT)  -SD               User Key  (r) = Recorded By, (t) = Taken By, (c) = Cosigned By      Initials Name Provider Type    SD Vero Hollis, PT Physical Therapist    Leroy Bland, RN Registered Nurse                                 Physical Therapy Education       Title: PT OT SLP Therapies (In Progress)       Topic: Physical Therapy (In Progress)       Point: Mobility training (In Progress)       Learning Progress Summary             Patient Acceptance, E, NR,NL by SD at 6/5/2024 1451    Acceptance, E, NL by LM at 6/1/2024 1014                         Point: Home exercise program (In Progress)       Learning Progress Summary             Patient Acceptance, E, NR,NL by SD at 6/5/2024 1451                         Point: Body mechanics (In Progress)       Learning Progress Summary             Patient Acceptance, E, NR,NL by SD at 6/5/2024 1451                         Point: Precautions (In Progress)       Learning Progress Summary             Patient Acceptance, E, NR,NL by SD at 6/5/2024 1451    Acceptance, E, NL by LM at 6/1/2024 1014                                         User Key       Initials Effective Dates Name Provider Type Discipline    SD 03/13/23 -  Vero Hollis, PT Physical Therapist PT    LM 07/11/23 -  Narcisa Andrade PT Physical Therapist PT                  PT Recommendation and Plan     Plan of Care Reviewed With: patient  Progress: improving  Outcome Evaluation: Pt amb in hallway with Rw and Hakeem due to varying gait speeds. Pt dem increased forward trunk flexion with fatigue and needed cues to keep RW close. Pt justin BLE and BUE  therex, reported feeling better after session. Pt remains below baseline level of function for mobility and IPPT services continue to be warranted at this time.     Time Calculation:         PT Charges       Row Name 06/05/24 1451             Time Calculation    Start Time 1417  -SD      PT Received On 06/05/24  -SD      PT Goal Re-Cert Due Date 06/11/24  -SD         Time Calculation- PT    Total Timed Code Minutes- PT 25 minute(s)  -SD         Timed Charges    75246 - PT Therapeutic Exercise Minutes 9  -SD      74987 - Gait Training Minutes  16  -SD         Total Minutes    Timed Charges Total Minutes 25  -SD       Total Minutes 25  -SD                User Key  (r) = Recorded By, (t) = Taken By, (c) = Cosigned By      Initials Name Provider Type    SD Vero Hollis, PT Physical Therapist                  Therapy Charges for Today       Code Description Service Date Service Provider Modifiers Qty    53664114030 HC PT THER PROC EA 15 MIN 6/5/2024 Vero Hollis, PT GP 1    41371313538 HC GAIT TRAINING EA 15 MIN 6/5/2024 Vero Hollis, PT GP 1            PT G-Codes  Outcome Measure Options: AM-PAC 6 Clicks Basic Mobility (PT)  AM-PAC 6 Clicks Score (PT): 18  AM-PAC 6 Clicks Score (OT): 9  PT Discharge Summary  Anticipated Discharge Disposition (PT): assisted living    Vero Hollis PT  6/5/2024

## 2024-06-05 NOTE — PLAN OF CARE
Goal Outcome Evaluation:  Plan of Care Reviewed With: patient        Progress: improving  Outcome Evaluation: Pt amb in hallway with Rw and Hakeem due to varying gait speeds. Pt dem increased forward trunk flexion with fatigue and needed cues to keep RW close. Pt justin BLE and SERA therex, reported feeling better after session. Pt remains below baseline level of function for mobility and IPPT services continue to be warranted at this time.      Anticipated Discharge Disposition (PT): assisted living

## 2024-06-06 VITALS
BODY MASS INDEX: 29.32 KG/M2 | DIASTOLIC BLOOD PRESSURE: 69 MMHG | SYSTOLIC BLOOD PRESSURE: 136 MMHG | TEMPERATURE: 97.5 F | RESPIRATION RATE: 18 BRPM | HEART RATE: 76 BPM | HEIGHT: 62 IN | WEIGHT: 159.3 LBS | OXYGEN SATURATION: 96 %

## 2024-06-06 PROCEDURE — 99239 HOSP IP/OBS DSCHRG MGMT >30: CPT | Performed by: FAMILY MEDICINE

## 2024-06-06 RX ORDER — PANTOPRAZOLE SODIUM 40 MG/1
40 TABLET, DELAYED RELEASE ORAL DAILY
Qty: 30 TABLET | Refills: 10 | Status: SHIPPED | OUTPATIENT
Start: 2024-06-06

## 2024-06-06 RX ADMIN — MEMANTINE 5 MG: 10 TABLET ORAL at 09:23

## 2024-06-06 RX ADMIN — BUSPIRONE HYDROCHLORIDE 10 MG: 10 TABLET ORAL at 09:23

## 2024-06-06 RX ADMIN — AMLODIPINE BESYLATE 2.5 MG: 2.5 TABLET ORAL at 09:23

## 2024-06-06 RX ADMIN — METOPROLOL TARTRATE 25 MG: 25 TABLET, FILM COATED ORAL at 09:22

## 2024-06-06 RX ADMIN — QUETIAPINE FUMARATE 75 MG: 25 TABLET ORAL at 09:23

## 2024-06-06 RX ADMIN — TAMSULOSIN HYDROCHLORIDE 0.8 MG: 0.4 CAPSULE ORAL at 09:23

## 2024-06-06 RX ADMIN — CITALOPRAM HYDROBROMIDE 10 MG: 10 TABLET ORAL at 09:22

## 2024-06-06 RX ADMIN — Medication 5000 UNITS: at 09:24

## 2024-06-06 RX ADMIN — APIXABAN 5 MG: 5 TABLET, FILM COATED ORAL at 09:23

## 2024-06-06 NOTE — DISCHARGE SUMMARY
[facility] Medicine Services  DISCHARGE SUMMARY    Patient Name: Dorota Membreno  : 10/31/1931  MRN: [id number]    Date of Admission: 2024  1:59 PM  Date of Discharge:  24    Primary Care Physician: Anthony Medrano MD    Consults       No orders found from 2024 to 2024.            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          Hospital Course:  Dorota Membreno is a 89 y.o. female with history of GERD, Alzheimer's disease, GCA, asthma, colon cancer, HTN, HLD, hypothyroid and anxiety presents with AMS and a UTI, complicated by urinary retention.     This patient's problems and plans were partially entered by my partner and updated as appropriate by me 24.     Assessment/plan:  Pt is new to me today      UTI  Urinary retention  - 100K mixed hunter  - showing clinical improvement on rocephin, completed 7-day course  -has required intermittent in/out caths at least daily.   -Buck catheter was placed but removed on 2024 prior to discharge  -Instructions for bladder scan every 8 hours and In-N-Out cath as needed for residual greater than 150 mL  -Follow-up with PCP next week to discuss urinary retention  -Continue Flomax     AMS (due to uti and sleep deprivation)  Dementia  - CT head negative for acute changes, suspect UTI contributing to AMS  - didn't require geodon last night (did evening of ); currently out of restraints.   -Increased Seroquel to 100 mg nightly and patient did improve with that, once back at nursing facility would resume regular dosing and increase as needed if continues to have  insomnia/agitation     KRYSTEN, resolved     Hypothyroid  -continue synthroid; tsh ok     Atrial fibrillation  - eliquis  - continue metoprolol      Discharge Follow Up Recommendations for outpatient labs/diagnostics:  Follow-up with PCP in 1 to 2 weeks to discuss urinary retention and UTI    Day of Discharge     HPI:   Patient feeling okay today and denies new complaints.  She is pleasant and redirectable.  Facility is agreeable to accept her back today.    Review of Systems  No fever, tolerating p.o.  No chest pain.  Buck removed.    Vital Signs:   Temp:  [98.1 °F (36.7 °C)-98.6 °F (37 °C)] 98.1 °F (36.7 °C)  Heart Rate:  [68-90] 68  Resp:  [14-18] 18  BP: (113-149)/(41-60) 122/54  Flow (L/min):  [2] 2      Physical Exam:  Constitutional: No acute distress, awake, alert  HENT: NCAT, mucous membranes moist  Respiratory: Clear to auscultation bilaterally, respiratory effort normal   Cardiovascular: RRR  Gastrointestinal: Positive bowel sounds, soft, nontender, nondistended  Musculoskeletal: Lying in bed, awake  Psychiatric: Appropriate affect, cooperative  Neurologic: Generally weak in all extremities, Cranial Nerves grossly intact to confrontation, speech clear  Skin: No rashes      Pertinent  and/or Most Recent Results     LAB RESULTS:      Lab 06/04/24  0543 06/02/24  0558 06/01/24  0510 05/31/24  0617 05/31/24  0048 05/30/24  2222 05/30/24  1532   WBC 8.39 9.88 12.87*  --  19.40*  --   --    HEMOGLOBIN 12.0 10.6* 10.5*  --  12.7  --   --    HEMATOCRIT 36.4 33.6* 32.3*  --  38.7  --   --    PLATELETS 252 174 192  --  291  --   --    NEUTROS ABS  --   --  9.19*  --  16.09*  --   --    IMMATURE GRANS (ABS)  --   --  0.05  --  0.09*  --   --    LYMPHS ABS  --   --  1.87  --  1.77  --   --    MONOS ABS  --   --  1.45*  --  1.41*  --   --    EOS ABS  --   --  0.28  --  0.01  --   --    MCV 86.1 89.6 89.7  --  87.8  --   --    PROCALCITONIN  --   --   --   --   --   --  0.05   LACTATE  --   --   --  1.8 2.7* 3.5* 3.5*          Lab 06/05/24  0429 06/04/24  0543 06/02/24  1158 06/02/24  0002 06/01/24  0510 05/31/24  0048 05/30/24  1532   SODIUM 136 137 140  --  139 140 141   POTASSIUM 3.7 3.7 4.4 4.5 3.2* 3.5 4.7   CHLORIDE 96* 98 106  --  101 100 100   CO2 27.0 27.0 27.0  --  28.0 26.0 26.8   ANION GAP 13.0 12.0 7.0  --  10.0 14.0 14.2   BUN 11 11 18  --  17 25* 36*   CREATININE 0.64 0.66 0.70  --  0.82 1.00 1.21*   EGFR 83.0 82.4 81.3  --  67.2 53.0* 42.1*   GLUCOSE 86 82 98  --  91 89 116*   CALCIUM 8.3 8.8 8.1*  --  8.0* 9.1 9.7*   MAGNESIUM 1.9 1.3*  --   --   --   --  1.9   TSH  --   --   --   --   --   --  2.040         Lab 05/30/24  1532   TOTAL PROTEIN 7.4   ALBUMIN 3.9   GLOBULIN 3.5   ALT (SGPT) 7   AST (SGOT) 25   BILIRUBIN 0.5   ALK PHOS 145*         Lab 05/30/24  1532   PROBNP 494.0   HSTROP T 13                 Brief Urine Lab Results  (Last result in the past 365 days)        Color   Clarity   Blood   Leuk Est   Nitrite   Protein   CREAT   Urine HCG        05/30/24 1452 Yellow   Cloudy   Moderate (2+)   Moderate (2+)   Negative   100 mg/dL (2+)                 Microbiology Results (last 10 days)       Procedure Component Value - Date/Time    Blood Culture - Blood, Arm, Right [[id number]]  (Normal) Collected: 05/30/24 1609    Lab Status: Final result Specimen: Blood from Arm, Right Updated: 06/04/24 1900     Blood Culture No growth at 5 days    Narrative:      Pediatric Bottle Only        Blood Culture - Blood, Arm, Left [[id number]]  (Normal) Collected: 05/30/24 1609    Lab Status: Final result Specimen: Blood from Arm, Left Updated: 06/04/24 1915     Blood Culture No growth at 5 days    Narrative:      Pediatric Bottle Only        COVID PRE-OP / PRE-PROCEDURE SCREENING ORDER (NO ISOLATION) - Swab, Nasopharynx [[id number]]  (Normal) Collected: 05/30/24 1452    Lab Status: Final result Specimen: Swab from Nasopharynx Updated: 05/30/24 1617    Narrative:      The following orders were created for panel order COVID PRE-OP  / PRE-PROCEDURE SCREENING ORDER (NO ISOLATION) - Swab, Nasopharynx.  Procedure                               Abnormality         Status                     ---------                               -----------         ------                     COVID-19, FLU A/B, RSV P...[[id number]]  Normal              Final result                 Please view results for these tests on the individual orders.    COVID-19, FLU A/B, RSV PCR 1 HR TAT - Swab, Nasopharynx [[id number]]  (Normal) Collected: 05/30/24 1452    Lab Status: Final result Specimen: Swab from Nasopharynx Updated: 05/30/24 1617     COVID19 Not Detected     Influenza A PCR Not Detected     Influenza B PCR Not Detected     RSV, PCR Not Detected    Narrative:      Fact sheet for providers: https://www.fda.gov/media/848749/download    Fact sheet for patients: https://www.fda.gov/media/946864/download    Test performed by PCR.    Urine Culture - Urine, Urine, Catheter [[id number]] Collected: 05/30/24 1452    Lab Status: Final result Specimen: Urine, Catheter Updated: 06/01/24 1459     Urine Culture >100,000 CFU/mL Mixed Kimberli Isolated    Narrative:      Specimen contains mixed organisms of questionable pathogenicity suggestive of contamination. If symptoms persist, suggest recollection.  Colonization of the urinary tract without infection is common. Treatment is discouraged unless the patient is symptomatic, pregnant, or undergoing an invasive urologic procedure.            CT Head Without Contrast    Result Date: 5/30/2024  CT HEAD WO CONTRAST Date of Exam: 5/30/2024 4:18 PM EDT Indication: ams. Comparison: 4/7/2018 Technique: Axial CT images were obtained of the head without contrast administration.  Automated exposure control and iterative construction methods were used. Findings: No intracranial hemorrhage. Gray-white matter differentiation is maintained without evidence of an acute infarction. Multiple foci of decreased attenuation are present within the subcortical,  deep cerebral, and periventricular white matter consistent with chronic small vessel/microangiopathic ischemic changes. No extra-axial mass or collection. The ventricles and sulci are prominent commensurate with involutional changes. The posterior fossa appears grossly normal. Sellar and suprasellar structures are normal. Lens replacements. The paranasal sinuses, ethmoid air cells, and mastoid air cells are aerated. The bony calvarium is intact.     Impression: No acute intracranial pathology. Electronically Signed: Mina Larkin MD  5/30/2024 4:30 PM EDT  Workstation ID: [id number]    XR Chest 1 View    Result Date: 5/30/2024  XR CHEST 1 VW Date of Exam: 5/30/2024 2:14 PM EDT Indication: ams Comparison 7/11/2023 Findings: There is unchanged moderately severe elevation of the right diaphragm. Mildly prominent interstitial pattern appears stable and chronic. Heart and pulmonary vessels appear within normal limits. There are no acute infiltrates or effusions.     Impression: Chronic findings. No acute process. Electronically Signed: Rachel Hood MD  5/30/2024 2:33 PM EDT  Workstation ID: [id number]             Results for orders placed during the hospital encounter of 02/26/23    Adult Transthoracic Echo Complete w/ Color, Spectral and Contrast if Necessary Per Protocol    Interpretation Summary    Left ventricular ejection fraction appears to be 61 - 65%.    Left ventricular diastolic function was normal.    Left atrial volume is moderately increased.    Moderate tricuspid valve regurgitation is present.    Estimated right ventricular systolic pressure from tricuspid regurgitation is moderately elevated (45-55 mmHg). Calculated right ventricular systolic pressure from tricuspid regurgitation is 47 mmHg.      Plan for Follow-up of Pending Labs/Results: No pending labs    Discharge Details        Discharge Medications        Changes to Medications        Instructions Start Date   levothyroxine 75 MCG tablet  Commonly  known as: SYNTHROID, LEVOTHROID  What changed: Another medication with the same name was removed. Continue taking this medication, and follow the directions you see here.   GIVE 1 TABLET BY MOUTH EVERY MORNING      memantine 5 MG tablet  Commonly known as: NAMENDA  What changed: See the new instructions.   GIVE 1 TABLET BY MOUTH TWICE A DAY      pantoprazole 40 MG EC tablet  Commonly known as: PROTONIX  What changed: See the new instructions.   40 mg, Oral, Daily             Continue These Medications        Instructions Start Date   acetaminophen 325 MG tablet  Commonly known as: TYLENOL   650 mg, Oral, Every 6 Hours PRN      amLODIPine 2.5 MG tablet  Commonly known as: NORVASC   2.5 mg, Oral, Daily      atorvastatin 20 MG tablet  Commonly known as: LIPITOR   20 mg, Oral, Nightly      busPIRone 10 MG tablet  Commonly known as: BUSPAR   10 mg, Oral, 2 Times Daily      citalopram 20 MG tablet  Commonly known as: CeleXA   GIVE 1 TABLET BY MOUTH EVERY MORNING      donepezil 10 MG tablet  Commonly known as: ARICEPT   GIVE 1 TABLET BY MOUTH DAILY AT BEDTIME      Eliquis 5 MG tablet tablet  Generic drug: apixaban   GIVE 1 TABLET BY MOUTH TWICE A DAY      hydrOXYzine 25 MG tablet  Commonly known as: ATARAX   GIVE 1 TABLET BY MOUTH 3 TIMES A DAY AS NEEDED FOR ANXEITY      ipratropium-albuterol 0.5-2.5 mg/3 ml nebulizer  Commonly known as: DUO-NEB   3 mL, Nebulization, Every 4 Hours PRN      MAGnesium-Oxide 400 (240 Mg) MG tablet  Generic drug: Magnesium Oxide -Mg Supplement   GIVE 1 TABLET BY MOUTH DAILY      metoprolol tartrate 25 MG tablet  Commonly known as: LOPRESSOR   GIVE 1 TABLET BY MOUTH TWICE A DAY      montelukast 10 MG tablet  Commonly known as: SINGULAIR   GIVE 1 TABLET BY MOUTH DAILY AT BEDTIME      ondansetron 4 MG tablet  Commonly known as: ZOFRAN   GIVE 1 TABLET BY MOUTH EVERY 12 HOURS AS NEEDED      polyethylene glycol 17 g packet  Commonly known as: MIRALAX   17 g, Oral, Daily      QUEtiapine 50 MG  tablet  Commonly known as: SEROquel   GIVE 1 TABLET BY MOUTH TWICE A DAY      sennosides-docusate 8.6-50 MG per tablet  Commonly known as: PERICOLACE   2 tablets, Oral, 2 Times Daily      tamsulosin 0.4 MG capsule 24 hr capsule  Commonly known as: FLOMAX   0.4 mg, Oral, Daily      vitamin D3 125 MCG (5000 UT) tablet tablet   GIVE 1 TABLET BY MOUTH DAILY      Vitamin D3 50 MCG (2000 UT) tablet   GIVE 1 TABLET BY MOUTH DAILY               Allergies   Allergen Reactions    Codeine     Dyazide [Hydrochlorothiazide W-Triamterene] Unknown - Low Severity    Levofloxacin     Penicillins Unknown - Low Severity         Discharge Disposition:  Skilled Nursing Facility (DC - External)    Diet:  Hospital:  Diet Order   Procedures    Diet: Cardiac; Healthy Heart (2-3 Na+); Fluid Consistency: Thin (IDDSI 0)       Diet Instructions       Diet: Regular/House Diet; Regular (IDDSI 7); Thin (IDDSI 0)      Discharge Diet: Regular/House Diet    Texture: Regular (IDDSI 7)    Fluid Consistency: Thin (IDDSI 0)             Activity:  Activity Instructions       Activity as Tolerated      Other Activity Instructions      Activity Instructions: bladder scan every 8 hours and do I/O cath if residual is >150ml            Restrictions or Other Recommendations:  None       CODE STATUS:    Code Status and Medical Interventions:   Ordered at: 05/31/24 1404     Medical Intervention Limits:    NO intubation (DNI)     Level Of Support Discussed With:    Next of Kin (If No Surrogate)    Health Care Surrogate     Code Status (Patient has no pulse and is not breathing):    No CPR (Do Not Attempt to Resuscitate)     Medical Interventions (Patient has pulse or is breathing):    Limited Support       Future Appointments   Date Time Provider Department [place]   6/6/2024  6:00 PM MED 8  [facility]       Additional Instructions for the Follow-ups that You Need to Schedule       Discharge Follow-up with PCP   As directed       Currently Documented PCP:     Anthony Medrano MD    PCP Phone Number:    [phone]     Follow Up Details: 1-2 weeks to review medications and discuss urinary retention                      Lety Moreau MD  06/06/24      Time Spent on Discharge:  I spent  32  minutes on this discharge activity which included: face-to-face encounter with the patient, reviewing the data in the system, coordination of the care with the nursing staff as well as consultants, documentation, and entering orders.

## 2024-06-06 NOTE — DISCHARGE PLACEMENT REQUEST
"Dorota Vogt (89 y.o. Female)       Date of Birth   10/31/1931    Social Security Number       Address   [facility] [place] [address]    Home Phone   [phone]    MRN   [id number]       Yarsani   None    Marital Status                               Admission Date   5/30/24    Admission Type   Emergency    Admitting Provider   Lety Moreau MD    Attending Provider   Lety Moreau MD    Department, Room/Bed   [address], S572/1       Discharge Date       Discharge Disposition   Skilled Nursing Facility (DC - External)    Discharge Destination                                 Attending Provider: Lety Moreau MD    Allergies: Codeine, Dyazide [Hydrochlorothiazide W-triamterene], Levofloxacin, Penicillins    Isolation: None   Infection: None   Code Status: No CPR    Ht: 157.5 cm (62\")   Wt: 72.3 kg (159 lb 4.8 oz)    Admission Cmt: None   Principal Problem: Complicated UTI (urinary tract infection) [N39.0]                   Active Insurance as of 5/30/2024       Primary Coverage       Payor Plan Insurance Group Employer/Plan Group    MEDICARE MEDICARE A & B        Payor Plan Address Payor Plan Phone Number Payor Plan Fax Number Effective Dates    [address] [phone]  10/1/1996 - None Entered    [facility] [id number]         Subscriber Name Subscriber Birth Date Member ID       DOROTA VOGT 10/31/1931 [id number]               Secondary Coverage       Payor Plan Insurance Group Employer/Plan Group    [facility] [id number]       Payor Plan Address Payor Plan Phone Number Payor Plan Fax Number Effective Dates    [address] [address]   1/1/2016 - None Entered    [facility] [id number]         Subscriber Name Subscriber Birth Date Member ID       DOROTA VOGT 10/31/1931 [id number]                     Emergency Contacts        (Rel.) Home Phone Work Phone Mobile Phone    Jovana Vogt (Daughter) [phone] -- [phone] "    Carlos Membreno (Son) [phone] -- [phone]                 Discharge Summary        Lety Moreau MD at 24 1513              [facility] Medicine Services  DISCHARGE SUMMARY    Patient Name: Dorota Membreno  : 10/31/1931  MRN: [id number]    Date of Admission: 2024  1:59 PM  Date of Discharge:  24    Primary Care Physician: Anthony Medrano MD    Consults       No orders found from 2024 to 2024.            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          Hospital Course:  Dorota Membreno is a 89 y.o. female with history of GERD, Alzheimer's disease, GCA, asthma, colon cancer, HTN, HLD, hypothyroid and anxiety presents with AMS and a UTI, complicated by urinary retention.     This patient's problems and plans were partially entered by my partner and updated as appropriate by me 24.     Assessment/plan:  Pt is new to me today      UTI  Urinary retention  - 100K mixed hunter  - showing clinical improvement on rocephin, completed 7-day course  -has required intermittent in/out caths at least daily.   -Buck catheter was placed but removed on 2024 prior to discharge  -Instructions for bladder scan every 8 hours and In-N-Out cath as needed for residual greater than 150 mL  -Follow-up with PCP next week to discuss urinary retention  -Continue Flomax     AMS (due to uti and sleep deprivation)  Dementia  - CT head negative for acute changes, suspect UTI contributing to AMS  - didn't require geodon last night (did evening of ); currently out of restraints.   -Increased Seroquel to 100 mg nightly and  patient did improve with that, once back at nursing facility would resume regular dosing and increase as needed if continues to have insomnia/agitation     KRYSTEN, resolved     Hypothyroid  -continue synthroid; tsh ok     Atrial fibrillation  - eliquis  - continue metoprolol      Discharge Follow Up Recommendations for outpatient labs/diagnostics:  Follow-up with PCP in 1 to 2 weeks to discuss urinary retention and UTI    Day of Discharge     HPI:   Patient feeling okay today and denies new complaints.  She is pleasant and redirectable.  Facility is agreeable to accept her back today.    Review of Systems  No fever, tolerating p.o.  No chest pain.  Buck removed.    Vital Signs:   Temp:  [98.1 °F (36.7 °C)-98.6 °F (37 °C)] 98.1 °F (36.7 °C)  Heart Rate:  [68-90] 68  Resp:  [14-18] 18  BP: (113-149)/(41-60) 122/54  Flow (L/min):  [2] 2      Physical Exam:  Constitutional: No acute distress, awake, alert  HENT: NCAT, mucous membranes moist  Respiratory: Clear to auscultation bilaterally, respiratory effort normal   Cardiovascular: RRR  Gastrointestinal: Positive bowel sounds, soft, nontender, nondistended  Musculoskeletal: Lying in bed, awake  Psychiatric: Appropriate affect, cooperative  Neurologic: Generally weak in all extremities, Cranial Nerves grossly intact to confrontation, speech clear  Skin: No rashes      Pertinent  and/or Most Recent Results     LAB RESULTS:      Lab 06/04/24  0543 06/02/24  0558 06/01/24  0510 05/31/24  0617 05/31/24  0048 05/30/24  2222 05/30/24  1532   WBC 8.39 9.88 12.87*  --  19.40*  --   --    HEMOGLOBIN 12.0 10.6* 10.5*  --  12.7  --   --    HEMATOCRIT 36.4 33.6* 32.3*  --  38.7  --   --    PLATELETS 252 174 192  --  291  --   --    NEUTROS ABS  --   --  9.19*  --  16.09*  --   --    IMMATURE GRANS (ABS)  --   --  0.05  --  0.09*  --   --    LYMPHS ABS  --   --  1.87  --  1.77  --   --    MONOS ABS  --   --  1.45*  --  1.41*  --   --    EOS ABS  --   --  0.28  --  0.01  --   --    MCV  86.1 89.6 89.7  --  87.8  --   --    PROCALCITONIN  --   --   --   --   --   --  0.05   LACTATE  --   --   --  1.8 2.7* 3.5* 3.5*         Lab 06/05/24  0429 06/04/24  0543 06/02/24  1158 06/02/24  0002 06/01/24  0510 05/31/24  0048 05/30/24  1532   SODIUM 136 137 140  --  139 140 141   POTASSIUM 3.7 3.7 4.4 4.5 3.2* 3.5 4.7   CHLORIDE 96* 98 106  --  101 100 100   CO2 27.0 27.0 27.0  --  28.0 26.0 26.8   ANION GAP 13.0 12.0 7.0  --  10.0 14.0 14.2   BUN 11 11 18  --  17 25* 36*   CREATININE 0.64 0.66 0.70  --  0.82 1.00 1.21*   EGFR 83.0 82.4 81.3  --  67.2 53.0* 42.1*   GLUCOSE 86 82 98  --  91 89 116*   CALCIUM 8.3 8.8 8.1*  --  8.0* 9.1 9.7*   MAGNESIUM 1.9 1.3*  --   --   --   --  1.9   TSH  --   --   --   --   --   --  2.040         Lab 05/30/24  1532   TOTAL PROTEIN 7.4   ALBUMIN 3.9   GLOBULIN 3.5   ALT (SGPT) 7   AST (SGOT) 25   BILIRUBIN 0.5   ALK PHOS 145*         Lab 05/30/24  1532   PROBNP 494.0   HSTROP T 13                 Brief Urine Lab Results  (Last result in the past 365 days)        Color   Clarity   Blood   Leuk Est   Nitrite   Protein   CREAT   Urine HCG        05/30/24 1452 Yellow   Cloudy   Moderate (2+)   Moderate (2+)   Negative   100 mg/dL (2+)                 Microbiology Results (last 10 days)       Procedure Component Value - Date/Time    Blood Culture - Blood, Arm, Right [[id number]]  (Normal) Collected: 05/30/24 1609    Lab Status: Final result Specimen: Blood from Arm, Right Updated: 06/04/24 1900     Blood Culture No growth at 5 days    Narrative:      Pediatric Bottle Only        Blood Culture - Blood, Arm, Left [[id number]]  (Normal) Collected: 05/30/24 1609    Lab Status: Final result Specimen: Blood from Arm, Left Updated: 06/04/24 1915     Blood Culture No growth at 5 days    Narrative:      Pediatric Bottle Only        COVID PRE-OP / PRE-PROCEDURE SCREENING ORDER (NO ISOLATION) - Swab, Nasopharynx [[id number]]  (Normal) Collected: 05/30/24 1452    Lab Status: Final result  Specimen: Swab from Nasopharynx Updated: 05/30/24 1617    Narrative:      The following orders were created for panel order COVID PRE-OP / PRE-PROCEDURE SCREENING ORDER (NO ISOLATION) - Swab, Nasopharynx.  Procedure                               Abnormality         Status                     ---------                               -----------         ------                     COVID-19, FLU A/B, RSV P...[[id number]]  Normal              Final result                 Please view results for these tests on the individual orders.    COVID-19, FLU A/B, RSV PCR 1 HR TAT - Swab, Nasopharynx [[id number]]  (Normal) Collected: 05/30/24 1452    Lab Status: Final result Specimen: Swab from Nasopharynx Updated: 05/30/24 1617     COVID19 Not Detected     Influenza A PCR Not Detected     Influenza B PCR Not Detected     RSV, PCR Not Detected    Narrative:      Fact sheet for providers: https://www.fda.gov/media/751396/download    Fact sheet for patients: https://www.fda.gov/media/226133/download    Test performed by PCR.    Urine Culture - Urine, Urine, Catheter [[id number]] Collected: 05/30/24 1452    Lab Status: Final result Specimen: Urine, Catheter Updated: 06/01/24 1459     Urine Culture >100,000 CFU/mL Mixed Kimberli Isolated    Narrative:      Specimen contains mixed organisms of questionable pathogenicity suggestive of contamination. If symptoms persist, suggest recollection.  Colonization of the urinary tract without infection is common. Treatment is discouraged unless the patient is symptomatic, pregnant, or undergoing an invasive urologic procedure.            CT Head Without Contrast    Result Date: 5/30/2024  CT HEAD WO CONTRAST Date of Exam: 5/30/2024 4:18 PM EDT Indication: ams. Comparison: 4/7/2018 Technique: Axial CT images were obtained of the head without contrast administration.  Automated exposure control and iterative construction methods were used. Findings: No intracranial hemorrhage. Gray-white matter  differentiation is maintained without evidence of an acute infarction. Multiple foci of decreased attenuation are present within the subcortical, deep cerebral, and periventricular white matter consistent with chronic small vessel/microangiopathic ischemic changes. No extra-axial mass or collection. The ventricles and sulci are prominent commensurate with involutional changes. The posterior fossa appears grossly normal. Sellar and suprasellar structures are normal. Lens replacements. The paranasal sinuses, ethmoid air cells, and mastoid air cells are aerated. The bony calvarium is intact.     Impression: No acute intracranial pathology. Electronically Signed: Mina Larkin MD  5/30/2024 4:30 PM EDT  Workstation ID: [id number]    XR Chest 1 View    Result Date: 5/30/2024  XR CHEST 1 VW Date of Exam: 5/30/2024 2:14 PM EDT Indication: ams Comparison 7/11/2023 Findings: There is unchanged moderately severe elevation of the right diaphragm. Mildly prominent interstitial pattern appears stable and chronic. Heart and pulmonary vessels appear within normal limits. There are no acute infiltrates or effusions.     Impression: Chronic findings. No acute process. Electronically Signed: Rachel Hood MD  5/30/2024 2:33 PM EDT  Workstation ID: [id number]             Results for orders placed during the hospital encounter of 02/26/23    Adult Transthoracic Echo Complete w/ Color, Spectral and Contrast if Necessary Per Protocol    Interpretation Summary    Left ventricular ejection fraction appears to be 61 - 65%.    Left ventricular diastolic function was normal.    Left atrial volume is moderately increased.    Moderate tricuspid valve regurgitation is present.    Estimated right ventricular systolic pressure from tricuspid regurgitation is moderately elevated (45-55 mmHg). Calculated right ventricular systolic pressure from tricuspid regurgitation is 47 mmHg.      Plan for Follow-up of Pending Labs/Results: No pending  labs    Discharge Details        Discharge Medications        Changes to Medications        Instructions Start Date   levothyroxine 75 MCG tablet  Commonly known as: SYNTHROID, LEVOTHROID  What changed: Another medication with the same name was removed. Continue taking this medication, and follow the directions you see here.   GIVE 1 TABLET BY MOUTH EVERY MORNING      memantine 5 MG tablet  Commonly known as: NAMENDA  What changed: See the new instructions.   GIVE 1 TABLET BY MOUTH TWICE A DAY      pantoprazole 40 MG EC tablet  Commonly known as: PROTONIX  What changed: See the new instructions.   40 mg, Oral, Daily             Continue These Medications        Instructions Start Date   acetaminophen 325 MG tablet  Commonly known as: TYLENOL   650 mg, Oral, Every 6 Hours PRN      amLODIPine 2.5 MG tablet  Commonly known as: NORVASC   2.5 mg, Oral, Daily      atorvastatin 20 MG tablet  Commonly known as: LIPITOR   20 mg, Oral, Nightly      busPIRone 10 MG tablet  Commonly known as: BUSPAR   10 mg, Oral, 2 Times Daily      citalopram 20 MG tablet  Commonly known as: CeleXA   GIVE 1 TABLET BY MOUTH EVERY MORNING      donepezil 10 MG tablet  Commonly known as: ARICEPT   GIVE 1 TABLET BY MOUTH DAILY AT BEDTIME      Eliquis 5 MG tablet tablet  Generic drug: apixaban   GIVE 1 TABLET BY MOUTH TWICE A DAY      hydrOXYzine 25 MG tablet  Commonly known as: ATARAX   GIVE 1 TABLET BY MOUTH 3 TIMES A DAY AS NEEDED FOR ANXEITY      ipratropium-albuterol 0.5-2.5 mg/3 ml nebulizer  Commonly known as: DUO-NEB   3 mL, Nebulization, Every 4 Hours PRN      MAGnesium-Oxide 400 (240 Mg) MG tablet  Generic drug: Magnesium Oxide -Mg Supplement   GIVE 1 TABLET BY MOUTH DAILY      metoprolol tartrate 25 MG tablet  Commonly known as: LOPRESSOR   GIVE 1 TABLET BY MOUTH TWICE A DAY      montelukast 10 MG tablet  Commonly known as: SINGULAIR   GIVE 1 TABLET BY MOUTH DAILY AT BEDTIME      ondansetron 4 MG tablet  Commonly known as: ZOFRAN   GIVE 1  TABLET BY MOUTH EVERY 12 HOURS AS NEEDED      polyethylene glycol 17 g packet  Commonly known as: MIRALAX   17 g, Oral, Daily      QUEtiapine 50 MG tablet  Commonly known as: SEROquel   GIVE 1 TABLET BY MOUTH TWICE A DAY      sennosides-docusate 8.6-50 MG per tablet  Commonly known as: PERICOLACE   2 tablets, Oral, 2 Times Daily      tamsulosin 0.4 MG capsule 24 hr capsule  Commonly known as: FLOMAX   0.4 mg, Oral, Daily      vitamin D3 125 MCG (5000 UT) tablet tablet   GIVE 1 TABLET BY MOUTH DAILY      Vitamin D3 50 MCG (2000 UT) tablet   GIVE 1 TABLET BY MOUTH DAILY               Allergies   Allergen Reactions    Codeine     Dyazide [Hydrochlorothiazide W-Triamterene] Unknown - Low Severity    Levofloxacin     Penicillins Unknown - Low Severity         Discharge Disposition:  Skilled Nursing Facility (DC - External)    Diet:  Hospital:  Diet Order   Procedures    Diet: Cardiac; Healthy Heart (2-3 Na+); Fluid Consistency: Thin (IDDSI 0)       Diet Instructions       Diet: Regular/House Diet; Regular (IDDSI 7); Thin (IDDSI 0)      Discharge Diet: Regular/House Diet    Texture: Regular (IDDSI 7)    Fluid Consistency: Thin (IDDSI 0)             Activity:  Activity Instructions       Activity as Tolerated      Other Activity Instructions      Activity Instructions: bladder scan every 8 hours and do I/O cath if residual is >150ml            Restrictions or Other Recommendations:  None       CODE STATUS:    Code Status and Medical Interventions:   Ordered at: 05/31/24 1404     Medical Intervention Limits:    NO intubation (DNI)     Level Of Support Discussed With:    Next of Kin (If No Surrogate)    Health Care Surrogate     Code Status (Patient has no pulse and is not breathing):    No CPR (Do Not Attempt to Resuscitate)     Medical Interventions (Patient has pulse or is breathing):    Limited Support       Future Appointments   Date Time Provider Department Center   6/6/2024  6:00 PM MED 8  [facility]        Additional Instructions for the Follow-ups that You Need to Schedule       Discharge Follow-up with PCP   As directed       Currently Documented PCP:    Anthony Medrano MD    PCP Phone Number:    [phone]     Follow Up Details: 1-2 weeks to review medications and discuss urinary retention                      Lety Moreau MD  06/06/24      Time Spent on Discharge:  I spent  32  minutes on this discharge activity which included: face-to-face encounter with the patient, reviewing the data in the system, coordination of the care with the nursing staff as well as consultants, documentation, and entering orders.            Electronically signed by Lety Moreau MD at 06/06/24 8746

## 2024-06-06 NOTE — CASE MANAGEMENT/SOCIAL WORK
Case Management Discharge Note      Final Note: Ms. Membreno will be discharging to SNF at [facility] today. She has [facility] stretcher transport scheduled for 1730. She is no CPR but unable to sign the DNR form for transport due to Alzheimer's and family [place] of [place]. She will have to be transported without the form. PCS form will be in [employer]. Pharmacy information has been updated in Epic. Nursing, please call report to [phone].  will fax discharge summary to [phone].  has updated [facility] liaison, Keely, with this plan, and she is agreeable. No other discharge needs were identified.    14:00 EDT  EMS stretcher transport has been switched to 1800.        Selected Continued Care - Admitted Since 5/30/2024       Destination Coordination complete.      Service Provider Selected Services Address Phone Fax Patient Preferred    [facility] [address], [facility] [address] [phone] --              Durable Medical Equipment    No services have been selected for the patient.                Dialysis/Infusion    No services have been selected for the patient.                Home Medical Care    No services have been selected for the patient.                Therapy    No services have been selected for the patient.                Community Resources    No services have been selected for the patient.                Community & DME    No services have been selected for the patient.                    Transportation Services  Ambulance: [facility]    Final Discharge Disposition Code: 03 - skilled nursing facility (SNF)

## 2024-06-12 ENCOUNTER — TELEPHONE (OUTPATIENT)
Dept: INTERNAL MEDICINE | Facility: CLINIC | Age: 89
End: 2024-06-12
Payer: MEDICARE

## 2024-06-12 NOTE — TELEPHONE ENCOUNTER
DAMIAN MENJIVAR CALLED AND MADE THE PT AN HOSP FU FOR 6/19/24 ARRIVING AT 12:45PM WITH , JUST GIVING A HEADS UP IN CASE THE PT NEEDS TO BE SEEN BY , PLEASE ADVISE

## 2024-06-13 RX ORDER — CLONAZEPAM 0.5 MG/1
TABLET ORAL
Qty: 1 TABLET | Refills: 0 | Status: SHIPPED | OUTPATIENT
Start: 2024-06-13